# Patient Record
Sex: FEMALE | Race: WHITE | NOT HISPANIC OR LATINO | Employment: OTHER | URBAN - METROPOLITAN AREA
[De-identification: names, ages, dates, MRNs, and addresses within clinical notes are randomized per-mention and may not be internally consistent; named-entity substitution may affect disease eponyms.]

---

## 2017-01-03 ENCOUNTER — TELEPHONE (OUTPATIENT)
Dept: CARDIOLOGY | Facility: CLINIC | Age: 77
End: 2017-01-03

## 2017-01-03 NOTE — TELEPHONE ENCOUNTER
----- Message from Leonilagreg Arredondo sent at 1/3/2017  4:23 PM CST -----  Pt needs a refill on her blood pressure meds called into [facility] [phone] and states the pharmacy sent it over on last Thursday.Pt states  she will run out tomorrow//please call [phone]

## 2017-01-06 RX ORDER — LISINOPRIL 40 MG/1
40 TABLET ORAL DAILY
Qty: 30 TABLET | Refills: 3 | Status: SHIPPED | OUTPATIENT
Start: 2017-01-06 | End: 2017-02-07 | Stop reason: SDUPTHER

## 2017-02-07 RX ORDER — LISINOPRIL 40 MG/1
TABLET ORAL
Qty: 30 TABLET | Refills: 0 | Status: SHIPPED | OUTPATIENT
Start: 2017-02-07 | End: 2017-03-07 | Stop reason: SDUPTHER

## 2017-03-09 RX ORDER — LISINOPRIL 40 MG/1
TABLET ORAL
Qty: 30 TABLET | Refills: 0 | Status: SHIPPED | OUTPATIENT
Start: 2017-03-09 | End: 2017-04-10 | Stop reason: SDUPTHER

## 2017-03-20 ENCOUNTER — HOSPITAL ENCOUNTER (OUTPATIENT)
Dept: RADIOLOGY | Facility: HOSPITAL | Age: 77
Discharge: HOME | End: 2017-03-20
Attending: FAMILY MEDICINE
Payer: MEDICARE

## 2017-03-20 ENCOUNTER — OFFICE VISIT (OUTPATIENT)
Dept: URGENT CARE | Facility: CLINIC | Age: 77
End: 2017-03-20
Payer: MEDICARE

## 2017-03-20 VITALS
HEIGHT: 65 IN | SYSTOLIC BLOOD PRESSURE: 126 MMHG | OXYGEN SATURATION: 97 % | TEMPERATURE: 97 F | DIASTOLIC BLOOD PRESSURE: 72 MMHG | WEIGHT: 191.38 LBS | BODY MASS INDEX: 31.89 KG/M2 | RESPIRATION RATE: 20 BRPM | HEART RATE: 79 BPM

## 2017-03-20 DIAGNOSIS — R05.9 COUGH: Primary | ICD-10-CM

## 2017-03-20 DIAGNOSIS — R05.9 COUGH: ICD-10-CM

## 2017-03-20 DIAGNOSIS — J01.00 ACUTE MAXILLARY SINUSITIS, RECURRENCE NOT SPECIFIED: ICD-10-CM

## 2017-03-20 PROCEDURE — 99999 PR PBB SHADOW E&M-EST. PATIENT-LVL V: CPT | Mod: PBBFAC,,, | Performed by: PHYSICIAN ASSISTANT

## 2017-03-20 PROCEDURE — 71020 XR CHEST PA AND LATERAL: CPT | Mod: 26,,, | Performed by: RADIOLOGY

## 2017-03-20 PROCEDURE — 99214 OFFICE O/P EST MOD 30 MIN: CPT | Mod: S$PBB,,, | Performed by: PHYSICIAN ASSISTANT

## 2017-03-20 PROCEDURE — 71020 XR CHEST PA AND LATERAL: CPT | Mod: TC,PO

## 2017-03-20 RX ORDER — BENZONATATE 200 MG/1
200 CAPSULE ORAL 3 TIMES DAILY PRN
Qty: 30 CAPSULE | Refills: 0 | Status: SHIPPED | OUTPATIENT
Start: 2017-03-20 | End: 2017-03-30

## 2017-03-20 RX ORDER — AMOXICILLIN AND CLAVULANATE POTASSIUM 875; 125 MG/1; MG/1
1 TABLET, FILM COATED ORAL 2 TIMES DAILY
Qty: 14 TABLET | Refills: 0 | Status: SHIPPED | OUTPATIENT
Start: 2017-03-20 | End: 2017-03-27

## 2017-03-20 RX ORDER — MECLIZINE HCL 12.5 MG 12.5 MG/1
12.5 TABLET ORAL
COMMUNITY
Start: 2016-12-20 | End: 2017-08-17

## 2017-03-20 RX ORDER — FLUTICASONE PROPIONATE 50 MCG
2 SPRAY, SUSPENSION (ML) NASAL
COMMUNITY
Start: 2017-01-24 | End: 2017-09-26 | Stop reason: CLARIF

## 2017-03-20 RX ORDER — TIZANIDINE 4 MG/1
4 TABLET ORAL NIGHTLY
Status: ON HOLD | COMMUNITY
Start: 2016-10-05 | End: 2023-08-17 | Stop reason: HOSPADM

## 2017-03-20 NOTE — PATIENT INSTRUCTIONS
Rest  Drink plenty of clear fluids--at least 64 ounces of water/juice  Normal saline nasal wash to irrigate sinuses and for congestion/runny nose  Cool mist humidifier/vaporizer  Practice good handwashing  Zyrtec or Claritin to help dry mucus and post nasal drip  Flonase to help dry mucus and post nasal drip  Mucinex or Mucinex DM for cough and chest congestion  Tylenol or Ibuprofen for fever, headache and body aches  Warm salt water gargles for throat comfort  Chloraseptic spray or lozenges for throat comfort  See PCP or go to ER if symptoms worsen or fail to improve with treatment.            Sinusitis (Antibiotic Treatment)    The sinuses are air-filled spaces within the bones of the face. They connect to the inside of the nose. Sinusitis is an inflammation of the tissue lining the sinus cavity. Sinus inflammation can occur during a cold. It can also be due to allergies to pollens and other particles in the air. Sinusitis can cause symptoms of sinus congestion and fullness. A sinus infection causes fever, headache and facial pain. There is often green or yellow drainage from the nose or into the back of the throat (post-nasal drip). You have been given antibiotics to treat this condition.  Home care:  · Take the full course of antibiotics as instructed. Do not stop taking them, even if you feel better.  · Drink plenty of water, hot tea, and other liquids. This may help thin mucus. It also may promote sinus drainage.  · Heat may help soothe painful areas of the face. Use a towel soaked in hot water. Or,  the shower and direct the hot spray onto your face. Using a vaporizer along with a menthol rub at night may also help.   · An expectorant containing guaifenesin may help thin the mucus and promote drainage from the sinuses.  · Over-the-counter decongestants may be used unless a similar medicine was prescribed. Nasal sprays work the fastest. Use one that contains phenylephrine or oxymetazoline. First  blow the nose gently. Then use the spray. Do not use these medicines more often than directed on the label or symptoms may get worse. You may also use tablets containing pseudoephedrine. Avoid products that combine ingredients, because side effects may be increased. Read labels. You can also ask the pharmacist for help. (NOTE: Persons with high blood pressure should not use decongestants. They can raise blood pressure.)  · Over-the-counter antihistamines may help if allergies contributed to your sinusitis.    · Do not use nasal rinses or irrigation during an acute sinus infection, unless told to by your health care provider. Rinsing may spread the infection to other sinuses.  · Use acetaminophen or ibuprofen to control pain, unless another pain medicine was prescribed. (If you have chronic liver or kidney disease or ever had a stomach ulcer, talk with your doctor before using these medicines. Aspirin should never be used in anyone under 18 years of age who is ill with a fever. It may cause severe liver damage.)  · Don't smoke. This can worsen symptoms.  Follow-up care  Follow up with your healthcare provider or our staff if you are not improving within the next week.  When to seek medical advice  Call your healthcare provider if any of these occur:  · Facial pain or headache becoming more severe  · Stiff neck  · Unusual drowsiness or confusion  · Swelling of the forehead or eyelids  · Vision problems, including blurred or double vision  · Fever of 100.4ºF (38ºC) or higher, or as directed by your healthcare provider  · Seizure  · Breathing problems  · Symptoms not resolving within 10 days  Date Last Reviewed: 4/13/2015  © 7325-0426 [employer]. [address] [place] [address], [place] [address]. All rights reserved. This information is not intended as a substitute for professional medical care. Always follow your healthcare professional's instructions.

## 2017-03-20 NOTE — PROGRESS NOTES
"Subjective:    Patient ID: Rebecca Rios is a 76 y.o. female.    Chief Complaint: Cough and Sinus Problem    URI    This is a new problem. The current episode started 1 to 4 weeks ago (1.5 weeks ago). Maximum temperature: subjective. Associated symptoms include congestion, coughing, diarrhea (once last week), headaches, rhinorrhea, sinus pain and sneezing. Pertinent negatives include no ear pain, sore throat, vomiting or wheezing. Treatments tried: Mucinex, Tessalon Perles, Claritin. The treatment provided mild relief.     Review of Systems   Constitutional: Positive for chills. Fever: subjective.   HENT: Positive for congestion, postnasal drip, rhinorrhea and sneezing. Negative for ear pain and sore throat.    Respiratory: Positive for cough. Negative for shortness of breath and wheezing.    Gastrointestinal: Positive for diarrhea (once last week). Negative for vomiting.   Neurological: Positive for headaches.     Objective:   /72 (BP Location: Left arm, Patient Position: Sitting, BP Method: Manual)  Pulse 79  Temp 96.8 °F (36 °C) (Tympanic)   Resp 20  Ht 5' 5" (1.651 m)  Wt 86.8 kg (191 lb 5.8 oz)  SpO2 97%  BMI 31.84 kg/m2    Physical Exam   Constitutional: She is oriented to person, place, and time. She appears well-developed and well-nourished.   HENT:   Head: Normocephalic and atraumatic.   Right Ear: Hearing, tympanic membrane, external ear and ear canal normal.   Left Ear: Hearing, tympanic membrane, external ear and ear canal normal.   Nose: Rhinorrhea present. Right sinus exhibits maxillary sinus tenderness. Left sinus exhibits maxillary sinus tenderness.   Mouth/Throat: Uvula is midline and oropharynx is clear and moist. No oropharyngeal exudate or posterior oropharyngeal erythema.   Eyes: Conjunctivae and EOM are normal.   Neck: Normal range of motion. Neck supple.   Cardiovascular: Normal rate, regular rhythm and normal heart sounds.    Pulmonary/Chest: Effort normal and breath sounds " normal. No respiratory distress. She has no wheezes.   Musculoskeletal: Normal range of motion.   Neurological: She is alert and oriented to person, place, and time.   Skin: Skin is warm and dry.   Nursing note and vitals reviewed.    Assessment:     1. Cough    2. Acute maxillary sinusitis, recurrence not specified      Plan:   Cough  -     X-Ray Chest PA And Lateral; Future; Expected date: 3/20/17  Findings: Heart size is normal.  Aorta is mildly tortuous and calcified.  Lungs are clear bilaterally.  Degenerative changes in the spine and shoulder girdle.    Acute maxillary sinusitis, recurrence not specified  -     amoxicillin-clavulanate 875-125mg (AUGMENTIN) 875-125 mg per tablet; Take 1 tablet by mouth 2 (two) times daily.  Dispense: 14 tablet; Refill: 0  -     benzonatate (TESSALON) 200 MG capsule; Take 1 capsule (200 mg total) by mouth 3 (three) times daily as needed for Cough.  Dispense: 30 capsule; Refill: 0      Discussed xray results with patient.  Rest  Drink plenty of clear fluids--at least 64 ounces of water/juice  Normal saline nasal wash to irrigate sinuses and for congestion/runny nose  Cool mist humidifier/vaporizer  Practice good handwashing  Zyrtec or Claritin to help dry mucus and post nasal drip  Flonase to help dry mucus and post nasal drip  Mucinex or Mucinex DM for cough and chest congestion  Tylenol or Ibuprofen for fever, headache and body aches  Warm salt water gargles for throat comfort  Chloraseptic spray or lozenges for throat comfort    If symptoms worsen or fail to improve, follow-up with primary care doctor or nearest ER. After visit summary given and discussed.  Patient verbalized understanding and agrees with treatment plan.  Patient remained stable and was discharged in no acute distress.

## 2017-03-20 NOTE — MR AVS SNAPSHOT
[facility]  [address]  [facility] [address]  Phone: [phone]  Fax: [phone]                  Rebecca Rios   3/20/2017 2:40 PM   Office Visit    Description:  Female : 1940   Provider:  Yolette Gillespie PA-C   Department:  [facility]           Reason for Visit     Cough     Sinus Problem           Diagnoses this Visit        Comments    Cough    -  Primary     Acute maxillary sinusitis, recurrence not specified                To Do List           Goals (5 Years of Data)     None       These Medications        Disp Refills Start End    amoxicillin-clavulanate 875-125mg (AUGMENTIN) 875-125 mg per tablet 14 tablet 0 3/20/2017 3/27/2017    Take 1 tablet by mouth 2 (two) times daily. - Oral    Pharmacy: [address] - [address] Ph #: [phone]       benzonatate (TESSALON) 200 MG capsule 30 capsule 0 3/20/2017 3/30/2017    Take 1 capsule (200 mg total) by mouth 3 (three) times daily as needed for Cough. - Oral    Pharmacy: [address] - [address] Ph #: [phone]         [facility]     [facility] On Call Nurse Care Line -  Assistance  Registered nurses in the [facility] provide clinical advisement, health education, appointment booking, and other advisory services.  Call for this free service at [phone].             Medications           Message regarding Medications     Verify the changes and/or additions to your medication regime listed below are the same as discussed with your clinician today.  If any of these changes or additions are incorrect, please notify your healthcare provider.        START taking these NEW medications        Refills    amoxicillin-clavulanate 875-125mg (AUGMENTIN) 875-125 mg per tablet 0    Sig: Take 1 tablet by mouth 2 (two) times daily.    Class: Normal    Route: Oral    benzonatate  (TESSALON) 200 MG capsule 0    Sig: Take 1 capsule (200 mg total) by mouth 3 (three) times daily as needed for Cough.    Class: Normal    Route: Oral      STOP taking these medications     citalopram (CELEXA) 10 MG tablet Take 1 tablet (10 mg total) by mouth once daily.           Verify that the below list of medications is an accurate representation of the medications you are currently taking.  If none reported, the list may be blank. If incorrect, please contact your healthcare provider. Carry this list with you in case of emergency.           Current Medications     alprazolam (XANAX) 0.5 MG tablet Take 0.5 mg by mouth 2 (two) times daily as needed for Anxiety.    aspirin (ECOTRIN) 81 MG EC tablet Take 1 tablet (81 mg total) by mouth once daily.    duloxetine (CYMBALTA) 30 MG capsule Take 30 mg by mouth.    fluticasone (FLONASE) 50 mcg/actuation nasal spray 2 sprays.    gabapentin (NEURONTIN) 600 MG tablet Take 600 mg by mouth 2 (two) times daily.    hydrochlorothiazide (MICROZIDE) 12.5 mg capsule TAKE ONE CAPSULE BY MOUTH ONCE DAILY    hydrocodone-acetaminophen 10-325mg (NORCO)  mg Tab Take 1 tablet by mouth every 6 (six) hours as needed.    levothyroxine (SYNTHROID) 75 MCG tablet Take 1 tablet (75 mcg total) by mouth once daily.    lisinopril (PRINIVIL,ZESTRIL) 40 MG tablet TAKE ONE TABLET BY MOUTH ONCE DAILY    meclizine (ANTIVERT) 12.5 mg tablet Take 12.5 mg by mouth.    pantoprazole (PROTONIX) 40 MG tablet Take 40 mg by mouth once daily.    tizanidine (ZANAFLEX) 4 MG tablet Take 4 mg by mouth.    amoxicillin-clavulanate 875-125mg (AUGMENTIN) 875-125 mg per tablet Take 1 tablet by mouth 2 (two) times daily.    benzonatate (TESSALON) 200 MG capsule Take 1 capsule (200 mg total) by mouth 3 (three) times daily as needed for Cough.    diphenoxylate-atropine 2.5-0.025 mg (LOMOTIL) 2.5-0.025 mg per tablet Take 1 tablet by mouth 4 (four) times daily as needed for Diarrhea.    meloxicam (MOBIC) 7.5 MG tablet  "Take 7.5 mg by mouth 2 (two) times daily.     nitroGLYCERIN (NITROSTAT) 0.4 MG SL tablet Place 1 tablet (0.4 mg total) under the tongue every 5 (five) minutes as needed for Chest pain.           Clinical Reference Information           Your Vitals Were     BP Pulse Temp Resp Height Weight    126/72 (BP Location: Left arm, Patient Position: Sitting, BP Method: Manual) 79 96.8 °F (36 °C) (Tympanic) 20 5' 5" (1.651 m) 86.8 kg (191 lb 5.8 oz)    SpO2 BMI             97% 31.84 kg/m2         Blood Pressure          Most Recent Value    BP  126/72      Allergies as of 3/20/2017     Erythromycin    Macrolide Antibiotics      Immunizations Administered on Date of Encounter - 3/20/2017     None      Orders Placed During Today's Visit     Future Labs/Procedures Expected by Expires    X-Ray Chest PA And Lateral  3/20/2017 3/20/2018      Instructions        Rest  Drink plenty of clear fluids--at least 64 ounces of water/juice  Normal saline nasal wash to irrigate sinuses and for congestion/runny nose  Cool mist humidifier/vaporizer  Practice good handwashing  Zyrtec or Claritin to help dry mucus and post nasal drip  Flonase to help dry mucus and post nasal drip  Mucinex or Mucinex DM for cough and chest congestion  Tylenol or Ibuprofen for fever, headache and body aches  Warm salt water gargles for throat comfort  Chloraseptic spray or lozenges for throat comfort  See PCP or go to ER if symptoms worsen or fail to improve with treatment.            Sinusitis (Antibiotic Treatment)    The sinuses are air-filled spaces within the bones of the face. They connect to the inside of the nose. Sinusitis is an inflammation of the tissue lining the sinus cavity. Sinus inflammation can occur during a cold. It can also be due to allergies to pollens and other particles in the air. Sinusitis can cause symptoms of sinus congestion and fullness. A sinus infection causes fever, headache and facial pain. There is often green or yellow drainage " from the nose or into the back of the throat (post-nasal drip). You have been given antibiotics to treat this condition.  Home care:  · Take the full course of antibiotics as instructed. Do not stop taking them, even if you feel better.  · Drink plenty of water, hot tea, and other liquids. This may help thin mucus. It also may promote sinus drainage.  · Heat may help soothe painful areas of the face. Use a towel soaked in hot water. Or,  the shower and direct the hot spray onto your face. Using a vaporizer along with a menthol rub at night may also help.   · An expectorant containing guaifenesin may help thin the mucus and promote drainage from the sinuses.  · Over-the-counter decongestants may be used unless a similar medicine was prescribed. Nasal sprays work the fastest. Use one that contains phenylephrine or oxymetazoline. First blow the nose gently. Then use the spray. Do not use these medicines more often than directed on the label or symptoms may get worse. You may also use tablets containing pseudoephedrine. Avoid products that combine ingredients, because side effects may be increased. Read labels. You can also ask the pharmacist for help. (NOTE: Persons with high blood pressure should not use decongestants. They can raise blood pressure.)  · Over-the-counter antihistamines may help if allergies contributed to your sinusitis.    · Do not use nasal rinses or irrigation during an acute sinus infection, unless told to by your health care provider. Rinsing may spread the infection to other sinuses.  · Use acetaminophen or ibuprofen to control pain, unless another pain medicine was prescribed. (If you have chronic liver or kidney disease or ever had a stomach ulcer, talk with your doctor before using these medicines. Aspirin should never be used in anyone under 18 years of age who is ill with a fever. It may cause severe liver damage.)  · Don't smoke. This can worsen symptoms.  Follow-up care  Follow up  with your healthcare provider or our staff if you are not improving within the next week.  When to seek medical advice  Call your healthcare provider if any of these occur:  · Facial pain or headache becoming more severe  · Stiff neck  · Unusual drowsiness or confusion  · Swelling of the forehead or eyelids  · Vision problems, including blurred or double vision  · Fever of 100.4ºF (38ºC) or higher, or as directed by your healthcare provider  · Seizure  · Breathing problems  · Symptoms not resolving within 10 days  Date Last Reviewed: 4/13/2015 © 2000-2016 [employer]. [address] [place] [address]. All rights reserved. This information is not intended as a substitute for professional medical care. Always follow your healthcare professional's instructions.             Language Assistance Services     ATTENTION: Language assistance services are available, free of charge. Please call [phone].      ATENCIÓN: Si habla chelsea, tiene a mei disposición servicios gratuitos de asistencia lingüística. Llame al [phone].     CHÚ Ý: N?u b?n nói Ti?ng Vi?t, có các d?ch v? h? tr? ngôn ng? mi?n phí dành cho b?n. G?i s? [phone].         [facility] complies with applicable Federal civil rights laws and does not discriminate on the basis of race, color, national origin, age, disability, or sex.

## 2017-03-31 ENCOUNTER — PATIENT MESSAGE (OUTPATIENT)
Dept: CARDIOLOGY | Facility: CLINIC | Age: 77
End: 2017-03-31

## 2017-04-10 RX ORDER — LISINOPRIL 40 MG/1
TABLET ORAL
Qty: 30 TABLET | Refills: 0 | Status: SHIPPED | OUTPATIENT
Start: 2017-04-10 | End: 2017-05-11 | Stop reason: SDUPTHER

## 2017-04-11 DIAGNOSIS — E03.9 HYPOTHYROIDISM, UNSPECIFIED TYPE: ICD-10-CM

## 2017-04-12 RX ORDER — LEVOTHYROXINE SODIUM 75 UG/1
TABLET ORAL
Qty: 30 TABLET | Refills: 0 | Status: SHIPPED | OUTPATIENT
Start: 2017-04-12 | End: 2017-05-11 | Stop reason: SDUPTHER

## 2017-05-11 DIAGNOSIS — E03.9 HYPOTHYROIDISM, UNSPECIFIED TYPE: ICD-10-CM

## 2017-05-12 RX ORDER — LISINOPRIL 40 MG/1
TABLET ORAL
Qty: 30 TABLET | Refills: 0 | Status: SHIPPED | OUTPATIENT
Start: 2017-05-12 | End: 2017-05-16 | Stop reason: SDUPTHER

## 2017-05-12 RX ORDER — LEVOTHYROXINE SODIUM 75 UG/1
TABLET ORAL
Qty: 30 TABLET | Refills: 0 | Status: SHIPPED | OUTPATIENT
Start: 2017-05-12 | End: 2017-08-28 | Stop reason: SDUPTHER

## 2017-05-16 DIAGNOSIS — I10 ESSENTIAL HYPERTENSION: ICD-10-CM

## 2017-05-16 DIAGNOSIS — I25.119 CORONARY ARTERY DISEASE INVOLVING NATIVE CORONARY ARTERY OF NATIVE HEART WITH ANGINA PECTORIS: Primary | ICD-10-CM

## 2017-05-16 RX ORDER — LISINOPRIL 40 MG/1
40 TABLET ORAL DAILY
Qty: 90 TABLET | Refills: 3 | Status: SHIPPED | OUTPATIENT
Start: 2017-05-16 | End: 2018-03-30 | Stop reason: SDUPTHER

## 2017-05-17 ENCOUNTER — OFFICE VISIT (OUTPATIENT)
Dept: URGENT CARE | Facility: CLINIC | Age: 77
End: 2017-05-17
Payer: MEDICARE

## 2017-05-17 VITALS
TEMPERATURE: 97 F | HEIGHT: 65 IN | SYSTOLIC BLOOD PRESSURE: 138 MMHG | RESPIRATION RATE: 18 BRPM | OXYGEN SATURATION: 97 % | HEART RATE: 62 BPM | DIASTOLIC BLOOD PRESSURE: 66 MMHG | BODY MASS INDEX: 33.04 KG/M2 | WEIGHT: 198.31 LBS
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
## 2017-05-17 DIAGNOSIS — S51.811A SKIN TEAR OF RIGHT FOREARM WITHOUT COMPLICATION, INITIAL ENCOUNTER: ICD-10-CM

## 2017-05-17 PROBLEM — M54.50 CHRONIC LOW BACK PAIN: Status: ACTIVE | Noted: 2017-05-17

## 2017-05-17 PROBLEM — M79.7 FIBROMYALGIA: Status: ACTIVE | Noted: 2017-05-17

## 2017-05-17 PROBLEM — M19.90 OSTEOARTHRITIS: Status: ACTIVE | Noted: 2017-05-17

## 2017-05-17 PROBLEM — G89.29 CHRONIC LOW BACK PAIN: Status: ACTIVE | Noted: 2017-05-17

## 2017-05-17 PROCEDURE — 99214 OFFICE O/P EST MOD 30 MIN: CPT | Mod: S$PBB,,, | Performed by: NURSE PRACTITIONER

## 2017-05-17 PROCEDURE — 99214 OFFICE O/P EST MOD 30 MIN: CPT | Mod: PBBFAC,PO | Performed by: NURSE PRACTITIONER

## 2017-05-17 PROCEDURE — 99999 PR PBB SHADOW E&M-EST. PATIENT-LVL IV: CPT | Mod: PBBFAC,,, | Performed by: NURSE PRACTITIONER

## 2017-05-17 RX ORDER — ESOMEPRAZOLE MAGNESIUM 40 MG/1
40 CAPSULE, DELAYED RELEASE ORAL DAILY
COMMUNITY
Start: 2017-05-02

## 2017-05-17 RX ORDER — LORATADINE 10 MG/1
10 TABLET ORAL
COMMUNITY
Start: 2017-04-28 | End: 2017-09-26 | Stop reason: CLARIF

## 2017-05-17 RX ORDER — AMOXICILLIN AND CLAVULANATE POTASSIUM 875; 125 MG/1; MG/1
1 TABLET, FILM COATED ORAL 2 TIMES DAILY
Qty: 20 TABLET | Refills: 0 | Status: SHIPPED | OUTPATIENT
Start: 2017-05-17 | End: 2017-05-27

## 2017-05-17 RX ORDER — MUPIROCIN 20 MG/G
OINTMENT TOPICAL 3 TIMES DAILY
Qty: 30 G | Refills: 0 | Status: SHIPPED | OUTPATIENT
Start: 2017-05-17 | End: 2017-05-27

## 2017-05-17 RX ORDER — BUPROPION HYDROCHLORIDE 150 MG/1
150 TABLET, EXTENDED RELEASE ORAL 2 TIMES DAILY
COMMUNITY
End: 2020-09-22

## 2017-05-17 NOTE — MR AVS SNAPSHOT
[facility]  [address]  [facility] [address]  Phone: [phone]  Fax: [phone]                  Reebcca Rios   2017 4:10 PM   Office Visit    Description:  Female : 1940   Provider:  Zuleika Tinsley NP   Department:  [facility]           Reason for Visit     Abrasion           Diagnoses this Visit        Comments    Dog scratch    -  Primary            To Do List           Future Appointments        Provider Department Dept Phone    2017 1:30 PM Celestino Joyce MD [facility] - Ophthalmology [phone]      Goals (5 Years of Data)     None       These Medications        Disp Refills Start End    amoxicillin-clavulanate 875-125mg (AUGMENTIN) 875-125 mg per tablet 20 tablet 0 2017    Take 1 tablet by mouth 2 (two) times daily. - Oral    Pharmacy: [address] [address] [place] [address] Ph #: [phone]       mupirocin (BACTROBAN) 2 % ointment 30 g 0 2017    Apply topically 3 (three) times daily. - Topical (Top)    Pharmacy: [address] [id number] [place] [address] Ph #: [phone]         [facility]     [facility] - 24/7 Assistance  Unless otherwise directed by your provider, please contact [facility], our nurse care line that is available for 24/7 assistance.     Registered nurses in the [facility] provide: appointment scheduling, clinical advisement, health education, and other advisory services.  Call: [phone] (toll free)               Medications           Message regarding Medications     Verify the changes and/or additions to your medication regime listed below are the same as discussed with your clinician today.  If any of these changes or additions are incorrect, please notify your healthcare provider.        START taking these NEW medications        Refills     amoxicillin-clavulanate 875-125mg (AUGMENTIN) 875-125 mg per tablet 0    Sig: Take 1 tablet by mouth 2 (two) times daily.    Class: Normal    Route: Oral    mupirocin (BACTROBAN) 2 % ointment 0    Sig: Apply topically 3 (three) times daily.    Class: Normal    Route: Topical (Top)      STOP taking these medications     duloxetine (CYMBALTA) 30 MG capsule Take 30 mg by mouth.    pantoprazole (PROTONIX) 40 MG tablet Take 40 mg by mouth once daily.    meloxicam (MOBIC) 7.5 MG tablet Take 7.5 mg by mouth 2 (two) times daily.            Verify that the below list of medications is an accurate representation of the medications you are currently taking.  If none reported, the list may be blank. If incorrect, please contact your healthcare provider. Carry this list with you in case of emergency.           Current Medications     alprazolam (XANAX) 0.5 MG tablet Take 0.5 mg by mouth 2 (two) times daily as needed for Anxiety.    amoxicillin-clavulanate 875-125mg (AUGMENTIN) 875-125 mg per tablet Take 1 tablet by mouth 2 (two) times daily.    aspirin (ECOTRIN) 81 MG EC tablet Take 1 tablet (81 mg total) by mouth once daily.    buPROPion (WELLBUTRIN SR) 150 MG TBSR 12 hr tablet Take 150 mg by mouth.    diphenoxylate-atropine 2.5-0.025 mg (LOMOTIL) 2.5-0.025 mg per tablet Take 1 tablet by mouth 4 (four) times daily as needed for Diarrhea.    esomeprazole (NEXIUM) 40 MG capsule Take 40 mg by mouth.    fluticasone (FLONASE) 50 mcg/actuation nasal spray 2 sprays.    gabapentin (NEURONTIN) 600 MG tablet Take 600 mg by mouth 2 (two) times daily.    hydrochlorothiazide (MICROZIDE) 12.5 mg capsule TAKE ONE CAPSULE BY MOUTH ONCE DAILY    hydrocodone-acetaminophen 10-325mg (NORCO)  mg Tab Take 1 tablet by mouth every 6 (six) hours as needed.    levothyroxine (SYNTHROID) 75 MCG tablet TAKE ONE TABLET BY MOUTH ONCE DAILY    lisinopril (PRINIVIL,ZESTRIL) 40 MG tablet Take 1 tablet (40 mg total) by mouth once daily.    loratadine  "(CLARITIN) 10 mg tablet Take 10 mg by mouth.    meclizine (ANTIVERT) 12.5 mg tablet Take 12.5 mg by mouth.    mupirocin (BACTROBAN) 2 % ointment Apply topically 3 (three) times daily.    nitroGLYCERIN (NITROSTAT) 0.4 MG SL tablet Place 1 tablet (0.4 mg total) under the tongue every 5 (five) minutes as needed for Chest pain.    tizanidine (ZANAFLEX) 4 MG tablet Take 4 mg by mouth.           Clinical Reference Information           Your Vitals Were     BP Pulse Temp Resp Height Weight    138/66 (BP Location: Right arm, Patient Position: Sitting, BP Method: Manual) 62 97 °F (36.1 °C) (Tympanic) 18 5' 5" (1.651 m) 89.9 kg (198 lb 4.9 oz)    SpO2 BMI             97% 33 kg/m2         Blood Pressure          Most Recent Value    BP  138/66      Allergies as of 5/17/2017     Erythromycin    Macrolide Antibiotics      Immunizations Administered on Date of Encounter - 5/17/2017     None      Instructions        Animal Bites and Scratches     Healthcare provider giving injection in man's arm.     Most bites and scratches from household pets are nothing to worry about. But some bites or scratches can be serious. Others may become infected or pose the risk of rabies. For that reason, it's best to seek medical treatment for all but the most minor bites.  Report severe animal bites to animal control or your local health department.   When to go to the emergency room (ER)  A bite that barely breaks the skin usually isn't cause for concern. But seek emergency medical care if you:  · Have a deep puncture wound or badly torn skin  · Have redness, swelling, or warmth near the wound  · Think you may have a broken bone or other serious injury  · The attack was unprovoked and you don't know the animal (rabies must be ruled out)  · Are bitten by a domestic cat or a wild animal, such as a skunk, raccoon, or bat  · Do not have a spleen or have a suppressed immune system  What to expect in the ER  · The wound will be carefully cleaned and " inspected.  · X-rays may be taken if deep damage is suspected.  · Although not common, infection can occur, especially if you have a cat bite, deep wound, or weakened immune system. You may be given antibiotics to help prevent this.  · You may be given a tetanus shot if you haven't had one in the past 5 years. If rabies is a concern, you may be given shots to protect you.  · If serious tissue or joint damage has been done, you may be referred to a plastic or orthopedic surgeon.  Follow-up  You will likely need to see your doctor within a day or two of receiving emergency medical treatment. In the meantime, watch for signs of infection. These include:  · Fever of 100.4°F (38°C) or higher, or as directed by your healthcare provider  · Swelling  · Redness  · Pus draining from the wound  Date Last Reviewed: 11/30/2014  © 6951-8430 [employer]. [address] [place] [address]. All rights reserved. This information is not intended as a substitute for professional medical care. Always follow your healthcare professional's instructions.             Language Assistance Services     ATTENTION: Language assistance services are available, free of charge. Please call [phone].      ATENCIÓN: Si habla español, tiene a mei disposición servicios gratuitos de asistencia lingüística. Llame al [phone].     CHÚ Ý: N?u b?n nói Ti?ng Vi?t, có các d?ch v? h? tr? ngôn ng? mi?n phí dành cho b?n. G?i s? [phone].         [facility] complies with applicable Federal civil rights laws and does not discriminate on the basis of race, color, national origin, age, disability, or sex.

## 2017-05-17 NOTE — PATIENT INSTRUCTIONS
Animal Bites and Scratches     Healthcare provider giving injection in man's arm.     Most bites and scratches from household pets are nothing to worry about. But some bites or scratches can be serious. Others may become infected or pose the risk of rabies. For that reason, it's best to seek medical treatment for all but the most minor bites.  Report severe animal bites to animal control or your local health department.   When to go to the emergency room (ER)  A bite that barely breaks the skin usually isn't cause for concern. But seek emergency medical care if you:  · Have a deep puncture wound or badly torn skin  · Have redness, swelling, or warmth near the wound  · Think you may have a broken bone or other serious injury  · The attack was unprovoked and you don't know the animal (rabies must be ruled out)  · Are bitten by a domestic cat or a wild animal, such as a skunk, raccoon, or bat  · Do not have a spleen or have a suppressed immune system  What to expect in the ER  · The wound will be carefully cleaned and inspected.  · X-rays may be taken if deep damage is suspected.  · Although not common, infection can occur, especially if you have a cat bite, deep wound, or weakened immune system. You may be given antibiotics to help prevent this.  · You may be given a tetanus shot if you haven't had one in the past 5 years. If rabies is a concern, you may be given shots to protect you.  · If serious tissue or joint damage has been done, you may be referred to a plastic or orthopedic surgeon.  Follow-up  You will likely need to see your doctor within a day or two of receiving emergency medical treatment. In the meantime, watch for signs of infection. These include:  · Fever of 100.4°F (38°C) or higher, or as directed by your healthcare provider  · Swelling  · Redness  · Pus draining from the wound  Date Last Reviewed: 11/30/2014  © 5238-0017 [employer]. [address] [place] [address], [place] [address]. All  rights reserved. This information is not intended as a substitute for professional medical care. Always follow your healthcare professional's instructions.

## 2017-05-17 NOTE — PROGRESS NOTES
Subjective:       Patient ID: Rebecca Rios is a 76 y.o. female.    Chief Complaint: Abrasion (Right arm )    HPI Comments: Patient here due to dog scratch (family dog) to the right FA that happened yesterday, she cleaned the area with peroxide. There is some pain. Dog immunizations UTD per patient. Patient had last TD 2 years ago    Arm Injury    The pain is present in the right forearm. The pain is mild. Pertinent negatives include no numbness or tingling. The symptoms are aggravated by palpation.     Review of Systems   Constitutional: Negative for chills, diaphoresis, fatigue and fever.   Skin: Positive for wound.   Neurological: Negative.  Negative for tingling and numbness.       Objective:      Physical Exam   Constitutional: She is oriented to person, place, and time. She appears well-developed and well-nourished.  Non-toxic appearance. She does not have a sickly appearance. She does not appear ill. No distress.   Pulmonary/Chest: Effort normal.   Neurological: She is alert and oriented to person, place, and time.   Skin: Skin is warm. Abrasion noted. She is not diaphoretic.   RFA- superficial, skin flap, surrounding erythema, mild tenderness; no obvious FB, normal ROM of right arm Neurovascular intact       Assessment:       1. Dog scratch    2. Skin tear of right forearm without complication, initial encounter            Plan:   Rebecca was seen today for abrasion.    Diagnoses and all orders for this visit:    Dog scratch  -     amoxicillin-clavulanate 875-125mg (AUGMENTIN) 875-125 mg per tablet; Take 1 tablet by mouth 2 (two) times daily.  -     mupirocin (BACTROBAN) 2 % ointment; Apply topically 3 (three) times daily.    Skin tear of right forearm without complication, initial encounter         Wound cleaned thoroughly with saline/betadine and 20ml syringe, abx ointment and dressing applied by nurse. Patient counseled on keeping area clean and dry;covered. Advised to observe for worsening  symptoms to  include, increase in redness, increase in swelling, red streaking, fever, increase in pain, etc or if no improvement in 3 days. If any of these should occur, seek care immediately.  -     Diagnosis, treatment, AVS reviewed with patient  -     Patient understands and agrees with plan

## 2017-06-29 ENCOUNTER — HOSPITAL ENCOUNTER (OUTPATIENT)
Dept: RADIOLOGY | Facility: HOSPITAL | Age: 77
Discharge: HOME | End: 2017-06-29
Attending: FAMILY MEDICINE
Payer: MEDICARE

## 2017-06-29 ENCOUNTER — OFFICE VISIT (OUTPATIENT)
Dept: FAMILY MEDICINE | Facility: CLINIC | Age: 77
End: 2017-06-29
Payer: MEDICARE

## 2017-06-29 VITALS
WEIGHT: 190.13 LBS | OXYGEN SATURATION: 95 % | TEMPERATURE: 97 F | DIASTOLIC BLOOD PRESSURE: 76 MMHG | HEIGHT: 65 IN | BODY MASS INDEX: 31.68 KG/M2 | HEART RATE: 83 BPM | SYSTOLIC BLOOD PRESSURE: 142 MMHG

## 2017-06-29 DIAGNOSIS — M54.41 CHRONIC BILATERAL LOW BACK PAIN WITH RIGHT-SIDED SCIATICA: ICD-10-CM

## 2017-06-29 DIAGNOSIS — M17.11 OSTEOARTHRITIS OF RIGHT KNEE, UNSPECIFIED OSTEOARTHRITIS TYPE: ICD-10-CM

## 2017-06-29 DIAGNOSIS — I25.10 CORONARY ARTERY DISEASE INVOLVING NATIVE CORONARY ARTERY OF NATIVE HEART WITHOUT ANGINA PECTORIS: ICD-10-CM

## 2017-06-29 DIAGNOSIS — G89.29 CHRONIC BILATERAL LOW BACK PAIN WITH RIGHT-SIDED SCIATICA: ICD-10-CM

## 2017-06-29 DIAGNOSIS — M25.551 ACUTE RIGHT HIP PAIN: Primary | ICD-10-CM

## 2017-06-29 DIAGNOSIS — M25.551 ACUTE RIGHT HIP PAIN: ICD-10-CM

## 2017-06-29 DIAGNOSIS — G89.29 OTHER CHRONIC PAIN: ICD-10-CM

## 2017-06-29 DIAGNOSIS — N18.9 CHRONIC KIDNEY DISEASE, UNSPECIFIED STAGE: ICD-10-CM

## 2017-06-29 DIAGNOSIS — I10 ESSENTIAL (PRIMARY) HYPERTENSION: ICD-10-CM

## 2017-06-29 DIAGNOSIS — E66.9 OBESITY (BMI 30.0-34.9): ICD-10-CM

## 2017-06-29 DIAGNOSIS — M79.7 FIBROMYALGIA: ICD-10-CM

## 2017-06-29 PROCEDURE — 73560 X-RAY EXAM OF KNEE 1 OR 2: CPT | Mod: 26,59,LT, | Performed by: RADIOLOGY

## 2017-06-29 PROCEDURE — 73560 X-RAY EXAM OF KNEE 1 OR 2: CPT | Mod: TC,PO,LT

## 2017-06-29 PROCEDURE — 73521 X-RAY EXAM HIPS BI 2 VIEWS: CPT | Mod: TC,PO

## 2017-06-29 PROCEDURE — 1159F MED LIST DOCD IN RCRD: CPT | Mod: ,,, | Performed by: FAMILY MEDICINE

## 2017-06-29 PROCEDURE — 73521 X-RAY EXAM HIPS BI 2 VIEWS: CPT | Mod: 26,,, | Performed by: RADIOLOGY

## 2017-06-29 PROCEDURE — 73562 X-RAY EXAM OF KNEE 3: CPT | Mod: 26,RT,, | Performed by: RADIOLOGY

## 2017-06-29 PROCEDURE — 1125F AMNT PAIN NOTED PAIN PRSNT: CPT | Mod: ,,, | Performed by: FAMILY MEDICINE

## 2017-06-29 PROCEDURE — 99999 PR PBB SHADOW E&M-EST. PATIENT-LVL V: CPT | Mod: PBBFAC,,, | Performed by: FAMILY MEDICINE

## 2017-06-29 PROCEDURE — 99214 OFFICE O/P EST MOD 30 MIN: CPT | Mod: S$PBB,,, | Performed by: FAMILY MEDICINE

## 2017-06-29 RX ORDER — KETOROLAC TROMETHAMINE 30 MG/ML
60 INJECTION, SOLUTION INTRAMUSCULAR; INTRAVENOUS
Status: COMPLETED | OUTPATIENT
Start: 2017-06-29 | End: 2017-06-29

## 2017-06-29 RX ORDER — IBUPROFEN 600 MG/1
600 TABLET ORAL EVERY 6 HOURS PRN
Qty: 60 TABLET | Refills: 0 | Status: SHIPPED | OUTPATIENT
Start: 2017-06-29 | End: 2017-09-26 | Stop reason: CLARIF

## 2017-06-29 RX ADMIN — KETOROLAC TROMETHAMINE 60 MG: 60 INJECTION, SOLUTION INTRAMUSCULAR at 03:06

## 2017-06-29 NOTE — PROGRESS NOTES
Allergies reviewed with patient  Pt instructed to wait 15 min for side effect  Kenalog 60 mg given  Left ventrogluteal  Josefina Madrid LPN

## 2017-06-29 NOTE — PROGRESS NOTES
Subjective:       Patient ID: Rebecca Rios is a 76 y.o. female.    Chief Complaint: Hip Pain and Knee Pain      HPI Comments:     This is my first time seeing Ms. Rios:    She has a history of spinal stenosis, low back pain, fibromyalgia, bilateral knee ARTHRITIS, and diffuse chronic pain.  She is followed by rheumatologist at the clinic.  She presents now with one-week history of pain in her right hip radiating down to the right knee is causing her to limp.  She had no falls or injuries, and the pain is worse at night she hasn't been taking anything for it.  In the past she's gotten cortisone shots in her knees.  She has a history of chronic kidney disease but her last renal panel was normal.  She's had no troubles tolerating NSAIDs in the past.      Review of Systems   Constitutional: Positive for activity change. Negative for appetite change, chills and fever.   HENT: Negative for sore throat.    Respiratory: Negative for cough and shortness of breath.    Cardiovascular: Negative for chest pain and leg swelling.   Gastrointestinal: Negative for abdominal pain, constipation and diarrhea.   Genitourinary: Negative for difficulty urinating and dysuria.   Musculoskeletal: Positive for arthralgias, back pain, gait problem and joint swelling. Negative for neck pain and neck stiffness.   Skin: Negative for rash.   Neurological: Negative for dizziness and headaches.   Psychiatric/Behavioral: Positive for sleep disturbance.       Objective:      Physical Exam   Constitutional: She is oriented to person, place, and time. She appears well-developed and well-nourished. No distress.   HENT:   Head: Normocephalic.   Right Ear: External ear normal.   Left Ear: External ear normal.   Mouth/Throat: Oropharynx is clear and moist. No oropharyngeal exudate.   Eyes: Conjunctivae are normal. Right eye exhibits no discharge. Left eye exhibits no discharge. No scleral icterus.   Neck: Normal range of motion. Neck supple. No JVD  present. No thyromegaly present.   Cardiovascular: Normal rate, regular rhythm, normal heart sounds and intact distal pulses.  Exam reveals no gallop and no friction rub.    No murmur heard.  Pulmonary/Chest: Effort normal and breath sounds normal. No respiratory distress. She has no wheezes. She has no rales.   Abdominal: Soft. She exhibits no distension and no mass. There is no tenderness. There is no guarding.   Musculoskeletal: She exhibits no edema.        Right hip: She exhibits tenderness and bony tenderness. She exhibits normal range of motion, normal strength and no crepitus.        Right knee: She exhibits decreased range of motion and swelling. She exhibits no effusion.   Tender over lateral hip joint and over greater trochanter.   Pain with internal rotation of hip     R knee with mild joint line tend   Lymphadenopathy:     She has no cervical adenopathy.   Neurological: She is alert and oriented to person, place, and time.   Skin: Skin is warm and dry. No rash noted. She is not diaphoretic.   Psychiatric: She has a normal mood and affect. Her behavior is normal. Judgment and thought content normal.   Nursing note and vitals reviewed.      Assessment:       1. Acute right hip pain    2. Osteoarthritis of right knee, unspecified osteoarthritis type    3. Chronic kidney disease, unspecified stage    4. Obesity (BMI 30.0-34.9)    5. Coronary artery disease involving native coronary artery of native heart without angina pectoris    6. Fibromyalgia    7. Chronic bilateral low back pain with right-sided sciatica    8. Essential (primary) hypertension    9. Other chronic pain        Plan:   Acute right hip pain  Comments:  Pain with range of motion on exam.  Toradol today.  Ibuprofen at home.  Follow up 2 weeks  Orders:  -     Cancel: X-Ray Hip 2 View Left; Future; Expected date: 06/29/2017  -     X-Ray Hip 2 View Right; Future; Expected date: 06/29/2017  -     ketorolac injection 60 mg; Inject 2 mLs (60 mg  total) into the muscle one time.  -     X-Ray Hips Bilateral 2 View Incl AP Pelvis; Future; Expected date: 06/29/2017    Osteoarthritis of right knee, unspecified osteoarthritis type  Comments:  Chronic, recurrent  Orders:  -     Cancel: X-Ray Knee 3 View Right; Future; Expected date: 06/29/2017  -     ketorolac injection 60 mg; Inject 2 mLs (60 mg total) into the muscle one time.  -     X-ray Knee Ortho Right; Future; Expected date: 06/29/2017    Chronic kidney disease, unspecified stage  Comments:  Currently normal function    Obesity (BMI 30.0-34.9)  Comments:  No recent change    Coronary artery disease involving native coronary artery of native heart without angina pectoris  Comments:  No recent symptoms    Fibromyalgia    Chronic bilateral low back pain with right-sided sciatica  Comments:  History of spinal stenosis    Essential (primary) hypertension  Comments:  stable    Other chronic pain  Comments:  pain managed largely by rheumatologist    Other orders  -     ibuprofen (ADVIL,MOTRIN) 600 MG tablet; Take 1 tablet (600 mg total) by mouth every 6 (six) hours as needed for Pain.  Dispense: 60 tablet; Refill: 0

## 2017-07-14 ENCOUNTER — OFFICE VISIT (OUTPATIENT)
Dept: FAMILY MEDICINE | Facility: CLINIC | Age: 77
End: 2017-07-14
Payer: MEDICARE

## 2017-07-14 ENCOUNTER — LAB VISIT (OUTPATIENT)
Dept: LAB | Facility: HOSPITAL | Age: 77
End: 2017-07-14
Attending: FAMILY MEDICINE
Payer: MEDICARE

## 2017-07-14 VITALS
DIASTOLIC BLOOD PRESSURE: 82 MMHG | WEIGHT: 195.19 LBS | BODY MASS INDEX: 32.52 KG/M2 | SYSTOLIC BLOOD PRESSURE: 164 MMHG | OXYGEN SATURATION: 97 % | HEIGHT: 65 IN | HEART RATE: 70 BPM | TEMPERATURE: 97 F

## 2017-07-14 DIAGNOSIS — E03.9 HYPOTHYROIDISM, UNSPECIFIED TYPE: ICD-10-CM

## 2017-07-14 DIAGNOSIS — I10 ESSENTIAL HYPERTENSION: ICD-10-CM

## 2017-07-14 DIAGNOSIS — I25.10 CORONARY ARTERY DISEASE INVOLVING NATIVE CORONARY ARTERY OF NATIVE HEART WITHOUT ANGINA PECTORIS: ICD-10-CM

## 2017-07-14 DIAGNOSIS — M25.551 CHRONIC RIGHT HIP PAIN: ICD-10-CM

## 2017-07-14 DIAGNOSIS — N18.9 CHRONIC KIDNEY DISEASE, UNSPECIFIED CKD STAGE: ICD-10-CM

## 2017-07-14 DIAGNOSIS — M17.11 OSTEOARTHRITIS OF RIGHT KNEE, UNSPECIFIED OSTEOARTHRITIS TYPE: Primary | ICD-10-CM

## 2017-07-14 DIAGNOSIS — G89.29 CHRONIC RIGHT HIP PAIN: ICD-10-CM

## 2017-07-14 DIAGNOSIS — Z00.00 HEALTHCARE MAINTENANCE: ICD-10-CM

## 2017-07-14 LAB
ALBUMIN SERPL BCP-MCNC: 3.7 G/DL
ALP SERPL-CCNC: 96 U/L
ALT SERPL W/O P-5'-P-CCNC: 12 U/L
ANION GAP SERPL CALC-SCNC: 12 MMOL/L
AST SERPL-CCNC: 19 U/L
BASOPHILS # BLD AUTO: 0.03 K/UL
BASOPHILS NFR BLD: 0.4 %
BILIRUB SERPL-MCNC: 0.4 MG/DL
BUN SERPL-MCNC: 13 MG/DL
CALCIUM SERPL-MCNC: 9.3 MG/DL
CHLORIDE SERPL-SCNC: 103 MMOL/L
CO2 SERPL-SCNC: 28 MMOL/L
CREAT SERPL-MCNC: 1 MG/DL
DIFFERENTIAL METHOD: ABNORMAL
EOSINOPHIL # BLD AUTO: 0.1 K/UL
EOSINOPHIL NFR BLD: 1.9 %
ERYTHROCYTE [DISTWIDTH] IN BLOOD BY AUTOMATED COUNT: 13 %
EST. GFR  (AFRICAN AMERICAN): >60 ML/MIN/1.73 M^2
EST. GFR  (NON AFRICAN AMERICAN): 55.6 ML/MIN/1.73 M^2
GLUCOSE SERPL-MCNC: 94 MG/DL
HCT VFR BLD AUTO: 42.8 %
HGB BLD-MCNC: 13.5 G/DL
LYMPHOCYTES # BLD AUTO: 3.1 K/UL
LYMPHOCYTES NFR BLD: 42.9 %
MCH RBC QN AUTO: 29.5 PG
MCHC RBC AUTO-ENTMCNC: 31.5 %
MCV RBC AUTO: 93 FL
MONOCYTES # BLD AUTO: 0.6 K/UL
MONOCYTES NFR BLD: 8 %
NEUTROPHILS # BLD AUTO: 3.4 K/UL
NEUTROPHILS NFR BLD: 46.7 %
PLATELET # BLD AUTO: 303 K/UL
PMV BLD AUTO: 10.8 FL
POTASSIUM SERPL-SCNC: 3.8 MMOL/L
PROT SERPL-MCNC: 6.9 G/DL
RBC # BLD AUTO: 4.58 M/UL
SODIUM SERPL-SCNC: 143 MMOL/L
TSH SERPL DL<=0.005 MIU/L-ACNC: 2.36 UIU/ML
WBC # BLD AUTO: 7.22 K/UL

## 2017-07-14 PROCEDURE — 80053 COMPREHEN METABOLIC PANEL: CPT

## 2017-07-14 PROCEDURE — 36415 COLL VENOUS BLD VENIPUNCTURE: CPT | Mod: PO

## 2017-07-14 PROCEDURE — 1159F MED LIST DOCD IN RCRD: CPT | Mod: ,,, | Performed by: FAMILY MEDICINE

## 2017-07-14 PROCEDURE — 99214 OFFICE O/P EST MOD 30 MIN: CPT | Mod: S$PBB,,, | Performed by: FAMILY MEDICINE

## 2017-07-14 PROCEDURE — 85025 COMPLETE CBC W/AUTO DIFF WBC: CPT

## 2017-07-14 PROCEDURE — 84443 ASSAY THYROID STIM HORMONE: CPT

## 2017-07-14 PROCEDURE — 99999 PR PBB SHADOW E&M-EST. PATIENT-LVL IV: CPT | Mod: PBBFAC,,, | Performed by: FAMILY MEDICINE

## 2017-07-14 PROCEDURE — 1126F AMNT PAIN NOTED NONE PRSNT: CPT | Mod: ,,, | Performed by: FAMILY MEDICINE

## 2017-07-14 RX ORDER — LIDOCAINE 50 MG/G
1 PATCH TOPICAL DAILY
Qty: 30 PATCH | Refills: 1 | Status: SHIPPED | OUTPATIENT
Start: 2017-07-14 | End: 2017-08-17

## 2017-07-14 RX ORDER — DICLOFENAC SODIUM 10 MG/G
2 GEL TOPICAL DAILY
Qty: 100 G | Refills: 2 | Status: SHIPPED | OUTPATIENT
Start: 2017-07-14 | End: 2017-12-22 | Stop reason: SDUPTHER

## 2017-07-14 RX ORDER — AMLODIPINE BESYLATE 2.5 MG/1
2.5 TABLET ORAL DAILY
Qty: 30 TABLET | Refills: 1 | Status: SHIPPED | OUTPATIENT
Start: 2017-07-14 | End: 2017-08-17 | Stop reason: ALTCHOICE

## 2017-07-14 NOTE — PROGRESS NOTES
Subjective:       Patient ID: Rebecca Rios is a 76 y.o. female.    Chief Complaint: Follow-up      HPI Comments:     Ms. Rios is following up on her hip and knee pain on the right.  About the same, even with 600 mg of ibuprofen she's been taking off and on.  Today her knee is much worse than her hip.  She is asking about Lidoderm patches which have helped in the past.  She's never tried Voltaren gel.    Her blood pressures elevated today and she says she doesn't like to take her hydrochlorothiazide because it makes her urinate all day.  Therefore she hasn't been taking it for a while      Review of Systems   Constitutional: Negative for activity change, appetite change, fatigue and fever.   HENT: Negative for sore throat.    Respiratory: Negative for cough and shortness of breath.    Cardiovascular: Negative for chest pain.   Gastrointestinal: Negative for abdominal pain, diarrhea and nausea.   Genitourinary: Negative for difficulty urinating and dysuria.   Musculoskeletal: Positive for arthralgias, back pain, gait problem and joint swelling.   Neurological: Negative for dizziness and headaches.   Psychiatric/Behavioral: Negative for sleep disturbance.       Objective:      Physical Exam   Constitutional: She is oriented to person, place, and time. She appears well-developed and well-nourished. No distress.   HENT:   Head: Normocephalic.   Right Ear: External ear normal.   Left Ear: External ear normal.   Mouth/Throat: Oropharynx is clear and moist. No oropharyngeal exudate.   Eyes: Conjunctivae are normal. Right eye exhibits no discharge. Left eye exhibits no discharge. No scleral icterus.   Neck: Normal range of motion. Neck supple. No JVD present. No thyromegaly present.   Cardiovascular: Normal rate, regular rhythm, normal heart sounds and intact distal pulses.  Exam reveals no gallop and no friction rub.    No murmur heard.  Pulmonary/Chest: Effort normal and breath sounds normal. No respiratory distress.  She has no wheezes. She has no rales.   Abdominal: Soft. There is no tenderness.   Musculoskeletal: Normal range of motion. She exhibits no edema.        Legs:  Lymphadenopathy:     She has no cervical adenopathy.   Neurological: She is alert and oriented to person, place, and time.   Skin: Skin is warm and dry. No rash noted. She is not diaphoretic.   Psychiatric: She has a normal mood and affect. Her behavior is normal. Judgment and thought content normal.   Nursing note and vitals reviewed.      Assessment:       1. Osteoarthritis of right knee, unspecified osteoarthritis type    2. Chronic kidney disease, unspecified CKD stage    3. Coronary artery disease involving native coronary artery of native heart without angina pectoris    4. Essential hypertension    5. Hypothyroidism, unspecified type    6. Healthcare maintenance    7. Chronic right hip pain        Plan:   Osteoarthritis of right knee, unspecified osteoarthritis type  Comments:  Trial of Voltaren gel.  Steroid injection intra-articular if needed in future.  X-ray shows degenerative changes    Chronic kidney disease, unspecified CKD stage  Comments:  CMP today  Orders:  -     Comprehensive metabolic panel; Future; Expected date: 07/14/2017    Coronary artery disease involving native coronary artery of native heart without angina pectoris  Comments:  Patient was not aware that she had abnormal finding on her nuclear stress test previously.  She had a small defect.  She wants to see cardiology  Orders:  -     Ambulatory consult to Cardiology    Essential hypertension  Comments:  Uncontrolled.  She doesn't like the fluid pill because she urinates all day.  We'll start low-dose amlodipine and recheck in a month. D/C HCTZ  Orders:  -     CBC auto differential; Future; Expected date: 07/14/2017  -     TSH; Future; Expected date: 07/14/2017    Hypothyroidism, unspecified type  Comments:  TSH today    Healthcare maintenance  Comments:  She wants to wait until  the follow-up visit to schedule mammogram    Chronic right hip pain  Comments:  A bit better than last time.  We'll try Lidoderm patch.  X-ray shows mild degenerative changes    Other orders  -     lidocaine (LIDODERM) 5 %; Place 1 patch onto the skin once daily. Remove & Discard patch within 12 hours or as directed by MD  Dispense: 30 patch; Refill: 1  -     diclofenac sodium 1 % Gel; Apply 2 g topically once daily.  Dispense: 100 g; Refill: 2  -     amlodipine (NORVASC) 2.5 MG tablet; Take 1 tablet (2.5 mg total) by mouth once daily.  Dispense: 30 tablet; Refill: 1

## 2017-07-18 ENCOUNTER — TELEPHONE (OUTPATIENT)
Dept: FAMILY MEDICINE | Facility: CLINIC | Age: 77
End: 2017-07-18

## 2017-07-18 NOTE — TELEPHONE ENCOUNTER
----- Message from Barb Padilla sent at 7/18/2017  9:21 AM CDT -----  Contact: [employer]  [employer] [id number] ref#[id number] ,,, calling regarding the laticain patch,,, please call back

## 2017-08-17 ENCOUNTER — CLINICAL SUPPORT (OUTPATIENT)
Dept: CARDIOLOGY | Facility: CLINIC | Age: 77
End: 2017-08-17
Payer: MEDICARE

## 2017-08-17 ENCOUNTER — OFFICE VISIT (OUTPATIENT)
Dept: CARDIOLOGY | Facility: CLINIC | Age: 77
End: 2017-08-17
Payer: MEDICARE

## 2017-08-17 VITALS — SYSTOLIC BLOOD PRESSURE: 160 MMHG | DIASTOLIC BLOOD PRESSURE: 84 MMHG | HEART RATE: 72 BPM | RESPIRATION RATE: 16 BRPM

## 2017-08-17 DIAGNOSIS — I25.10 CAD (CORONARY ARTERY DISEASE): ICD-10-CM

## 2017-08-17 DIAGNOSIS — I27.20 PULMONARY HYPERTENSION: ICD-10-CM

## 2017-08-17 DIAGNOSIS — I25.10 CAD (CORONARY ARTERY DISEASE): Primary | ICD-10-CM

## 2017-08-17 DIAGNOSIS — I25.10 CORONARY ARTERY DISEASE INVOLVING NATIVE CORONARY ARTERY OF NATIVE HEART WITHOUT ANGINA PECTORIS: Primary | ICD-10-CM

## 2017-08-17 DIAGNOSIS — E78.2 MIXED HYPERLIPIDEMIA: ICD-10-CM

## 2017-08-17 DIAGNOSIS — R06.01 ORTHOPNEA: ICD-10-CM

## 2017-08-17 DIAGNOSIS — I10 ESSENTIAL HYPERTENSION: ICD-10-CM

## 2017-08-17 DIAGNOSIS — I50.32 CHRONIC DIASTOLIC HEART FAILURE: ICD-10-CM

## 2017-08-17 PROCEDURE — 3077F SYST BP >= 140 MM HG: CPT | Mod: ,,, | Performed by: INTERNAL MEDICINE

## 2017-08-17 PROCEDURE — 99999 PR PBB SHADOW E&M-EST. PATIENT-LVL II: CPT | Mod: PBBFAC,,, | Performed by: INTERNAL MEDICINE

## 2017-08-17 PROCEDURE — 99212 OFFICE O/P EST SF 10 MIN: CPT | Mod: PBBFAC,PO | Performed by: INTERNAL MEDICINE

## 2017-08-17 PROCEDURE — 1159F MED LIST DOCD IN RCRD: CPT | Mod: ,,, | Performed by: INTERNAL MEDICINE

## 2017-08-17 PROCEDURE — 99214 OFFICE O/P EST MOD 30 MIN: CPT | Mod: S$PBB,,, | Performed by: INTERNAL MEDICINE

## 2017-08-17 PROCEDURE — 3079F DIAST BP 80-89 MM HG: CPT | Mod: ,,, | Performed by: INTERNAL MEDICINE

## 2017-08-17 RX ORDER — AMLODIPINE BESYLATE 5 MG/1
5 TABLET ORAL DAILY
Qty: 30 TABLET | Refills: 11 | Status: SHIPPED | OUTPATIENT
Start: 2017-08-17 | End: 2019-01-11 | Stop reason: SDUPTHER

## 2017-08-17 NOTE — PROGRESS NOTES
Subjective:    Patient ID:  Rebecca Rios is a 76 y.o. female who presents for evaluation of Hyperlipidemia; Hypertension; Coronary Artery Disease; Congestive Heart Failure; and Shortness of Breath      HPI  Pt presents for f/u  She has seen Dr. Bryan in past.  Old chart reviewed.  Has h/o HTN, hyperlipidemia, carries dx of CAD in chart.  Stress mpi 2014 no significant ischemia, fixed anteroapical defect.  Echo 2014 normal EF, DD, mod TR, PHTN.  She denies h/o MI, PCI.  Has some occasional smothering feeling due to stress.  No chest pain sxs.    Has chronic arthritis sxs.  BP above goal      Patient Active Problem List   Diagnosis    Microscopic hematuria    HTN (hypertension)    Obesity (BMI 30.0-34.9)    Hypothyroidism    Hyperlipidemia    MDD (major depressive disorder)    Anxiety    Chronic pain    CAD (coronary artery disease)    Unspecified vitamin D deficiency    Sedative dependence    Restless leg    Osteopenia    Osteoarthritis    Mild episode of recurrent major depressive disorder    Acquired atrophy of thyroid    History of colonic polyps    Fibromyalgia    Essential (primary) hypertension    Diverticular disease of large intestine    Osteoarthritis of spine    Chronic low back pain    Chronic kidney disease    Back pain    Orthopnea    Pulmonary hypertension    Chronic diastolic heart failure     Past Medical History:   Diagnosis Date    Anxiety     Depression     Hot flash, menopausal     HTN (hypertension)     Hypothyroid        Current Outpatient Prescriptions:     alprazolam (XANAX) 0.5 MG tablet, Take 0.5 mg by mouth 2 (two) times daily as needed for Anxiety., Disp: , Rfl:     aspirin (ECOTRIN) 81 MG EC tablet, Take 1 tablet (81 mg total) by mouth once daily., Disp: 30 tablet, Rfl: 2    buPROPion (WELLBUTRIN SR) 150 MG TBSR 12 hr tablet, Take 150 mg by mouth., Disp: , Rfl:     diclofenac sodium 1 % Gel, Apply 2 g topically once daily., Disp: 100 g, Rfl: 2     diphenoxylate-atropine 2.5-0.025 mg (LOMOTIL) 2.5-0.025 mg per tablet, Take 1 tablet by mouth 4 (four) times daily as needed for Diarrhea., Disp: 30 tablet, Rfl: 1    esomeprazole (NEXIUM) 40 MG capsule, Take 40 mg by mouth., Disp: , Rfl:     fluticasone (FLONASE) 50 mcg/actuation nasal spray, 2 sprays., Disp: , Rfl:     gabapentin (NEURONTIN) 600 MG tablet, Take 600 mg by mouth 2 (two) times daily., Disp: , Rfl:     hydrocodone-acetaminophen 10-325mg (NORCO)  mg Tab, Take 1 tablet by mouth every 6 (six) hours as needed., Disp: , Rfl:     ibuprofen (ADVIL,MOTRIN) 600 MG tablet, Take 1 tablet (600 mg total) by mouth every 6 (six) hours as needed for Pain., Disp: 60 tablet, Rfl: 0    levothyroxine (SYNTHROID) 75 MCG tablet, TAKE ONE TABLET BY MOUTH ONCE DAILY, Disp: 30 tablet, Rfl: 0    lisinopril (PRINIVIL,ZESTRIL) 40 MG tablet, Take 1 tablet (40 mg total) by mouth once daily., Disp: 90 tablet, Rfl: 3    loratadine (CLARITIN) 10 mg tablet, Take 10 mg by mouth., Disp: , Rfl:     tizanidine (ZANAFLEX) 4 MG tablet, Take 4 mg by mouth every evening. , Disp: , Rfl:     amlodipine (NORVASC) 5 MG tablet, Take 1 tablet (5 mg total) by mouth once daily., Disp: 30 tablet, Rfl: 11      Review of Systems   Constitution: Negative.   HENT: Negative.    Eyes: Negative.    Cardiovascular: Positive for dyspnea on exertion and orthopnea.   Respiratory: Positive for shortness of breath.    Endocrine: Negative.    Hematologic/Lymphatic: Negative.    Skin: Negative.    Musculoskeletal: Positive for arthritis, back pain and joint pain.   Gastrointestinal: Negative.    Genitourinary: Negative.    Neurological: Negative.    Psychiatric/Behavioral: Negative.    Allergic/Immunologic: Negative.        BP (!) 160/84   Pulse 72   Resp 16     Wt Readings from Last 3 Encounters:   07/14/17 88.6 kg (195 lb 3.5 oz)   06/29/17 86.3 kg (190 lb 2.4 oz)   05/17/17 89.9 kg (198 lb 4.9 oz)     Temp Readings from Last 3 Encounters:    07/14/17 97.4 °F (36.3 °C) (Tympanic)   06/29/17 97.1 °F (36.2 °C) (Tympanic)   05/17/17 97 °F (36.1 °C) (Tympanic)     BP Readings from Last 3 Encounters:   08/17/17 (!) 160/84   07/14/17 (!) 164/82   06/29/17 (!) 142/76     Pulse Readings from Last 3 Encounters:   08/17/17 72   07/14/17 70   06/29/17 83          Objective:    Physical Exam   Constitutional: She is oriented to person, place, and time. Vital signs are normal. She appears well-developed and well-nourished. She is active and cooperative. She does not have a sickly appearance. She does not appear ill. No distress.   HENT:   Head: Normocephalic.   Neck: Neck supple. Normal carotid pulses, no hepatojugular reflux and no JVD present. Carotid bruit is not present. No thyromegaly present.   Cardiovascular: Normal rate, regular rhythm, S1 normal, S2 normal, normal heart sounds and normal pulses.  PMI is not displaced.  Exam reveals no gallop and no friction rub.    No murmur heard.  Pulses:       Radial pulses are 2+ on the right side, and 2+ on the left side.   Pulmonary/Chest: Effort normal and breath sounds normal. She has no wheezes. She has no rales.   Abdominal: Soft. Normal appearance, normal aorta and bowel sounds are normal. She exhibits no pulsatile liver, no abdominal bruit, no ascites and no mass. There is no splenomegaly or hepatomegaly. There is no tenderness.   Musculoskeletal: She exhibits no edema.   Lymphadenopathy:     She has no cervical adenopathy.   Neurological: She is alert and oriented to person, place, and time.   Skin: Skin is warm. She is not diaphoretic.   Psychiatric: She has a normal mood and affect. Her behavior is normal.   Nursing note and vitals reviewed.      I have reviewed all pertinent labs and cardiac studies.      Chemistry        Component Value Date/Time     07/14/2017 1452    K 3.8 07/14/2017 1452     07/14/2017 1452    CO2 28 07/14/2017 1452    BUN 13 07/14/2017 1452    CREATININE 1.0 07/14/2017 1452     GLU 94 07/14/2017 1452        Component Value Date/Time    CALCIUM 9.3 07/14/2017 1452    ALKPHOS 96 07/14/2017 1452    AST 19 07/14/2017 1452    ALT 12 07/14/2017 1452    BILITOT 0.4 07/14/2017 1452    ESTGFRAFRICA >60.0 07/14/2017 1452    EGFRNONAA 55.6 (A) 07/14/2017 1452        Lab Results   Component Value Date    WBC 7.22 07/14/2017    HGB 13.5 07/14/2017    HCT 42.8 07/14/2017    MCV 93 07/14/2017     07/14/2017     No results found for: LABA1C, HGBA1C  Lab Results   Component Value Date    CHOL 179 12/23/2015    CHOL 195 11/03/2014    CHOL 168 09/14/2014     Lab Results   Component Value Date    HDL 43 12/23/2015    HDL 47 11/03/2014    HDL 38 (L) 09/14/2014     Lab Results   Component Value Date    LDLCALC 102.6 12/23/2015    LDLCALC 122.2 11/03/2014    LDLCALC 107.8 09/14/2014     Lab Results   Component Value Date    TRIG 167 (H) 12/23/2015    TRIG 129 11/03/2014    TRIG 111 09/14/2014     Lab Results   Component Value Date    CHOLHDL 24.0 12/23/2015    CHOLHDL 24.1 11/03/2014    CHOLHDL 22.6 09/14/2014     PRE-TEST DATA   EKG: EKG demonstrates adequate. Resting electrocardiogram reveals normal sinus rhythm at a rate of 60 bpm. There was non-specific abnormality, ST segment, and/or T wave.     TEST DESCRIPTION   The patient received 0.4 mg of Regadenoson as an IV bolus. Peak heart rate was 87 bpm, which is 61% of the age predicted maximum heart rate. .     EKG Conclusions:    1. The EKG portion of this study is negative for ischemia at a peak heart rate of 87 bpm (61% of predicted).   2. Blood pressure remained stable throughout the protocol  (Presenting BP: 144/70 Peak BP: 166/69).   3. No significant arrhythmias were present.   4. There were no symptoms of chest discomfort or significant dyspnea throughout the protocol.     Nuclear Procedure:  Following a single isotope protocol, 10 mCi of Tc99 labeled Tetrofosmin was given at rest and tomographic imaging was performed. Regadenoson  pharmacologic stress testing was performed as described above. Immediately following the IV bolus of regadenoson,   30 mCi of Tc99 labeled Tetrofosmin was given and tomographic imaging was performed. The site of the IV injection was the right AC. Images were obtained on a [employer] camera.     Comments:  This is a technically adequate study. Inspection of the transaxial images demonstrated no significant cranial, caudal, or lateral patient motion in the camera between rest and stress acquisitions. On stress images, there is mild decreased uptake of   radiotracer in the anteroapical wall of the left ventricle which does not significantly reverse suggestive of injury. There is a trivial amount of reversibility in the distal aspect of this defect. The remainder of the myocardium demonstrates normal   radiotracer uptake. The extracardiac distribution of radioactivity is normal. The left ventricular cavity is normal in size and does not increase with stress. On gated SPECT, left ventricular motion is normal at rest.     Nuclear Quantitative Functional Analysis:   LVEF: 57 %    Impression: ABNORMAL MYOCARDIAL PERFUSION  1. There is mild intensity fixed defect in the anteroapical wall of the left ventricle, consistent with myocardial injury. There is trivial katelyn-injury ischemia.   2. Resting wall motion is physiologic.   3. Resting LV function is normal.   4. The ventricular volumes are normal at rest and stress.   5. The extracardiac distribution of radioactivity is normal.       This document has been electronically    SIGNED BY: Vlad Padilla MD On: 09/14/2014 16:04      CONCLUSIONS     1 - Normal left ventricular systolic function (EF 55-60%).     2 - Left ventricular diastolic dysfunction.     3 - Normal right ventricular systolic function .     4 - Mild mitral regurgitation.     5 - Moderate tricuspid regurgitation.     6 - Severe left atrial enlargement.     7 - Pulmonary hypertension.         This document has  been electronically    SIGNED BY: Vlad Padilla MD On: 09/14/2014 17:10        Assessment:       1. Coronary artery disease involving native coronary artery of native heart without angina pectoris    2. Essential hypertension    3. Mixed hyperlipidemia    4. Orthopnea    5. Pulmonary hypertension    6. Chronic diastolic heart failure         Plan:             Increase Amlodipine to 5 mg qd for more optimal HTN control.  Pt counseled on diastolic CHF mgt.  Check labs -- cbc, cmp, bnp, tsh.  Echocardiogram    F/u 4 weeks after tests.

## 2017-08-18 ENCOUNTER — OFFICE VISIT (OUTPATIENT)
Dept: OPHTHALMOLOGY | Facility: CLINIC | Age: 77
End: 2017-08-18
Payer: MEDICARE

## 2017-08-18 DIAGNOSIS — Z96.1 PSEUDOPHAKIA OF BOTH EYES: ICD-10-CM

## 2017-08-18 DIAGNOSIS — H26.491 PCO (POSTERIOR CAPSULAR OPACIFICATION), RIGHT: Primary | ICD-10-CM

## 2017-08-18 DIAGNOSIS — H52.7 REFRACTION DISORDER: ICD-10-CM

## 2017-08-18 PROCEDURE — 92014 COMPRE OPH EXAM EST PT 1/>: CPT | Mod: S$PBB,,, | Performed by: OPHTHALMOLOGY

## 2017-08-18 PROCEDURE — 92015 DETERMINE REFRACTIVE STATE: CPT | Mod: ,,, | Performed by: OPHTHALMOLOGY

## 2017-08-18 PROCEDURE — 99211 OFF/OP EST MAY X REQ PHY/QHP: CPT | Mod: PBBFAC,PO | Performed by: OPHTHALMOLOGY

## 2017-08-18 PROCEDURE — 99999 PR PBB SHADOW E&M-EST. PATIENT-LVL I: CPT | Mod: PBBFAC,,, | Performed by: OPHTHALMOLOGY

## 2017-08-18 NOTE — PROGRESS NOTES
HPI     Blurred Vision    Additional comments: OS           Comments   Pt states that her left eye is blurry. No complaints of pain or   discomfort. No gtts.     PCP: Dr: Amber Pagan    PCIOL OD 10-22-09 Distance   PCIOL OS 11-15-09 Near   YAG OS  PCO OD       Last edited by Guillermo Levin, Patient Care Assistant on 8/18/2017  1:27   PM. (History)            Assessment /Plan     For exam results, see Encounter Report.      ICD-10-CM ICD-9-CM    1. 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 Yag laser treatment as needed if visual loss occurs.      2. Pseudophakia of both eyes Z96.1 V43.1    3. 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

## 2017-08-24 ENCOUNTER — TELEPHONE (OUTPATIENT)
Dept: CARDIOLOGY | Facility: CLINIC | Age: 77
End: 2017-08-24

## 2017-08-24 NOTE — TELEPHONE ENCOUNTER
Returned phone call to patient requesting lab results and wanted let Dr. Padilla know her blood pressure 165/84 pulse 78.  Echo scheduled 8/29  Currently taking Lisinopril 40 mg daily and Amlodipine 5 mg daily- advised to start taking Lisinopril in the morning and Amlodipine in the evening.      Dr. Padilla- please review labs

## 2017-08-24 NOTE — TELEPHONE ENCOUNTER
----- Message from Torito Barreto sent at 8/23/2017  1:37 PM CDT -----  Contact: Pt   States she's calling rg her test results from 08/17 and can be reached at [phone]//[email or web address]

## 2017-08-24 NOTE — TELEPHONE ENCOUNTER
Patient was seen on 8/17 and her follow-up to discuss labs, b/p and Echo is 9/7/17.  Her  was scheduled for October ?

## 2017-08-24 NOTE — TELEPHONE ENCOUNTER
Please call pt  Labs are all good  She is supposed to have f/u appt with me in [place] in about 1 month, at same time as her husbands appt.    Dr Padilla

## 2017-08-28 DIAGNOSIS — E03.9 HYPOTHYROIDISM, UNSPECIFIED TYPE: ICD-10-CM

## 2017-08-29 RX ORDER — LEVOTHYROXINE SODIUM 75 UG/1
TABLET ORAL
Qty: 30 TABLET | Refills: 0 | Status: SHIPPED | OUTPATIENT
Start: 2017-08-29 | End: 2017-10-02 | Stop reason: SDUPTHER

## 2017-09-01 ENCOUNTER — CLINICAL SUPPORT (OUTPATIENT)
Dept: CARDIOLOGY | Facility: CLINIC | Age: 77
End: 2017-09-01
Payer: MEDICARE

## 2017-09-01 DIAGNOSIS — I27.20 PULMONARY HYPERTENSION: ICD-10-CM

## 2017-09-01 DIAGNOSIS — R06.01 ORTHOPNEA: ICD-10-CM

## 2017-09-01 DIAGNOSIS — I25.10 CORONARY ARTERY DISEASE INVOLVING NATIVE CORONARY ARTERY OF NATIVE HEART WITHOUT ANGINA PECTORIS: ICD-10-CM

## 2017-09-01 DIAGNOSIS — I10 ESSENTIAL HYPERTENSION: ICD-10-CM

## 2017-09-01 DIAGNOSIS — I50.32 CHRONIC DIASTOLIC HEART FAILURE: ICD-10-CM

## 2017-09-01 PROCEDURE — 93306 TTE W/DOPPLER COMPLETE: CPT | Mod: PBBFAC | Performed by: INTERNAL MEDICINE

## 2017-09-02 LAB
DIASTOLIC DYSFUNCTION: NO
ESTIMATED PA SYSTOLIC PRESSURE: 25
RETIRED EF AND QEF - SEE NOTES: 60 (ref 55–65)

## 2017-09-09 ENCOUNTER — HOSPITAL ENCOUNTER (EMERGENCY)
Facility: HOSPITAL | Age: 77
Discharge: HOME | End: 2017-09-09
Attending: EMERGENCY MEDICINE
Payer: MEDICARE

## 2017-09-09 VITALS
HEART RATE: 83 BPM | RESPIRATION RATE: 20 BRPM | WEIGHT: 220 LBS | DIASTOLIC BLOOD PRESSURE: 62 MMHG | BODY MASS INDEX: 36.65 KG/M2 | SYSTOLIC BLOOD PRESSURE: 130 MMHG | OXYGEN SATURATION: 98 % | HEIGHT: 65 IN | TEMPERATURE: 98 F

## 2017-09-09 DIAGNOSIS — R11.10 VOMITING, INTRACTABILITY OF VOMITING NOT SPECIFIED, PRESENCE OF NAUSEA NOT SPECIFIED, UNSPECIFIED VOMITING TYPE: Primary | ICD-10-CM

## 2017-09-09 DIAGNOSIS — R07.9 CHEST PAIN: ICD-10-CM

## 2017-09-09 DIAGNOSIS — I10 ESSENTIAL HYPERTENSION: ICD-10-CM

## 2017-09-09 DIAGNOSIS — R47.81 SLURRED SPEECH: ICD-10-CM

## 2017-09-09 LAB
ALBUMIN SERPL BCP-MCNC: 3.1 G/DL
ALP SERPL-CCNC: 85 U/L
ALT SERPL W/O P-5'-P-CCNC: 13 U/L
ANION GAP SERPL CALC-SCNC: 9 MMOL/L
AST SERPL-CCNC: 17 U/L
BACTERIA #/AREA URNS HPF: ABNORMAL /HPF
BASOPHILS # BLD AUTO: 0.02 K/UL
BASOPHILS NFR BLD: 0.2 %
BILIRUB SERPL-MCNC: 0.6 MG/DL
BILIRUB UR QL STRIP: NEGATIVE
BNP SERPL-MCNC: 138 PG/ML
BUN SERPL-MCNC: 12 MG/DL
CALCIUM SERPL-MCNC: 9.2 MG/DL
CHLORIDE SERPL-SCNC: 104 MMOL/L
CK SERPL-CCNC: 63 U/L
CLARITY UR: CLEAR
CO2 SERPL-SCNC: 28 MMOL/L
COLOR UR: YELLOW
CREAT SERPL-MCNC: 0.9 MG/DL
D DIMER PPP IA.FEU-MCNC: 0.76 MG/L FEU
DIFFERENTIAL METHOD: ABNORMAL
EOSINOPHIL # BLD AUTO: 0.1 K/UL
EOSINOPHIL NFR BLD: 0.6 %
ERYTHROCYTE [DISTWIDTH] IN BLOOD BY AUTOMATED COUNT: 14 %
EST. GFR  (AFRICAN AMERICAN): >60 ML/MIN/1.73 M^2
EST. GFR  (NON AFRICAN AMERICAN): >60 ML/MIN/1.73 M^2
GLUCOSE SERPL-MCNC: 107 MG/DL
GLUCOSE UR QL STRIP: NEGATIVE
HCT VFR BLD AUTO: 37.8 %
HGB BLD-MCNC: 11.9 G/DL
HGB UR QL STRIP: ABNORMAL
KETONES UR QL STRIP: NEGATIVE
LEUKOCYTE ESTERASE UR QL STRIP: ABNORMAL
LYMPHOCYTES # BLD AUTO: 2.4 K/UL
LYMPHOCYTES NFR BLD: 21.6 %
MAGNESIUM SERPL-MCNC: 1.9 MG/DL
MCH RBC QN AUTO: 29.4 PG
MCHC RBC AUTO-ENTMCNC: 31.5 G/DL
MCV RBC AUTO: 93 FL
MICROSCOPIC COMMENT: ABNORMAL
MONOCYTES # BLD AUTO: 0.9 K/UL
MONOCYTES NFR BLD: 7.9 %
NEUTROPHILS # BLD AUTO: 7.8 K/UL
NEUTROPHILS NFR BLD: 69.7 %
NITRITE UR QL STRIP: NEGATIVE
PH UR STRIP: 6 [PH] (ref 5–8)
PLATELET # BLD AUTO: 295 K/UL
PMV BLD AUTO: 10.2 FL
POTASSIUM SERPL-SCNC: 4.1 MMOL/L
PROT SERPL-MCNC: 6.2 G/DL
PROT UR QL STRIP: NEGATIVE
RBC # BLD AUTO: 4.05 M/UL
RBC #/AREA URNS HPF: 2 /HPF (ref 0–4)
SODIUM SERPL-SCNC: 141 MMOL/L
SP GR UR STRIP: <=1.005 (ref 1–1.03)
SQUAMOUS #/AREA URNS HPF: 2 /HPF
TROPONIN I SERPL DL<=0.01 NG/ML-MCNC: 0.01 NG/ML
URN SPEC COLLECT METH UR: ABNORMAL
UROBILINOGEN UR STRIP-ACNC: 1 EU/DL
WBC # BLD AUTO: 11.19 K/UL
WBC #/AREA URNS HPF: 8 /HPF (ref 0–5)

## 2017-09-09 PROCEDURE — 81000 URINALYSIS NONAUTO W/SCOPE: CPT

## 2017-09-09 PROCEDURE — 99284 EMERGENCY DEPT VISIT MOD MDM: CPT

## 2017-09-09 PROCEDURE — 82550 ASSAY OF CK (CPK): CPT

## 2017-09-09 PROCEDURE — 84484 ASSAY OF TROPONIN QUANT: CPT

## 2017-09-09 PROCEDURE — 80053 COMPREHEN METABOLIC PANEL: CPT

## 2017-09-09 PROCEDURE — 85025 COMPLETE CBC W/AUTO DIFF WBC: CPT

## 2017-09-09 PROCEDURE — 25500020 PHARM REV CODE 255: Performed by: EMERGENCY MEDICINE

## 2017-09-09 PROCEDURE — 85379 FIBRIN DEGRADATION QUANT: CPT

## 2017-09-09 PROCEDURE — 83880 ASSAY OF NATRIURETIC PEPTIDE: CPT

## 2017-09-09 PROCEDURE — 83735 ASSAY OF MAGNESIUM: CPT

## 2017-09-09 RX ADMIN — IOHEXOL 100 ML: 350 INJECTION, SOLUTION INTRAVENOUS at 09:09

## 2017-09-10 NOTE — ED PROVIDER NOTES
"SCRIBE #1 NOTE: I, Miranda Granados, am scribing for, and in the presence of, Carolynn Roberson DO. I have scribed the HPI, ROS, PEx.    SCRIBE #2 NOTE: I, Delphine Raman, am scribing for, and in the presence of,  Raza Burton MD. I have scribed the remaining portions of the note not scribed by Scribe #1.     History      Chief Complaint   Patient presents with    Chest Pain     L cp x1wk; described as sharp and smothering 8/10; worsening today       Review of patient's allergies indicates:   Allergen Reactions    Erythromycin     Macrolide antibiotics Nausea And Vomiting        HPI   HPI    9/9/2017, 7:31 PM   History obtained from the , family and patient      History of Present Illness: Rebecca Rios is a 76 y.o. female patient with HTN who presents to the Emergency Department for SOB and L sided sharp CP lasting 1 hour, which onset gradually 1 week ago. Symptoms are constant and moderate in severity. She denies feeling nauseated when the pain onsets. She had normal ECHO on 9/2. No mitigating or exacerbating factors reported. Pt c/o having a subjective fever, chills, cough, increased fatigue and sleepiness today.  reports that patient woke up with slurred speech this morning similar to when someone is drunk. He states that last night patient's pills were scattered everywhere and patient had N/V. Family were wondering if patient "took something twice" because the meds were not in the pill bottles. Pt states  was concerned because she was very sleepy today. Patient denies diaphoresis, leg swelling, dizziness, HA, extremity weakness/numbness, hospitalization or abx in last 90 days. and all other sxs at this time. No further complaints or concerns at this time.      Arrival mode: Personal vehicle    PCP: Shlomo Ware MD       Past Medical History:  Past Medical History:   Diagnosis Date    Anxiety     Depression     Hot flash, menopausal     HTN (hypertension)     Hyperlipidemia  "    Hypothyroid        Past Surgical History:  Past Surgical History:   Procedure Laterality Date    HYSTERECTOMY      open luis           Family History:  Family History   Problem Relation Age of Onset    Hypertension Mother        Social History:  Social History     Social History Main Topics    Smoking status: Never Smoker    Smokeless tobacco: Never Used    Alcohol use No    Drug use: No    Sexual activity: Unknown       ROS   Review of Systems   Constitutional: Positive for fatigue and fever.   HENT: Negative for sore throat.    Respiratory: Positive for cough and shortness of breath.    Cardiovascular: Positive for chest pain. Negative for palpitations and leg swelling.   Gastrointestinal: Negative for abdominal pain, diarrhea, nausea and vomiting.   Genitourinary: Negative for dysuria.   Musculoskeletal: Negative for back pain.   Skin: Negative for rash.   Neurological: Positive for speech difficulty. Negative for dizziness, seizures, facial asymmetry, weakness, light-headedness, numbness and headaches.   Hematological: Does not bruise/bleed easily.   All other systems reviewed and are negative.      Physical Exam      Initial Vitals [09/09/17 1927]   BP Pulse Resp Temp SpO2   (!) 139/59 88 18 98.3 °F (36.8 °C) (!) 93 %      MAP       85.67          Physical Exam  Nursing Notes and Vital Signs Reviewed.  Constitutional: Patient is in no acute distress. Awake and alert. Well-developed and well-nourished.  Head: Atraumatic. Normocephalic.  Eyes: PERRL. EOM intact. Conjunctivae are not pale. No scleral icterus.  ENT: Mucous membranes are dry. Oropharynx is clear and symmetric.    Neck: Supple. Full ROM. No lymphadenopathy.  Cardiovascular: Regular rate. Regular rhythm. No murmurs, rubs, or gallops. Distal pulses are 2+ and symmetric.  Pulmonary/Chest: No respiratory distress. Clear to auscultation bilaterally. No wheezing, rales, or rhonchi.  Abdominal: Soft and non-distended.  There is no tenderness.   "No rebound, guarding, or rigidity.  Good bowel sounds.    Musculoskeletal: Moves all extremities. No obvious deformities. No edema. No calf tenderness. No calf swelling.  Skin: Warm and dry.  Neurological: Patient is alert and oriented to person, place and time. Pupils ERRL and EOM normal. Cranial nerves II-XII are intact. Strength is full bilaterally; it is equal and 5/5 in bilateral upper and lower extremities. There is no pronator drift of outstretched arms. Finger to nose intact. Light touch sense is intact. Speech is clear and normal. Ambulates without difficulty. NIH stroke scale of 0. No acute focal neurological deficits noted.  Psychiatric: Normal affect. Good eye contact. Appropriate in content.    ED Course    Procedures  ED Vital Signs:  Vitals:    09/09/17 1927 09/09/17 1939 09/09/17 1948 09/09/17 2002   BP: (!) 139/59  (!) 106/49 (!) 122/42   Pulse: 88 80 85 82   Resp: 18  18 19   Temp: 98.3 °F (36.8 °C)      TempSrc: Oral      SpO2: (!) 93%  (!) 94% 96%   Weight: 99.8 kg (220 lb)      Height: 5' 5" (1.651 m)       09/09/17 2017 09/09/17 2217 09/09/17 2218   BP: (!) 115/53 130/62    Pulse: 79 83    Resp: 17 20    Temp:   98.3 °F (36.8 °C)   TempSrc:   Oral   SpO2: (!) 94% 98%    Weight:      Height:          Abnormal Lab Results:  Labs Reviewed   CBC W/ AUTO DIFFERENTIAL - Abnormal; Notable for the following:        Result Value    Hemoglobin 11.9 (*)     MCHC 31.5 (*)     Gran # 7.8 (*)     All other components within normal limits   COMPREHENSIVE METABOLIC PANEL - Abnormal; Notable for the following:     Albumin 3.1 (*)     All other components within normal limits   URINALYSIS - Abnormal; Notable for the following:     Specific Gravity, UA <=1.005 (*)     Occult Blood UA 1+ (*)     Leukocytes, UA 3+ (*)     All other components within normal limits   D DIMER, QUANTITATIVE - Abnormal; Notable for the following:     D-Dimer 0.76 (*)     All other components within normal limits   B-TYPE NATRIURETIC " PEPTIDE - Abnormal; Notable for the following:      (*)     All other components within normal limits   URINALYSIS MICROSCOPIC - Abnormal; Notable for the following:     WBC, UA 8 (*)     All other components within normal limits   MAGNESIUM   TROPONIN I   CK        All Lab Results:  Results for orders placed or performed during the hospital encounter of 09/09/17   CBC auto differential   Result Value Ref Range    WBC 11.19 3.90 - 12.70 K/uL    RBC 4.05 4.00 - 5.40 M/uL    Hemoglobin 11.9 (L) 12.0 - 16.0 g/dL    Hematocrit 37.8 37.0 - 48.5 %    MCV 93 82 - 98 fL    MCH 29.4 27.0 - 31.0 pg    MCHC 31.5 (L) 32.0 - 36.0 g/dL    RDW 14.0 11.5 - 14.5 %    Platelets 295 150 - 350 K/uL    MPV 10.2 9.2 - 12.9 fL    Gran # 7.8 (H) 1.8 - 7.7 K/uL    Lymph # 2.4 1.0 - 4.8 K/uL    Mono # 0.9 0.3 - 1.0 K/uL    Eos # 0.1 0.0 - 0.5 K/uL    Baso # 0.02 0.00 - 0.20 K/uL    Gran% 69.7 38.0 - 73.0 %    Lymph% 21.6 18.0 - 48.0 %    Mono% 7.9 4.0 - 15.0 %    Eosinophil% 0.6 0.0 - 8.0 %    Basophil% 0.2 0.0 - 1.9 %    Differential Method Automated    Comprehensive metabolic panel   Result Value Ref Range    Sodium 141 136 - 145 mmol/L    Potassium 4.1 3.5 - 5.1 mmol/L    Chloride 104 95 - 110 mmol/L    CO2 28 23 - 29 mmol/L    Glucose 107 70 - 110 mg/dL    BUN, Bld 12 8 - 23 mg/dL    Creatinine 0.9 0.5 - 1.4 mg/dL    Calcium 9.2 8.7 - 10.5 mg/dL    Total Protein 6.2 6.0 - 8.4 g/dL    Albumin 3.1 (L) 3.5 - 5.2 g/dL    Total Bilirubin 0.6 0.1 - 1.0 mg/dL    Alkaline Phosphatase 85 55 - 135 U/L    AST 17 10 - 40 U/L    ALT 13 10 - 44 U/L    Anion Gap 9 8 - 16 mmol/L    eGFR if African American >60 >60 mL/min/1.73 m^2    eGFR if non African American >60 >60 mL/min/1.73 m^2   Magnesium   Result Value Ref Range    Magnesium 1.9 1.6 - 2.6 mg/dL   Urinalysis   Result Value Ref Range    Specimen UA Urine, Clean Catch     Color, UA Yellow Yellow, Straw, Jeanette    Appearance, UA Clear Clear    pH, UA 6.0 5.0 - 8.0    Specific Gravity, UA  <=1.005 (A) 1.005 - 1.030    Protein, UA Negative Negative    Glucose, UA Negative Negative    Ketones, UA Negative Negative    Bilirubin (UA) Negative Negative    Occult Blood UA 1+ (A) Negative    Nitrite, UA Negative Negative    Urobilinogen, UA 1.0 <2.0 EU/dL    Leukocytes, UA 3+ (A) Negative   Troponin I   Result Value Ref Range    Troponin I 0.007 0.000 - 0.026 ng/mL   CPK   Result Value Ref Range    CPK 63 20 - 180 U/L   D dimer, quantitative   Result Value Ref Range    D-Dimer 0.76 (H) <0.50 mg/L FEU   Brain natriuretic peptide   Result Value Ref Range     (H) 0 - 99 pg/mL   Urinalysis Microscopic   Result Value Ref Range    RBC, UA 2 0 - 4 /hpf    WBC, UA 8 (H) 0 - 5 /hpf    Bacteria, UA Occasional None-Occ /hpf    Squam Epithel, UA 2 /hpf    Microscopic Comment SEE COMMENT          Imaging Results:  Imaging Results          CTA Chest Non-Coronary (Final result)  Result time 09/09/17 21:47:19    Final result by Dante Reynoso MD (09/09/17 21:47:19)                 Impression:        No pulmonary embolus.   Subtle right lower lobe infiltrate/pneumonia.  Multiple subpleural nodules measuring up to 3 mm in diameter likely representing remote granulomatous exposure.  Recommend followup.        All CT scans at this facility use dose modulation, iterative reconstruction, and/or weight based dosing when appropriate to reduce radiation dose to as low as reasonably achievable.      Electronically signed by: DANTE REYNOSO MD  Date:     09/09/17  Time:    21:47              Narrative:    A Exam: CTA chest with intravenous contrast.    Clinical History:   chest pain.  Rule out pulmonary embolus.    Technique: Axial CTA images performed through the chest after the administration of 100 cc intravenous contrast. Maximum intensity projections were performed and interpreted.    Findings:        No evidence of pulmonary embolism. Pulmonary artery caliber is normal. The heart, great vessels, and mediastinal structures are  within normal limits. No thoracic adenopathy.    Sagittal infiltrate involving the right lower lobe. No pleural effusions.    Hiatal hernia.    Degenerative thoracic spine with multilevel osteophytes.                             CT Head Without Contrast (Final result)  Result time 09/09/17 20:49:03    Final result by Antony Greene MD (09/09/17 20:49:03)                 Impression:     Normal head CT for age.          All CT scans at this facility use dose modulation, iterative reconstruction, and/or weight based dosing when appropriate to reduce radiation dose to as low as reasonably achievable.      Electronically signed by: ANTONY GREENE  Date:     09/09/17  Time:    20:49              Narrative:    Exam: CT HEAD WITHOUT CONTRAST     Indication: Slurred speech.    Technique: Routine noncontrast head CT.    Comparison Study: 9/13/2014.    Findings: No change.  There is no acute intracranial hemorrhage or abnormal extra-axial fluid collection.  Mild atrophy.  Stable ventricles.  Minimal periventricular microangiopathy.  There is no additional abnormal increased or decreased density within the brain parenchyma.  Gray-white differentiation preserved.  There is no intracranial mass or mass effect.  Calvarium is intact the visualized paranasal sinuses and mastoids are well-aerated.                             X-Ray Chest AP Portable (Final result)  Result time 09/09/17 20:19:34    Final result by Antony Greene MD (09/09/17 20:19:34)                 Impression:     Negative chest x-ray.      Electronically signed by: ANTONY GREENE  Date:     09/09/17  Time:    20:19              Narrative:    Chest x-ray single view    Indication: Chest pain, unspecified.    Findings: Comparison study 3/20/2017.  No change.  The lungs are clear.  Normal size heart.  Minimal aortic calcification.  Thoracic spondylosis.                             The EKG was ordered, reviewed, and independently interpreted by the ED  "provider.  Interpretation time: 19:25  Rate: 88 BPM  Rhythm: normal sinus rhythm  Interpretation: No acute ST changes. No STEMI.    The Emergency Provider reviewed the vital signs and test results, which are outlined above.    ED Discussion     8:00 PM: Dr. Roberson transfers care of pt to Dr. Burton, pending lab and imaging results.    8:34 PM: Pt is currently in CT. Dr. Burton introduced himself to the pt's family and will return to re-evaluate the pt when she returns.     9:20 PM: Dr. Burton re-evaluated pt. A complete exam was performed. Pt's exam was normal and pt is grossly neurologically intact. Pt states that she has been having N/V since yesterday and intermittent episodes of not CP, but "smothering". Pt states she has to sit in front of a fan to catch her breath and states that it lasts for a brief period of time and has been intermittent for the last 2 weeks. Pt states the last time she experienced this was around 5 pm today. Pt reports having a cardiac echo performed within the last month that showed a normal ejection fraction. Pt's labs and exams show no evidence of acute congestive heart failure. A CT will be ordered due to her elevated D dimer. D/w pt all pertinent results. D/w pt any concerns expressed at this time. Answered all questions. Pt expresses understanding at this time.    10:15 PM: Reassessed pt at this time. There is no evidence of respiratory distress or respiratory difficulty. Pt is awake, alert, and in NAD at this time. Discussed with pt all pertinent ED information and results. Discussed pt dx and plan of tx. Gave pt all f/u and return to the ED instructions. All questions and concerns were addressed at this time. Pt expresses understanding of information and instructions, and is comfortable with plan to discharge. Pt is stable for discharge.    I discussed with patient and/or family/caretaker that evaluation in the ED does not suggest any emergent or life threatening medical " conditions requiring immediate intervention beyond what was provided in the ED, and I believe patient is safe for discharge.  Regardless, an unremarkable evaluation in the ED does not preclude the development or presence of a serious of life threatening condition. As such, patient was instructed to return immediately for any worsening or change in current symptoms.      ED Medication(s):  Medications   omnipaque 350 iohexol 100 mL (100 mLs Intravenous Given 9/9/17 2132)       New Prescriptions    No medications on file       Follow-up Information     Shlomo Ware MD In 1 day.    Specialty:  Internal Medicine  Why:  Patient should return to the ED for any concerns or worsening of condition.  Contact information:  [address] [place] [id number] [phone]                    Medical Decision Making    Medical Decision Making:   Clinical Tests:   Lab Tests: Reviewed and Ordered  Radiological Study: Reviewed and Ordered  Medical Tests: Reviewed and Ordered           Scribe Attestation:   Scribe #1: I performed the above scribed service and the documentation accurately describes the services I performed. I attest to the accuracy of the note.    Attending:   Physician Attestation Statement for Scribe #1: I, Carolynn Roberson DO, personally performed the services described in this documentation, as scribed by Miranda Granados, in my presence, and it is both accurate and complete.       Scribe Attestation:   Scribe #2: I performed the above scribed service and the documentation accurately describes the services I performed. I attest to the accuracy of the note.    Attending Attestation:           Physician Attestation for Scribe:    Physician Attestation Statement for Scribe #2: I, Raza Burton MD, reviewed documentation, as scribed by Delphine Raman in my presence, and it is both accurate and complete. I also acknowledge and confirm the content of the note done by Scribe #1.          Clinical Impression        ICD-10-CM ICD-9-CM   1. Vomiting, intractability of vomiting not specified, presence of nausea not specified, unspecified vomiting type R11.10 787.03   2. Chest pain R07.9 786.50   3. Slurred speech R47.81 784.59   4. Essential hypertension I10 401.9       Disposition:   Disposition: Discharged  Condition: Stable         Carolynn Roberson DO  09/11/17 0558       Raza Burton MD  10/19/17 114

## 2017-09-12 ENCOUNTER — TELEPHONE (OUTPATIENT)
Dept: CARDIOLOGY | Facility: CLINIC | Age: 77
End: 2017-09-12

## 2017-09-12 NOTE — TELEPHONE ENCOUNTER
----- Message from Jimi Justice sent at 9/8/2017  1:04 PM CDT -----  Contact: [phone]   Pt would like to consult with the nurse, pt would like to be worked in for an appt sooner than October, Please call back at [phone].  Thx-[facility]

## 2017-09-26 ENCOUNTER — HOSPITAL ENCOUNTER (EMERGENCY)
Facility: HOSPITAL | Age: 77
Discharge: HOME | End: 2017-09-26
Payer: MEDICARE

## 2017-09-26 VITALS
WEIGHT: 220 LBS | RESPIRATION RATE: 18 BRPM | BODY MASS INDEX: 36.65 KG/M2 | SYSTOLIC BLOOD PRESSURE: 130 MMHG | HEART RATE: 67 BPM | OXYGEN SATURATION: 99 % | HEIGHT: 65 IN | TEMPERATURE: 99 F | DIASTOLIC BLOOD PRESSURE: 64 MMHG

## 2017-09-26 DIAGNOSIS — S82.001A CLOSED NONDISPLACED FRACTURE OF RIGHT PATELLA, UNSPECIFIED FRACTURE MORPHOLOGY, INITIAL ENCOUNTER: Primary | ICD-10-CM

## 2017-09-26 DIAGNOSIS — M25.561 ACUTE PAIN OF RIGHT KNEE: ICD-10-CM
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
## 2017-09-26 PROCEDURE — 96372 THER/PROPH/DIAG INJ SC/IM: CPT

## 2017-09-26 PROCEDURE — 99284 EMERGENCY DEPT VISIT MOD MDM: CPT | Mod: 25

## 2017-09-26 PROCEDURE — 29505 APPLICATION LONG LEG SPLINT: CPT

## 2017-09-26 PROCEDURE — 25000003 PHARM REV CODE 250: Performed by: PHYSICIAN ASSISTANT

## 2017-09-26 PROCEDURE — 63600175 PHARM REV CODE 636 W HCPCS: Performed by: PHYSICIAN ASSISTANT

## 2017-09-26 RX ORDER — MELOXICAM 15 MG/1
15 TABLET ORAL DAILY
Qty: 14 TABLET | Refills: 0 | Status: SHIPPED | OUTPATIENT
Start: 2017-09-26 | End: 2017-10-02 | Stop reason: ALTCHOICE

## 2017-09-26 RX ORDER — MORPHINE SULFATE 4 MG/ML
4 INJECTION, SOLUTION INTRAMUSCULAR; INTRAVENOUS
Status: COMPLETED | OUTPATIENT
Start: 2017-09-26 | End: 2017-09-26

## 2017-09-26 RX ORDER — ONDANSETRON 4 MG/1
4 TABLET, ORALLY DISINTEGRATING ORAL
Status: COMPLETED | OUTPATIENT
Start: 2017-09-26 | End: 2017-09-26

## 2017-09-26 RX ORDER — OXYCODONE AND ACETAMINOPHEN 10; 325 MG/1; MG/1
1 TABLET ORAL EVERY 6 HOURS PRN
Qty: 18 TABLET | Refills: 0 | Status: SHIPPED | OUTPATIENT
Start: 2017-09-26 | End: 2017-10-02 | Stop reason: ALTCHOICE

## 2017-09-26 RX ADMIN — ONDANSETRON 4 MG: 4 TABLET, ORALLY DISINTEGRATING ORAL at 02:09

## 2017-09-26 RX ADMIN — MORPHINE SULFATE 4 MG: 4 INJECTION, SOLUTION INTRAMUSCULAR; INTRAVENOUS at 02:09

## 2017-09-26 NOTE — DISCHARGE INSTRUCTIONS
Ice right knee 3-4 times per day  Call and schedule appointment with Dr. Kumari for next Monday or Wednesday: follow-up for patella fracture.

## 2017-09-26 NOTE — ED NOTES
Level of Consciousness: The patient is awake, alert, and oriented with appropriate affect and speech; oriented to person, place and time.  Appearance: Sitting up in wheelchair with no acute distress noted. Clothing and hygiene are clean and worn appropriately.  Skin: Skin is warm and dry with good skin turgor; intact; color consistent with ethnicity.  Mucous membranes are moist.   Musculoskeletal: Swelling, redness, warmth to right knee from fall.   Respiratory: Airway open and patent, respirations spontaneous, even and unlabored. No accessory muscles in use.   Cardiac: Regular rate and rhythm, good pulses palpated peripherally, capillary refill < 3 seconds.  Abdomen: Soft, non-tender to palpation. No distention noted.  Neurologic: PERRLA, face exhibits symmetrical expression, hand grasps equal and even bilaterally, normal sensation noted to all extremities and face when touched by a finger.

## 2017-09-26 NOTE — ED PROVIDER NOTES
History      Chief Complaint   Patient presents with    Fall     fell last night on R knee +swelling denies hitting head       Review of patient's allergies indicates:   Allergen Reactions    Erythromycin     Macrolide antibiotics Nausea And Vomiting        HPI   HPI    9/26/2017, 2:03 PM   History obtained from the patient      History of Present Illness: Rebecca Rios is a 76 y.o. female patient who presents to the Emergency Department for right knee pain x one day.  Patient states she fell on right knee yesterday when walking in kitchen.  Denies head injury, LOC, fever, vomiting, diarrhea.  Patient has taken Norco with minimal relief.  Patient states that she is unable to ambulate due to knee pain.        Arrival mode: Personal vehicle     PCP: Shlomo Ware MD       Past Medical History:  Past Medical History:   Diagnosis Date    Anxiety     Depression     Hot flash, menopausal     HTN (hypertension)     Hyperlipidemia     Hypothyroid        Past Surgical History:  Past Surgical History:   Procedure Laterality Date    CARPAL TUNNEL RELEASE Bilateral     CHOLECYSTECTOMY      HYSTERECTOMY      open luis           Family History:  Family History   Problem Relation Age of Onset    Hypertension Mother        Social History:  Social History     Social History Main Topics    Smoking status: Never Smoker    Smokeless tobacco: Never Used    Alcohol use No    Drug use: No    Sexual activity: Not given       ROS   Review of Systems   Constitutional: Negative for chills and fever.   HENT: Negative for congestion and sore throat.    Respiratory: Negative for cough and wheezing.    Cardiovascular: Negative for chest pain and palpitations.   Gastrointestinal: Negative for diarrhea and vomiting.   Musculoskeletal: Positive for arthralgias, joint swelling and myalgias.     Physical Exam      Initial Vitals [09/26/17 1321]   BP Pulse Resp Temp SpO2   118/74 73 18 98.5 °F (36.9 °C) 96 %      MAP        88.67          Physical Exam  Nursing Notes and Vital Signs Reviewed.  Constitutional: Patient is in no acute distress. Awake and alert. Well-developed and well-nourished.  Head: Atraumatic. Normocephalic.  Eyes: PERRL. EOM intact. Conjunctivae are not pale. No scleral icterus.  ENT: Mucous membranes are moist. Oropharynx is clear and symmetric.    Neck: Supple. Full ROM. No lymphadenopathy.  Cardiovascular: Regular rate. Regular rhythm. No murmurs, rubs, or gallops. Distal pulses are 2+ and symmetric.  Pulmonary/Chest: No respiratory distress. Clear to auscultation bilaterally. No wheezing, rales, or rhonchi.  Abdominal: Soft and non-distended.  There is no tenderness.  No rebound, guarding, or rigidity. Good bowel sounds.  Genitourinary: No CVA tenderness  Musculoskeletal: Moves all extremities. No obvious deformities. No edema. No calf tenderness.  Right Knee:  No obvious deformity. R lateral knee TTP. Swelling of knee. Pain with flexion and extension of the knee. No increased warmth, erythema, induration or fluctuance.  No ligament laxity. DP and PT pulses are 2+.  Normal capillary refill.  Distal sensation is intact.  Skin: Warm and dry.  Neurological:  Alert, awake, and appropriate.  Normal speech.  No acute focal neurological deficits are appreciated.  Psychiatric: Normal affect. Good eye contact. Appropriate in content.        ED Course    Orthopedic Injury  Date/Time: 9/26/2017 4:11 PM  Authorized by: DEYVI AGOSTO   Performed by: SABINO FORD  Injury location: knee  Location details: right knee  Injury type: fracture  Fracture type: patellar  Pre-procedure distal perfusion: normal  Pre-procedure neurological function: normal  Pre-procedure neurovascular assessment: neurovascularly intact  Pre-procedure range of motion: reduced  Local anesthesia used: no    Anesthesia:  Local anesthesia used: no    Sedation:  Patient sedated: no    Manipulation performed: no  Immobilization: brace (Knee  "immobilizer)  Supplies used: elastic bandage  Complications: No  Post-procedure neurovascular assessment: post-procedure neurovascularly intact  Post-procedure distal perfusion: normal  Post-procedure neurological function: normal  Post-procedure range of motion: unchanged  Patient tolerance: Patient tolerated the procedure well with no immediate complications        ED Vital Signs:  Vitals:    09/26/17 1321 09/26/17 1536 09/26/17 1635   BP: 118/74 (!) 124/58 130/64   Pulse: 73 79 67   Resp: 18 18 18   Temp: 98.5 °F (36.9 °C) 98.7 °F (37.1 °C)    TempSrc: Tympanic     SpO2: 96% 96% 99%   Weight: 99.8 kg (220 lb)     Height: 5' 5" (1.651 m)           Imaging Results:  Imaging Results          X-Ray Knee Complete 4 Or More Views Right (Final result)  Result time 09/26/17 15:12:48    Final result by JEREMIAS Dhillon Sr., MD (09/26/17 15:12:48)                 Impression:      1. There has been interval development of complex fractures of the patella.   2. There has been interval development of a moderate amount of fluid within the right knee. This is characteristic of hemorrhage.        Electronically signed by: JEREMIAS DHILLON MD  Date:     09/26/17  Time:    15:12              Narrative:    4 view x-ray of the right knee    Clinical History:     Unspecified fall, initial encounter    Findings: Comparison was made to a prior examination performed on 6/29/2017. There has been interval development of complex fractures of the patella. There is no dislocation. There has been interval development of a moderate amount of fluid within the right knee.                                      The Emergency Provider reviewed the vital signs and test results, which are outlined above.    ED Discussion     4:01 PM: FRED Garcia discussed the pt's case with Dr. Kumari (Orthopedics) who recommends finding out if pt would like surgery or conservative treatment. Dr. Kumari states conservative treatment would be knee immobilizer, ice, and " use of walker as well as being prescribed percocet for pain and mobic 15 mg daily for antibiotics. Also to f/u in his office either next Monday or Wednesday. Pt wants to go with conservative treatment.     4:06 PM: Reassessed pt at this time. Discussed with pt all pertinent ED information and results. Discussed pt dx and plan of tx. Gave pt all f/u and return to the ED instructions. All questions and concerns were addressed at this time. Pt expresses understanding of information and instructions, and is comfortable with plan to discharge. Pt is stable for discharge.        ED Medication(s):  Medications   morphine injection 4 mg (4 mg Intramuscular Given 9/26/17 1431)   ondansetron disintegrating tablet 4 mg (4 mg Oral Given 9/26/17 1431)       Discharge Medication List as of 9/26/2017  4:12 PM      START taking these medications    Details   meloxicam (MOBIC) 15 MG tablet Take 1 tablet (15 mg total) by mouth once daily., Starting Tue 9/26/2017, Print      oxycodone-acetaminophen (PERCOCET)  mg per tablet Take 1 tablet by mouth every 6 (six) hours as needed for Pain., Starting Tue 9/26/2017, Print             Follow-up Information     Schedule an appointment as soon as possible for a visit  with Vlad Kumari MD.    Specialty:  Orthopedic Surgery  Why:  Call and schedule an appointment for Oct 2 or Oct 4  Contact information:  [address] [place] [address] [address] Vipul DOMINGUEZ [id number]  [phone]                     Medical Decision Making    Medical Decision Making:   Clinical Tests:   Radiological Study: Ordered and Reviewed               Clinical Impression       ICD-10-CM ICD-9-CM   1. Closed nondisplaced fracture of right patella, unspecified fracture morphology, initial encounter S82.001A 822.0   2. Fall W19.XXXA E888.9   3. Acute pain of right knee M25.561 719.46       Disposition:   Disposition: Discharged  Condition: Stable           Yolette Gillespie PA-C  09/26/17 2029

## 2017-09-27 ENCOUNTER — TELEPHONE (OUTPATIENT)
Dept: ORTHOPEDICS | Facility: CLINIC | Age: 77
End: 2017-09-27

## 2017-09-27 NOTE — TELEPHONE ENCOUNTER
----- Message from Torito Barreto sent at 9/27/2017 10:16 AM CDT -----  Contact: Pt   States she was in the ER and was told to follow up with Ortho for fractured right knee cap. States an afternoon appt later this week or early next week and wants to be been at [facility] and can be reached at [phone] or cell [phone]//thanks/dbw

## 2017-09-28 ENCOUNTER — NURSE TRIAGE (OUTPATIENT)
Dept: ADMINISTRATIVE | Facility: CLINIC | Age: 77
End: 2017-09-28

## 2017-09-28 NOTE — TELEPHONE ENCOUNTER
Reason for Disposition   MODERATE pain (e.g., symptoms interfere with work or school, limping) and present > 3 days    Protocols used:  KNEE PAIN-A-OH    Patient has questions about home care after ER visit for  fracture knee on 9/26/17. Education completed per [facility] On Call Care Advice including  Keep your leg elevated to reduce pain and swelling. When sleeping, put a pillow under the injured leg. When sitting, support the injured leg so it is above your waist. This is very important during the first 2 days (48 hours).    Put an ice pack on the injured area. Do this for 20 minutes every 1 to 2 hours the first day for pain relief. You can make an ice pack by wrapping a plastic bag of ice cubes in a thin towel. As the ice melts, be careful that the cast, splint, or brace doesnt get wet. You can place the ice pack directly over the splint, cast, or brace. Continue using the ice pack 3 to 4 times a day for the next 2 days. Then use the ice pack as needed to ease pain and swelling.    Keep the cast, splint, or brace completely dry at all times. Bathe with your cast, splint, or brace out of the water. Protect it with a large plastic bag, rubber-banded at the top end. If a brace or fiberglass cast, splint, or brace gets wet, you can dry it with a hair dryer.  Patient verbalize understanding. Patient has f/u on Monday 10/2 with ortho.

## 2017-10-02 ENCOUNTER — OFFICE VISIT (OUTPATIENT)
Dept: ORTHOPEDICS | Facility: CLINIC | Age: 77
End: 2017-10-02
Payer: MEDICARE

## 2017-10-02 ENCOUNTER — HOSPITAL ENCOUNTER (OUTPATIENT)
Dept: RADIOLOGY | Facility: HOSPITAL | Age: 77
Discharge: HOME | End: 2017-10-02
Attending: PHYSICIAN ASSISTANT
Payer: MEDICARE

## 2017-10-02 VITALS
RESPIRATION RATE: 14 BRPM | HEART RATE: 75 BPM | BODY MASS INDEX: 36.65 KG/M2 | HEIGHT: 65 IN | SYSTOLIC BLOOD PRESSURE: 138 MMHG | DIASTOLIC BLOOD PRESSURE: 95 MMHG | WEIGHT: 220 LBS

## 2017-10-02 DIAGNOSIS — M25.561 ACUTE PAIN OF RIGHT KNEE: ICD-10-CM

## 2017-10-02 DIAGNOSIS — M25.561 ACUTE PAIN OF RIGHT KNEE: Primary | ICD-10-CM

## 2017-10-02 DIAGNOSIS — M25.561 RIGHT KNEE PAIN, UNSPECIFIED CHRONICITY: Primary | ICD-10-CM

## 2017-10-02 DIAGNOSIS — S82.001A CLOSED FRACTURE OF RIGHT PATELLA, UNSPECIFIED FRACTURE MORPHOLOGY, INITIAL ENCOUNTER: Primary | ICD-10-CM

## 2017-10-02 DIAGNOSIS — E03.9 HYPOTHYROIDISM, UNSPECIFIED TYPE: ICD-10-CM

## 2017-10-02 PROCEDURE — 73564 X-RAY EXAM KNEE 4 OR MORE: CPT | Mod: 26,RT,, | Performed by: RADIOLOGY

## 2017-10-02 PROCEDURE — 99213 OFFICE O/P EST LOW 20 MIN: CPT | Mod: PBBFAC,25 | Performed by: PHYSICIAN ASSISTANT

## 2017-10-02 PROCEDURE — 73564 X-RAY EXAM KNEE 4 OR MORE: CPT | Mod: TC,RT

## 2017-10-02 PROCEDURE — 27520 TREAT KNEECAP FRACTURE: CPT | Mod: PBBFAC | Performed by: PHYSICIAN ASSISTANT

## 2017-10-02 PROCEDURE — 99214 OFFICE O/P EST MOD 30 MIN: CPT | Mod: S$PBB,57,, | Performed by: PHYSICIAN ASSISTANT

## 2017-10-02 PROCEDURE — 27520 TREAT KNEECAP FRACTURE: CPT | Mod: S$PBB,RT,, | Performed by: PHYSICIAN ASSISTANT

## 2017-10-02 PROCEDURE — 99999 PR PBB SHADOW E&M-EST. PATIENT-LVL III: CPT | Mod: PBBFAC,,, | Performed by: PHYSICIAN ASSISTANT

## 2017-10-02 RX ORDER — OXYCODONE AND ACETAMINOPHEN 10; 325 MG/1; MG/1
TABLET ORAL
Qty: 40 TABLET | Refills: 0 | Status: SHIPPED | OUTPATIENT
Start: 2017-10-02 | End: 2017-11-09

## 2017-10-02 RX ORDER — MELOXICAM 15 MG/1
15 TABLET ORAL DAILY
Qty: 30 TABLET | Refills: 2 | Status: SHIPPED | OUTPATIENT
Start: 2017-10-02 | End: 2018-03-15

## 2017-10-02 NOTE — PROGRESS NOTES
Subjective:      Patient ID: Rbeecca Rios is a 76 y.o. female.    Chief Complaint: Pain of the Right Knee    HPI  The patient presents to clinic for evaluation of right knee pain secondary to a closed displaced patellar fracture.  She is an emergency room follow-up.  She is here today with her family members.  The patient describes tripping on the threshold at her home and landing on hard, ceramic tile.  This fall occurred on September 26, 2017.  She was seen in the emergency room the same day.  The patient presents today wearing a knee immobilizer.    X-rays were taken today.  The patient was previously prescribed meloxicam and Percocet which she is currently out of.  She request refills on both of these.    Review of Systems   Constitution: Negative for chills, fever and night sweats.   Respiratory: Negative for cough, shortness of breath and wheezing.    Musculoskeletal: Positive for joint pain and joint swelling.   Gastrointestinal: Negative for diarrhea, nausea and vomiting.   Neurological: Negative for brief paralysis.   Psychiatric/Behavioral: Negative for altered mental status.         Objective:            General    Vitals reviewed.  Constitutional: She is oriented to person, place, and time. She appears well-developed and well-nourished.   HENT:   Head: Normocephalic.   Nose: Nose normal.   Eyes: EOM are normal. Pupils are equal, round, and reactive to light.   Neck: Normal range of motion. Neck supple.   Cardiovascular: Normal rate and regular rhythm.    Pulmonary/Chest: Effort normal.   Abdominal: Soft. She exhibits no distension. There is no tenderness.   Neurological: She is alert and oriented to person, place, and time.   Psychiatric: She has a normal mood and affect. Her behavior is normal.     General Musculoskeletal Exam   Gait: abnormal   Pelvic Obliquity: none    Right Ankle/Foot Exam     Alignment   Forefoot Alignment: normal    Tests   Varus tilt: negative  Heel Walk: able to perform  Tiptoe  Walk: able to perform    Other   Sensation: normal    Left Ankle/Foot Exam     Alignment   Forefoot Alignment: normal    Tests   Varus tilt: negative  Heel Walk: able to perform  Tiptoe Walk: able to perform    Other   Sensation: normal    Right Knee Exam     Inspection   Swelling: present  Bruising: present    Tenderness   The patient is tender to palpation of the patella.    Range of Motion   Extension:  0 normal     Tests   Ligament Examination   Posterior Sag Test: negative  Posterolateral Corner: unstable (>15 degrees difference)  Patella   Patellar Tracking: normal    Other   Sensation: normal    Left Knee Exam     Inspection   Erythema: absent  Scars: absent  Effusion: absent    Tenderness   The patient is experiencing no tenderness.         Range of Motion   Extension: normal   Flexion: normal     Tests   Stability   Posterior Sag Test: negative  Posterolateral Corner: unstable (>15 degrees difference)  Patella   Patellar Tracking: normal    Other   Sensation: normal    Right Hip Exam     Inspection   Swelling: absent  Bruising: absent    Other   Sensation: normal  Left Hip Exam     Inspection   Swelling: absent  Bruising: absent    Other   Sensation: normal      Back (L-Spine & T-Spine) / Neck (C-Spine) Exam   Back exam is normal.    Neck (C-Spine) Range of Motion   Flexion:     Normal  Extension: Normal  Right Lateral Bend: normal  Left Lateral Bend: normal  Right Rotation: normal  Left Rotation: normal    Spinal Sensation   Right Side Sensation  C-Spine Level: normal   L-Spine Level: normal  S-Spine Level: normal  T-Spine Level: normal  Left Side Sensation  C-Spine Level: normal  L-Spine Level: normal  S-Spine Level: normal  T-Spine Level: normal    Other She has no scoliosis .      Right Hand/Wrist Exam     Inspection   Deformity: Wrist - deformity     Tests   Phalens Sign: negative  Tinels Sign (Medial Nerve): negative  Finkelstein: negative  Cubital Tunnel Compression Test: negative      Other      Neuorologic Exam    Median Distribution: normal  Ulnar Distribution: normal  Radial Distribution: normal      Left Hand/Wrist Exam     Inspection   Deformity: Wrist - absent     Tests   Phalens Sign: negative  Tinels Sign (Medial Nerve): negative  Finkelstein: negative  Cubital Tunnel Compression Test: negative      Other     Sensory Exam  Median Distribution: normal  Ulnar Distribution: normal  Radial Distribution: normal      Right Elbow Exam     Inspection   Effusion: absent  Bruising: absent  Deformity: absent    Tests Tinel's Sign (cubital tunnel): negative    Other   Sensation: normal      Left Elbow Exam     Inspection   Effusion: absent  Bruising: absent  Deformity: absent    Tests Tinel's Sign (cubital tunnel): negative    Other   Sensation: normal    Right Shoulder Exam     Range of Motion   Active Abduction: normal   Passive Abduction: normal   Extension: normal   Forward Flexion: normal   Forward Elevation: normal  Adduction: normal  External Rotation 0 degrees: normal   Internal Rotation 0 degrees: normal     Tests & Signs   Apprehension: negative  Cross Arm: negative  Impingement: negative    Other   Sensation: normal    Left Shoulder Exam     Range of Motion   Active Abduction: normal   Passive Abduction: normal   Extension: normal   Forward Flexion: normal   Forward Elevation: normal  Adduction: normal  External Rotation 0 degrees: normal   Internal Rotation 0 degrees: normal     Tests & Signs   Apprehension: negative  Cross Arm: negative  Impingement: negative    Other   Sensation: normal       Muscle Strength   Right Upper Extremity   Wrist Extension: 5/5/5   Wrist Flexion: 5/5/5   : 5/5/5   Elbow Pronation:  5/5   Elbow Supination:  5/5   Elbow Extension: 5/5  Elbow Flexion: 5/5  Intrinsics: 5/5  EPL (Extensor Pollicis Longus): 5/5  Left Upper Extremity  Wrist Extension: 5/5/5   Wrist Flexion: 5/5/5   :  5/5/5   Elbow Pronation:  5/5   Elbow Supination:  5/5   Elbow Extension:  5/5  Elbow Flexion: 5/5  Intrinsics: 5/5  EPL (Extensor Pollicis Longus): 5/5  Right Lower Extremity   Hip Abduction: 5/5   Quadriceps:  5/5   Hamstrin/5   Left Lower Extremity   Hip Abduction: 5/5   Quadriceps:  5/5   Hamstrin/5     Reflexes     Left Side  Biceps:  2+  Triceps:  2+  Quadriceps:  2+  Achilles:  2+    Right Side   Biceps:  2+  Triceps:  2+  Quadriceps:  2+  Achilles:  2+    Vascular Exam     Right Pulses      Radial:                    2+      Left Pulses      Radial:                    2+      Capillary Refill  Right Hand: normal capillary refill  Left Hand: normal capillary refill                Comparison: 2017    Bilateral standing ap knees  bilateral sunrise views  right lateral    Findings:    Comminuted tuft fracture of the RIGHT patella again noted with displaced fragments, anterior and superior soft tissue swelling and concomitant small effusion.  No subluxation or dislocation.  Fabella noted.  Diffuse soft tissue prominence the lower extremity.   Impression           Comminuted fracture RIGHT patella.  See above.          Assessment:       Encounter Diagnoses   Name Primary?    Acute pain of right knee     Closed fracture of right patella, unspecified fracture morphology, initial encounter Yes          Plan:       Rebecca was seen today for pain.    Diagnoses and all orders for this visit:    Closed fracture of right patella, unspecified fracture morphology, initial encounter    Acute pain of right knee  -     WALKER FOR HOME USE    Other orders  -     meloxicam (MOBIC) 15 MG tablet; Take 1 tablet (15 mg total) by mouth once daily.  -     oxycodone-acetaminophen (PERCOCET)  mg per tablet; 1-2 tablets by mouth every 6 to 8 hours when necessary for pain    Based on recent x-rays, the patient's fracture has distracted somewhat.  She understands that it is in multiple pieces but it considered stable condition.  She is at high risk for developing posttraumatic arthritis in  her knee and may need to consider a total knee replacement some time in the future.  Dr. Kumari has also seen the patient and her x-rays and informed her of this.      The patient was instructed to wear her knee immobilizer at all times keeping her knee in complete extension.  She is able to do some weightbearing but keeping it minimal such as going to the restroom and around her home.  Patient was given a DME form to obtain a roller walker.  She needs to use her walker whenever she is walking around.  She is not safe to ambulate without this device.    The patient was given prescriptions for her anti-inflammatory and Percocet 10.    The patient was instructed on straight leg raises and ankle pumps.  She can also alternate warm and cold compresses.  Patient will return to recheck her patella in approximately 4 weeks and have repeat x-rays.            Agustin Thacker PA-C

## 2017-10-03 RX ORDER — LEVOTHYROXINE SODIUM 75 UG/1
TABLET ORAL
Qty: 30 TABLET | Refills: 6 | Status: SHIPPED | OUTPATIENT
Start: 2017-10-03

## 2017-10-25 ENCOUNTER — TELEPHONE (OUTPATIENT)
Dept: CARDIOLOGY | Facility: CLINIC | Age: 77
End: 2017-10-25

## 2017-10-25 NOTE — TELEPHONE ENCOUNTER
----- Message from Catece Kennedy sent at 10/25/2017 12:53 PM CDT -----  Contact: PT  States she needs to speak to the nurse regarding her and her husbands appts. States she fell and broke a bone in her leg. Please call pt at [phone]. Thank you

## 2017-10-30 ENCOUNTER — OFFICE VISIT (OUTPATIENT)
Dept: ORTHOPEDICS | Facility: CLINIC | Age: 77
End: 2017-10-30
Payer: MEDICARE

## 2017-10-30 ENCOUNTER — HOSPITAL ENCOUNTER (OUTPATIENT)
Dept: RADIOLOGY | Facility: HOSPITAL | Age: 77
Discharge: HOME | End: 2017-10-30
Attending: PHYSICIAN ASSISTANT
Payer: MEDICARE

## 2017-10-30 VITALS — HEART RATE: 64 BPM | SYSTOLIC BLOOD PRESSURE: 148 MMHG | DIASTOLIC BLOOD PRESSURE: 80 MMHG | RESPIRATION RATE: 12 BRPM

## 2017-10-30 DIAGNOSIS — S82.001D CLOSED NONDISPLACED FRACTURE OF RIGHT PATELLA WITH ROUTINE HEALING, UNSPECIFIED FRACTURE MORPHOLOGY, SUBSEQUENT ENCOUNTER: ICD-10-CM

## 2017-10-30 DIAGNOSIS — M25.561 RIGHT KNEE PAIN, UNSPECIFIED CHRONICITY: Primary | ICD-10-CM

## 2017-10-30 DIAGNOSIS — M25.561 RIGHT KNEE PAIN, UNSPECIFIED CHRONICITY: ICD-10-CM

## 2017-10-30 DIAGNOSIS — M25.561 ACUTE PAIN OF RIGHT KNEE: Primary | ICD-10-CM

## 2017-10-30 PROCEDURE — 99024 POSTOP FOLLOW-UP VISIT: CPT | Mod: S$PBB,,, | Performed by: PHYSICIAN ASSISTANT

## 2017-10-30 PROCEDURE — 73560 X-RAY EXAM OF KNEE 1 OR 2: CPT | Mod: 26,RT,, | Performed by: RADIOLOGY

## 2017-10-30 PROCEDURE — 99214 OFFICE O/P EST MOD 30 MIN: CPT | Mod: PBBFAC,25 | Performed by: PHYSICIAN ASSISTANT

## 2017-10-30 PROCEDURE — 99999 PR PBB SHADOW E&M-EST. PATIENT-LVL IV: CPT | Mod: PBBFAC,,, | Performed by: PHYSICIAN ASSISTANT

## 2017-10-30 PROCEDURE — 73560 X-RAY EXAM OF KNEE 1 OR 2: CPT | Mod: TC,RT

## 2017-10-30 RX ORDER — HYDROCODONE BITARTRATE AND ACETAMINOPHEN 10; 325 MG/1; MG/1
1 TABLET ORAL 3 TIMES DAILY PRN
COMMUNITY
End: 2020-09-22

## 2017-10-30 NOTE — PROGRESS NOTES
Subjective:      Patient ID: Rebecca Rios is a 76 y.o. female.    Chief Complaint: Follow-up and Pain of the Right Knee    HPI     The patient is seen today in follow-up of her right knee due to a closed patella fracture. She has kept her knee immobilized in a knee immobilizer for almost 5 weeks now and remains careful.   She only sleeps in her recliner.    Her current discomfort is a 6/10. X-rays were taken today.     Review of Systems   Constitution: Negative for chills, fever and night sweats.   Respiratory: Negative for cough, shortness of breath and wheezing.    Musculoskeletal: Positive for joint pain and joint swelling.   Gastrointestinal: Negative for diarrhea, nausea and vomiting.   Neurological: Negative for brief paralysis.   Psychiatric/Behavioral: Negative for altered mental status.         Objective:            General    Constitutional: She is oriented to person, place, and time. She appears well-developed and well-nourished.   Neck: Normal range of motion.   Cardiovascular: Normal rate and regular rhythm.    Pulmonary/Chest: Effort normal.   Abdominal: Soft.   Neurological: She is alert and oriented to person, place, and time.   Psychiatric: She has a normal mood and affect. Her behavior is normal.     General Musculoskeletal Exam   Gait: abnormal       Right Knee Exam     Inspection   Erythema: absent  Swelling: present  Effusion: present    Tenderness   The patient is tender to palpation of the patella, patellar tendon and tibial tubercle.    Range of Motion   Extension: 5   Flexion: 50               Technique: 2 views were obtained of the right knee.    Comparison: 10/2/2017    Findings: The previously described patellar fracture is again demonstrated.  Fragment positioning appears unchanged.  No new fractures or dislocations visualized.  Soft tissue edema appears improved. Joint spaces are well preserved.    Assessment:       Encounter Diagnoses   Name Primary?    Acute pain of right knee Yes     Closed nondisplaced fracture of right patella with routine healing, unspecified fracture morphology, subsequent encounter           Plan:       Rebecca was seen today for follow-up and pain.    Diagnoses and all orders for this visit:    Acute pain of right knee  -     Ambulatory Referral to Physical/Occupational Therapy    Closed nondisplaced fracture of right patella with routine healing, unspecified fracture morphology, subsequent encounter  -     Ambulatory Referral to Physical/Occupational Therapy      The patient's fracture remains in good alignment. I would like for her to continue wearing knee immobilizer for 1 week and then transition to shorter hinged knee brace. She will be TDWB to PWB (30-50%) on RLE with the use of her walker.     I am sending her to [facility] in [place] 2x a week to work on gait training and ROM exercises. I will see her back in 4 weeks to reassess her knee and have repeat x-rays.               Agustin Thacker PA-C

## 2017-11-09 ENCOUNTER — OFFICE VISIT (OUTPATIENT)
Dept: CARDIOLOGY | Facility: CLINIC | Age: 77
End: 2017-11-09
Payer: MEDICARE

## 2017-11-09 VITALS
HEART RATE: 74 BPM | DIASTOLIC BLOOD PRESSURE: 64 MMHG | WEIGHT: 193.31 LBS | SYSTOLIC BLOOD PRESSURE: 150 MMHG | BODY MASS INDEX: 32.17 KG/M2

## 2017-11-09 DIAGNOSIS — I10 ESSENTIAL (PRIMARY) HYPERTENSION: ICD-10-CM

## 2017-11-09 DIAGNOSIS — M25.561 ACUTE PAIN OF BOTH KNEES: Primary | ICD-10-CM

## 2017-11-09 DIAGNOSIS — I50.32 CHRONIC DIASTOLIC HEART FAILURE: Primary | ICD-10-CM

## 2017-11-09 DIAGNOSIS — R07.89 ATYPICAL CHEST PAIN: ICD-10-CM

## 2017-11-09 DIAGNOSIS — M25.562 ACUTE PAIN OF BOTH KNEES: Primary | ICD-10-CM

## 2017-11-09 DIAGNOSIS — I27.20 PULMONARY HYPERTENSION: ICD-10-CM

## 2017-11-09 DIAGNOSIS — E78.2 MIXED HYPERLIPIDEMIA: ICD-10-CM

## 2017-11-09 DIAGNOSIS — I25.10 CORONARY ARTERY DISEASE INVOLVING NATIVE CORONARY ARTERY OF NATIVE HEART WITHOUT ANGINA PECTORIS: ICD-10-CM

## 2017-11-09 PROCEDURE — 99214 OFFICE O/P EST MOD 30 MIN: CPT | Mod: S$PBB,,, | Performed by: INTERNAL MEDICINE

## 2017-11-09 PROCEDURE — 99213 OFFICE O/P EST LOW 20 MIN: CPT | Mod: PBBFAC,PO | Performed by: INTERNAL MEDICINE

## 2017-11-09 PROCEDURE — 99999 PR PBB SHADOW E&M-EST. PATIENT-LVL III: CPT | Mod: PBBFAC,,, | Performed by: INTERNAL MEDICINE

## 2017-11-09 NOTE — PROGRESS NOTES
Subjective:    Patient ID:  Rebecca Rios is a 76 y.o. female who presents for evaluation of Congestive Heart Failure; Hypertension; and Coronary Artery Disease      HPI Pt presents for f/u  She has seen Dr. Bryan in past.  Has h/o HTN, DD, MR, PHTN, hyperlipidemia, carries dx of CAD in chart.  Stress mpi 2014 no significant ischemia, fixed anteroapical defect.  Echo 2014 normal EF, DD, mod TR, PHTN.  Pt here for f/u.  Amlodipine increased to 5 mg qd last appt August for HTN control.  BNP levels minimally elevated.   Echo ordered last appt, and shows normal LV function, no significant abnormality noted.  Since last appt in August was seen in ER twice in Sept for fall on knee (fracture) and another time for cp, slurred speech, n/v, subj fevers and some concerns over whether pt took too many of her meds.  Last ecg 9/9/17 is normal when in ER for cp.  CTA - for PE, - troponin level.  No recurrent cp sxs.  Denies dyspnea. No pnd/orthopnea.  + knee pain.  BP is better per pt.  Compliant with meds.    Mindful of low salt diet.   PCP checks lipids.    Past Medical History:   Diagnosis Date    Anxiety     Depression     Fractures     Hot flash, menopausal     HTN (hypertension)     Hyperlipidemia     Hypothyroid        Current Outpatient Prescriptions:     alprazolam (XANAX) 0.5 MG tablet, Take 0.5 mg by mouth 2 (two) times daily as needed for Anxiety., Disp: , Rfl:     amlodipine (NORVASC) 5 MG tablet, Take 1 tablet (5 mg total) by mouth once daily., Disp: 30 tablet, Rfl: 11    aspirin (ECOTRIN) 81 MG EC tablet, Take 1 tablet (81 mg total) by mouth once daily., Disp: 30 tablet, Rfl: 2    buPROPion (WELLBUTRIN SR) 150 MG TBSR 12 hr tablet, Take 150 mg by mouth., Disp: , Rfl:     diclofenac sodium 1 % Gel, Apply 2 g topically once daily., Disp: 100 g, Rfl: 2    diphenoxylate-atropine 2.5-0.025 mg (LOMOTIL) 2.5-0.025 mg per tablet, Take 1 tablet by mouth 4 (four) times daily as needed for Diarrhea., Disp: 30  tablet, Rfl: 1    esomeprazole (NEXIUM) 40 MG capsule, Take 40 mg by mouth., Disp: , Rfl:     gabapentin (NEURONTIN) 600 MG tablet, Take 600 mg by mouth 2 (two) times daily., Disp: , Rfl:     hydrocodone-acetaminophen 10-325mg (NORCO)  mg Tab, Take by mouth., Disp: , Rfl:     levothyroxine (SYNTHROID) 75 MCG tablet, TAKE ONE TABLET BY MOUTH ONCE DAILY, Disp: 30 tablet, Rfl: 6    lisinopril (PRINIVIL,ZESTRIL) 40 MG tablet, Take 1 tablet (40 mg total) by mouth once daily., Disp: 90 tablet, Rfl: 3    meloxicam (MOBIC) 15 MG tablet, Take 1 tablet (15 mg total) by mouth once daily., Disp: 30 tablet, Rfl: 2    tizanidine (ZANAFLEX) 4 MG tablet, Take 4 mg by mouth every evening. , Disp: , Rfl:       Review of Systems   Constitution: Negative.   HENT: Negative.    Eyes: Negative.    Cardiovascular: Negative.    Respiratory: Negative.    Endocrine: Negative.    Hematologic/Lymphatic: Negative.    Skin: Negative.    Musculoskeletal: Positive for arthritis and joint pain.   Gastrointestinal: Negative.    Genitourinary: Negative.    Neurological: Negative.    Psychiatric/Behavioral: Negative.    Allergic/Immunologic: Negative.        BP (!) 150/64 (BP Location: Left arm, Patient Position: Sitting, BP Method: Large (Manual))   Pulse 74   Wt 87.7 kg (193 lb 5.5 oz)   BMI 32.17 kg/m²   Wt Readings from Last 3 Encounters:   11/09/17 87.7 kg (193 lb 5.5 oz)   10/02/17 99.8 kg (220 lb 0.3 oz)   09/26/17 99.8 kg (220 lb)     Temp Readings from Last 3 Encounters:   09/26/17 98.7 °F (37.1 °C)   09/09/17 98.2 °F (36.8 °C)   07/14/17 97.4 °F (36.3 °C) (Tympanic)     BP Readings from Last 3 Encounters:   11/09/17 (!) 150/64   10/30/17 (!) 148/80   10/02/17 (!) 138/95     Pulse Readings from Last 3 Encounters:   11/09/17 74   10/30/17 64   10/02/17 75          Objective:    Physical Exam   Constitutional: She is oriented to person, place, and time. Vital signs are normal. She appears well-developed and well-nourished. She is  active and cooperative. She does not have a sickly appearance. She does not appear ill. No distress.   HENT:   Head: Normocephalic.   Neck: Neck supple. Normal carotid pulses, no hepatojugular reflux and no JVD present. Carotid bruit is not present. No thyromegaly present.   Cardiovascular: Normal rate, regular rhythm, S1 normal, S2 normal, normal heart sounds and normal pulses.  PMI is not displaced.  Exam reveals no gallop and no friction rub.    No murmur heard.  Pulses:       Radial pulses are 2+ on the right side, and 2+ on the left side.   Pulmonary/Chest: Effort normal and breath sounds normal. She has no wheezes. She has no rales.   Abdominal: Soft. Normal appearance, normal aorta and bowel sounds are normal. She exhibits no pulsatile liver, no abdominal bruit, no ascites and no mass. There is no splenomegaly or hepatomegaly. There is no tenderness.   Musculoskeletal: She exhibits no edema.   Lymphadenopathy:     She has no cervical adenopathy.   Neurological: She is alert and oriented to person, place, and time.   Skin: Skin is warm. She is not diaphoretic.   Psychiatric: She has a normal mood and affect. Her behavior is normal.   Nursing note and vitals reviewed.      I have reviewed all pertinent labs and cardiac studies.      Component      Latest Ref Rng & Units 9/9/2017   BNP      0 - 99 pg/mL 138 (H)         Chemistry        Component Value Date/Time     09/09/2017 2010    K 4.1 09/09/2017 2010     09/09/2017 2010    CO2 28 09/09/2017 2010    BUN 12 09/09/2017 2010    CREATININE 0.9 09/09/2017 2010     09/09/2017 2010        Component Value Date/Time    CALCIUM 9.2 09/09/2017 2010    ALKPHOS 85 09/09/2017 2010    AST 17 09/09/2017 2010    ALT 13 09/09/2017 2010    BILITOT 0.6 09/09/2017 2010    ESTGFRAFRICA >60 09/09/2017 2010    EGFRNONAA >60 09/09/2017 2010        Lab Results   Component Value Date    WBC 11.19 09/09/2017    HGB 11.9 (L) 09/09/2017    HCT 37.8 09/09/2017    MCV 93  09/09/2017     09/09/2017     No results found for: LABA1C, HGBA1C  Lab Results   Component Value Date    CHOL 179 12/23/2015    CHOL 195 11/03/2014    CHOL 168 09/14/2014     Lab Results   Component Value Date    HDL 43 12/23/2015    HDL 47 11/03/2014    HDL 38 (L) 09/14/2014     Lab Results   Component Value Date    LDLCALC 102.6 12/23/2015    LDLCALC 122.2 11/03/2014    LDLCALC 107.8 09/14/2014     Lab Results   Component Value Date    TRIG 167 (H) 12/23/2015    TRIG 129 11/03/2014    TRIG 111 09/14/2014     Lab Results   Component Value Date    CHOLHDL 24.0 12/23/2015    CHOLHDL 24.1 11/03/2014    CHOLHDL 22.6 09/14/2014     Est. PA Systolic Pressure  25       Resulting Agency  CV[facility] CVIS CVIS   Narrative     Date of Procedure: 09/01/2017        TEST DESCRIPTION   Technical Quality: This is a technically challenging study.     Aorta: The aortic root is normal in size, measuring 2.2 cm at sinotubular junction and 2.2 cm at Sinuses of Valsalva. The proximal ascending aorta is normal in size, measuring 2.6 cm across.     Left Atrium: The left atrial volume index is normal, measuring 33.70 cc/m2.     Left Ventricle: The left ventricle is normal in size, with an end-diastolic diameter of 4.6 cm, and an end-systolic diameter of 3.3 cm. LV wall thickness is normal, with the septum measuring 1.5 cm and the posterior wall measuring 1.2 cm across. Relative   wall thickness was increased at 0.52, and the LV mass index was increased at 147.8 g/m2 consistent with concentric left ventricular hypertrophy. There are no regional wall motion abnormalities. Left ventricular systolic function appears normal. Visually   estimated ejection fraction is 60-65%. The LV Doppler derived stroke volume equals 55.0 ccs.     Diastolic indices: E wave velocity 0.9 m/s, E/A ratio 1.7,  msec., E/e' ratio(avg) 7. Diastolic function is normal.     Right Atrium: The right atrium is normal in size, measuring 4.9 cm in length and 2.8 cm  in width in the apical view.     Right Ventricle: The right ventricle is normal in size measuring 1.9 cm at the base in the apical right ventricle-focused view. Global right ventricular systolic function appears normal. The estimated PA systolic pressure is greater than 25 mmHg.     Aortic Valve:  Aortic valve is normal in structure with normal leaflet mobility.     Mitral Valve:  Mitral valve is normal in structure with normal leaflet mobility.     Tricuspid Valve:  Tricuspid valve is normal in structure with normal leaflet mobility.     Pulmonary Valve:  Pulmonary valve is normal in structure with normal leaflet mobility.     Intracavitary: There is no evidence of pericardial effusion, intracavity mass, thrombi, or vegetation.         CONCLUSIONS     1 - Concentric hypertrophy.     2 - No wall motion abnormalities.     3 - Normal left ventricular systolic function (EF 60-65%).     4 - Normal left ventricular diastolic function.     5 - Normal right ventricular systolic function .     6 - The estimated PA systolic pressure is greater than 25 mmHg.             This document has been electronically    SIGNED BY: David Bryan MD On: 09/02/2017 06:46              Assessment:       1. Chronic diastolic heart failure    2. Pulmonary hypertension    3. Essential (primary) hypertension    4. Coronary artery disease involving native coronary artery of native heart without angina pectoris    5. Mixed hyperlipidemia    6. Atypical chest pain         Plan:             - Stable diastolic CHF/CV conditions on current medical tx.  - Atypical cp resolved, likely non-cardiac etiology, monitor/observe for now. Stress test if needed in future should it recur.  - Test results reviewed in detail with pt.  - Pt counseled on diastolic CHF mgt and need to keep HTN under control.  - Cardiac low salt diet  - Exercise when recovers from knee fx.  - f/u with PCP on non-cardiac issues and lipids.    F/u 4 months.

## 2017-11-18 ENCOUNTER — OFFICE VISIT (OUTPATIENT)
Dept: URGENT CARE | Facility: CLINIC | Age: 77
End: 2017-11-18
Payer: MEDICARE

## 2017-11-18 VITALS
HEIGHT: 65 IN | TEMPERATURE: 96 F | DIASTOLIC BLOOD PRESSURE: 77 MMHG | BODY MASS INDEX: 31.96 KG/M2 | SYSTOLIC BLOOD PRESSURE: 150 MMHG | OXYGEN SATURATION: 97 % | WEIGHT: 191.81 LBS | HEART RATE: 65 BPM

## 2017-11-18 DIAGNOSIS — L60.0 INGROWN LEFT GREATER TOENAIL: ICD-10-CM

## 2017-11-18 DIAGNOSIS — L03.032 PARONYCHIA OF GREAT TOE OF LEFT FOOT: Primary | ICD-10-CM

## 2017-11-18 PROCEDURE — 99999 PR PBB SHADOW E&M-EST. PATIENT-LVL V: CPT | Mod: PBBFAC,,, | Performed by: NURSE PRACTITIONER

## 2017-11-18 PROCEDURE — 99214 OFFICE O/P EST MOD 30 MIN: CPT | Mod: S$PBB,,, | Performed by: NURSE PRACTITIONER

## 2017-11-18 PROCEDURE — 99215 OFFICE O/P EST HI 40 MIN: CPT | Mod: PBBFAC,PO | Performed by: NURSE PRACTITIONER

## 2017-11-18 RX ORDER — CEPHALEXIN 500 MG/1
500 CAPSULE ORAL 4 TIMES DAILY
Qty: 28 CAPSULE | Refills: 0 | Status: SHIPPED | OUTPATIENT
Start: 2017-11-18 | End: 2017-11-25

## 2017-11-18 RX ORDER — MUPIROCIN 20 MG/G
OINTMENT TOPICAL 3 TIMES DAILY
Qty: 1 TUBE | Refills: 0 | Status: SHIPPED | OUTPATIENT
Start: 2017-11-18 | End: 2017-11-28

## 2017-11-18 NOTE — PATIENT INSTRUCTIONS
Paronychia of the Finger or Toe  Paronychia is an infection near a fingernail or toenail. It usually occurs when an opening in the cuticle or an ingrown toenail lets bacteria under the skin.  The infection will need to be drained if pus is present. If the infection has been caught early, you may need only antibiotic treatment. Healing will take about 1 to 2 weeks.  Home care  Follow these guidelines when caring for yourself at home:  · Clean and soak the toe or finger. Do this 2 times a day for the first 3 days. To do so:  ¨ Soak your foot or hand in a tub of warm water for 5 minutes. Or hold your toe or finger under a faucet of warm running water for 5 minutes.  ¨ Clean any crust away with soap and water using a cotton swab.  ¨ Put antibiotic ointment on the infected area.  · Change the dressing daily or any time it gets dirty.  · If you were given antibiotics, take them as directed until they are all gone.  · If your infection is on a toe, wear comfortable shoes with a lot of toe room. You can also wear open-toed sandals while your toe heals.  · You may use over-the-counter medicine (acetaminophen or ibuprofen to help with pain, unless another medicine was prescribed. If you have chronic liver or kidney disease, talk with your healthcare provider before using these medicines. Also talk with your provider if you've had a stomach ulcer or GI (gastrointestinal) bleeding.  Prevention  The following can prevent paronychia:  · Avoid cutting or playing with your cuticles at home.  · Don't bite your nails.  · Don't suck on your thumbs or fingers.  Follow-up care  Follow up with your healthcare provider, or as advised.  When to seek medical advice  Call your healthcare provider right away if any of these occur:  · Redness, pain, or swelling of the finger or toe gets worse  · Red streaks in the skin leading away from the wound  · Pus or fluid draining from the nail area  · Fever of 100.4ºF (38ºC) or higher, or as directed  by your provider  Date Last Reviewed: 8/1/2016 © 2000-2017 [employer]. [address] [place] [address], [place] [address]. All rights reserved. This information is not intended as a substitute for professional medical care. Always follow your healthcare professional's instructions.        Ingrown Toenail (Excised)  An ingrown toenail occurs when the nail grows sideways into the skin next to the nail. This can cause pain and may lead to an infection with redness, swelling, and sometimes drainage.  The most common cause of an ingrown toenail is trimming your toenails wrong. Most people trim the nails too close to the skin and try to round the nail too tightly around the shape of the toe. When you do this, the nail can grow into the skin of the toe. While it may look nice, your toenail can grow into the skin and cause infection. It is safer to trim the nail to end in a straight line rather than a curve.  Other causes include injury or wearing shoes that are too short or tight. This can cause the same problem that happens when trimming your toenails. Sometimes you are born with a toenail that grows too large for your toe.  The most common symptoms of an ingrown toenail include:  · Pain  · Redness  · Swelling  · Drainage  Treatment  It's important to treat an ingrown toenail as soon as you notice there is a problem. If the infection is mild, you may be able to take care of it at home. Home care includes:  · Frequent warm water soaks  · Keeping the nail clean  · Wearing loose, comfortable shoes or open toe sandals  Another method to help the toe heal is to use a small piece of cotton or waxed dental floss to gently lift the corner of the problem nail. Change the cotton or floss frequently, especially if it gets dirty.  If your infection is mild but home care isn't working, or the toenail is getting worse, see your healthcare provider. Signs of worsening infection include:  · Swelling  · Redness   · Pus drainage  In  some cases, part of the toenail needs to be removed by your healthcare provider so that the infection can be drained.  If there is a lot of redness and swelling, then an antibiotic may also be used. The redness and pain should go away within 48 hours. It will take about 2 weeks for the exposed nail bed to become dry and for the swelling to go down.  If only the side of the nail was removed, it will begin to grow back in a few months. To prevent recurrence, sometimes the side of the nail bed may be treated with a strong chemical to prevent the nail from growing back.  Home care  Wound care  · Twice a day for the first 3 days, clean and soak the toe as follows:  ¨ Soak your foot in a tub of warm water for 5 minutes. Or, hold your toe under a faucet of warm running water for 5 minutes.  ¨ Clean any remaining crust away with soap and water using a cotton swab.  ¨ Put a small amount of antibiotic ointment on the infected area.  ¨ Cover with a bandage until the exposed nail bed is dry and there is no more drainage.  · Change the dressing or bandage every time you soak or clean it, or whenever it becomes wet or dirty.  · If you were prescribed antibiotics, take them as directed until they are all gone.  · While your toe is healing wear comfortable shoes with a lot of toe room. Or wear open-toe sandals.  Medicines  · You can take over-the-counter medicine for pain, unless you were given a different pain medicine to use. Note: Talk with your healthcare provider before using these medicines if you have chronic liver or kidney disease, have ever had a stomach ulcer or GI (gastrointestinal) bleeding, or are taking blood thinner medicines.  · If you were given antibiotics, take them until they are all gone. It is important to finish the antibiotics even if the wound looks better. This ensures that the infection clears.  Prevention  To prevent ingrown toenails:  · Wear shoes that fit well. Avoid shoes that pinch the toes  together.  · When you trim your toenails, dont cut them too short. Cut straight across at the top and dont round the edges.  · Dont use a sharp object to clean under your nail since this might cause an infection.  · If the toenail starts to grow into the skin again, put a small piece of cotton under that side of the nail to help it grow out straight.  Follow-up care  Follow up as advised by your healthcare provider. If the ingrown toenail recurs, follow up with a foot specialist (podiatrist) for nail bed ablation.  When to seek medical care  Call your healthcare provider right away if any of these occur:  · Increasing redness, pain, or swelling of the toe  · Red streaks in the skin leading away from the wound  · Continued pus or fluid drainage for more than 24 hours  · Fever of 100.4°F (38°C) or higher, or as directed by your provider  Date Last Reviewed: 11/1/2016  © 5458-5792 [employer]. [address] [place] [address], [place] [address]. All rights reserved. This information is not intended as a substitute for professional medical care. Always follow your healthcare professional's instructions.

## 2017-11-21 NOTE — PROGRESS NOTES
"Subjective:      Patient ID: Rebecca Rios is a 76 y.o. female.    Chief Complaint: Nail Problem    Nail Problem   This is a new problem. The current episode started in the past 7 days. The problem occurs constantly. The problem has been gradually worsening. Associated symptoms include arthralgias (left great toe). Pertinent negatives include no chills, fever, numbness or weakness. Associated symptoms comments: Had some drainage from the site. Nothing aggravates the symptoms. Treatments tried: soaks. The treatment provided no relief.     Review of Systems   Constitutional: Negative for chills and fever.   HENT: Negative.    Respiratory: Negative.    Cardiovascular: Negative.    Gastrointestinal: Negative.    Musculoskeletal: Positive for arthralgias (left great toe).   Skin:        Redness to left great toe   Neurological: Negative for weakness and numbness.   Hematological: Negative.        Objective:   BP (!) 150/77 (BP Location: Left arm, Patient Position: Sitting, BP Method: Large (Manual))   Pulse 65   Temp 96 °F (35.6 °C) (Tympanic)   Ht 5' 5" (1.651 m)   Wt 87 kg (191 lb 12.8 oz)   SpO2 97%   BMI 31.92 kg/m²   Physical Exam   Constitutional: She is oriented to person, place, and time. She appears well-developed and well-nourished. No distress.   Neck: Normal range of motion. Neck supple.   Cardiovascular: Normal rate.    Pulmonary/Chest: Effort normal. No respiratory distress.   Musculoskeletal:        Left foot: There is tenderness. There is normal range of motion and no bony tenderness.        Feet:    Neurological: She is alert and oriented to person, place, and time.   Skin: Skin is warm and dry. She is not diaphoretic.   Nursing note and vitals reviewed.    Assessment:      1. Paronychia of great toe of left foot    2. Ingrown left greater toenail       Plan:   Paronychia of great toe of left foot  -     cephALEXin (KEFLEX) 500 MG capsule; Take 1 capsule (500 mg total) by mouth 4 (four) times " daily.  Dispense: 28 capsule; Refill: 0  -     mupirocin (BACTROBAN) 2 % ointment; Apply topically 3 (three) times daily.  Dispense: 1 Tube; Refill: 0    Ingrown left greater toenail  -     Ambulatory referral to Podiatry    Instructions, follow up, and supportive care as per AVS.  Epsom salt soaks.   Follow up with podiatry as scheduled.

## 2017-12-04 ENCOUNTER — HOSPITAL ENCOUNTER (OUTPATIENT)
Dept: RADIOLOGY | Facility: HOSPITAL | Age: 77
Discharge: HOME | End: 2017-12-04
Attending: PHYSICIAN ASSISTANT
Payer: MEDICARE

## 2017-12-04 ENCOUNTER — OFFICE VISIT (OUTPATIENT)
Dept: ORTHOPEDICS | Facility: CLINIC | Age: 77
End: 2017-12-04
Payer: MEDICARE

## 2017-12-04 VITALS
BODY MASS INDEX: 31.96 KG/M2 | HEIGHT: 65 IN | RESPIRATION RATE: 19 BRPM | SYSTOLIC BLOOD PRESSURE: 135 MMHG | DIASTOLIC BLOOD PRESSURE: 75 MMHG | HEART RATE: 68 BPM | WEIGHT: 191.81 LBS

## 2017-12-04 DIAGNOSIS — M25.561 RIGHT KNEE PAIN, UNSPECIFIED CHRONICITY: ICD-10-CM

## 2017-12-04 DIAGNOSIS — S82.001D CLOSED NONDISPLACED FRACTURE OF RIGHT PATELLA WITH ROUTINE HEALING, UNSPECIFIED FRACTURE MORPHOLOGY, SUBSEQUENT ENCOUNTER: Primary | ICD-10-CM

## 2017-12-04 PROCEDURE — 99214 OFFICE O/P EST MOD 30 MIN: CPT | Mod: PBBFAC,25 | Performed by: PHYSICIAN ASSISTANT

## 2017-12-04 PROCEDURE — 73560 X-RAY EXAM OF KNEE 1 OR 2: CPT | Mod: TC,LT

## 2017-12-04 PROCEDURE — 73562 X-RAY EXAM OF KNEE 3: CPT | Mod: 26,RT,, | Performed by: RADIOLOGY

## 2017-12-04 PROCEDURE — 73560 X-RAY EXAM OF KNEE 1 OR 2: CPT | Mod: 26,59,LT, | Performed by: RADIOLOGY

## 2017-12-04 PROCEDURE — 99213 OFFICE O/P EST LOW 20 MIN: CPT | Mod: 24,S$PBB,, | Performed by: PHYSICIAN ASSISTANT

## 2017-12-04 PROCEDURE — 99999 PR PBB SHADOW E&M-EST. PATIENT-LVL IV: CPT | Mod: PBBFAC,,, | Performed by: PHYSICIAN ASSISTANT

## 2017-12-04 NOTE — PROGRESS NOTES
Subjective:     Patient ID: Rebecca Rios is a 77 y.o. female.    Chief Complaint: Pain of the Right Knee    HPI   The patient is seen today in follow-up of her right knee due to a closed patella fracture. She is more comfortable on today's visit. Her current discomfort is a 4/10.    The patient describes tripping on the threshold at her home and landing on hard, ceramic tile.  This fall occurred on September 26, 2017.            Past Medical History:   Diagnosis Date    Anxiety     Depression     Fractures     Hot flash, menopausal     HTN (hypertension)     Hyperlipidemia     Hypothyroid      Past Surgical History:   Procedure Laterality Date    CARPAL TUNNEL RELEASE Bilateral     CHOLECYSTECTOMY      HYSTERECTOMY      open luis       Family History   Problem Relation Age of Onset    Hypertension Mother      Social History     Social History    Marital status:      Spouse name: N/A    Number of children: N/A    Years of education: N/A     Occupational History    Not on file.     Social History Main Topics    Smoking status: Never Smoker    Smokeless tobacco: Never Used    Alcohol use No    Drug use: No    Sexual activity: Not on file     Other Topics Concern    Not on file     Social History Narrative    No narrative on file     Medication List with Changes/Refills   Current Medications    ALPRAZOLAM (XANAX) 0.5 MG TABLET    Take 0.5 mg by mouth 2 (two) times daily as needed for Anxiety.    AMLODIPINE (NORVASC) 5 MG TABLET    Take 1 tablet (5 mg total) by mouth once daily.    ASPIRIN (ECOTRIN) 81 MG EC TABLET    Take 1 tablet (81 mg total) by mouth once daily.    BUPROPION (WELLBUTRIN SR) 150 MG TBSR 12 HR TABLET    Take 150 mg by mouth.    DICLOFENAC SODIUM 1 % GEL    Apply 2 g topically once daily.    DIPHENOXYLATE-ATROPINE 2.5-0.025 MG (LOMOTIL) 2.5-0.025 MG PER TABLET    Take 1 tablet by mouth 4 (four) times daily as needed for Diarrhea.    ESOMEPRAZOLE (NEXIUM) 40 MG CAPSULE     Take 40 mg by mouth.    GABAPENTIN (NEURONTIN) 600 MG TABLET    Take 600 mg by mouth 2 (two) times daily.    HYDROCODONE-ACETAMINOPHEN 10-325MG (NORCO)  MG TAB    Take by mouth.    LEVOTHYROXINE (SYNTHROID) 75 MCG TABLET    TAKE ONE TABLET BY MOUTH ONCE DAILY    LISINOPRIL (PRINIVIL,ZESTRIL) 40 MG TABLET    Take 1 tablet (40 mg total) by mouth once daily.    MELOXICAM (MOBIC) 15 MG TABLET    Take 1 tablet (15 mg total) by mouth once daily.    TIZANIDINE (ZANAFLEX) 4 MG TABLET    Take 4 mg by mouth every evening.      Review of patient's allergies indicates:   Allergen Reactions    Erythromycin     Macrolide antibiotics Nausea And Vomiting     Review of Systems   Constitution: Negative for fever, night sweats, weight gain and weight loss.   Cardiovascular: Negative for chest pain, near-syncope and syncope.   Respiratory: Negative for shortness of breath.    Musculoskeletal: Positive for joint pain and stiffness. Negative for joint swelling.   Gastrointestinal: Negative for bowel incontinence.   Genitourinary: Negative for bladder incontinence.       Objective:   Body mass index is 31.92 kg/m².  Vitals:    12/04/17 1325   BP: 135/75   Pulse: 68   Resp: 19       General: Rebecca is well-developed, well-nourished, appears stated age, in no acute distress, alert and oriented to time, place and person.       General    Constitutional: She is oriented to person, place, and time. She appears well-developed and well-nourished.   Neck: Normal range of motion.   Cardiovascular: Normal rate and regular rhythm.    Pulmonary/Chest: Effort normal.   Abdominal: Soft.   Neurological: She is alert and oriented to person, place, and time.   Psychiatric: She has a normal mood and affect. Her behavior is normal.     General Musculoskeletal Exam   Gait: normal       Right Knee Exam     Inspection   Erythema: absent  Swelling: present  Effusion: effusion    Tenderness   The patient is tender to palpation of the patella and patellar  tendon.    Range of Motion   Extension: 0   Flexion: 110        AP standing views of both knees as well as lateral and Merchant views of the right knee and a Merchant view of the left knee were obtained.    Comparison: 10/30/2017    Findings: There is a comminuted transversely oriented fracture involving the patella.  The alignment of the fracture fragments is stable when compared to the prior exam.  There may be some early callus formation although the fracture lines are still visualized..   Impression      As Above.         Assessment:     Encounter Diagnoses   Name Primary?    Right knee pain, unspecified chronicity     Closed nondisplaced fracture of right patella with routine healing, unspecified fracture morphology, subsequent encounter Yes        Plan:       The patient will complete current referral/order for PT and then continue with HEP. She is making good progress so far.   She will return in 4 weeks for what may be a final check and have repeat x-rays.

## 2017-12-18 ENCOUNTER — OFFICE VISIT (OUTPATIENT)
Dept: PODIATRY | Facility: CLINIC | Age: 77
End: 2017-12-18
Payer: MEDICARE

## 2017-12-18 VITALS
SYSTOLIC BLOOD PRESSURE: 169 MMHG | HEIGHT: 65 IN | DIASTOLIC BLOOD PRESSURE: 71 MMHG | WEIGHT: 192.88 LBS | HEART RATE: 65 BPM | BODY MASS INDEX: 32.14 KG/M2

## 2017-12-18 DIAGNOSIS — L60.0 INGROWN LEFT BIG TOENAIL: Primary | ICD-10-CM

## 2017-12-18 DIAGNOSIS — L03.032 ACUTE PARONYCHIA OF TOE OF LEFT FOOT: ICD-10-CM

## 2017-12-18 PROCEDURE — 99203 OFFICE O/P NEW LOW 30 MIN: CPT | Mod: 25,S$PBB,, | Performed by: PODIATRIST

## 2017-12-18 PROCEDURE — 11750 EXCISION NAIL&NAIL MATRIX: CPT | Mod: TA,PBBFAC | Performed by: PODIATRIST

## 2017-12-18 PROCEDURE — 99999 PR PBB SHADOW E&M-EST. PATIENT-LVL III: CPT | Mod: PBBFAC,,, | Performed by: PODIATRIST

## 2017-12-18 PROCEDURE — 11750 EXCISION NAIL&NAIL MATRIX: CPT | Mod: TA,S$PBB,, | Performed by: PODIATRIST

## 2017-12-18 PROCEDURE — 99213 OFFICE O/P EST LOW 20 MIN: CPT | Mod: PBBFAC,25 | Performed by: PODIATRIST

## 2017-12-18 NOTE — PROGRESS NOTES
Subjective:     Patient ID: Rebecca Rios is a 77 y.o. female.    Chief Complaint: Ingrown Toenail (left great toenail (medial side))    Rebecca is a 77 y.o. female who presents to the clinic complaining of painful ingrown toenail on the left foot. Patient states she completed the antibiotics prescribed by her PCP, and denies any pus from the toe.       Patient Active Problem List   Diagnosis    Microscopic hematuria    Obesity (BMI 30.0-34.9)    Hypothyroidism    Hyperlipidemia    MDD (major depressive disorder)    Anxiety    Chronic pain    CAD (coronary artery disease)    Unspecified vitamin D deficiency    Sedative dependence    Restless leg    Osteopenia    Osteoarthritis    Mild episode of recurrent major depressive disorder    Acquired atrophy of thyroid    History of colonic polyps    Fibromyalgia    Essential (primary) hypertension    Diverticular disease of large intestine    Osteoarthritis of spine    Chronic low back pain    Chronic kidney disease    Back pain    Orthopnea    Pulmonary hypertension    Chronic diastolic heart failure    Atypical chest pain       Medication List with Changes/Refills   Current Medications    ALPRAZOLAM (XANAX) 0.5 MG TABLET    Take 0.5 mg by mouth 2 (two) times daily as needed for Anxiety.    AMLODIPINE (NORVASC) 5 MG TABLET    Take 1 tablet (5 mg total) by mouth once daily.    ASPIRIN (ECOTRIN) 81 MG EC TABLET    Take 1 tablet (81 mg total) by mouth once daily.    BUPROPION (WELLBUTRIN SR) 150 MG TBSR 12 HR TABLET    Take 150 mg by mouth.    DIPHENOXYLATE-ATROPINE 2.5-0.025 MG (LOMOTIL) 2.5-0.025 MG PER TABLET    Take 1 tablet by mouth 4 (four) times daily as needed for Diarrhea.    ESOMEPRAZOLE (NEXIUM) 40 MG CAPSULE    Take 40 mg by mouth.    GABAPENTIN (NEURONTIN) 600 MG TABLET    Take 600 mg by mouth 2 (two) times daily.    HYDROCODONE-ACETAMINOPHEN 10-325MG (NORCO)  MG TAB    Take by mouth.    LEVOTHYROXINE (SYNTHROID) 75 MCG TABLET     "TAKE ONE TABLET BY MOUTH ONCE DAILY    LISINOPRIL (PRINIVIL,ZESTRIL) 40 MG TABLET    Take 1 tablet (40 mg total) by mouth once daily.    MELOXICAM (MOBIC) 15 MG TABLET    Take 1 tablet (15 mg total) by mouth once daily.    TIZANIDINE (ZANAFLEX) 4 MG TABLET    Take 4 mg by mouth every evening.    Changed and/or Refilled Medications    Modified Medication Previous Medication    VOLTAREN 1 % GEL diclofenac sodium 1 % Gel       APPLY 2 GRAMS TOPICALLY ONCE DAILY    Apply 2 g topically once daily.       Review of patient's allergies indicates:   Allergen Reactions    Erythromycin     Macrolide antibiotics Nausea And Vomiting       Past Surgical History:   Procedure Laterality Date    CARPAL TUNNEL RELEASE Bilateral     CHOLECYSTECTOMY      HYSTERECTOMY      open luis         Family History   Problem Relation Age of Onset    Hypertension Mother        Social History     Social History    Marital status:      Spouse name: N/A    Number of children: N/A    Years of education: N/A     Occupational History    Not on file.     Social History Main Topics    Smoking status: Never Smoker    Smokeless tobacco: Never Used    Alcohol use No    Drug use: No    Sexual activity: Not on file     Other Topics Concern    Not on file     Social History Narrative    No narrative on file       Vitals:    12/18/17 1327   BP: (!) 169/71   Pulse: 65   Weight: 87.5 kg (192 lb 14.4 oz)   Height: 5' 5" (1.651 m)   PainSc: 0-No pain       Review of Systems   Constitutional: Negative for chills and fever.   Respiratory: Negative for shortness of breath.    Cardiovascular: Negative for chest pain, palpitations, orthopnea, claudication and leg swelling.   Gastrointestinal: Negative for diarrhea, nausea and vomiting.   Musculoskeletal: Negative for joint pain.   Skin: Negative for rash.   Neurological: Negative for dizziness, tingling, sensory change, focal weakness and weakness.   Psychiatric/Behavioral: Negative.            "   Objective:      PHYSICAL EXAM: Apperance: Alert and orient in no distress,well developed, and with good attention to grooming and body habits  Lower Extremity Exam   VASCULAR: Dorsalis pedis pulses 2/4 left and Posterior Tibial pulses 2/4 left. Capillary fill time <3 seconds left. No edema observed left. Varicosities absent left. Skin temperature of the lower extremities is warm to warm, proximal to distal. Hair growth dim left.  DERMATOLOGICAL: No skin rash, subcutaneous nodules, lesions or ulcers observed. Left hallux nail observed to be mildly incurvated at medial borders and slight obstructed in the nail grooves with soft tissue. The left hallux medial nail fold is grossly edematous and firm from chronic inflammation and scarring. No purulent drainage, no odor, and no increased temperature observed to left hallux.   NEUROLOGICAL: Light touch, sharp-dull, proprioception all present and equal bilaterally.    MUSCULOSKELETAL: Muscle strength 5/5 for all foot inverters, everters, plantarflexors, and  dorsiflexors bilateral. Pain on palpation of left hallux medial nail border. No pain on palpation of dorsal nail plate left hallux.         Assessment:       Encounter Diagnoses   Name Primary?    Ingrown left big toenail Yes    Acute paronychia of toe of left foot          Plan:   Ingrown left big toenail    Acute paronychia of toe of left foot      I counseled the patient on her conditions, regarding findings of my examination, my impressions, and usual treatment plan.   Patient consented verbal and written to permanent partial nail avulsion of left great toe.  Procedure performed: A local digital lizandro was administered to the left hallux of  6cc of 1% Lidocaine plain. Attention was then directed to the left hallux to check for adequate anesthesia. A penrose drain tourniquet was applied to the base of the left hallux for hemostasis.  Attention was then directed the medial nail border to separate using a spatula the  most proximal nail border at the eponychium was released to the area of the matrix. Then the border was released at the medial aspect and at the plantar aspect distally to proximally. Using the English anvil, a cut was then made approximately 3mm lateral to the medial nail fold in a longitudinal manner at the distal aspect extending to the proximal end of the nail plate. Using a straight hemostat, the free nail plate segment was clamped and removed. Using a tissue nipper, residual tissue was then removed. The nail groove area was inspected and probed proximally with a curette for any spicules. Next a 60 second application of phenol was applied to the medial nail border. The area was flushed with copious amounts of sterile normal saline. Betadine was applied to the area and dry sterile dressing of gauze and Coflex. The penrose drain tourniquet was released. Neurovascular status was assessed and noted to be intact. Patient tolerated procedure well.   The patient was given oral and written instructions for post-op care including BID soaks of water and Epson salt followed by application of antibiotic ointment  and light dressing.  The patient was instructed to take Tylenol over-the-counter q4h prn pain and to call clinic immediately if there is increased pain, increased redness, pus, fever, chills, nausea, or vomiting present.  Patient to return 3 weeks or sooner if needed.                 Emmanuel Mcnair DPM  [facility]

## 2017-12-18 NOTE — LETTER
December 26, 2017      Jessa Pagan, NP  [address] [facility] [address]           [facility]  [address] [place] [id number]  Phone: [phone]  Fax: [phone]          Patient: Rebecca Rios   MR Number: [id number]   YOB: 1940   Date of Visit: 12/18/2017       Dear Jessa Pagan:    Thank you for referring Rebecca Rios to me for evaluation. Attached you will find relevant portions of my assessment and plan of care.    If you have questions, please do not hesitate to call me. I look forward to following Rebecca Rios along with you.    Sincerely,    Sangeeta Muniz  CC:  No Recipients    If you would like to receive this communication electronically, please contact [email or web address] or [phone] to request more information on [employer] access.    For providers and/or their staff who would like to refer a patient to [facility], please contact us through our one-stop-shop provider referral line, [facility], at [phone].    If you feel you have received this communication in error or would no longer like to receive these types of communications, please e-mail [email or web address]

## 2017-12-22 RX ORDER — DICLOFENAC SODIUM 10 MG/G
GEL TOPICAL
Qty: 100 G | Refills: 2 | Status: SHIPPED | OUTPATIENT
Start: 2017-12-22 | End: 2019-04-23 | Stop reason: SDUPTHER

## 2018-01-08 ENCOUNTER — OFFICE VISIT (OUTPATIENT)
Dept: ORTHOPEDICS | Facility: CLINIC | Age: 78
End: 2018-01-08
Payer: MEDICARE

## 2018-01-08 ENCOUNTER — HOSPITAL ENCOUNTER (OUTPATIENT)
Dept: RADIOLOGY | Facility: HOSPITAL | Age: 78
Discharge: HOME | End: 2018-01-08
Attending: PHYSICIAN ASSISTANT
Payer: MEDICARE

## 2018-01-08 VITALS
SYSTOLIC BLOOD PRESSURE: 164 MMHG | WEIGHT: 192.88 LBS | HEART RATE: 78 BPM | BODY MASS INDEX: 32.14 KG/M2 | HEIGHT: 65 IN | RESPIRATION RATE: 19 BRPM | DIASTOLIC BLOOD PRESSURE: 86 MMHG

## 2018-01-08 DIAGNOSIS — M17.11 ARTHRITIS OF RIGHT KNEE: ICD-10-CM

## 2018-01-08 DIAGNOSIS — S82.001D CLOSED NONDISPLACED FRACTURE OF RIGHT PATELLA WITH ROUTINE HEALING, UNSPECIFIED FRACTURE MORPHOLOGY, SUBSEQUENT ENCOUNTER: ICD-10-CM

## 2018-01-08 DIAGNOSIS — S82.001D CLOSED NONDISPLACED FRACTURE OF RIGHT PATELLA WITH ROUTINE HEALING, UNSPECIFIED FRACTURE MORPHOLOGY, SUBSEQUENT ENCOUNTER: Primary | ICD-10-CM

## 2018-01-08 DIAGNOSIS — M25.561 RIGHT KNEE PAIN, UNSPECIFIED CHRONICITY: ICD-10-CM

## 2018-01-08 PROCEDURE — 20610 DRAIN/INJ JOINT/BURSA W/O US: CPT | Mod: PBBFAC | Performed by: PHYSICIAN ASSISTANT

## 2018-01-08 PROCEDURE — 20610 DRAIN/INJ JOINT/BURSA W/O US: CPT | Mod: S$PBB,RT,, | Performed by: PHYSICIAN ASSISTANT

## 2018-01-08 PROCEDURE — 73562 X-RAY EXAM OF KNEE 3: CPT | Mod: TC,RT

## 2018-01-08 PROCEDURE — 73560 X-RAY EXAM OF KNEE 1 OR 2: CPT | Mod: 26,59,LT, | Performed by: RADIOLOGY

## 2018-01-08 PROCEDURE — 99213 OFFICE O/P EST LOW 20 MIN: CPT | Mod: 25,S$PBB,, | Performed by: PHYSICIAN ASSISTANT

## 2018-01-08 PROCEDURE — 99999 PR PBB SHADOW E&M-EST. PATIENT-LVL IV: CPT | Mod: PBBFAC,,, | Performed by: PHYSICIAN ASSISTANT

## 2018-01-08 PROCEDURE — 99214 OFFICE O/P EST MOD 30 MIN: CPT | Mod: PBBFAC,25 | Performed by: PHYSICIAN ASSISTANT

## 2018-01-08 PROCEDURE — 73560 X-RAY EXAM OF KNEE 1 OR 2: CPT | Mod: TC,LT

## 2018-01-08 PROCEDURE — 73562 X-RAY EXAM OF KNEE 3: CPT | Mod: 26,RT,, | Performed by: RADIOLOGY

## 2018-01-08 RX ORDER — LORATADINE 10 MG/1
TABLET ORAL
COMMUNITY
Start: 2017-12-23 | End: 2018-03-15 | Stop reason: SDUPTHER

## 2018-01-08 RX ORDER — GUAIFENESIN 600 MG/1
600 TABLET, EXTENDED RELEASE ORAL
COMMUNITY
Start: 2017-12-27 | End: 2018-03-15

## 2018-01-08 RX ORDER — METHYLPREDNISOLONE ACETATE 80 MG/ML
80 INJECTION, SUSPENSION INTRA-ARTICULAR; INTRALESIONAL; INTRAMUSCULAR; SOFT TISSUE
Status: COMPLETED | OUTPATIENT
Start: 2018-01-08 | End: 2018-01-08

## 2018-01-08 RX ORDER — FLUTICASONE PROPIONATE 50 MCG
2 SPRAY, SUSPENSION (ML) NASAL
COMMUNITY
Start: 2017-12-27 | End: 2018-12-27

## 2018-01-08 RX ADMIN — METHYLPREDNISOLONE ACETATE 80 MG: 80 INJECTION, SUSPENSION INTRALESIONAL; INTRAMUSCULAR; INTRASYNOVIAL; SOFT TISSUE at 01:01

## 2018-01-08 NOTE — PROGRESS NOTES
Subjective:     Patient ID: Rebecca Rios is a 77 y.o. female.    Chief Complaint: Follow-up and Pain of the Right Knee    HPI   The patient is seen today in follow-up of her right knee due to a closed patella fracture. X-rays were taken today.  The patient is also known to have evolving arthritis of her right knee.  This causes discomfort for her as well.  Her current pain is a 6 out of 10.    The patient describes tripping on the threshold at her home and landing on hard, ceramic tile.  This fall occurred on September 26, 2017.      X-rays were taken today.    Past Medical History:   Diagnosis Date    Anxiety     Depression     Fractures     Hot flash, menopausal     HTN (hypertension)     Hyperlipidemia     Hypothyroid      Past Surgical History:   Procedure Laterality Date    CARPAL TUNNEL RELEASE Bilateral     CHOLECYSTECTOMY      HYSTERECTOMY      open luis       Family History   Problem Relation Age of Onset    Hypertension Mother      Social History     Social History    Marital status:      Spouse name: N/A    Number of children: N/A    Years of education: N/A     Occupational History    Not on file.     Social History Main Topics    Smoking status: Never Smoker    Smokeless tobacco: Never Used    Alcohol use No    Drug use: No    Sexual activity: Not on file     Other Topics Concern    Not on file     Social History Narrative    No narrative on file     Medication List with Changes/Refills   Current Medications    ALPRAZOLAM (XANAX) 0.5 MG TABLET    Take 0.5 mg by mouth 2 (two) times daily as needed for Anxiety.    AMLODIPINE (NORVASC) 5 MG TABLET    Take 1 tablet (5 mg total) by mouth once daily.    ASPIRIN (ECOTRIN) 81 MG EC TABLET    Take 1 tablet (81 mg total) by mouth once daily.    BUPROPION (WELLBUTRIN SR) 150 MG TBSR 12 HR TABLET    Take 150 mg by mouth.    DIPHENOXYLATE-ATROPINE 2.5-0.025 MG (LOMOTIL) 2.5-0.025 MG PER TABLET    Take 1 tablet by mouth 4 (four) times  daily as needed for Diarrhea.    ESOMEPRAZOLE (NEXIUM) 40 MG CAPSULE    Take 40 mg by mouth.    FLUTICASONE (FLONASE) 50 MCG/ACTUATION NASAL SPRAY    2 sprays.    GABAPENTIN (NEURONTIN) 600 MG TABLET    Take 600 mg by mouth 2 (two) times daily.    GUAIFENESIN (MUCINEX) 600 MG 12 HR TABLET    Take 600 mg by mouth.    HYDROCODONE-ACETAMINOPHEN 10-325MG (NORCO)  MG TAB    Take by mouth.    LEVOTHYROXINE (SYNTHROID) 75 MCG TABLET    TAKE ONE TABLET BY MOUTH ONCE DAILY    LISINOPRIL (PRINIVIL,ZESTRIL) 40 MG TABLET    Take 1 tablet (40 mg total) by mouth once daily.    LORATADINE (CLARITIN) 10 MG TABLET    TAKE ONE TABLET BY MOUTH ONCE DAILY    MELOXICAM (MOBIC) 15 MG TABLET    Take 1 tablet (15 mg total) by mouth once daily.    TIZANIDINE (ZANAFLEX) 4 MG TABLET    Take 4 mg by mouth every evening.     VOLTAREN 1 % GEL    APPLY 2 GRAMS TOPICALLY ONCE DAILY     Review of patient's allergies indicates:   Allergen Reactions    Erythromycin     Macrolide antibiotics Nausea And Vomiting     Review of Systems   Constitution: Negative for chills and fever.   Musculoskeletal: Positive for arthritis and joint pain. Negative for joint swelling.   Gastrointestinal: Negative for abdominal pain, diarrhea, nausea and vomiting.       Objective:   Body mass index is 32.1 kg/m².  Vitals:    01/08/18 1308   BP: (!) 164/86   Pulse: 78   Resp: 19       General: Rebecca is well-developed, well-nourished, appears stated age, in no acute distress, alert and oriented to time, place and person.       General    Constitutional: She is oriented to person, place, and time. She appears well-developed and well-nourished.   Neck: Normal range of motion.   Cardiovascular: Normal rate and regular rhythm.    Pulmonary/Chest: Effort normal.   Abdominal: Soft.   Neurological: She is alert and oriented to person, place, and time.   Psychiatric: She has a normal mood and affect. Her behavior is normal.     General Musculoskeletal Exam   Gait: normal        Right Knee Exam     Inspection   Erythema: absent  Effusion: effusion  Bruising: absent    Tenderness   The patient is tender to palpation of the condyle, patella and lateral joint line.    Crepitus   The patient has crepitus of the patella.    Range of Motion   Extension: 0   Flexion: 120     Tests   Ligament Examination Lachman: normal (-1 to 2mm) PCL-Posterior Drawer: normal (0 to 2mm)     Patella   Patellar Grind: positive    AP standing views of both knees as well as lateral and Merchant views of the right knee and a Merchant view of the left knee were obtained.  Comparison 12/04/2017    Findings: There is mild degenerative change bilaterally.  Ununited fracture of the right patella again noted with stable fragment positioning and alignment.  No new fracture or dislocation.  Assessment:     Encounter Diagnoses   Name Primary?    Closed nondisplaced fracture of right patella with routine healing, unspecified fracture morphology, subsequent encounter Yes    Right knee pain, unspecified chronicity     Arthritis of right knee         Plan:       The patient was administered a steroid and Xylocaine injection today.  She will return in 6 weeks to recheck her knee.  If her pain is not well controlled, we will consider Visco supplementation next time.    Procedure note:    Preoperative diagnosis:OA and pain of right knee    Postoperative diagnosis: same    Procedure performed Injection  right knee(s)     Description:  Under sterile conditions the patient's right knee(s) was injected through   anterolateral approach with Depro-Medrol 80 and 1 cc of 2% Xylocaine.  The patient tolerated the procedure without incident.  A Band-Aid was applied to the needle site.

## 2018-03-15 ENCOUNTER — HOSPITAL ENCOUNTER (OUTPATIENT)
Dept: RADIOLOGY | Facility: HOSPITAL | Age: 78
Discharge: HOME | End: 2018-03-15
Attending: NURSE PRACTITIONER
Payer: MEDICARE

## 2018-03-15 ENCOUNTER — OFFICE VISIT (OUTPATIENT)
Dept: FAMILY MEDICINE | Facility: CLINIC | Age: 78
End: 2018-03-15
Payer: MEDICARE

## 2018-03-15 VITALS
BODY MASS INDEX: 32.14 KG/M2 | OXYGEN SATURATION: 95 % | WEIGHT: 193.13 LBS | TEMPERATURE: 98 F | SYSTOLIC BLOOD PRESSURE: 130 MMHG | HEART RATE: 76 BPM | DIASTOLIC BLOOD PRESSURE: 82 MMHG

## 2018-03-15 DIAGNOSIS — R07.89 ATYPICAL CHEST PAIN: ICD-10-CM

## 2018-03-15 DIAGNOSIS — J00 ACUTE NASOPHARYNGITIS: Primary | ICD-10-CM

## 2018-03-15 PROCEDURE — 99214 OFFICE O/P EST MOD 30 MIN: CPT | Mod: PBBFAC,25,PO | Performed by: NURSE PRACTITIONER

## 2018-03-15 PROCEDURE — 71046 X-RAY EXAM CHEST 2 VIEWS: CPT | Mod: TC,PO

## 2018-03-15 PROCEDURE — 96372 THER/PROPH/DIAG INJ SC/IM: CPT | Mod: PBBFAC,PO

## 2018-03-15 PROCEDURE — 71046 X-RAY EXAM CHEST 2 VIEWS: CPT | Mod: 26,,, | Performed by: RADIOLOGY

## 2018-03-15 PROCEDURE — 99214 OFFICE O/P EST MOD 30 MIN: CPT | Mod: S$PBB,,, | Performed by: NURSE PRACTITIONER

## 2018-03-15 PROCEDURE — 93005 ELECTROCARDIOGRAM TRACING: CPT | Mod: PBBFAC,PO | Performed by: NURSE PRACTITIONER

## 2018-03-15 PROCEDURE — 93010 ELECTROCARDIOGRAM REPORT: CPT | Mod: ,,, | Performed by: INTERNAL MEDICINE

## 2018-03-15 PROCEDURE — 99999 PR PBB SHADOW E&M-EST. PATIENT-LVL IV: CPT | Mod: PBBFAC,,, | Performed by: NURSE PRACTITIONER

## 2018-03-15 RX ORDER — METHYLPREDNISOLONE ACETATE 80 MG/ML
80 INJECTION, SUSPENSION INTRA-ARTICULAR; INTRALESIONAL; INTRAMUSCULAR; SOFT TISSUE
Status: COMPLETED | OUTPATIENT
Start: 2018-03-15 | End: 2018-03-15

## 2018-03-15 RX ORDER — LEVOCETIRIZINE DIHYDROCHLORIDE 5 MG/1
5 TABLET, FILM COATED ORAL NIGHTLY
Qty: 90 TABLET | Refills: 3 | Status: SHIPPED | OUTPATIENT
Start: 2018-03-15 | End: 2019-03-15

## 2018-03-15 RX ADMIN — METHYLPREDNISOLONE ACETATE 80 MG: 80 INJECTION, SUSPENSION INTRALESIONAL; INTRAMUSCULAR; INTRASYNOVIAL; SOFT TISSUE at 04:03

## 2018-03-16 NOTE — PROGRESS NOTES
"Subjective:       Patient ID: Rebecca Rios is a 77 y.o. female.    Chief Complaint: Cough; Headache; Nasal Congestion; Sore Throat; and Nausea  Pt reports to clinic with chief complaint of URI symptoms.   Onset 3 days ago. Has been taking OTC mucinex without relief.  Pt also reports she has been feeling a "tightness in chest".  Denies SOB or wheezing.  HX of CAD  URI    This is a new problem. The current episode started in the past 7 days. The problem has been unchanged. There has been no fever. Associated symptoms include chest pain, congestion, coughing and nausea. She has tried decongestant for the symptoms. The treatment provided no relief.     Review of Systems   Constitutional: Positive for fever.   HENT: Positive for congestion.    Respiratory: Positive for cough. Negative for shortness of breath.    Cardiovascular: Positive for chest pain. Negative for palpitations.   Gastrointestinal: Positive for nausea.   Genitourinary: Negative.        Objective:      Physical Exam   Constitutional: She appears well-developed and well-nourished.   HENT:   Head: Normocephalic.   Right Ear: Tympanic membrane normal.   Left Ear: Tympanic membrane normal.   Nose: Mucosal edema and rhinorrhea present. Right sinus exhibits maxillary sinus tenderness. Left sinus exhibits maxillary sinus tenderness.   Eyes: EOM are normal.   Neck: Neck supple.   Cardiovascular: Normal rate, regular rhythm and normal heart sounds.    Pulmonary/Chest: Effort normal and breath sounds normal.   Abdominal: Soft. Bowel sounds are normal. There is no tenderness.   Skin: Skin is warm and dry.   Psychiatric: She has a normal mood and affect.   Vitals reviewed.      Assessment:       1. Acute nasopharyngitis    2. Atypical chest pain        Plan:   Acute nasopharyngitis  -     levocetirizine (XYZAL) 5 MG tablet; Take 1 tablet (5 mg total) by mouth every evening.  Dispense: 90 tablet; Refill: 3  -     methylPREDNISolone acetate injection 80 mg; Inject 1 " mL (80 mg total) into the muscle one time.    Atypical chest pain  -     IN OFFICE EKG 12-LEAD (to Muse)  -     X-Ray Chest PA And Lateral; Future; Expected date: 03/15/2018      No Follow-up on file.

## 2018-03-27 ENCOUNTER — TELEPHONE (OUTPATIENT)
Dept: FAMILY MEDICINE | Facility: CLINIC | Age: 78
End: 2018-03-27

## 2018-03-27 NOTE — TELEPHONE ENCOUNTER
----- Message from Krista Judd sent at 3/27/2018 11:23 AM CDT -----  Contact: pt  The pt request a call concerning her xray results, the pt can be reached at [phone]///[id number]

## 2018-03-30 DIAGNOSIS — I25.119 CORONARY ARTERY DISEASE INVOLVING NATIVE CORONARY ARTERY OF NATIVE HEART WITH ANGINA PECTORIS: ICD-10-CM

## 2018-03-30 DIAGNOSIS — I10 ESSENTIAL HYPERTENSION: ICD-10-CM

## 2018-03-31 RX ORDER — LISINOPRIL 40 MG/1
TABLET ORAL
Qty: 90 TABLET | Refills: 3 | Status: SHIPPED | OUTPATIENT
Start: 2018-03-31 | End: 2019-05-09 | Stop reason: SDUPTHER

## 2018-04-26 ENCOUNTER — OFFICE VISIT (OUTPATIENT)
Dept: CARDIOLOGY | Facility: CLINIC | Age: 78
End: 2018-04-26
Payer: MEDICARE

## 2018-04-26 VITALS
HEART RATE: 72 BPM | SYSTOLIC BLOOD PRESSURE: 114 MMHG | HEIGHT: 65 IN | WEIGHT: 192.69 LBS | BODY MASS INDEX: 32.1 KG/M2 | DIASTOLIC BLOOD PRESSURE: 64 MMHG

## 2018-04-26 DIAGNOSIS — E78.2 MIXED HYPERLIPIDEMIA: ICD-10-CM

## 2018-04-26 DIAGNOSIS — I50.32 CHRONIC DIASTOLIC HEART FAILURE: ICD-10-CM

## 2018-04-26 DIAGNOSIS — I10 ESSENTIAL (PRIMARY) HYPERTENSION: ICD-10-CM

## 2018-04-26 DIAGNOSIS — I25.10 CORONARY ARTERY DISEASE INVOLVING NATIVE CORONARY ARTERY OF NATIVE HEART WITHOUT ANGINA PECTORIS: ICD-10-CM

## 2018-04-26 DIAGNOSIS — R07.89 ATYPICAL CHEST PAIN: Primary | ICD-10-CM

## 2018-04-26 DIAGNOSIS — I27.20 PULMONARY HYPERTENSION: ICD-10-CM

## 2018-04-26 PROCEDURE — 99214 OFFICE O/P EST MOD 30 MIN: CPT | Mod: S$PBB,,, | Performed by: INTERNAL MEDICINE

## 2018-04-26 PROCEDURE — 99213 OFFICE O/P EST LOW 20 MIN: CPT | Mod: PBBFAC | Performed by: INTERNAL MEDICINE

## 2018-04-26 PROCEDURE — 99999 PR PBB SHADOW E&M-EST. PATIENT-LVL III: CPT | Mod: PBBFAC,,, | Performed by: INTERNAL MEDICINE

## 2018-04-26 NOTE — PROGRESS NOTES
Subjective:    Patient ID:  Rebecca Rios is a 77 y.o. female who presents for evaluation of Congestive Heart Failure; Coronary Artery Disease; Hypertension; Hyperlipidemia; and Risk Factor Management For Atherosclerosis        HPI Pt presents for f/u  She has seen Dr. Bryan in past.  Has h/o HTN, DD, MR, PHTN, hyperlipidemia, carries dx of CAD in chart.  Stress mpi 2014 fixed anteroapical defect, trivial ischemia.   Echo 2014 normal EF, DD, mod TR, PHTN.  Pt here for f/u.  She has had some chronic chest pain sxs, some indigestion per pt.   Occurs after eating. Associated with belching.  Possible hiatal hernia.  Sometimes occurs at night.    nexium helps but not all the time.  No cp with exertion.  Stable occasional CURTIS, no pnd/orthopnea.  No leg edema.  BP controlled on current meds, no associated sxs.   Compliant with meds.       Current Outpatient Prescriptions:     alprazolam (XANAX) 0.5 MG tablet, Take 0.5 mg by mouth 2 (two) times daily as needed for Anxiety., Disp: , Rfl:     amlodipine (NORVASC) 5 MG tablet, Take 1 tablet (5 mg total) by mouth once daily., Disp: 30 tablet, Rfl: 11    aspirin (ECOTRIN) 81 MG EC tablet, Take 1 tablet (81 mg total) by mouth once daily., Disp: 30 tablet, Rfl: 2    buPROPion (WELLBUTRIN SR) 150 MG TBSR 12 hr tablet, Take 150 mg by mouth., Disp: , Rfl:     diphenoxylate-atropine 2.5-0.025 mg (LOMOTIL) 2.5-0.025 mg per tablet, Take 1 tablet by mouth 4 (four) times daily as needed for Diarrhea., Disp: 30 tablet, Rfl: 1    esomeprazole (NEXIUM) 40 MG capsule, Take 40 mg by mouth., Disp: , Rfl:     fluticasone (FLONASE) 50 mcg/actuation nasal spray, 2 sprays., Disp: , Rfl:     gabapentin (NEURONTIN) 600 MG tablet, Take 600 mg by mouth 2 (two) times daily., Disp: , Rfl:     hydrocodone-acetaminophen 10-325mg (NORCO)  mg Tab, Take by mouth., Disp: , Rfl:     levocetirizine (XYZAL) 5 MG tablet, Take 1 tablet (5 mg total) by mouth every evening., Disp: 90 tablet, Rfl:  "3    levothyroxine (SYNTHROID) 75 MCG tablet, TAKE ONE TABLET BY MOUTH ONCE DAILY, Disp: 30 tablet, Rfl: 6    lisinopril (PRINIVIL,ZESTRIL) 40 MG tablet, TAKE ONE TABLET BY MOUTH ONCE DAILY, Disp: 90 tablet, Rfl: 3    tizanidine (ZANAFLEX) 4 MG tablet, Take 4 mg by mouth every evening. , Disp: , Rfl:     VOLTAREN 1 % Gel, APPLY 2 GRAMS TOPICALLY ONCE DAILY, Disp: 100 g, Rfl: 2      Review of Systems   Constitution: Negative.   HENT: Negative.    Eyes: Negative.    Cardiovascular: Positive for chest pain and dyspnea on exertion.   Respiratory: Positive for shortness of breath.    Endocrine: Negative.    Hematologic/Lymphatic: Negative.    Skin: Negative.    Musculoskeletal: Negative.    Gastrointestinal: Positive for heartburn.   Genitourinary: Negative.    Neurological: Negative.    Psychiatric/Behavioral: Negative.    Allergic/Immunologic: Negative.        /64 (BP Location: Right arm)   Pulse 72   Ht 5' 5" (1.651 m)   Wt 87.4 kg (192 lb 10.9 oz)   BMI 32.06 kg/m²     Wt Readings from Last 3 Encounters:   04/26/18 87.4 kg (192 lb 10.9 oz)   03/15/18 87.6 kg (193 lb 2 oz)   01/08/18 87.5 kg (192 lb 14.4 oz)     Temp Readings from Last 3 Encounters:   03/15/18 97.9 °F (36.6 °C) (Tympanic)   11/18/17 96 °F (35.6 °C) (Tympanic)   09/26/17 98.7 °F (37.1 °C)     BP Readings from Last 3 Encounters:   04/26/18 114/64   03/15/18 130/82   01/08/18 (!) 164/86     Pulse Readings from Last 3 Encounters:   04/26/18 72   03/15/18 76   01/08/18 78          Objective:    Physical Exam   Constitutional: She is oriented to person, place, and time. Vital signs are normal. She appears well-developed and well-nourished. She is active and cooperative. She does not have a sickly appearance. She does not appear ill. No distress.   HENT:   Head: Normocephalic.   Neck: Neck supple. Normal carotid pulses, no hepatojugular reflux and no JVD present. Carotid bruit is not present. No thyromegaly present.   Cardiovascular: Normal " rate, regular rhythm, S1 normal, S2 normal, normal heart sounds and normal pulses.  PMI is not displaced.  Exam reveals no gallop and no friction rub.    No murmur heard.  Pulses:       Radial pulses are 2+ on the right side, and 2+ on the left side.   Pulmonary/Chest: Effort normal and breath sounds normal. She has no wheezes. She has no rales.   Abdominal: Soft. Normal appearance, normal aorta and bowel sounds are normal. She exhibits no pulsatile liver, no abdominal bruit, no ascites and no mass. There is no splenomegaly or hepatomegaly. There is no tenderness.   Musculoskeletal: She exhibits no edema.   Lymphadenopathy:     She has no cervical adenopathy.   Neurological: She is alert and oriented to person, place, and time.   Skin: Skin is warm. She is not diaphoretic.   Psychiatric: She has a normal mood and affect. Her behavior is normal.   Nursing note and vitals reviewed.      I have reviewed all pertinent labs and cardiac studies.      Chemistry        Component Value Date/Time     09/09/2017 2010    K 4.1 09/09/2017 2010     09/09/2017 2010    CO2 28 09/09/2017 2010    BUN 12 09/09/2017 2010    CREATININE 0.9 09/09/2017 2010     09/09/2017 2010        Component Value Date/Time    CALCIUM 9.2 09/09/2017 2010    ALKPHOS 85 09/09/2017 2010    AST 17 09/09/2017 2010    ALT 13 09/09/2017 2010    BILITOT 0.6 09/09/2017 2010    ESTGFRAFRICA >60 09/09/2017 2010    EGFRNONAA >60 09/09/2017 2010        Lab Results   Component Value Date    WBC 11.19 09/09/2017    HGB 11.9 (L) 09/09/2017    HCT 37.8 09/09/2017    MCV 93 09/09/2017     09/09/2017     No results found for: LABA1C, HGBA1C  Lab Results   Component Value Date    CHOL 179 12/23/2015    CHOL 195 11/03/2014    CHOL 168 09/14/2014     Lab Results   Component Value Date    HDL 43 12/23/2015    HDL 47 11/03/2014    HDL 38 (L) 09/14/2014     Lab Results   Component Value Date    LDLCALC 102.6 12/23/2015    LDLCALC 122.2 11/03/2014     LDLCALC 107.8 09/14/2014     Lab Results   Component Value Date    TRIG 167 (H) 12/23/2015    TRIG 129 11/03/2014    TRIG 111 09/14/2014     Lab Results   Component Value Date    CHOLHDL 24.0 12/23/2015    CHOLHDL 24.1 11/03/2014    CHOLHDL 22.6 09/14/2014           Assessment:       1. Atypical chest pain    2. Pulmonary hypertension    3. Mixed hyperlipidemia    4. Coronary artery disease involving native coronary artery of native heart without angina pectoris    5. Chronic diastolic heart failure    6. Essential (primary) hypertension         Plan:             Stable CV conditions on current medical tx.  Atypical cp, likely GERD sxs.  Continue Nexium.  She defers GI referral. F/u with PCP on this issue.  Carries dx of CAD.  Stress MPI + anteroapical scar, trivial katelyn-infarct ischemia.  Will reevaluate CAD with stress test.  Continue current meds  Cardiac low salt diet  Phone review for test results.    F/u 6 months.

## 2018-05-07 ENCOUNTER — HOSPITAL ENCOUNTER (OUTPATIENT)
Dept: RADIOLOGY | Facility: HOSPITAL | Age: 78
Discharge: HOME | End: 2018-05-07
Attending: INTERNAL MEDICINE
Payer: MEDICARE

## 2018-05-07 ENCOUNTER — CLINICAL SUPPORT (OUTPATIENT)
Dept: CARDIOLOGY | Facility: CLINIC | Age: 78
End: 2018-05-07
Attending: INTERNAL MEDICINE
Payer: MEDICARE

## 2018-05-07 DIAGNOSIS — I27.20 PULMONARY HYPERTENSION: ICD-10-CM

## 2018-05-07 DIAGNOSIS — I25.10 CORONARY ARTERY DISEASE INVOLVING NATIVE CORONARY ARTERY OF NATIVE HEART WITHOUT ANGINA PECTORIS: ICD-10-CM

## 2018-05-07 DIAGNOSIS — I10 ESSENTIAL (PRIMARY) HYPERTENSION: ICD-10-CM

## 2018-05-07 DIAGNOSIS — R07.89 ATYPICAL CHEST PAIN: ICD-10-CM

## 2018-05-07 DIAGNOSIS — I50.32 CHRONIC DIASTOLIC HEART FAILURE: ICD-10-CM

## 2018-05-07 LAB — DIASTOLIC DYSFUNCTION: NO

## 2018-05-07 PROCEDURE — A9502 TC99M TETROFOSMIN: HCPCS | Mod: PO

## 2018-05-07 PROCEDURE — 93018 CV STRESS TEST I&R ONLY: CPT | Mod: S$PBB,,, | Performed by: NUCLEAR MEDICINE

## 2018-05-07 PROCEDURE — 93016 CV STRESS TEST SUPVJ ONLY: CPT | Mod: S$PBB,,, | Performed by: NUCLEAR MEDICINE

## 2018-05-07 PROCEDURE — 78452 HT MUSCLE IMAGE SPECT MULT: CPT | Mod: 26,,, | Performed by: NUCLEAR MEDICINE

## 2018-05-09 ENCOUNTER — TELEPHONE (OUTPATIENT)
Dept: CARDIOLOGY | Facility: HOSPITAL | Age: 78
End: 2018-05-09

## 2018-05-09 NOTE — TELEPHONE ENCOUNTER
Please call pt  She passed her nuclear stress test.  No blockages were noted  F/u next appt    Dr Padilla

## 2018-07-05 ENCOUNTER — HOSPITAL ENCOUNTER (EMERGENCY)
Facility: HOSPITAL | Age: 78
Discharge: HOME | End: 2018-07-05
Attending: EMERGENCY MEDICINE
Payer: MEDICARE

## 2018-07-05 ENCOUNTER — OFFICE VISIT (OUTPATIENT)
Dept: URGENT CARE | Facility: CLINIC | Age: 78
End: 2018-07-05
Payer: MEDICARE

## 2018-07-05 VITALS
TEMPERATURE: 99 F | RESPIRATION RATE: 18 BRPM | HEART RATE: 65 BPM | OXYGEN SATURATION: 97 % | SYSTOLIC BLOOD PRESSURE: 120 MMHG | WEIGHT: 192.44 LBS | BODY MASS INDEX: 32.06 KG/M2 | DIASTOLIC BLOOD PRESSURE: 70 MMHG | HEIGHT: 65 IN

## 2018-07-05 VITALS
WEIGHT: 189 LBS | BODY MASS INDEX: 31.49 KG/M2 | HEIGHT: 65 IN | HEART RATE: 66 BPM | TEMPERATURE: 98 F | RESPIRATION RATE: 21 BRPM | OXYGEN SATURATION: 94 % | SYSTOLIC BLOOD PRESSURE: 169 MMHG | DIASTOLIC BLOOD PRESSURE: 78 MMHG

## 2018-07-05 DIAGNOSIS — R07.9 CHEST PAIN: ICD-10-CM

## 2018-07-05 DIAGNOSIS — R07.9 CHEST PAIN, UNSPECIFIED TYPE: ICD-10-CM

## 2018-07-05 DIAGNOSIS — E66.9 OBESITY (BMI 30-39.9): Primary | ICD-10-CM

## 2018-07-05 DIAGNOSIS — I10 ESSENTIAL HYPERTENSION: ICD-10-CM

## 2018-07-05 DIAGNOSIS — R60.9 SWELLING: ICD-10-CM

## 2018-07-05 DIAGNOSIS — M79.662 PAIN OF LEFT CALF: ICD-10-CM

## 2018-07-05 DIAGNOSIS — M25.562 ACUTE PAIN OF LEFT KNEE: ICD-10-CM

## 2018-07-05 DIAGNOSIS — L03.116 CELLULITIS OF KNEE, LEFT: Primary | ICD-10-CM

## 2018-07-05 LAB
ALBUMIN SERPL BCP-MCNC: 3.6 G/DL
ALP SERPL-CCNC: 105 U/L
ALT SERPL W/O P-5'-P-CCNC: 12 U/L
ANION GAP SERPL CALC-SCNC: 12 MMOL/L
AST SERPL-CCNC: 17 U/L
BASOPHILS # BLD AUTO: 0.03 K/UL
BASOPHILS NFR BLD: 0.4 %
BILIRUB SERPL-MCNC: 0.4 MG/DL
BUN SERPL-MCNC: 7 MG/DL
CALCIUM SERPL-MCNC: 9.2 MG/DL
CHLORIDE SERPL-SCNC: 103 MMOL/L
CO2 SERPL-SCNC: 26 MMOL/L
CREAT SERPL-MCNC: 0.8 MG/DL
DIFFERENTIAL METHOD: NORMAL
EOSINOPHIL # BLD AUTO: 0.1 K/UL
EOSINOPHIL NFR BLD: 1.5 %
ERYTHROCYTE [DISTWIDTH] IN BLOOD BY AUTOMATED COUNT: 13.1 %
EST. GFR  (AFRICAN AMERICAN): >60 ML/MIN/1.73 M^2
EST. GFR  (NON AFRICAN AMERICAN): >60 ML/MIN/1.73 M^2
GLUCOSE SERPL-MCNC: 114 MG/DL
HCT VFR BLD AUTO: 40.7 %
HGB BLD-MCNC: 13.1 G/DL
LYMPHOCYTES # BLD AUTO: 3.1 K/UL
LYMPHOCYTES NFR BLD: 40.6 %
MCH RBC QN AUTO: 30.3 PG
MCHC RBC AUTO-ENTMCNC: 32.2 G/DL
MCV RBC AUTO: 94 FL
MONOCYTES # BLD AUTO: 0.7 K/UL
MONOCYTES NFR BLD: 8.8 %
NEUTROPHILS # BLD AUTO: 3.7 K/UL
NEUTROPHILS NFR BLD: 48.7 %
PLATELET # BLD AUTO: 285 K/UL
PMV BLD AUTO: 10.1 FL
POTASSIUM SERPL-SCNC: 3.6 MMOL/L
PROT SERPL-MCNC: 6.6 G/DL
RBC # BLD AUTO: 4.32 M/UL
SODIUM SERPL-SCNC: 141 MMOL/L
TROPONIN I SERPL DL<=0.01 NG/ML-MCNC: <0.006 NG/ML
WBC # BLD AUTO: 7.52 K/UL

## 2018-07-05 PROCEDURE — 85025 COMPLETE CBC W/AUTO DIFF WBC: CPT

## 2018-07-05 PROCEDURE — 84484 ASSAY OF TROPONIN QUANT: CPT

## 2018-07-05 PROCEDURE — 96375 TX/PRO/DX INJ NEW DRUG ADDON: CPT

## 2018-07-05 PROCEDURE — 99214 OFFICE O/P EST MOD 30 MIN: CPT | Mod: PBBFAC,PO,25 | Performed by: NURSE PRACTITIONER

## 2018-07-05 PROCEDURE — 99285 EMERGENCY DEPT VISIT HI MDM: CPT | Mod: 25,27

## 2018-07-05 PROCEDURE — 80053 COMPREHEN METABOLIC PANEL: CPT

## 2018-07-05 PROCEDURE — 36415 COLL VENOUS BLD VENIPUNCTURE: CPT

## 2018-07-05 PROCEDURE — 96374 THER/PROPH/DIAG INJ IV PUSH: CPT

## 2018-07-05 PROCEDURE — 93005 ELECTROCARDIOGRAM TRACING: CPT

## 2018-07-05 PROCEDURE — 63600175 PHARM REV CODE 636 W HCPCS: Performed by: EMERGENCY MEDICINE

## 2018-07-05 PROCEDURE — 93010 ELECTROCARDIOGRAM REPORT: CPT | Mod: ,,, | Performed by: INTERNAL MEDICINE

## 2018-07-05 PROCEDURE — S0077 INJECTION, CLINDAMYCIN PHOSP: HCPCS | Performed by: EMERGENCY MEDICINE

## 2018-07-05 PROCEDURE — 25000003 PHARM REV CODE 250: Performed by: EMERGENCY MEDICINE

## 2018-07-05 PROCEDURE — 99999 PR PBB SHADOW E&M-EST. PATIENT-LVL IV: CPT | Mod: PBBFAC,,, | Performed by: NURSE PRACTITIONER

## 2018-07-05 PROCEDURE — 99214 OFFICE O/P EST MOD 30 MIN: CPT | Mod: S$PBB,,, | Performed by: NURSE PRACTITIONER

## 2018-07-05 RX ORDER — ONDANSETRON 2 MG/ML
4 INJECTION INTRAMUSCULAR; INTRAVENOUS
Status: COMPLETED | OUTPATIENT
Start: 2018-07-05 | End: 2018-07-05

## 2018-07-05 RX ORDER — CLINDAMYCIN HYDROCHLORIDE 150 MG/1
300 CAPSULE ORAL 4 TIMES DAILY
Qty: 56 CAPSULE | Refills: 0 | Status: SHIPPED | OUTPATIENT
Start: 2018-07-05 | End: 2018-07-12

## 2018-07-05 RX ORDER — ONDANSETRON 4 MG/1
4 TABLET, FILM COATED ORAL EVERY 6 HOURS
Qty: 30 TABLET | Refills: 0 | Status: SHIPPED | OUTPATIENT
Start: 2018-07-05 | End: 2018-07-17 | Stop reason: SDUPTHER

## 2018-07-05 RX ORDER — CLINDAMYCIN PHOSPHATE 150 MG/ML
600 INJECTION, SOLUTION INTRAVENOUS
Status: COMPLETED | OUTPATIENT
Start: 2018-07-05 | End: 2018-07-05

## 2018-07-05 RX ORDER — ONDANSETRON 4 MG/1
4 TABLET, FILM COATED ORAL EVERY 6 HOURS
Qty: 12 TABLET | Refills: 0 | Status: SHIPPED | OUTPATIENT
Start: 2018-07-05 | End: 2019-08-01

## 2018-07-05 RX ORDER — OXYCODONE AND ACETAMINOPHEN 5; 325 MG/1; MG/1
1 TABLET ORAL EVERY 4 HOURS PRN
Qty: 18 TABLET | Refills: 0 | Status: SHIPPED | OUTPATIENT
Start: 2018-07-05 | End: 2018-07-26

## 2018-07-05 RX ORDER — MORPHINE SULFATE 4 MG/ML
2 INJECTION, SOLUTION INTRAMUSCULAR; INTRAVENOUS
Status: COMPLETED | OUTPATIENT
Start: 2018-07-05 | End: 2018-07-05

## 2018-07-05 RX ADMIN — MORPHINE SULFATE 2 MG: 4 INJECTION INTRAVENOUS at 06:07

## 2018-07-05 RX ADMIN — ONDANSETRON 4 MG: 2 INJECTION INTRAMUSCULAR; INTRAVENOUS at 06:07

## 2018-07-05 RX ADMIN — CLINDAMYCIN PHOSPHATE 600 MG: 150 INJECTION, SOLUTION INTRAMUSCULAR; INTRAVENOUS at 06:07

## 2018-07-05 NOTE — ED PROVIDER NOTES
"SCRIBE #1 NOTE: I, Alan Watters, am scribing for, and in the presence of, Dalton Powell Do, MD. I have scribed the entire note.      History      Chief Complaint   Patient presents with    Leg Swelling     Pt states, "I hurt my leg about 2 or 3 weeks ago and it started swelling yesterday."       Review of patient's allergies indicates:   Allergen Reactions    Erythromycin     Macrolide antibiotics Nausea And Vomiting        HPI   HPI    7/5/2018, 4:32 PM   History obtained from the patient      History of Present Illness: Rebecca Rios is a 77 y.o. female patient who presents to the Emergency Department for L knee/upper calf swelling which onset today. Pt states that her dog ran into her knee three weeks ago, and that it has hurt since then. She states when she stood up today, she heard a pop in her knee. Symptoms are worsening and moderate in severity. No mitigating or exacerbating factors reported. Associated sxs include pain to the knee and one instance of CP this AM =. Pt describes the pain as only lasting for a "few seconds" and being localized to the L chest, and states that it did not feel like a heaviness or pressure. Patient denies any SOB, diaphoresis, n/v/d, numbness/weakness, fever, chills, palpitations, cough, back pain, neck pain, abd pain, HA, dizziness, and all other sxs at this time. No further complaints or concerns at this time.         Arrival mode: Personal vehicle     PCP: Shlomo Ware MD       Past Medical History:  Past Medical History:   Diagnosis Date    Anxiety     Depression     Fractures     Hot flash, menopausal     HTN (hypertension)     Hyperlipidemia     Hypothyroid        Past Surgical History:  Past Surgical History:   Procedure Laterality Date    CARPAL TUNNEL RELEASE Bilateral     CHOLECYSTECTOMY      HYSTERECTOMY      open luis           Family History:  Family History   Problem Relation Age of Onset    Hypertension Mother        Social History:  Social " History     Social History Main Topics    Smoking status: Never Smoker    Smokeless tobacco: Never Used    Alcohol use No    Drug use: No    Sexual activity: Unknown       ROS   Review of Systems   Constitutional: Negative for chills, diaphoresis and fever.   HENT: Negative for sore throat.    Respiratory: Negative for cough and shortness of breath.    Cardiovascular: Positive for chest pain and leg swelling (Upper left calf). Negative for palpitations.   Gastrointestinal: Negative for abdominal pain, diarrhea, nausea and vomiting.   Genitourinary: Negative for dysuria.   Musculoskeletal: Positive for arthralgias (L knee) and joint swelling (L knee). Negative for back pain and neck pain.   Skin: Negative for rash.   Neurological: Negative for dizziness, weakness, numbness and headaches.   Hematological: Does not bruise/bleed easily.   All other systems reviewed and are negative.      Physical Exam      Initial Vitals [07/05/18 1619]   BP Pulse Resp Temp SpO2   (!) 145/74 67 20 97.5 °F (36.4 °C) 96 %      MAP       --          Physical Exam  Nursing Notes and Vital Signs Reviewed.  Constitutional: Patient is in no acute distress. Well-developed and well-nourished.  Head: Atraumatic. Normocephalic.  Eyes: PERRL. EOM intact. Conjunctivae are not pale. No scleral icterus.  ENT: Mucous membranes are moist. Oropharynx is clear and symmetric.    Neck: Supple. Full ROM. No lymphadenopathy.  Cardiovascular: Regular rate. Regular rhythm. No murmurs, rubs, or gallops. Distal pulses are 2+ and symmetric.  Pulmonary/Chest: No respiratory distress. Clear to auscultation bilaterally. No wheezing or rales.  Abdominal: Soft and non-distended.  There is no tenderness.  No rebound, guarding, or rigidity.   Musculoskeletal: Moves all extremities. No obvious deformities.   LLE: no evident deformity. Mild upper calf swelling. FROM with some tenderness. Cap refill distally is <2 seconds. DP and PT pulses are equal and 2+ bilaterally.  "No motor deficit. No distal sensory deficit  Skin: Fine cellulitis to left knee.  Neurological:  Alert, awake, and appropriate.  Normal speech.  No acute focal neurological deficits are appreciated.  Psychiatric: Normal affect. Good eye contact. Appropriate in content.    ED Course    Procedures  ED Vital Signs:  Vitals:    07/05/18 1619 07/05/18 1642 07/05/18 1645 07/05/18 1647   BP: (!) 145/74   (!) 157/67   Pulse: 67 67 61 60   Resp: 20   18   Temp: 97.5 °F (36.4 °C)      TempSrc: Oral      SpO2: 96%   (!) 94%   Weight: 85.7 kg (189 lb)      Height: 5' 5" (1.651 m)       07/05/18 1700 07/05/18 1730   BP: (!) 178/79 (!) 198/85   Pulse: 70 62   Resp: 20 20   Temp:     TempSrc:     SpO2: 95% 95%   Weight:     Height:         Abnormal Lab Results:  Labs Reviewed   COMPREHENSIVE METABOLIC PANEL - Abnormal; Notable for the following:        Result Value    Glucose 114 (*)     BUN, Bld 7 (*)     All other components within normal limits   CBC W/ AUTO DIFFERENTIAL   TROPONIN I        All Lab Results:  Results for orders placed or performed during the hospital encounter of 07/05/18   CBC auto differential   Result Value Ref Range    WBC 7.52 3.90 - 12.70 K/uL    RBC 4.32 4.00 - 5.40 M/uL    Hemoglobin 13.1 12.0 - 16.0 g/dL    Hematocrit 40.7 37.0 - 48.5 %    MCV 94 82 - 98 fL    MCH 30.3 27.0 - 31.0 pg    MCHC 32.2 32.0 - 36.0 g/dL    RDW 13.1 11.5 - 14.5 %    Platelets 285 150 - 350 K/uL    MPV 10.1 9.2 - 12.9 fL    Gran # (ANC) 3.7 1.8 - 7.7 K/uL    Lymph # 3.1 1.0 - 4.8 K/uL    Mono # 0.7 0.3 - 1.0 K/uL    Eos # 0.1 0.0 - 0.5 K/uL    Baso # 0.03 0.00 - 0.20 K/uL    Gran% 48.7 38.0 - 73.0 %    Lymph% 40.6 18.0 - 48.0 %    Mono% 8.8 4.0 - 15.0 %    Eosinophil% 1.5 0.0 - 8.0 %    Basophil% 0.4 0.0 - 1.9 %    Differential Method Automated    Comprehensive metabolic panel   Result Value Ref Range    Sodium 141 136 - 145 mmol/L    Potassium 3.6 3.5 - 5.1 mmol/L    Chloride 103 95 - 110 mmol/L    CO2 26 23 - 29 mmol/L    " Glucose 114 (H) 70 - 110 mg/dL    BUN, Bld 7 (L) 8 - 23 mg/dL    Creatinine 0.8 0.5 - 1.4 mg/dL    Calcium 9.2 8.7 - 10.5 mg/dL    Total Protein 6.6 6.0 - 8.4 g/dL    Albumin 3.6 3.5 - 5.2 g/dL    Total Bilirubin 0.4 0.1 - 1.0 mg/dL    Alkaline Phosphatase 105 55 - 135 U/L    AST 17 10 - 40 U/L    ALT 12 10 - 44 U/L    Anion Gap 12 8 - 16 mmol/L    eGFR if African American >60 >60 mL/min/1.73 m^2    eGFR if non African American >60 >60 mL/min/1.73 m^2   Troponin I #1   Result Value Ref Range    Troponin I <0.006 0.000 - 0.026 ng/mL         Imaging Results:  Imaging Results          US Lower Extremity Veins Left (Final result)  Result time 07/05/18 17:44:49    Final result by Lico Sr MD (07/05/18 17:44:49)                 Impression:      Negative for left lower extremity DVT.      Electronically signed by: Lico Sr MD  Date:    07/05/2018  Time:    17:44             Narrative:    EXAMINATION:  US LOWER EXTREMITY VEINS LEFT    CLINICAL HISTORY:  Left lower extremity swelling.    COMPARISON:  None.    FINDINGS:  The deep veins demonstrate a normal appearance of the common femoral vein, deep profunda, superficial femoral and popliteal vein.  There is no evidence of deep venous thrombosis.  Additionally, the greater saphenous, tibial and peroneal veins are patent.                               X-Ray Chest PA And Lateral (Final result)  Result time 07/05/18 17:41:18    Final result by Lico Sr MD (07/05/18 17:41:18)                 Impression:      No acute findings.  No significant change since the previous exam.      Electronically signed by: Lico Sr MD  Date:    07/05/2018  Time:    17:41             Narrative:    EXAMINATION:  XR CHEST PA AND LATERAL    CLINICAL HISTORY:  Chest Pain;    COMPARISON:  03/15/2018    FINDINGS:  Lungs are clear.  Heart size within normal limits.Degenerative changes of the spine..                               X-Ray Knee Complete 4 or  more Views Left (Final result)  Result time 07/05/18 17:40:45   Procedure changed from X-Ray Knee 3 View Left     Final result by Lico Sr MD (07/05/18 17:40:45)                 Impression:      Negative .      Electronically signed by: Lico Sr MD  Date:    07/05/2018  Time:    17:40             Narrative:    EXAMINATION:  XR KNEE COMP 4 OR MORE VIEWS LEFT    CLINICAL HISTORY:  Left knee pain; Edema, unspecified    COMPARISON:  None    FINDINGS:  No fracture or dislocation of the left knee is identified.  Joint spaces are well preserved in all 3 compartments.  Soft tissues are unremarkable.                               The EKG was ordered, reviewed, and independently interpreted by the ED provider.  Interpretation time: 16:45  Rate: 60 BPM  Rhythm: normal sinus rhythm and sinus arrhythmia  Interpretation: No acute ST changes. No STEMI.             The Emergency Provider reviewed the vital signs and test results, which are outlined above.    ED Discussion     6:09 PM: Do not suspect septic knee at this time. Pt has good ROM with a fine cellulitis on her knee.    6:15 PM: Reassessed pt at this time.  Pt states her condition has improved at this time. Discussed with pt all pertinent ED information and results. Discussed pt dx and plan of tx. Gave pt all f/u and return to the ED instructions. All questions and concerns were addressed at this time. Pt expresses understanding of information and instructions, and is comfortable with plan to discharge. Pt is stable for discharge.    I discussed with patient and/or family/caretaker that evaluation in the ED does not suggest any emergent or life threatening medical conditions requiring immediate intervention beyond what was provided in the ED, and I believe patient is safe for discharge.  Regardless, an unremarkable evaluation in the ED does not preclude the development or presence of a serious of life threatening condition. As such, patient was  instructed to return immediately for any worsening or change in current symptoms.    I have discussed with patient and/or family/caretaker chest pain precautions, specifically to return for worsening chest pain, shortness of breath, fever, or any concern.  I have low suspicion for cardiopulmonary, vascular, infectious, respiratory, or other emergent medical condition based on my evaluation in the ED.      ED Medication(s):  Medications   ondansetron injection 4 mg (4 mg Intravenous Given 7/5/18 1813)   morphine injection 2 mg (2 mg Intravenous Given 7/5/18 1813)   clindamycin injection 600 mg (600 mg Intravenous Given 7/5/18 1814)       New Prescriptions    CLINDAMYCIN (CLEOCIN) 150 MG CAPSULE    Take 2 capsules (300 mg total) by mouth 4 (four) times daily. for 7 days    ONDANSETRON (ZOFRAN) 4 MG TABLET    Take 1 tablet (4 mg total) by mouth every 6 (six) hours.    OXYCODONE-ACETAMINOPHEN (PERCOCET) 5-325 MG PER TABLET    Take 1 tablet by mouth every 4 (four) hours as needed for Pain.       Follow-up Information     Shlomo Ware MD In 2 days.    Specialty:  Internal Medicine  Contact information:  [address] [facility] [address] [phone]                     Medical Decision Making    Medical Decision Making:   Clinical Tests:   Lab Tests: Ordered and Reviewed  Radiological Study: Ordered and Reviewed  Medical Tests: Ordered and Reviewed           Scribe Attestation:   Scribe #1: I performed the above scribed service and the documentation accurately describes the services I performed. I attest to the accuracy of the note.    Attending:   Physician Attestation Statement for Scribe #1: I, Dalton Powell Do, MD, personally performed the services described in this documentation, as scribed by Alan Watters, in my presence, and it is both accurate and complete.          Clinical Impression       ICD-10-CM ICD-9-CM   1. Cellulitis of knee, left L03.116 682.6   2. Chest pain R07.9 786.50   3. Swelling R60.9  782.3       Disposition:   Disposition: Discharged  Condition: Stable         Dalton Powell Do, MD  07/05/18 1854

## 2018-07-05 NOTE — ED NOTES
C/o left leg pain after 90 pound dog ran into her leg 2 weeks ago, then stated she had pain around her heart for just a few seconds this morning. Denies chest pain at present, denies N/V or dyspnea.

## 2018-07-05 NOTE — PATIENT INSTRUCTIONS
PLAN:   Advise go to E.R. Now for further evaluation  Advise follow up with PCP  Advise go to ER now for US & further evaluation   Patient admits daughter will drive her to emergency room.

## 2018-07-05 NOTE — PROGRESS NOTES
CHIEF COMPLAINT/REASON FOR VISIT:  Left knee and calf pain    HISTORY OF PRESENT ILLNESS:  77-year-old female in wheelchair complains of left knee pain onset yesterday. Complains of chest pain onset prior to arrival. Patient admits her dog ran & hit left knee onset 2 weeks ago.  Admits to getting up quickly, twisted left knee, lower leg &  felt a pop and pain onset yesterday. Patient concerned may have a blood clot. Complains pain increases on walking. Patient denies shortness of breath, congestion, nausea, vomiting, fever, cough, back pain and urinary discomfort.  Discussed further evaluation at Emergency room with ultrasound, lab work & transportation. Patient admits daughter will drive her to emergency room.  Patient denies any history of gout.    Past Medical History:   Diagnosis Date    Anxiety     Depression     Fractures     Hot flash, menopausal     HTN (hypertension)     Hyperlipidemia     Hypothyroid           Social History     Social History    Marital status:      Spouse name: N/A    Number of children: N/A    Years of education: N/A     Occupational History    Not on file.     Social History Main Topics    Smoking status: Never Smoker    Smokeless tobacco: Never Used    Alcohol use No    Drug use: No    Sexual activity: Not on file     Other Topics Concern    Not on file     Social History Narrative    No narrative on file          Family History   Problem Relation Age of Onset    Hypertension Mother        ROS:  GENERAL: No fever, chills, fatigability or weight loss.  SKIN: No rashes, itching or changes in color or texture of skin.  HEENT: No headaches or recent head trauma.  Denies ear pain, discharge or vertigo. No loss of smell, no epistaxis or postnasal drip. No hoarseness or change in voice.   CHEST: Denies cyanosis, wheezing, cough and sputum production.  CARDIOVASCULAR: reports chest pain, no shortness of breath  ABDOMEN: Appetite fine. No weight loss. Denies diarrhea,  abdominal pain  MUSCULOSKELETAL: left red knee pain & left calf pain with swelling. .  NEUROLOGIC: No history of seizures, paralysis, alteration of gait or coordination.  PSYCHIATRIC: Denies mood swings, depression or suicidal thoughts.    PE:   APPEARANCE: Well nourished, well developed, in mild distress.   V/S: Reviewed.  SKIN: left knee red, left calf hot to touch, limited range of motion due to pain with soft tissue swelling..  CHEST: Lungs clear to auscultation. No wheezing  CARDIOVASCULAR: Regular rate and rhythm   MUSCULOSKELETAL: left knee red, hot to touch, left calf, tib /fib with limited range of motion due to pain, with soft tissue swelling, left posterior knee & left calf with tenderness on palpation, pulses palpable..  NEUROLOGIC: No sensory deficits. Gait & Posture:in wheelchair. No cerebellar signs.  MENTAL STATUS: Patient alert, oriented x 3 & conversant.    PLAN:   Advise go to E.R. Now for further evaluation  Advise follow up with PCP  Advise go to ER now for US & further evaluation   Patient admits daughter will drive her to emergency room.    DIAGNOSIS:  Chest pain  Obesity  Hypertension  Left knee pain / redness  Left lower extremity pain

## 2018-07-16 ENCOUNTER — TELEPHONE (OUTPATIENT)
Dept: ORTHOPEDICS | Facility: CLINIC | Age: 78
End: 2018-07-16

## 2018-07-16 NOTE — TELEPHONE ENCOUNTER
----- Message from Jimi Justice sent at 7/16/2018  9:42 AM CDT -----  Contact: [phone] or [phone]  Pt would like to be worked in for a same day appt, Pt complaints of a swollen knee.  Please call back at [phone] or [phone].  Pending sale to [facility]-

## 2018-07-16 NOTE — TELEPHONE ENCOUNTER
Returned pt phone call. Pt states she needs a hosp follow up appointment. Advised pt I would send her information to [facility] due to it being their on call day. Pt vocalized understanding.

## 2018-07-17 ENCOUNTER — OFFICE VISIT (OUTPATIENT)
Dept: ORTHOPEDICS | Facility: CLINIC | Age: 78
End: 2018-07-17
Payer: MEDICARE

## 2018-07-17 ENCOUNTER — HOSPITAL ENCOUNTER (OUTPATIENT)
Dept: RADIOLOGY | Facility: HOSPITAL | Age: 78
Discharge: HOME | End: 2018-07-17
Attending: PHYSICIAN ASSISTANT
Payer: MEDICARE

## 2018-07-17 VITALS
HEIGHT: 65 IN | RESPIRATION RATE: 12 BRPM | DIASTOLIC BLOOD PRESSURE: 67 MMHG | HEART RATE: 72 BPM | BODY MASS INDEX: 31.48 KG/M2 | SYSTOLIC BLOOD PRESSURE: 112 MMHG | WEIGHT: 188.94 LBS

## 2018-07-17 DIAGNOSIS — R52 PAIN: Primary | ICD-10-CM

## 2018-07-17 DIAGNOSIS — M25.562 ACUTE PAIN OF LEFT KNEE: Primary | ICD-10-CM

## 2018-07-17 DIAGNOSIS — M25.562 ACUTE PAIN OF LEFT KNEE: ICD-10-CM

## 2018-07-17 PROCEDURE — 20610 DRAIN/INJ JOINT/BURSA W/O US: CPT | Mod: S$PBB,LT,, | Performed by: PHYSICIAN ASSISTANT

## 2018-07-17 PROCEDURE — 73564 X-RAY EXAM KNEE 4 OR MORE: CPT | Mod: 26,LT,, | Performed by: RADIOLOGY

## 2018-07-17 PROCEDURE — 99215 OFFICE O/P EST HI 40 MIN: CPT | Mod: PBBFAC,25,PO | Performed by: PHYSICIAN ASSISTANT

## 2018-07-17 PROCEDURE — 73562 X-RAY EXAM OF KNEE 3: CPT | Mod: TC,FY,PO,RT

## 2018-07-17 PROCEDURE — 99214 OFFICE O/P EST MOD 30 MIN: CPT | Mod: 25,S$PBB,, | Performed by: PHYSICIAN ASSISTANT

## 2018-07-17 PROCEDURE — 73562 X-RAY EXAM OF KNEE 3: CPT | Mod: 26,XS,RT, | Performed by: RADIOLOGY

## 2018-07-17 PROCEDURE — 99999 PR PBB SHADOW E&M-EST. PATIENT-LVL V: CPT | Mod: PBBFAC,,, | Performed by: PHYSICIAN ASSISTANT

## 2018-07-17 PROCEDURE — 20610 DRAIN/INJ JOINT/BURSA W/O US: CPT | Mod: PBBFAC,PO | Performed by: PHYSICIAN ASSISTANT

## 2018-07-17 RX ORDER — SULFAMETHOXAZOLE AND TRIMETHOPRIM 800; 160 MG/1; MG/1
TABLET ORAL
COMMUNITY
Start: 2018-07-12 | End: 2018-07-17

## 2018-07-17 RX ORDER — LIDOCAINE AND PRILOCAINE 25; 25 MG/G; MG/G
CREAM TOPICAL
COMMUNITY
Start: 2018-04-12 | End: 2019-09-19

## 2018-07-17 RX ORDER — METHYLPREDNISOLONE ACETATE 80 MG/ML
80 INJECTION, SUSPENSION INTRA-ARTICULAR; INTRALESIONAL; INTRAMUSCULAR; SOFT TISSUE ONCE
Status: COMPLETED | OUTPATIENT
Start: 2018-07-17 | End: 2018-07-17

## 2018-07-17 RX ORDER — PRAVASTATIN SODIUM 20 MG/1
20 TABLET ORAL
COMMUNITY
Start: 2018-06-26 | End: 2019-01-11

## 2018-07-17 RX ORDER — DULOXETIN HYDROCHLORIDE 30 MG/1
CAPSULE, DELAYED RELEASE ORAL
Status: ON HOLD | COMMUNITY
Start: 2018-06-04 | End: 2023-08-17 | Stop reason: HOSPADM

## 2018-07-17 RX ADMIN — METHYLPREDNISOLONE ACETATE 80 MG: 80 INJECTION, SUSPENSION INTRALESIONAL; INTRAMUSCULAR; INTRASYNOVIAL; SOFT TISSUE at 02:07

## 2018-07-17 NOTE — PROGRESS NOTES
Patient ID: Rebecca Rios is a 77 y.o. female.    Chief Complaint: Pain of the Left Knee      HPI: Rebecca Rios  is a 77 y.o. female who c/o Pain of the Left Knee   for duration of about 2 weeks.  She states that she got up quickly from a chair on 07/04/2018 and twisted the left knee.  At that time, she felt a pop and had pain in the knee since then.  It is 7/10 in severity.  Quality is throbbing and constant.  Alleviating factors include ice and rest.  She is currently taking some pain medication which also helps.  Aggravating factors include weight-bearing and nighttime.  She also states it feels stiff when she tries to bend it.  She complains of associated swelling. She goes on to tell me that she was seen in the emergency room and they diagnosed her with cellulitis.  She denies associated fevers.  She has been taking an antibiotic.    Past Medical History:   Diagnosis Date    Anxiety     Depression     Fractures     Hot flash, menopausal     HTN (hypertension)     Hyperlipidemia     Hypothyroid      Past Surgical History:   Procedure Laterality Date    CARPAL TUNNEL RELEASE Bilateral     CHOLECYSTECTOMY      HYSTERECTOMY      open luis       Family History   Problem Relation Age of Onset    Hypertension Mother      Social History     Social History    Marital status:      Spouse name: N/A    Number of children: N/A    Years of education: N/A     Occupational History    Not on file.     Social History Main Topics    Smoking status: Never Smoker    Smokeless tobacco: Never Used    Alcohol use No    Drug use: No    Sexual activity: Not on file     Other Topics Concern    Not on file     Social History Narrative    No narrative on file     Medication List with Changes/Refills   Current Medications    ALPRAZOLAM (XANAX) 0.5 MG TABLET    Take 0.5 mg by mouth 2 (two) times daily as needed for Anxiety.    AMLODIPINE (NORVASC) 5 MG TABLET    Take 1 tablet (5 mg total) by mouth once  daily.    ASPIRIN (ECOTRIN) 81 MG EC TABLET    Take 1 tablet (81 mg total) by mouth once daily.    BUPROPION (WELLBUTRIN SR) 150 MG TBSR 12 HR TABLET    Take 150 mg by mouth.    DIPHENOXYLATE-ATROPINE 2.5-0.025 MG (LOMOTIL) 2.5-0.025 MG PER TABLET    Take 1 tablet by mouth 4 (four) times daily as needed for Diarrhea.    DULOXETINE (CYMBALTA) 30 MG CAPSULE    TAKE 1 CAPSULE BY MOUTH ONCE DAILY    ESOMEPRAZOLE (NEXIUM) 40 MG CAPSULE    Take 40 mg by mouth.    FLUTICASONE (FLONASE) 50 MCG/ACTUATION NASAL SPRAY    2 sprays.    GABAPENTIN (NEURONTIN) 600 MG TABLET    Take 600 mg by mouth 2 (two) times daily.    HYDROCODONE-ACETAMINOPHEN 10-325MG (NORCO)  MG TAB    Take by mouth.    LEVOCETIRIZINE (XYZAL) 5 MG TABLET    Take 1 tablet (5 mg total) by mouth every evening.    LEVOTHYROXINE (SYNTHROID) 75 MCG TABLET    TAKE ONE TABLET BY MOUTH ONCE DAILY    LIDOCAINE-PRILOCAINE (EMLA) CREAM    Apply topically.    LISINOPRIL (PRINIVIL,ZESTRIL) 40 MG TABLET    TAKE ONE TABLET BY MOUTH ONCE DAILY    ONDANSETRON (ZOFRAN) 4 MG TABLET    Take 1 tablet (4 mg total) by mouth every 6 (six) hours.    OXYCODONE-ACETAMINOPHEN (PERCOCET) 5-325 MG PER TABLET    Take 1 tablet by mouth every 4 (four) hours as needed for Pain.    PRAVASTATIN (PRAVACHOL) 20 MG TABLET    Take 20 mg by mouth.    TIZANIDINE (ZANAFLEX) 4 MG TABLET    Take 4 mg by mouth every evening.     VOLTAREN 1 % GEL    APPLY 2 GRAMS TOPICALLY ONCE DAILY   Discontinued Medications    ONDANSETRON (ZOFRAN) 4 MG TABLET    Take 1 tablet (4 mg total) by mouth every 6 (six) hours.     Review of patient's allergies indicates:   Allergen Reactions    Erythromycin     Macrolide antibiotics Nausea And Vomiting           Objective:      Temp 97.3    General    Nursing note and vitals reviewed.  Constitutional: She is oriented to person, place, and time. She appears well-developed and well-nourished.   HENT:   Head: Normocephalic and atraumatic.   Eyes: EOM are normal.    Cardiovascular: Normal rate and regular rhythm.    Pulmonary/Chest: Effort normal.   Abdominal: Soft.   Neurological: She is alert and oriented to person, place, and time.   Psychiatric: She has a normal mood and affect. Her behavior is normal.             Left Knee Exam     Inspection   Erythema: absent  Swelling: absent  Effusion: present (1+)  Deformity: deformity  Bruising: absent    Tenderness   The patient tender to palpation of the no tenderness, medial joint line, condyle, lateral joint line and pes anserinus.    Crepitus   The patient has crepitus of the patella.    Range of Motion   Extension: normal   Flexion:  90 abnormal     Tests   Meniscus   Kellie:  Medial - negative Lateral - negative  Stability Lachman: normal (-1 to 2mm) PCL-Posterior Drawer: normal (0 to 2mm)  MCL - Valgus: normal (0 to 2mm)  LCL - Varus: normal (0 to 2mm)  Patella   Patellar Apprehension: negative  Patellar Tracking: normal  Patellar Grind: negative    Other   Meniscal Cyst: absent  Popliteal (Baker's) Cyst: absent  Sensation: normal    Comments:  Comp soft, cap refill < 2 sec.  No erythema today  No purulence  No s/sx infxn  Painfree PROM    Muscle Strength   Left Lower Extremity   Quadriceps:  4/5   Hamstrin/5     Vascular Exam       Edema  Left Lower Leg: absent            Xray:   Left knee from 18 images and report were reviewed today.  I agree with the radiologist's interpretation.  No fracture or dislocation of the left knee is identified.  Joint spaces are well preserved in all 3 compartments.  Soft tissues are unremarkable.    Assessment:       Encounter Diagnosis   Name Primary?    Acute pain of left knee Yes          Plan:       Rebecca was seen today for pain.    Diagnoses and all orders for this visit:    Acute pain of left knee  -     X-ray Knee Ortho Left with Flexion; Future  -     methylPREDNISolone acetate injection 80 mg; Inject 1 mL (80 mg total) into the articular space once.  -     Ambulatory  Referral to Physical/Occupational Therapy    Ms. Rios is an established patient with a new problem in the left knee.  I would like her to get standing x-rays of the left knee on her way out today. She did have x-rays done in the emergency room, but they were supine and do not give a good indication of any severity of arthritis. We will notify her of those results.  Additionally, we have discussed risks and benefits of aspirating and injecting the knee. She would like to move forward with that.  I would also recommend formal physical therapy for ambulation training, quad strengthening, edema management as well as modalities for symptomatic relief.  I will see her back in the office in approximately 10-14 days to re-evaluate the knee.  If she has any problems before then, she will notify the office.  She certainly does not appear to have cellulitis today.  There is no erythema on the knee on exam.  She does not appear to have a septic joint as she has pain-free passive range of motion and is afebrile.  Additionally a white count done in the ER on 07/05/2018 was completely normal. If she worsens in any way, she will notify the office or seek treatment in the emergency room. She verbalizes understanding and agrees.  Follow-up in about 10 days (around 7/27/2018).        Left knee aspiration and injection report  Treatment options were discussed.  After verbal consent was obtained and the sight was identified the left knee was prepped in sterile fashion. Under sterile conditions the left knee was injected with 20 mg of lidocaine plain utilizing the superolateral approach. The left knee was reprepped sterilely and aspirated using the same anterolateral portal with return of 10 cc's of serous fluid.  The patient was then given an injection of 80 mg depomedrol.  After injection a sterile Band-Aid was applied.  The patient tolerated the procedure well and was sent home in stable condition.  The patient was instructed to  apply an ice pack for approximately 10 minutes once arriving at home and not to do anything strenuous for the next 48 hours.  The patient was instructed to call if there were any problems at the aspiration sight.    The patient understands, chooses and consents to this plan and accepts all   the risks which include but are not limited to the risks mentioned above.     Disclaimer: This note was prepared using a voice recognition system and is likely to have sound alike errors within the text.

## 2018-07-26 ENCOUNTER — OFFICE VISIT (OUTPATIENT)
Dept: ORTHOPEDICS | Facility: CLINIC | Age: 78
End: 2018-07-26
Payer: MEDICARE

## 2018-07-26 VITALS
HEIGHT: 65 IN | WEIGHT: 188.94 LBS | DIASTOLIC BLOOD PRESSURE: 73 MMHG | BODY MASS INDEX: 31.48 KG/M2 | HEART RATE: 76 BPM | SYSTOLIC BLOOD PRESSURE: 134 MMHG

## 2018-07-26 DIAGNOSIS — M25.562 ACUTE PAIN OF LEFT KNEE: Primary | ICD-10-CM

## 2018-07-26 PROCEDURE — 99213 OFFICE O/P EST LOW 20 MIN: CPT | Mod: S$PBB,,, | Performed by: PHYSICIAN ASSISTANT

## 2018-07-26 PROCEDURE — 99999 PR PBB SHADOW E&M-EST. PATIENT-LVL V: CPT | Mod: PBBFAC,,, | Performed by: PHYSICIAN ASSISTANT

## 2018-07-26 PROCEDURE — 99215 OFFICE O/P EST HI 40 MIN: CPT | Mod: PBBFAC,PO | Performed by: PHYSICIAN ASSISTANT

## 2018-07-26 NOTE — PROGRESS NOTES
Patient ID: Rebecca Rios is a 77 y.o. female.    Chief Complaint: Pain of the Left Knee      HPI: Rebecca Rios  is a 77 y.o. female who c/o Pain of the Left Knee   for duration of nearly a month.  She had twisted the knee and felt a pop.  She came to see me after going to the emergency room.  The emergency room diagnosed her with cellulitis and put her on an antibiotic.  When I saw her, she was not so concerned about any redness in the knee but the pain caused from the injury when she twisted the knee and popped.  At her appointment 2 weeks ago, we did an intra-articular steroid injection. She comes in today to follow-up.  She is not having any fevers.  She has completed her course of antibiotics from the emergency department.  Pain is vastly improved today compared to 2 weeks ago.  It is 3/10 in severity.  Quality is intermittent throbbing pain.  Alleviating factors include pain medication, knee brace and a steroid injection. Aggravating factors include prolonged weight-bearing.  Her range of motion is significantly improved as is the swelling.    Past Medical History:   Diagnosis Date    Anxiety     Depression     Fractures     Hot flash, menopausal     HTN (hypertension)     Hyperlipidemia     Hypothyroid      Past Surgical History:   Procedure Laterality Date    CARPAL TUNNEL RELEASE Bilateral     CHOLECYSTECTOMY      HYSTERECTOMY      open luis       Family History   Problem Relation Age of Onset    Hypertension Mother      Social History     Social History    Marital status:      Spouse name: N/A    Number of children: N/A    Years of education: N/A     Occupational History    Not on file.     Social History Main Topics    Smoking status: Never Smoker    Smokeless tobacco: Never Used    Alcohol use No    Drug use: No    Sexual activity: Not on file     Other Topics Concern    Not on file     Social History Narrative    No narrative on file     Medication List with  Changes/Refills   Current Medications    ALPRAZOLAM (XANAX) 0.5 MG TABLET    Take 0.5 mg by mouth 2 (two) times daily as needed for Anxiety.    AMLODIPINE (NORVASC) 5 MG TABLET    Take 1 tablet (5 mg total) by mouth once daily.    ASPIRIN (ECOTRIN) 81 MG EC TABLET    Take 1 tablet (81 mg total) by mouth once daily.    BUPROPION (WELLBUTRIN SR) 150 MG TBSR 12 HR TABLET    Take 150 mg by mouth.    DIPHENOXYLATE-ATROPINE 2.5-0.025 MG (LOMOTIL) 2.5-0.025 MG PER TABLET    Take 1 tablet by mouth 4 (four) times daily as needed for Diarrhea.    DULOXETINE (CYMBALTA) 30 MG CAPSULE    TAKE 1 CAPSULE BY MOUTH ONCE DAILY    ESOMEPRAZOLE (NEXIUM) 40 MG CAPSULE    Take 40 mg by mouth.    FLUTICASONE (FLONASE) 50 MCG/ACTUATION NASAL SPRAY    2 sprays.    GABAPENTIN (NEURONTIN) 600 MG TABLET    Take 600 mg by mouth 2 (two) times daily.    HYDROCODONE-ACETAMINOPHEN 10-325MG (NORCO)  MG TAB    Take by mouth.    LEVOCETIRIZINE (XYZAL) 5 MG TABLET    Take 1 tablet (5 mg total) by mouth every evening.    LEVOTHYROXINE (SYNTHROID) 75 MCG TABLET    TAKE ONE TABLET BY MOUTH ONCE DAILY    LIDOCAINE-PRILOCAINE (EMLA) CREAM    Apply topically.    LISINOPRIL (PRINIVIL,ZESTRIL) 40 MG TABLET    TAKE ONE TABLET BY MOUTH ONCE DAILY    ONDANSETRON (ZOFRAN) 4 MG TABLET    Take 1 tablet (4 mg total) by mouth every 6 (six) hours.    PRAVASTATIN (PRAVACHOL) 20 MG TABLET    Take 20 mg by mouth.    TIZANIDINE (ZANAFLEX) 4 MG TABLET    Take 4 mg by mouth every evening.     VOLTAREN 1 % GEL    APPLY 2 GRAMS TOPICALLY ONCE DAILY   Discontinued Medications    OXYCODONE-ACETAMINOPHEN (PERCOCET) 5-325 MG PER TABLET    Take 1 tablet by mouth every 4 (four) hours as needed for Pain.     Review of patient's allergies indicates:   Allergen Reactions    Erythromycin     Macrolide antibiotics Nausea And Vomiting           Objective:        General    Nursing note and vitals reviewed.  Constitutional: She is oriented to person, place, and time. She appears  well-developed and well-nourished.   HENT:   Head: Normocephalic and atraumatic.   Eyes: EOM are normal.   Cardiovascular: Normal rate and regular rhythm.    Pulmonary/Chest: Effort normal.   Abdominal: Soft.   Neurological: She is alert and oriented to person, place, and time.   Psychiatric: She has a normal mood and affect. Her behavior is normal.           Right Knee Exam     Range of Motion   Extension: normal   Flexion: normal     Tests   Ligament Examination Lachman: normal (-1 to 2mm) PCL-Posterior Drawer: normal (0 to 2mm)     MCL - Valgus: normal (0 to 2mm)  LCL - Varus: normal    Other   Sensation: normal    Left Knee Exam     Inspection   Erythema: absent  Swelling: absent  Effusion: absent  Deformity: deformity  Bruising: absent    Range of Motion   Extension: normal   Flexion:  130 (much improved from 90 2 weeks ago) abnormal     Tests   Meniscus   Kellie:  Medial - negative Lateral - negative  Stability Lachman: normal (-1 to 2mm) PCL-Posterior Drawer: normal (0 to 2mm)  MCL - Valgus: abnormal - grade I  LCL - Varus: normal (0 to 2mm)  Patella   Patellar Apprehension: negative  Patellar Tracking: normal  Patellar Grind: negative    Other   Meniscal Cyst: absent  Popliteal (Baker's) Cyst: absent  Sensation: normal    Comments:  Comp soft, cap refill < 2 sec.  Minimal diffuse TTP  No erythema  No effusion  Pain free ROM    Muscle Strength   Right Lower Extremity   Quadriceps:  5/5   Hamstrin/5   Left Lower Extremity   Quadriceps:  4/5   Hamstrin/5     Vascular Exam       Edema  Right Lower Leg: absent  Left Lower Leg: absent            Xray:   Left knee from 2018 images and report were reviewed today.  I agree with the radiologist's interpretation.  No acute osseous or soft tissue abnormality.  Joint spaces are relatively maintained.  Bilateral patellar enthesophyte formation noted.    Assessment:       Encounter Diagnosis   Name Primary?    Acute pain of left knee Yes          Plan:        Rebecca was seen today for pain.    Diagnoses and all orders for this visit:    Acute pain of left knee  -     Ambulatory Referral to Physical/Occupational Therapy        Rebecca Rios comes in today with the above problems.  This is an established problem which is improved.  She has mild arthritis in the left knee. No effusion no erythema.  No concern for infection at this time.  I recommend physical therapy.  I ordered at her last office visit, but she did not go.  I do think that doing therapy to strengthen the quad muscles will be helpful for her.  She may continue to use the knee brace as it is providing some symptomatic relief.  I will re-evaluate her in the office in 3 weeks.  At that time, we will make further decisions as to whether or not we should consider pursuing other conservative treatment.  She verbalizes understanding and agrees.    Follow-up in about 1 month (around 8/26/2018).    The patient understands, chooses and consents to this plan and accepts all   the risks which include but are not limited to the risks mentioned above.     Disclaimer: This note was prepared using a voice recognition system and is likely to have sound alike errors within the text.

## 2018-08-29 ENCOUNTER — OFFICE VISIT (OUTPATIENT)
Dept: ORTHOPEDICS | Facility: CLINIC | Age: 78
End: 2018-08-29
Payer: MEDICARE

## 2018-08-29 VITALS
DIASTOLIC BLOOD PRESSURE: 79 MMHG | WEIGHT: 188 LBS | BODY MASS INDEX: 31.32 KG/M2 | RESPIRATION RATE: 18 BRPM | SYSTOLIC BLOOD PRESSURE: 141 MMHG | HEART RATE: 74 BPM | HEIGHT: 65 IN

## 2018-08-29 DIAGNOSIS — M25.562 ACUTE PAIN OF LEFT KNEE: Primary | ICD-10-CM

## 2018-08-29 DIAGNOSIS — M17.11 ARTHRITIS OF RIGHT KNEE: ICD-10-CM

## 2018-08-29 PROCEDURE — 99215 OFFICE O/P EST HI 40 MIN: CPT | Mod: PBBFAC,PO | Performed by: PHYSICIAN ASSISTANT

## 2018-08-29 PROCEDURE — 99999 PR PBB SHADOW E&M-EST. PATIENT-LVL V: CPT | Mod: PBBFAC,,, | Performed by: PHYSICIAN ASSISTANT

## 2018-08-29 PROCEDURE — 99214 OFFICE O/P EST MOD 30 MIN: CPT | Mod: S$PBB,,, | Performed by: PHYSICIAN ASSISTANT

## 2018-08-29 NOTE — PROGRESS NOTES
Patient ID: Rebecca Rios is a 77 y.o. female.    Chief Complaint: Pain of the Left Knee      HPI: Rebecca Rios  is a 77 y.o. female who c/o Pain of the Left Knee   for duration of more than 2 months.  I last saw her a month ago.  At that time, she was doing relatively well. Today, her pain is much worsened.  It is 9/10.  It is mainly aching pain with occasional intermittent sharp pain. She also feels pulling sensation posteriorly. Aggravating factors include prolonged weight-bearing, nighttime, as well as getting up from a seated position. Alleviating factors include intra-articular steroid injection that she had approximately 6 weeks ago.  She still complains of associated swelling. She denies fevers.  She denies erythema.  She has not done physical therapy, because I hurt too much..  When I asked her further about that, she states that she go cold therapy exercises and stated there was no way she could expect herself to do the things that she found on the Internet.    Past Medical History:   Diagnosis Date    Anxiety     Depression     Fractures     Hot flash, menopausal     HTN (hypertension)     Hyperlipidemia     Hypothyroid      Past Surgical History:   Procedure Laterality Date    CARPAL TUNNEL RELEASE Bilateral     CHOLECYSTECTOMY      HYSTERECTOMY      open luis       Family History   Problem Relation Age of Onset    Hypertension Mother      Social History     Socioeconomic History    Marital status:      Spouse name: Not on file    Number of children: Not on file    Years of education: Not on file    Highest education level: Not on file   Social Needs    Financial resource strain: Not on file    Food insecurity - worry: Not on file    Food insecurity - inability: Not on file    Transportation needs - medical: Not on file    Transportation needs - non-medical: Not on file   Occupational History    Not on file   Tobacco Use    Smoking status: Never Smoker    Smokeless  tobacco: Never Used   Substance and Sexual Activity    Alcohol use: No    Drug use: No    Sexual activity: Not on file   Other Topics Concern    Not on file   Social History Narrative    Not on file     Medication List with Changes/Refills   Current Medications    ALPRAZOLAM (XANAX) 0.5 MG TABLET    Take 0.5 mg by mouth 2 (two) times daily as needed for Anxiety.    AMLODIPINE (NORVASC) 5 MG TABLET    Take 1 tablet (5 mg total) by mouth once daily.    ASPIRIN (ECOTRIN) 81 MG EC TABLET    Take 1 tablet (81 mg total) by mouth once daily.    BUPROPION (WELLBUTRIN SR) 150 MG TBSR 12 HR TABLET    Take 150 mg by mouth.    DIPHENOXYLATE-ATROPINE 2.5-0.025 MG (LOMOTIL) 2.5-0.025 MG PER TABLET    Take 1 tablet by mouth 4 (four) times daily as needed for Diarrhea.    DULOXETINE (CYMBALTA) 30 MG CAPSULE    TAKE 1 CAPSULE BY MOUTH ONCE DAILY    ESOMEPRAZOLE (NEXIUM) 40 MG CAPSULE    Take 40 mg by mouth.    FLUTICASONE (FLONASE) 50 MCG/ACTUATION NASAL SPRAY    2 sprays.    GABAPENTIN (NEURONTIN) 600 MG TABLET    Take 600 mg by mouth 2 (two) times daily.    HYDROCODONE-ACETAMINOPHEN 10-325MG (NORCO)  MG TAB    Take by mouth.    LEVOCETIRIZINE (XYZAL) 5 MG TABLET    Take 1 tablet (5 mg total) by mouth every evening.    LEVOTHYROXINE (SYNTHROID) 75 MCG TABLET    TAKE ONE TABLET BY MOUTH ONCE DAILY    LIDOCAINE-PRILOCAINE (EMLA) CREAM    Apply topically.    LISINOPRIL (PRINIVIL,ZESTRIL) 40 MG TABLET    TAKE ONE TABLET BY MOUTH ONCE DAILY    ONDANSETRON (ZOFRAN) 4 MG TABLET    Take 1 tablet (4 mg total) by mouth every 6 (six) hours.    PRAVASTATIN (PRAVACHOL) 20 MG TABLET    Take 20 mg by mouth.    TIZANIDINE (ZANAFLEX) 4 MG TABLET    Take 4 mg by mouth every evening.     VOLTAREN 1 % GEL    APPLY 2 GRAMS TOPICALLY ONCE DAILY     Review of patient's allergies indicates:   Allergen Reactions    Erythromycin     Macrolide antibiotics Nausea And Vomiting           Objective:        General    Nursing note and vitals  reviewed.  Constitutional: She is oriented to person, place, and time. She appears well-developed and well-nourished.   HENT:   Head: Normocephalic and atraumatic.   Eyes: EOM are normal.   Cardiovascular: Normal rate and regular rhythm.    Pulmonary/Chest: Effort normal.   Abdominal: Soft.   Neurological: She is alert and oriented to person, place, and time.   Psychiatric: She has a normal mood and affect. Her behavior is normal.           Right Knee Exam     Range of Motion   Extension: normal   Flexion: normal     Tests   Ligament Examination Lachman: normal (-1 to 2mm) PCL-Posterior Drawer: normal (0 to 2mm)     MCL - Valgus: normal (0 to 2mm)  LCL - Varus: normal    Other   Sensation: normal    Left Knee Exam     Inspection   Erythema: absent  Swelling: absent  Effusion: absent  Deformity: deformity  Bruising: absent    Tenderness   The patient tender to palpation of the condyle, medial joint line and medial hamstring.    Crepitus   The patient has crepitus of the patella.    Range of Motion   Extension: normal   Flexion:  110 (much improved from 90 2 weeks ago) abnormal     Tests   Meniscus   Kellie:  Medial - negative Lateral - negative  Stability Lachman: normal (-1 to 2mm) PCL-Posterior Drawer: normal (0 to 2mm)  MCL - Valgus: abnormal - grade I  LCL - Varus: normal (0 to 2mm)  Patella   Patellar apprehension: negative  Patellar Tracking: normal  Patellar Grind: negative    Other   Meniscal Cyst: absent  Popliteal (Baker's) Cyst: absent  Sensation: normal    Comments:  Comp soft, cap refill < 2 sec.  No erythema  No effusion  Pain free PROM  Calf NT    Muscle Strength   Right Lower Extremity   Quadriceps:  5/5   Hamstrin/5   Left Lower Extremity   Quadriceps:  4/5   Hamstrin/5     Vascular Exam       Edema  Right Lower Leg: absent  Left Lower Leg: absent              Assessment:       Encounter Diagnoses   Name Primary?    Acute pain of left knee Yes    Arthritis of right knee           Plan:        Rebecca was seen today for pain.    Diagnoses and all orders for this visit:    Acute pain of left knee  -     sodium hyaluronate (EUFLEXXA) 10 mg/mL(mw 2.4 -3.6 million) Syrg 20 mg; Inject 2 mLs (20 mg total) into the articular space once a week.  -     Prior Authorization Order  -     Ambulatory Referral to Physical/Occupational Therapy    Arthritis of right knee  -     sodium hyaluronate (EUFLEXXA) 10 mg/mL(mw 2.4 -3.6 million) Syrg 20 mg; Inject 2 mLs (20 mg total) into the articular space once a week.  -     Prior Authorization Order  -     Ambulatory Referral to Physical/Occupational Therapy    Ms. Rios comes in today for exacerbation of an established problem.  I had a very long discussion with her in regards to her pain in the need for physical therapy.  I have explained that a good physical therapist will do a full evaluation on her and avoid those activities which exacerbate her pain.  Additionally, they can do therapeutic modalities such as electrical stimulation, manual massage, heat, stretching which may help some of the discomfort she feels in the back of her knee. She is extremely concerned that she may have something torn in her knee. I have advised that at the age of 77 I do absolutely expect there to be worn down cartilage in the knee. However, getting an MRI would not change my immediate treatment plan for her, so I would hold off on that for now.  We have also discussed the option of doing hyaluronic acid injections in the left knee. She is willing to think about that.  I will submit the authorization request to her insurance company.  We will plan to see her back in the office in 2 weeks.  If at that time, she has decided she does not want to do the injections, she is welcome to cancel that appointment. We have discussed risks and benefits of Euflexxa as well as Synvisc 1.  She verbalizes understanding and agrees with the above plan.  Follow-up in about 2 weeks (around 9/12/2018).           The patient understands, chooses and consents to this plan and accepts all   the risks which include but are not limited to the risks mentioned above.     Disclaimer: This note was prepared using a voice recognition system and is likely to have sound alike errors within the text.

## 2018-09-07 ENCOUNTER — TELEPHONE (OUTPATIENT)
Dept: ORTHOPEDICS | Facility: CLINIC | Age: 78
End: 2018-09-07

## 2018-09-07 NOTE — TELEPHONE ENCOUNTER
----- Message from Vivian Ventura sent at 9/7/2018  1:22 PM CDT -----  Contact: Pt.   Pt is calling regarding requesting that nurse call Pt. Pt states that she would like to receive the injections after physical therapy. Pt would like nurse to call to discuss treatment. .[phone] (home) [phone]

## 2018-09-07 NOTE — TELEPHONE ENCOUNTER
CX Euflexxa inj appts for now. RS f/u Ortho appt for Left Knee therapy for about 5 weeks from initial PT visit. Pt verbalized understanding.

## 2018-10-10 ENCOUNTER — TELEPHONE (OUTPATIENT)
Dept: ORTHOPEDICS | Facility: CLINIC | Age: 78
End: 2018-10-10

## 2018-10-10 NOTE — TELEPHONE ENCOUNTER
----- Message from Yahaira Abbott sent at 10/10/2018 10:18 AM CDT -----  Contact: self [phone]  Pt canceled appt scheduled today due to illness, would like to know if reschedule is needed.   Please call back at [phone]. Md Loly

## 2018-10-10 NOTE — TELEPHONE ENCOUNTER
Pt cx appt due to illness. She prefers not to RS appt. She will RS f/u if needed. She stated that she is doing better. She will call back to RS appt if needed.

## 2019-01-11 ENCOUNTER — OFFICE VISIT (OUTPATIENT)
Dept: CARDIOLOGY | Facility: CLINIC | Age: 79
End: 2019-01-11
Payer: MEDICARE

## 2019-01-11 VITALS
HEIGHT: 65 IN | WEIGHT: 188.06 LBS | HEART RATE: 64 BPM | SYSTOLIC BLOOD PRESSURE: 162 MMHG | BODY MASS INDEX: 31.33 KG/M2 | DIASTOLIC BLOOD PRESSURE: 74 MMHG

## 2019-01-11 DIAGNOSIS — I27.20 PULMONARY HYPERTENSION: ICD-10-CM

## 2019-01-11 DIAGNOSIS — E78.2 MIXED HYPERLIPIDEMIA: ICD-10-CM

## 2019-01-11 DIAGNOSIS — I10 ESSENTIAL HYPERTENSION: ICD-10-CM

## 2019-01-11 DIAGNOSIS — I25.10 CORONARY ARTERY DISEASE INVOLVING NATIVE CORONARY ARTERY OF NATIVE HEART WITHOUT ANGINA PECTORIS: ICD-10-CM

## 2019-01-11 DIAGNOSIS — R07.89 ATYPICAL CHEST PAIN: ICD-10-CM

## 2019-01-11 DIAGNOSIS — E66.9 OBESITY (BMI 30.0-34.9): ICD-10-CM

## 2019-01-11 DIAGNOSIS — I10 ESSENTIAL (PRIMARY) HYPERTENSION: ICD-10-CM

## 2019-01-11 DIAGNOSIS — I50.32 CHRONIC DIASTOLIC HEART FAILURE: ICD-10-CM

## 2019-01-11 DIAGNOSIS — R05.9 COUGH: Primary | ICD-10-CM

## 2019-01-11 PROCEDURE — 99214 OFFICE O/P EST MOD 30 MIN: CPT | Mod: S$PBB,,, | Performed by: INTERNAL MEDICINE

## 2019-01-11 PROCEDURE — 99999 PR PBB SHADOW E&M-EST. PATIENT-LVL III: ICD-10-PCS | Mod: PBBFAC,,, | Performed by: INTERNAL MEDICINE

## 2019-01-11 PROCEDURE — 99999 PR PBB SHADOW E&M-EST. PATIENT-LVL III: CPT | Mod: PBBFAC,,, | Performed by: INTERNAL MEDICINE

## 2019-01-11 PROCEDURE — 99213 OFFICE O/P EST LOW 20 MIN: CPT | Mod: PBBFAC | Performed by: INTERNAL MEDICINE

## 2019-01-11 PROCEDURE — 99214 PR OFFICE/OUTPT VISIT, EST, LEVL IV, 30-39 MIN: ICD-10-PCS | Mod: S$PBB,,, | Performed by: INTERNAL MEDICINE

## 2019-01-11 RX ORDER — ROSUVASTATIN CALCIUM 10 MG/1
10 TABLET, COATED ORAL NIGHTLY
Qty: 90 TABLET | Refills: 3 | Status: SHIPPED | OUTPATIENT
Start: 2019-01-11 | End: 2020-01-27

## 2019-01-11 RX ORDER — AMLODIPINE BESYLATE 5 MG/1
5 TABLET ORAL DAILY
Qty: 90 TABLET | Refills: 3 | Status: SHIPPED | OUTPATIENT
Start: 2019-01-11 | End: 2020-01-07 | Stop reason: SDUPTHER

## 2019-01-11 RX ORDER — ROSUVASTATIN CALCIUM 10 MG/1
10 TABLET, COATED ORAL NIGHTLY
Qty: 30 TABLET | Refills: 12 | Status: SHIPPED | OUTPATIENT
Start: 2019-01-11 | End: 2019-01-11 | Stop reason: SDUPTHER

## 2019-01-11 RX ORDER — BENZONATATE 100 MG/1
100 CAPSULE ORAL 3 TIMES DAILY PRN
Qty: 30 CAPSULE | Refills: 0 | Status: SHIPPED | OUTPATIENT
Start: 2019-01-11 | End: 2019-01-21

## 2019-01-11 NOTE — PROGRESS NOTES
Subjective:    Patient ID:  Rebecca Rios is a 78 y.o. female who presents for evaluation of Cough, Risk Factor Modification,  Hyperlipidemia; Coronary Artery Disease; and Hypertension        HPI Pt presents for f/u  Has h/o HTN, DD, MR, PHTN, LVH, hyperlipidemia, carries dx of CAD in chart. Nonsmoker.   Stress mpi 2014 fixed anteroapical defect, trivial ischemia.   Echo 2014 normal EF, DD, mod TR, PHTN.  Now here.   She c/o cough recently due to sinus drainage.  No fevers, chills and has sinus headaches.  sxs recent.  HTN is above goal.  Has been off Amlodipine for a month, ran out and didn't get refill.  Has chronic atypical cp sxs, stable.  sxs infrequent.  This has not changed in frequency/severity since last visit.  No active dyspnea.  No pnd/orthopnea.  Stress MPI May 2018 no ischemia, normal LVEF.  Echo Sept 2017 normal LVEF, LVH.  Lipids last June by PCP were high, cholesterol 250.  On med list there is pravastatin listed but she is not taking it.   Did not tolerate Atorvastatin.  Obesity is stable.      Current Outpatient Medications:     alprazolam (XANAX) 0.5 MG tablet, Take 0.5 mg by mouth 2 (two) times daily as needed for Anxiety., Disp: , Rfl:     aspirin (ECOTRIN) 81 MG EC tablet, Take 1 tablet (81 mg total) by mouth once daily., Disp: 30 tablet, Rfl: 2    buPROPion (WELLBUTRIN SR) 150 MG TBSR 12 hr tablet, Take 150 mg by mouth., Disp: , Rfl:     diphenoxylate-atropine 2.5-0.025 mg (LOMOTIL) 2.5-0.025 mg per tablet, Take 1 tablet by mouth 4 (four) times daily as needed for Diarrhea., Disp: 30 tablet, Rfl: 1    DULoxetine (CYMBALTA) 30 MG capsule, TAKE 1 CAPSULE BY MOUTH ONCE DAILY, Disp: , Rfl:     esomeprazole (NEXIUM) 40 MG capsule, Take 40 mg by mouth., Disp: , Rfl:     gabapentin (NEURONTIN) 600 MG tablet, Take 600 mg by mouth 2 (two) times daily., Disp: , Rfl:     hydrocodone-acetaminophen 10-325mg (NORCO)  mg Tab, Take by mouth., Disp: , Rfl:     levocetirizine (XYZAL) 5 MG  "tablet, Take 1 tablet (5 mg total) by mouth every evening., Disp: 90 tablet, Rfl: 3    levothyroxine (SYNTHROID) 75 MCG tablet, TAKE ONE TABLET BY MOUTH ONCE DAILY, Disp: 30 tablet, Rfl: 6    lidocaine-prilocaine (EMLA) cream, Apply topically., Disp: , Rfl:     lisinopril (PRINIVIL,ZESTRIL) 40 MG tablet, TAKE ONE TABLET BY MOUTH ONCE DAILY, Disp: 90 tablet, Rfl: 3    tizanidine (ZANAFLEX) 4 MG tablet, Take 4 mg by mouth every evening. , Disp: , Rfl:     amLODIPine (NORVASC) 5 MG tablet, Take 1 tablet (5 mg total) by mouth once daily., Disp: 90 tablet, Rfl: 3    benzonatate (TESSALON PERLES) 100 MG capsule, Take 1 capsule (100 mg total) by mouth 3 (three) times daily as needed for Cough., Disp: 30 capsule, Rfl: 0    ondansetron (ZOFRAN) 4 MG tablet, Take 1 tablet (4 mg total) by mouth every 6 (six) hours., Disp: 12 tablet, Rfl: 0    rosuvastatin (CRESTOR) 10 MG tablet, Take 1 tablet (10 mg total) by mouth every evening., Disp: 90 tablet, Rfl: 3    VOLTAREN 1 % Gel, APPLY 2 GRAMS TOPICALLY ONCE DAILY, Disp: 100 g, Rfl: 2      Review of Systems   Constitution: Negative.   HENT: Negative.    Eyes: Negative.    Cardiovascular: Negative.    Respiratory: Positive for cough.    Endocrine: Negative.    Hematologic/Lymphatic: Negative.    Skin: Negative.    Musculoskeletal: Positive for arthritis and joint pain.   Gastrointestinal: Negative.    Genitourinary: Negative.    Neurological: Positive for headaches.   Psychiatric/Behavioral: Negative.    Allergic/Immunologic: Negative.        BP (!) 162/74 (BP Location: Right arm, Patient Position: Sitting, BP Method: Medium (Manual))   Pulse 64   Ht 5' 5" (1.651 m)   Wt 85.3 kg (188 lb 0.8 oz)   BMI 31.29 kg/m²     Wt Readings from Last 3 Encounters:   01/11/19 85.3 kg (188 lb 0.8 oz)   08/29/18 85.3 kg (188 lb)   07/26/18 85.7 kg (188 lb 15 oz)     Temp Readings from Last 3 Encounters:   07/05/18 97.5 °F (36.4 °C) (Oral)   07/05/18 98.6 °F (37 °C)   03/15/18 97.9 °F " (36.6 °C) (Tympanic)     BP Readings from Last 3 Encounters:   01/11/19 (!) 162/74   08/29/18 (!) 141/79   07/26/18 134/73     Pulse Readings from Last 3 Encounters:   01/11/19 64   08/29/18 74   07/26/18 76          Objective:    Physical Exam   Constitutional: She is oriented to person, place, and time. Vital signs are normal. She appears well-developed and well-nourished. She is active and cooperative. She does not have a sickly appearance. She does not appear ill. No distress.   HENT:   Head: Normocephalic.   Neck: Neck supple. Normal carotid pulses, no hepatojugular reflux and no JVD present. Carotid bruit is not present. No thyromegaly present.   Cardiovascular: Normal rate, regular rhythm, S1 normal, S2 normal, normal heart sounds and normal pulses. PMI is not displaced. Exam reveals no gallop and no friction rub.   No murmur heard.  Pulses:       Radial pulses are 2+ on the right side, and 2+ on the left side.   Pulmonary/Chest: Effort normal and breath sounds normal. She has no wheezes. She has no rales.   Abdominal: Soft. Normal appearance, normal aorta and bowel sounds are normal. She exhibits no pulsatile liver, no abdominal bruit, no ascites and no mass. There is no splenomegaly or hepatomegaly. There is no tenderness.   Musculoskeletal: She exhibits no edema.   Lymphadenopathy:     She has no cervical adenopathy.   Neurological: She is alert and oriented to person, place, and time.   Skin: Skin is warm. She is not diaphoretic.   Psychiatric: She has a normal mood and affect. Her behavior is normal.   Nursing note and vitals reviewed.      I have reviewed all pertinent labs and cardiac studies.          Chemistry        Component Value Date/Time     07/05/2018 1637    K 3.6 07/05/2018 1637     07/05/2018 1637    CO2 26 07/05/2018 1637    BUN 7 (L) 07/05/2018 1637    CREATININE 0.8 07/05/2018 1637     (H) 07/05/2018 1637        Component Value Date/Time    CALCIUM 9.2 07/05/2018 1637     ALKPHOS 105 07/05/2018 1637    AST 17 07/05/2018 1637    ALT 12 07/05/2018 1637    BILITOT 0.4 07/05/2018 1637    ESTGFRAFRICA >60 07/05/2018 1637    EGFRNONAA >60 07/05/2018 1637        Lab Results   Component Value Date    WBC 7.52 07/05/2018    HGB 13.1 07/05/2018    HCT 40.7 07/05/2018    MCV 94 07/05/2018     07/05/2018     No results found for: LABA1C, HGBA1C  Lab Results   Component Value Date    CHOL 179 12/23/2015    CHOL 195 11/03/2014    CHOL 168 09/14/2014     Lab Results   Component Value Date    HDL 43 12/23/2015    HDL 47 11/03/2014    HDL 38 (L) 09/14/2014     Lab Results   Component Value Date    LDLCALC 102.6 12/23/2015    LDLCALC 122.2 11/03/2014    LDLCALC 107.8 09/14/2014     Lab Results   Component Value Date    TRIG 167 (H) 12/23/2015    TRIG 129 11/03/2014    TRIG 111 09/14/2014     Lab Results   Component Value Date    CHOLHDL 24.0 12/23/2015    CHOLHDL 24.1 11/03/2014    CHOLHDL 22.6 09/14/2014           Assessment:       1. Cough    2. Coronary artery disease involving native coronary artery of native heart without angina pectoris    3. Pulmonary hypertension    4. Obesity (BMI 30.0-34.9)    5. Mixed hyperlipidemia    6. Chronic diastolic heart failure    7. Atypical chest pain    8. Essential (primary) hypertension    9. Essential hypertension         Plan:             Tessalon perles for sinus related cough.  Refill Amlodipine for HTN control and should improve.  Discussed indications for statin for hyperlipidemia which is significantly elevated.  Start Rosuvastatin 10 mg qhs, risks/benefits and side effects discussed.  Recheck lipids 2 months.  Continue other meds.  Chronic atypical cp stable, and - stress MPI 5/18.  Monitor for now.   Compensated diastolic CHF, well controlled.   Cardiac diet  Exercise  Weight loss  F/u 3 months.

## 2019-02-22 ENCOUNTER — OFFICE VISIT (OUTPATIENT)
Dept: URGENT CARE | Facility: CLINIC | Age: 79
End: 2019-02-22
Payer: MEDICARE

## 2019-02-22 VITALS
SYSTOLIC BLOOD PRESSURE: 122 MMHG | BODY MASS INDEX: 30.82 KG/M2 | TEMPERATURE: 98 F | RESPIRATION RATE: 18 BRPM | HEART RATE: 74 BPM | HEIGHT: 65 IN | OXYGEN SATURATION: 96 % | WEIGHT: 185 LBS | DIASTOLIC BLOOD PRESSURE: 78 MMHG

## 2019-02-22 DIAGNOSIS — G44.89 OTHER HEADACHE SYNDROME: ICD-10-CM

## 2019-02-22 DIAGNOSIS — J02.9 SORE THROAT: Primary | ICD-10-CM

## 2019-02-22 DIAGNOSIS — J01.90 ACUTE BACTERIAL SINUSITIS: ICD-10-CM

## 2019-02-22 DIAGNOSIS — B96.89 ACUTE BACTERIAL SINUSITIS: ICD-10-CM

## 2019-02-22 LAB
CTP QC/QA: YES
S PYO RRNA THROAT QL PROBE: NEGATIVE

## 2019-02-22 PROCEDURE — 99215 OFFICE O/P EST HI 40 MIN: CPT | Mod: PBBFAC,PO | Performed by: NURSE PRACTITIONER

## 2019-02-22 PROCEDURE — 99999 PR PBB SHADOW E&M-EST. PATIENT-LVL V: ICD-10-PCS | Mod: PBBFAC,,, | Performed by: NURSE PRACTITIONER

## 2019-02-22 PROCEDURE — 99214 PR OFFICE/OUTPT VISIT, EST, LEVL IV, 30-39 MIN: ICD-10-PCS | Mod: S$PBB,,, | Performed by: NURSE PRACTITIONER

## 2019-02-22 PROCEDURE — 99999 PR PBB SHADOW E&M-EST. PATIENT-LVL V: CPT | Mod: PBBFAC,,, | Performed by: NURSE PRACTITIONER

## 2019-02-22 PROCEDURE — 87081 CULTURE SCREEN ONLY: CPT

## 2019-02-22 PROCEDURE — 99214 OFFICE O/P EST MOD 30 MIN: CPT | Mod: S$PBB,,, | Performed by: NURSE PRACTITIONER

## 2019-02-22 PROCEDURE — 87880 STREP A ASSAY W/OPTIC: CPT | Mod: PBBFAC,PO | Performed by: NURSE PRACTITIONER

## 2019-02-22 RX ORDER — PROMETHAZINE HYDROCHLORIDE AND DEXTROMETHORPHAN HYDROBROMIDE 6.25; 15 MG/5ML; MG/5ML
5 SYRUP ORAL
Qty: 120 ML | Refills: 0 | Status: SHIPPED | OUTPATIENT
Start: 2019-02-22 | End: 2019-08-01

## 2019-02-22 RX ORDER — AMOXICILLIN AND CLAVULANATE POTASSIUM 875; 125 MG/1; MG/1
1 TABLET, FILM COATED ORAL EVERY 12 HOURS
Qty: 14 TABLET | Refills: 0 | Status: SHIPPED | OUTPATIENT
Start: 2019-02-22 | End: 2019-03-01

## 2019-02-22 RX ORDER — FLUTICASONE PROPIONATE 50 MCG
2 SPRAY, SUSPENSION (ML) NASAL DAILY
Qty: 16 G | Refills: 0 | Status: SHIPPED | OUTPATIENT
Start: 2019-02-22 | End: 2019-12-12 | Stop reason: SDUPTHER

## 2019-02-22 NOTE — PROGRESS NOTES
CHIEF COMPLAINT/REASON FOR VISIT: nasal congestion, post nasal drip, sore throat, facial pressure    HISTORY OF PRESENT ILLNESS:  77 y/o female complains of sore throat, nasal congestion, post nasal drip, facial pressure, ears popping and productive cough onset 5-6 days ago.  Patient concerned regarding strep throat and requesting strep screen.  Patient admits tried OTC medications with little relief. Patient states could be strep throat,  also be a sinus infection.  Patient requesting medication to relief symptoms.  Patient denies chest pain, shortness of breath, nausea, vomiting, diarrhea, fever with body aches..       Past Medical History:   Diagnosis Date    Anxiety     Depression     Fractures     Hot flash, menopausal     HTN (hypertension)     Hyperlipidemia     Hypothyroid          .  Past Surgical History:   Procedure Laterality Date    CARPAL TUNNEL RELEASE Bilateral     CHOLECYSTECTOMY      HYSTERECTOMY      open luis           Social History     Socioeconomic History    Marital status:      Spouse name: Not on file    Number of children: Not on file    Years of education: Not on file    Highest education level: Not on file   Social Needs    Financial resource strain: Not on file    Food insecurity - worry: Not on file    Food insecurity - inability: Not on file    Transportation needs - medical: Not on file    Transportation needs - non-medical: Not on file   Occupational History    Not on file   Tobacco Use    Smoking status: Never Smoker    Smokeless tobacco: Never Used   Substance and Sexual Activity    Alcohol use: No    Drug use: No    Sexual activity: Not on file   Other Topics Concern    Not on file   Social History Narrative    Not on file       Family History   Problem Relation Age of Onset    Hypertension Mother          ROS:  GENERAL: reports no fever, chills.  SKIN: No rashes, itching or changes in color or texture of skin.  HEENT: reports headaches,  nasal congestion, postnasal drip of blood tinge mucus, sore throat, ears popping.   CHEST: report cough and sputum production.  CARDIOVASCULAR: Denies chest pain, PND, orthopnea or reduced exercise tolerance.  ABDOMEN: Appetite fine. No weight loss. .  MUSCULOSKELETAL: No joint stiffness or swelling. Denies back pain.  NEUROLOGIC: No history of seizures, paralysis, alteration of gait or coordination.  PSYCHIATRIC: Denies mood swings, depression or suicidal thoughts.    PE:   APPEARANCE: Well nourished, well developed, in moderate distress. Hoarseness  V/S: Reviewed.  SKIN: Normal skin turgor, no lesions.  HEENT: Turbinates red,  minimal red pharynx, TM's with minimal effusions & poor light reflex bilaterally, facial tenderness.  CHEST: Lungs clear to auscultation. no wheezing  CARDIOVASCULAR: Regular rate and rhythm.  MUSCULOSKELETAL: Motor: 5/5 strength major flexors/extensors.  NEUROLOGIC: No sensory deficits. Gait & Posture: Normal, No cerebellar signs.  MENTAL STATUS: Patient alert, oriented x 3 & conversant.    PLAN: Lab work POCT Strep screen  Advise increase p.o. fluids-- water/juice & rest  Meds:  Augmentin, Flonase and Phenergan DM    / no refills  Simply saline nasal wash  to irrigate sinuses and for congestion/runny nose.  Cool mist humidifier/vaporizer.  Practice good handwashing.  Mucinex for cough and chest congestion.  Tylenol or Ibuprofen for fever, headache and body aches.  Warm salt water gargles for throat comfort.  Chloraseptic spray or lozenges for throat comfort.  Advise follow up with PCP  Advise go to ER if symptoms worsen or fail to improve with treatment.      DIAGNOSIS:  Cough  Pharyngitis  Hypertension  Acute bacterial sinusitis

## 2019-02-22 NOTE — PATIENT INSTRUCTIONS
PLAN: Lab work POCT Strep screen  Advise increase p.o. fluids-- water/juice & rest  Meds:  Augmentin, Flonase and Phenergan DM    / no refills  Simply saline nasal wash  to irrigate sinuses and for congestion/runny nose.  Cool mist humidifier/vaporizer.  Practice good handwashing.  Mucinex for cough and chest congestion.  Tylenol or Ibuprofen for fever, headache and body aches.  Warm salt water gargles for throat comfort.  Chloraseptic spray or lozenges for throat comfort.  Advise follow up with PCP  Advise go to ER if symptoms worsen or fail to improve with treatment.

## 2019-02-25 LAB — BACTERIA THROAT CULT: NORMAL

## 2019-03-08 ENCOUNTER — OFFICE VISIT (OUTPATIENT)
Dept: URGENT CARE | Facility: CLINIC | Age: 79
End: 2019-03-08
Payer: MEDICARE

## 2019-03-08 VITALS
OXYGEN SATURATION: 97 % | RESPIRATION RATE: 20 BRPM | TEMPERATURE: 98 F | WEIGHT: 187.63 LBS | BODY MASS INDEX: 31.22 KG/M2 | DIASTOLIC BLOOD PRESSURE: 64 MMHG | SYSTOLIC BLOOD PRESSURE: 144 MMHG | HEART RATE: 91 BPM

## 2019-03-08 DIAGNOSIS — J01.90 ACUTE BACTERIAL SINUSITIS: Primary | ICD-10-CM

## 2019-03-08 DIAGNOSIS — B96.89 ACUTE BACTERIAL SINUSITIS: Primary | ICD-10-CM

## 2019-03-08 PROCEDURE — 99999 PR PBB SHADOW E&M-EST. PATIENT-LVL III: ICD-10-PCS | Mod: PBBFAC,,, | Performed by: NURSE PRACTITIONER

## 2019-03-08 PROCEDURE — 99214 OFFICE O/P EST MOD 30 MIN: CPT | Mod: S$PBB,,, | Performed by: NURSE PRACTITIONER

## 2019-03-08 PROCEDURE — 99999 PR PBB SHADOW E&M-EST. PATIENT-LVL III: CPT | Mod: PBBFAC,,, | Performed by: NURSE PRACTITIONER

## 2019-03-08 PROCEDURE — 99213 OFFICE O/P EST LOW 20 MIN: CPT | Mod: PBBFAC,PO | Performed by: NURSE PRACTITIONER

## 2019-03-08 PROCEDURE — 99214 PR OFFICE/OUTPT VISIT, EST, LEVL IV, 30-39 MIN: ICD-10-PCS | Mod: S$PBB,,, | Performed by: NURSE PRACTITIONER

## 2019-03-08 RX ORDER — GUAIFENESIN 600 MG/1
600 TABLET, EXTENDED RELEASE ORAL 2 TIMES DAILY PRN
Qty: 14 TABLET | Refills: 0 | COMMUNITY
Start: 2019-03-08 | End: 2019-03-15

## 2019-03-08 RX ORDER — LEVOCETIRIZINE DIHYDROCHLORIDE 5 MG/1
5 TABLET, FILM COATED ORAL NIGHTLY
Qty: 30 TABLET | Refills: 1 | Status: SHIPPED | OUTPATIENT
Start: 2019-03-08 | End: 2019-06-07 | Stop reason: SDUPTHER

## 2019-03-08 RX ORDER — BENZONATATE 200 MG/1
200 CAPSULE ORAL 3 TIMES DAILY PRN
Qty: 30 CAPSULE | Refills: 0 | Status: SHIPPED | OUTPATIENT
Start: 2019-03-08 | End: 2019-03-18

## 2019-03-08 RX ORDER — PREDNISONE 20 MG/1
20 TABLET ORAL DAILY
Qty: 3 TABLET | Refills: 0 | Status: SHIPPED | OUTPATIENT
Start: 2019-03-08 | End: 2019-03-11

## 2019-03-08 RX ORDER — DOXYCYCLINE 100 MG/1
100 CAPSULE ORAL EVERY 12 HOURS
Qty: 14 CAPSULE | Refills: 0 | Status: SHIPPED | OUTPATIENT
Start: 2019-03-08 | End: 2019-03-15

## 2019-03-08 NOTE — PROGRESS NOTES
Subjective:      Patient ID: Rebecca Rios is a 78 y.o. female.    Chief Complaint: Chest Congestion; Sinus Problem; and Hoarse (come and go)      Sinus Problem   This is a new problem. The current episode started 1 to 4 weeks ago. There has been no fever. Her pain is at a severity of 0/10. Associated symptoms include congestion, coughing and a hoarse voice. Pertinent negatives include no chills, diaphoresis, ear pain, headaches, neck pain, shortness of breath, sinus pressure, sneezing, sore throat or swollen glands.     Review of Systems   Constitutional: Negative for activity change, appetite change, chills, diaphoresis, fatigue, fever and unexpected weight change.   HENT: Positive for congestion and hoarse voice. Negative for ear discharge, ear pain, postnasal drip, rhinorrhea, sinus pressure, sinus pain, sneezing and sore throat.    Eyes: Negative.  Negative for pain, discharge, redness and itching.   Respiratory: Positive for cough. Negative for chest tightness, shortness of breath and wheezing.    Cardiovascular: Negative for chest pain and palpitations.   Gastrointestinal: Negative for abdominal pain, diarrhea, nausea and vomiting.   Endocrine: Negative.  Negative for cold intolerance and heat intolerance.   Genitourinary: Negative.  Negative for decreased urine volume, difficulty urinating and dysuria.   Musculoskeletal: Negative for arthralgias, myalgias and neck pain.   Skin: Negative for rash.   Allergic/Immunologic: Negative for environmental allergies and food allergies.   Neurological: Negative for dizziness, weakness, light-headedness and headaches.   Hematological: Negative for adenopathy.   Psychiatric/Behavioral: Negative for agitation.        Objective:     Vitals:    03/08/19 1612   BP: (!) 144/64   Pulse: 91   Resp: 20   Temp: 98.1 °F (36.7 °C)     Physical Exam   Constitutional: She is oriented to person, place, and time. She appears well-developed and well-nourished. She is cooperative. No  distress.   HENT:   Head: Normocephalic.   Right Ear: Hearing, tympanic membrane, external ear and ear canal normal.   Left Ear: Hearing, tympanic membrane, external ear and ear canal normal.   Nose: Mucosal edema present. Right sinus exhibits maxillary sinus tenderness and frontal sinus tenderness. Left sinus exhibits maxillary sinus tenderness and frontal sinus tenderness.   Mouth/Throat: Uvula is midline and mucous membranes are normal. No oral lesions. No uvula swelling. Posterior oropharyngeal erythema (mild) present. No oropharyngeal exudate, posterior oropharyngeal edema or tonsillar abscesses.   Eyes: Conjunctivae, EOM and lids are normal. Pupils are equal, round, and reactive to light. Right eye exhibits no discharge. Left eye exhibits no discharge.   Neck: Normal range of motion and full passive range of motion without pain. Neck supple.   Cardiovascular: Normal rate, regular rhythm and normal heart sounds.   Pulmonary/Chest: Effort normal and breath sounds normal. No accessory muscle usage. No tachypnea and no bradypnea. No respiratory distress.   Musculoskeletal: Normal range of motion.   Lymphadenopathy:        Head (right side): No submandibular and no tonsillar adenopathy present.        Head (left side): No submandibular and no tonsillar adenopathy present.     She has no cervical adenopathy.   Neurological: She is alert and oriented to person, place, and time.   Skin: Skin is warm, dry and intact. Capillary refill takes less than 2 seconds. No bruising, no ecchymosis, no lesion, no petechiae and no rash noted. She is not diaphoretic. No erythema. No pallor.   Nursing note and vitals reviewed.      Assessment:     1. Acute bacterial sinusitis        Plan:       Rebecca was seen today for chest congestion, sinus problem and hoarse.    Diagnoses and all orders for this visit:    Acute bacterial sinusitis    Other orders  -     benzonatate (TESSALON) 200 MG capsule; Take 1 capsule (200 mg total) by mouth  3 (three) times daily as needed.  -     doxycycline (VIBRAMYCIN) 100 MG Cap; Take 1 capsule (100 mg total) by mouth every 12 (twelve) hours. for 7 days  -     levocetirizine (XYZAL) 5 MG tablet; Take 1 tablet (5 mg total) by mouth every evening.  -     guaiFENesin (MUCINEX) 600 mg 12 hr tablet; Take 1 tablet (600 mg total) by mouth 2 (two) times daily as needed.  -     predniSONE (DELTASONE) 20 MG tablet; Take 1 tablet (20 mg total) by mouth once daily. for 3 days    Antibiotic Therapy  Take antibiotics for entire course.  Do not save medications for later, all medications must be taken for full regimen    Sinus/Allergy Symptoms    - Attempt to avoid allergens.  - Use Normal saline nasal spray to wash offending allergens from airways.  - Take antihistamine as prescribed such as Allegra, Zyrtec, Clartin.   - Take Plain Mucinex as directed.   - Drink plenty of fluids.  - Follow up with Primary Care Provider in 2-3 days or sooner if needed.              Go to ER immediately if severe allergic response experienced such as difficulty breathing, facial swelling, throat swelling.      NELSON Plunkett, FNP-C

## 2019-03-08 NOTE — PATIENT INSTRUCTIONS
Sinusitis (Antibiotic Treatment)    The sinuses are air-filled spaces within the bones of the face. They connect to the inside of the nose. Sinusitis is an inflammation of the tissue lining the sinus cavity. Sinus inflammation can occur during a cold. It can also be due to allergies to pollens and other particles in the air. Sinusitis can cause symptoms of sinus congestion and fullness. A sinus infection causes fever, headache and facial pain. There is often green or yellow drainage from the nose or into the back of the throat (post-nasal drip). You have been given antibiotics to treat this condition.  Home care:  · Take the full course of antibiotics as instructed. Do not stop taking them, even if you feel better.  · Drink plenty of water, hot tea, and other liquids. This may help thin mucus. It also may promote sinus drainage.  · Heat may help soothe painful areas of the face. Use a towel soaked in hot water. Or,  the shower and direct the hot spray onto your face. Using a vaporizer along with a menthol rub at night may also help.   · An expectorant containing guaifenesin may help thin the mucus and promote drainage from the sinuses.  · Over-the-counter decongestants may be used unless a similar medicine was prescribed. Nasal sprays work the fastest. Use one that contains phenylephrine or oxymetazoline. First blow the nose gently. Then use the spray. Do not use these medicines more often than directed on the label or symptoms may get worse. You may also use tablets containing pseudoephedrine. Avoid products that combine ingredients, because side effects may be increased. Read labels. You can also ask the pharmacist for help. (NOTE: Persons with high blood pressure should not use decongestants. They can raise blood pressure.)  · Over-the-counter antihistamines may help if allergies contributed to your sinusitis.    · Do not use nasal rinses or irrigation during an acute sinus infection, unless told to by  your health care provider. Rinsing may spread the infection to other sinuses.  · Use acetaminophen or ibuprofen to control pain, unless another pain medicine was prescribed. (If you have chronic liver or kidney disease or ever had a stomach ulcer, talk with your doctor before using these medicines. Aspirin should never be used in anyone under 18 years of age who is ill with a fever. It may cause severe liver damage.)  · Don't smoke. This can worsen symptoms.  Follow-up care  Follow up with your healthcare provider or our staff if you are not improving within the next week.  When to seek medical advice  Call your healthcare provider if any of these occur:  · Facial pain or headache becoming more severe  · Stiff neck  · Unusual drowsiness or confusion  · Swelling of the forehead or eyelids  · Vision problems, including blurred or double vision  · Fever of 100.4ºF (38ºC) or higher, or as directed by your healthcare provider  · Seizure  · Breathing problems  · Symptoms not resolving within 10 days  Date Last Reviewed: 4/13/2015  © 8051-0358 [employer]. [address] [place] [address], [place] [address]. All rights reserved. This information is not intended as a substitute for professional medical care. Always follow your healthcare professional's instructions.

## 2019-04-14 ENCOUNTER — HOSPITAL ENCOUNTER (EMERGENCY)
Facility: HOSPITAL | Age: 79
Discharge: HOME | End: 2019-04-14
Payer: MEDICARE

## 2019-04-14 PROCEDURE — 99283 EMERGENCY DEPT VISIT LOW MDM: CPT

## 2019-04-14 RX ORDER — TRAMADOL HYDROCHLORIDE 50 MG/1
50 TABLET ORAL
Status: DISCONTINUED | OUTPATIENT
Start: 2019-04-14 | End: 2019-04-15 | Stop reason: HOSPADM

## 2019-04-14 RX ORDER — TRAMADOL HYDROCHLORIDE 50 MG/1
TABLET ORAL
Status: DISPENSED
Start: 2019-04-14 | End: 2019-04-15

## 2019-04-15 ENCOUNTER — PES CALL (OUTPATIENT)
Dept: ADMINISTRATIVE | Facility: CLINIC | Age: 79
End: 2019-04-15

## 2019-04-17 ENCOUNTER — TELEPHONE (OUTPATIENT)
Dept: ORTHOPEDICS | Facility: CLINIC | Age: 79
End: 2019-04-17

## 2019-04-17 NOTE — TELEPHONE ENCOUNTER
Spoke with the patient about getting a appointment schedule for their left shoulder. Offered the patient to come to the [facility] location or to [facility]. Patient states that they would like to be schedule at [facility]. I got the patient schedule to see Dr. Steele on 4/20/19 at 2:20. Patient verbalized understanding. Patient was thankful for the call.FP        ----- Message from Kayla Patel sent at 4/17/2019 12:39 PM CDT -----  Contact: pt   Type:  Needs Medical Advice    Who Called: WESLY MRECEDES   Symptoms (please be specific): shoulder pains   How long has patient had these symptoms: 04/14/2019  Pharmacy name and phone #:   Would the patient rather a call back or a response via [facility]? Call   Best Call Back Number: [phone]   Additional Information: pt went to the ER after her fall is is still in a lot of pain

## 2019-04-23 ENCOUNTER — HOSPITAL ENCOUNTER (OUTPATIENT)
Dept: RADIOLOGY | Facility: HOSPITAL | Age: 79
Discharge: HOME | End: 2019-04-23
Attending: NURSE PRACTITIONER
Payer: MEDICARE

## 2019-04-23 ENCOUNTER — OFFICE VISIT (OUTPATIENT)
Dept: URGENT CARE | Facility: CLINIC | Age: 79
End: 2019-04-23
Payer: MEDICARE

## 2019-04-23 VITALS
BODY MASS INDEX: 31.29 KG/M2 | SYSTOLIC BLOOD PRESSURE: 154 MMHG | OXYGEN SATURATION: 95 % | TEMPERATURE: 97 F | DIASTOLIC BLOOD PRESSURE: 80 MMHG | HEART RATE: 74 BPM | WEIGHT: 188.06 LBS

## 2019-04-23 DIAGNOSIS — R07.81 RIB PAIN ON LEFT SIDE: Primary | ICD-10-CM

## 2019-04-23 DIAGNOSIS — M54.50 LOW BACK PAIN, NON-SPECIFIC: ICD-10-CM

## 2019-04-23 DIAGNOSIS — R07.81 RIB PAIN ON LEFT SIDE: ICD-10-CM

## 2019-04-23 PROCEDURE — 72100 X-RAY EXAM L-S SPINE 2/3 VWS: CPT | Mod: TC,PO

## 2019-04-23 PROCEDURE — 72100 X-RAY EXAM L-S SPINE 2/3 VWS: CPT | Mod: 26,,, | Performed by: RADIOLOGY

## 2019-04-23 PROCEDURE — 99214 OFFICE O/P EST MOD 30 MIN: CPT | Mod: S$PBB,,, | Performed by: NURSE PRACTITIONER

## 2019-04-23 PROCEDURE — 99214 PR OFFICE/OUTPT VISIT, EST, LEVL IV, 30-39 MIN: ICD-10-PCS | Mod: S$PBB,,, | Performed by: NURSE PRACTITIONER

## 2019-04-23 PROCEDURE — 99999 PR PBB SHADOW E&M-EST. PATIENT-LVL IV: ICD-10-PCS | Mod: PBBFAC,,, | Performed by: NURSE PRACTITIONER

## 2019-04-23 PROCEDURE — 99999 PR PBB SHADOW E&M-EST. PATIENT-LVL IV: CPT | Mod: PBBFAC,,, | Performed by: NURSE PRACTITIONER

## 2019-04-23 PROCEDURE — 96372 THER/PROPH/DIAG INJ SC/IM: CPT | Mod: PBBFAC,PO

## 2019-04-23 PROCEDURE — 72100 XR LUMBAR SPINE AP AND LATERAL: ICD-10-PCS | Mod: 26,,, | Performed by: RADIOLOGY

## 2019-04-23 PROCEDURE — 71100 XR RIBS 2 VIEW LEFT: ICD-10-PCS | Mod: 26,LT,, | Performed by: RADIOLOGY

## 2019-04-23 PROCEDURE — 99214 OFFICE O/P EST MOD 30 MIN: CPT | Mod: PBBFAC,25,PO | Performed by: NURSE PRACTITIONER

## 2019-04-23 PROCEDURE — 71100 X-RAY EXAM RIBS UNI 2 VIEWS: CPT | Mod: 26,LT,, | Performed by: RADIOLOGY

## 2019-04-23 PROCEDURE — 71100 X-RAY EXAM RIBS UNI 2 VIEWS: CPT | Mod: TC,PO,LT

## 2019-04-23 RX ORDER — KETOROLAC TROMETHAMINE 30 MG/ML
15 INJECTION, SOLUTION INTRAMUSCULAR; INTRAVENOUS ONCE
Status: DISCONTINUED | OUTPATIENT
Start: 2019-04-23 | End: 2019-04-23

## 2019-04-23 RX ORDER — DICLOFENAC SODIUM 10 MG/G
GEL TOPICAL
Qty: 100 G | Refills: 0 | Status: SHIPPED | OUTPATIENT
Start: 2019-04-23 | End: 2021-06-18

## 2019-04-23 RX ORDER — KETOROLAC TROMETHAMINE 30 MG/ML
15 INJECTION, SOLUTION INTRAMUSCULAR; INTRAVENOUS
Status: COMPLETED | OUTPATIENT
Start: 2019-04-23 | End: 2019-04-23

## 2019-04-23 RX ADMIN — KETOROLAC TROMETHAMINE 15 MG: 60 INJECTION, SOLUTION INTRAMUSCULAR at 03:04

## 2019-04-23 NOTE — PROGRESS NOTES
Subjective:      Patient ID: Rebecca Rios is a 78 y.o. female.    Chief Complaint: No chief complaint on file.    Mrs. Rios presents to urgent care today with complaints of left shoulder pain, left rib pain, and low back pain after a fall on 4/14/19. She slipped and fell in her kitchen. Fell into the cabinets then slid onto the floor on her left side. She was seen in the ER just after the fall and had an x-ray of the shoulder done at that time. X-ray was negative. She takes norco 10 for arthritis and fibromyalgia. Also takes 4 mg of zanaflex PRN. She has been taking OTC naproxen without improvement. Also tried rest and ice packs. She has bruising to the left ribs and left low back. Ambulates with cane per baseline. Has increased pain with any movement or if she stays in one position too long. No numbness, tingling, or weakness of the lower extremities. No saddle anesthesia. No fever. No SOB.     Review of Systems   Constitutional: Negative.    HENT: Negative.    Respiratory: Negative.    Cardiovascular: Negative.    Musculoskeletal:        See HPI   Skin: Positive for color change (bruising to left posterior chest wall and left low back).   Neurological: Negative.    Hematological: Negative.        Objective:   BP (!) 154/80   Pulse 74   Temp 97.3 °F (36.3 °C) (Tympanic)   Wt 85.3 kg (188 lb 0.8 oz)   SpO2 95%   BMI 31.29 kg/m²   Physical Exam   Constitutional: She is oriented to person, place, and time. She appears well-developed and well-nourished. No distress.   HENT:   Head: Normocephalic and atraumatic.   Neck: Normal range of motion. Neck supple.   Cardiovascular: Normal rate, regular rhythm and normal heart sounds.   Pulmonary/Chest: Effort normal and breath sounds normal. No respiratory distress. She exhibits tenderness.   Musculoskeletal:        Lumbar back: She exhibits tenderness. She exhibits normal range of motion, no deformity and normal pulse.        Back:    Neurological: She is alert and  oriented to person, place, and time.   Skin: Skin is warm and dry. She is not diaphoretic.   Nursing note and vitals reviewed.    Assessment:      1. Rib pain on left side    2. Low back pain, non-specific       Plan:   Rib pain on left side  -     X-Ray Ribs 2 View Left; Future; Expected date: 04/23/2019  -     Discontinue: ketorolac injection 15 mg  -     VOLTAREN 1 % Gel; APPLY 2 GRAMS TOPICALLY ONCE DAILY  Dispense: 100 g; Refill: 0  -     ketorolac injection 15 mg    Low back pain, non-specific  -     X-Ray Lumbar Spine Ap And Lateral; Future; Expected date: 04/23/2019  -     Discontinue: ketorolac injection 15 mg  -     VOLTAREN 1 % Gel; APPLY 2 GRAMS TOPICALLY ONCE DAILY  Dispense: 100 g; Refill: 0  -     ketorolac injection 15 mg    PCP if not improving.  Continue rest, ice, and current pain medications as previously prescribed.  Instructions, follow up, and supportive care as per AVS.

## 2019-04-26 ENCOUNTER — OFFICE VISIT (OUTPATIENT)
Dept: CARDIOLOGY | Facility: CLINIC | Age: 79
End: 2019-04-26
Payer: MEDICARE

## 2019-04-26 VITALS
SYSTOLIC BLOOD PRESSURE: 138 MMHG | HEART RATE: 76 BPM | BODY MASS INDEX: 31.89 KG/M2 | HEIGHT: 65 IN | DIASTOLIC BLOOD PRESSURE: 68 MMHG | WEIGHT: 191.38 LBS

## 2019-04-26 DIAGNOSIS — E66.9 OBESITY (BMI 30.0-34.9): ICD-10-CM

## 2019-04-26 DIAGNOSIS — R07.89 ATYPICAL CHEST PAIN: ICD-10-CM

## 2019-04-26 DIAGNOSIS — I50.32 CHRONIC DIASTOLIC HEART FAILURE: Primary | ICD-10-CM

## 2019-04-26 DIAGNOSIS — I27.20 PULMONARY HYPERTENSION: ICD-10-CM

## 2019-04-26 DIAGNOSIS — E78.2 MIXED HYPERLIPIDEMIA: ICD-10-CM

## 2019-04-26 DIAGNOSIS — I10 ESSENTIAL (PRIMARY) HYPERTENSION: ICD-10-CM

## 2019-04-26 DIAGNOSIS — I25.10 CORONARY ARTERY DISEASE INVOLVING NATIVE CORONARY ARTERY OF NATIVE HEART WITHOUT ANGINA PECTORIS: ICD-10-CM

## 2019-04-26 PROCEDURE — 99999 PR PBB SHADOW E&M-EST. PATIENT-LVL III: ICD-10-PCS | Mod: PBBFAC,,, | Performed by: INTERNAL MEDICINE

## 2019-04-26 PROCEDURE — 99214 PR OFFICE/OUTPT VISIT, EST, LEVL IV, 30-39 MIN: ICD-10-PCS | Mod: S$PBB,,, | Performed by: INTERNAL MEDICINE

## 2019-04-26 PROCEDURE — 99214 OFFICE O/P EST MOD 30 MIN: CPT | Mod: S$PBB,,, | Performed by: INTERNAL MEDICINE

## 2019-04-26 PROCEDURE — 99213 OFFICE O/P EST LOW 20 MIN: CPT | Mod: PBBFAC | Performed by: INTERNAL MEDICINE

## 2019-04-26 PROCEDURE — 99999 PR PBB SHADOW E&M-EST. PATIENT-LVL III: CPT | Mod: PBBFAC,,, | Performed by: INTERNAL MEDICINE

## 2019-04-26 NOTE — PROGRESS NOTES
Subjective:    Patient ID:  Rebecca Rios is a 78 y.o. female who presents for evaluation of Hyperlipidemia; Hypertension; Coronary Artery Disease; Congestive Heart Failure; and Chest Pain        HPI Pt presents for f/u  Her current med conditions include HTN, DD, MR, PHTN, LVH, hyperlipidemia, carries dx of CAD in chart. Nonsmoker.   Now here for f/u.  Stress mpi 2014 fixed anteroapical defect, trivial ischemia.   Echo 2014 normal EF, DD, mod TR, PHTN.  Stress MPI May 2018 no ischemia, normal LVEF.  Echo Sept 2017 normal LVEF, LVH.  She had an episode of chest tightness few weeks ago, at night, few minutes, resolved.  This was only episode of cp since I saw her and she has h/o atypical cp sxs.  Diastolic CHF seems stable. She denies dyspnea, pnd/orthopnea. No leg edema.  Labs shows her lipids to be improved, on statin.  HTN controlled on current meds, no associated sxs.  Compliant with meds.  Obesity unchanged, no significant weight gain/loss since last visit.  No exercise.  Not as mindful of diet as she should be.       Current Outpatient Medications:     alprazolam (XANAX) 0.5 MG tablet, Take 0.5 mg by mouth 2 (two) times daily as needed for Anxiety., Disp: , Rfl:     amLODIPine (NORVASC) 5 MG tablet, Take 1 tablet (5 mg total) by mouth once daily., Disp: 90 tablet, Rfl: 3    aspirin (ECOTRIN) 81 MG EC tablet, Take 1 tablet (81 mg total) by mouth once daily., Disp: 30 tablet, Rfl: 2    buPROPion (WELLBUTRIN SR) 150 MG TBSR 12 hr tablet, Take 150 mg by mouth., Disp: , Rfl:     diphenoxylate-atropine 2.5-0.025 mg (LOMOTIL) 2.5-0.025 mg per tablet, Take 1 tablet by mouth 4 (four) times daily as needed for Diarrhea., Disp: 30 tablet, Rfl: 1    DULoxetine (CYMBALTA) 30 MG capsule, TAKE 1 CAPSULE BY MOUTH ONCE DAILY, Disp: , Rfl:     esomeprazole (NEXIUM) 40 MG capsule, Take 40 mg by mouth., Disp: , Rfl:     fluticasone (FLONASE) 50 mcg/actuation nasal spray, 2 sprays (100 mcg total) by Each Nare route once  "daily., Disp: 16 g, Rfl: 0    gabapentin (NEURONTIN) 600 MG tablet, Take 600 mg by mouth 2 (two) times daily., Disp: , Rfl:     hydrocodone-acetaminophen 10-325mg (NORCO)  mg Tab, Take by mouth., Disp: , Rfl:     levocetirizine (XYZAL) 5 MG tablet, Take 1 tablet (5 mg total) by mouth every evening., Disp: 30 tablet, Rfl: 1    levothyroxine (SYNTHROID) 75 MCG tablet, TAKE ONE TABLET BY MOUTH ONCE DAILY, Disp: 30 tablet, Rfl: 6    lidocaine-prilocaine (EMLA) cream, Apply topically., Disp: , Rfl:     lisinopril (PRINIVIL,ZESTRIL) 40 MG tablet, TAKE ONE TABLET BY MOUTH ONCE DAILY, Disp: 90 tablet, Rfl: 3    ondansetron (ZOFRAN) 4 MG tablet, Take 1 tablet (4 mg total) by mouth every 6 (six) hours., Disp: 12 tablet, Rfl: 0    promethazine-dextromethorphan (PROMETHAZINE-DM) 6.25-15 mg/5 mL Syrp, Take 5 mLs by mouth every 6 to 8 hours as needed., Disp: 120 mL, Rfl: 0    rosuvastatin (CRESTOR) 10 MG tablet, Take 1 tablet (10 mg total) by mouth every evening., Disp: 90 tablet, Rfl: 3    tizanidine (ZANAFLEX) 4 MG tablet, Take 4 mg by mouth every evening. , Disp: , Rfl:     VOLTAREN 1 % Gel, APPLY 2 GRAMS TOPICALLY ONCE DAILY, Disp: 100 g, Rfl: 0      Review of Systems   Constitution: Negative.   HENT: Negative.    Eyes: Negative.    Cardiovascular: Positive for chest pain.   Respiratory: Negative.    Endocrine: Negative.    Hematologic/Lymphatic: Negative.    Skin: Negative.    Musculoskeletal: Negative.    Gastrointestinal: Negative.    Genitourinary: Negative.    Neurological: Negative.    Psychiatric/Behavioral: Negative.    Allergic/Immunologic: Negative.        /68 (BP Location: Right arm, Patient Position: Sitting, BP Method: Large (Manual))   Pulse 76   Ht 5' 5" (1.651 m)   Wt 86.8 kg (191 lb 5.8 oz)   BMI 31.84 kg/m²       Wt Readings from Last 3 Encounters:   04/26/19 86.8 kg (191 lb 5.8 oz)   04/23/19 85.3 kg (188 lb 0.8 oz)   03/08/19 85.1 kg (187 lb 9.8 oz)     Temp Readings from Last 3 " Encounters:   04/23/19 97.3 °F (36.3 °C) (Tympanic)   03/08/19 98.1 °F (36.7 °C) (Tympanic)   02/22/19 97.5 °F (36.4 °C) (Tympanic)     BP Readings from Last 3 Encounters:   04/26/19 138/68   04/23/19 (!) 154/80   03/08/19 (!) 144/64     Pulse Readings from Last 3 Encounters:   04/26/19 76   04/23/19 74   03/08/19 91          Objective:    Physical Exam   Constitutional: She is oriented to person, place, and time. Vital signs are normal. She appears well-developed and well-nourished. She is active and cooperative. She does not have a sickly appearance. She does not appear ill. No distress.   HENT:   Head: Normocephalic.   Neck: Neck supple. Normal carotid pulses, no hepatojugular reflux and no JVD present. Carotid bruit is not present. No thyromegaly present.   Cardiovascular: Normal rate, regular rhythm, S1 normal, S2 normal, normal heart sounds and normal pulses. PMI is not displaced. Exam reveals no gallop and no friction rub.   No murmur heard.  Pulses:       Radial pulses are 2+ on the right side, and 2+ on the left side.   Pulmonary/Chest: Effort normal and breath sounds normal. She has no wheezes. She has no rales.   Abdominal: Soft. Normal appearance, normal aorta and bowel sounds are normal. She exhibits no pulsatile liver, no abdominal bruit, no ascites and no mass. There is no splenomegaly or hepatomegaly. There is no tenderness.   Musculoskeletal: She exhibits no edema.   Lymphadenopathy:     She has no cervical adenopathy.   Neurological: She is alert and oriented to person, place, and time.   Skin: Skin is warm. She is not diaphoretic.   Psychiatric: She has a normal mood and affect. Her behavior is normal.   Nursing note and vitals reviewed.      I have reviewed all pertinent labs and cardiac studies.      Chemistry        Component Value Date/Time     04/23/2019 1333    K 4.5 04/23/2019 1333     04/23/2019 1333    CO2 31 (H) 04/23/2019 1333    BUN 12 04/23/2019 1333    CREATININE 0.8  04/23/2019 1333    GLU 93 04/23/2019 1333        Component Value Date/Time    CALCIUM 9.9 04/23/2019 1333    ALKPHOS 125 04/23/2019 1333    AST 16 04/23/2019 1333    ALT 9 (L) 04/23/2019 1333    BILITOT 0.5 04/23/2019 1333    ESTGFRAFRICA >60.0 04/23/2019 1333    EGFRNONAA >60.0 04/23/2019 1333        Lab Results   Component Value Date    WBC 7.52 07/05/2018    HGB 13.1 07/05/2018    HCT 40.7 07/05/2018    MCV 94 07/05/2018     07/05/2018     No results found for: LABA1C, HGBA1C  Lab Results   Component Value Date    CHOL 168 04/23/2019    CHOL 179 12/23/2015    CHOL 195 11/03/2014     Lab Results   Component Value Date    HDL 55 04/23/2019    HDL 43 12/23/2015    HDL 47 11/03/2014     Lab Results   Component Value Date    LDLCALC 86.6 04/23/2019    LDLCALC 102.6 12/23/2015    LDLCALC 122.2 11/03/2014     Lab Results   Component Value Date    TRIG 132 04/23/2019    TRIG 167 (H) 12/23/2015    TRIG 129 11/03/2014     Lab Results   Component Value Date    CHOLHDL 32.7 04/23/2019    CHOLHDL 24.0 12/23/2015    CHOLHDL 24.1 11/03/2014           Assessment:       1. Chronic diastolic heart failure    2. Atypical chest pain    3. Pulmonary hypertension    4. Essential (primary) hypertension    5. Obesity (BMI 30.0-34.9)    6. Mixed hyperlipidemia    7. Coronary artery disease involving native coronary artery of native heart without angina pectoris         Plan:             Stable CV conditions on current medical tx.  Reviewed recent tests with pt.  Continue current medical tx for CV conditions.  Cardiac diet  Weight loss.  Monitor atypical cp sxs, stable with no ischemia on stress test last year.  Optimal HTN control.  No changes in meds today.  F/u 6 months with echo + labs + carotid u/s.

## 2019-05-09 DIAGNOSIS — I10 ESSENTIAL HYPERTENSION: ICD-10-CM

## 2019-05-09 DIAGNOSIS — I25.119 CORONARY ARTERY DISEASE INVOLVING NATIVE CORONARY ARTERY OF NATIVE HEART WITH ANGINA PECTORIS: ICD-10-CM

## 2019-05-09 RX ORDER — LISINOPRIL 40 MG/1
TABLET ORAL
Qty: 90 TABLET | Refills: 3 | Status: SHIPPED | OUTPATIENT
Start: 2019-05-09 | End: 2020-03-06 | Stop reason: SDUPTHER

## 2019-05-09 RX ORDER — LISINOPRIL 40 MG/1
TABLET ORAL
Qty: 90 TABLET | Refills: 3 | Status: SHIPPED | OUTPATIENT
Start: 2019-05-09 | End: 2019-05-09 | Stop reason: SDUPTHER

## 2019-06-10 RX ORDER — LEVOCETIRIZINE DIHYDROCHLORIDE 5 MG/1
TABLET, FILM COATED ORAL
Qty: 30 TABLET | Refills: 1 | Status: SHIPPED | OUTPATIENT
Start: 2019-06-10 | End: 2019-12-12 | Stop reason: SDUPTHER

## 2019-07-25 DIAGNOSIS — M25.561 PAIN IN BOTH KNEES, UNSPECIFIED CHRONICITY: Primary | ICD-10-CM

## 2019-07-25 DIAGNOSIS — M25.562 PAIN IN BOTH KNEES, UNSPECIFIED CHRONICITY: Primary | ICD-10-CM

## 2019-08-01 ENCOUNTER — HOSPITAL ENCOUNTER (OUTPATIENT)
Dept: RADIOLOGY | Facility: HOSPITAL | Age: 79
Discharge: HOME | End: 2019-08-01
Attending: FAMILY MEDICINE
Payer: MEDICARE

## 2019-08-01 ENCOUNTER — OFFICE VISIT (OUTPATIENT)
Dept: ORTHOPEDICS | Facility: CLINIC | Age: 79
End: 2019-08-01
Payer: MEDICARE

## 2019-08-01 VITALS
HEART RATE: 74 BPM | DIASTOLIC BLOOD PRESSURE: 78 MMHG | BODY MASS INDEX: 31.89 KG/M2 | HEIGHT: 65 IN | WEIGHT: 191.38 LBS | SYSTOLIC BLOOD PRESSURE: 175 MMHG

## 2019-08-01 DIAGNOSIS — I10 ESSENTIAL (PRIMARY) HYPERTENSION: ICD-10-CM

## 2019-08-01 DIAGNOSIS — M17.31 POST-TRAUMATIC OSTEOARTHRITIS OF RIGHT KNEE: ICD-10-CM

## 2019-08-01 DIAGNOSIS — M25.562 PAIN IN BOTH KNEES, UNSPECIFIED CHRONICITY: ICD-10-CM

## 2019-08-01 DIAGNOSIS — M17.0 BILATERAL PRIMARY OSTEOARTHRITIS OF KNEE: Primary | ICD-10-CM

## 2019-08-01 DIAGNOSIS — M79.7 FIBROMYALGIA: ICD-10-CM

## 2019-08-01 DIAGNOSIS — M17.12 PRIMARY OSTEOARTHRITIS OF LEFT KNEE: ICD-10-CM

## 2019-08-01 DIAGNOSIS — E78.2 MIXED HYPERLIPIDEMIA: ICD-10-CM

## 2019-08-01 DIAGNOSIS — M25.561 PAIN IN BOTH KNEES, UNSPECIFIED CHRONICITY: ICD-10-CM

## 2019-08-01 DIAGNOSIS — I25.10 CORONARY ARTERY DISEASE INVOLVING NATIVE CORONARY ARTERY OF NATIVE HEART WITHOUT ANGINA PECTORIS: ICD-10-CM

## 2019-08-01 PROCEDURE — 73564 X-RAY EXAM KNEE 4 OR MORE: CPT | Mod: TC,50

## 2019-08-01 PROCEDURE — 73564 XR KNEE ORTHO BILAT WITH FLEXION: ICD-10-PCS | Mod: 26,50,, | Performed by: RADIOLOGY

## 2019-08-01 PROCEDURE — 99213 OFFICE O/P EST LOW 20 MIN: CPT | Mod: PBBFAC,25 | Performed by: FAMILY MEDICINE

## 2019-08-01 PROCEDURE — 73564 X-RAY EXAM KNEE 4 OR MORE: CPT | Mod: 26,50,, | Performed by: RADIOLOGY

## 2019-08-01 PROCEDURE — 99214 OFFICE O/P EST MOD 30 MIN: CPT | Mod: S$PBB,,, | Performed by: FAMILY MEDICINE

## 2019-08-01 PROCEDURE — 99999 PR PBB SHADOW E&M-EST. PATIENT-LVL III: CPT | Mod: PBBFAC,,, | Performed by: FAMILY MEDICINE

## 2019-08-01 PROCEDURE — 99214 PR OFFICE/OUTPT VISIT, EST, LEVL IV, 30-39 MIN: ICD-10-PCS | Mod: S$PBB,,, | Performed by: FAMILY MEDICINE

## 2019-08-01 PROCEDURE — 99999 PR PBB SHADOW E&M-EST. PATIENT-LVL III: ICD-10-PCS | Mod: PBBFAC,,, | Performed by: FAMILY MEDICINE

## 2019-08-01 NOTE — PROGRESS NOTES
Subjective:     Patient ID: Rebecca Rios is a 78 y.o. female.    Chief Complaint: Pain of the Left Knee    Patient is a 78-year-old female presents clinic today complaining chronic left knee pain.  Patient states that she has had this knee pain for many years.  Patient sources popping, catching, and chronic sensation her knees.  States that she has had cortisone injections and Synvisc-One injections in the past which helped minimally.  States that she is interested in having a total knee replacement done under epidural anesthesia instead of general anesthesia.  Denies any recent falls, fever, chills, redness, or swelling.      Past Medical History:   Diagnosis Date    Anxiety     Depression     Fractures     Hot flash, menopausal     HTN (hypertension)     Hyperlipidemia     Hypothyroid      Past Surgical History:   Procedure Laterality Date    CARPAL TUNNEL RELEASE Bilateral     CHOLECYSTECTOMY      HYSTERECTOMY      open luis       Family History   Problem Relation Age of Onset    Hypertension Mother      Social History     Socioeconomic History    Marital status:      Spouse name: Not on file    Number of children: Not on file    Years of education: Not on file    Highest education level: Not on file   Occupational History    Not on file   Social Needs    Financial resource strain: Not on file    Food insecurity:     Worry: Not on file     Inability: Not on file    Transportation needs:     Medical: Not on file     Non-medical: Not on file   Tobacco Use    Smoking status: Never Smoker    Smokeless tobacco: Never Used   Substance and Sexual Activity    Alcohol use: No    Drug use: No    Sexual activity: Not on file   Lifestyle    Physical activity:     Days per week: Not on file     Minutes per session: Not on file    Stress: Not on file   Relationships    Social connections:     Talks on phone: Not on file     Gets together: Not on file     Attends [employer] service: Not on  file     Active member of club or organization: Not on file     Attends meetings of clubs or organizations: Not on file     Relationship status: Not on file   Other Topics Concern    Not on file   Social History Narrative    Not on file     Medication List with Changes/Refills   Current Medications    ALPRAZOLAM (XANAX) 0.5 MG TABLET    Take 0.5 mg by mouth 2 (two) times daily as needed for Anxiety.    AMLODIPINE (NORVASC) 5 MG TABLET    Take 1 tablet (5 mg total) by mouth once daily.    ASPIRIN (ECOTRIN) 81 MG EC TABLET    Take 1 tablet (81 mg total) by mouth once daily.    BUPROPION (WELLBUTRIN SR) 150 MG TBSR 12 HR TABLET    Take 150 mg by mouth 2 (two) times daily.     DIPHENOXYLATE-ATROPINE 2.5-0.025 MG (LOMOTIL) 2.5-0.025 MG PER TABLET    Take 1 tablet by mouth 4 (four) times daily as needed for Diarrhea.    DULOXETINE (CYMBALTA) 30 MG CAPSULE    TAKE 1 CAPSULE BY MOUTH ONCE DAILY    ESOMEPRAZOLE (NEXIUM) 40 MG CAPSULE    Take 40 mg by mouth once daily.     FLUTICASONE (FLONASE) 50 MCG/ACTUATION NASAL SPRAY    2 sprays (100 mcg total) by Each Nare route once daily.    GABAPENTIN (NEURONTIN) 600 MG TABLET    Take 600 mg by mouth 2 (two) times daily.    HYDROCODONE-ACETAMINOPHEN 10-325MG (NORCO)  MG TAB    Take 1 tablet by mouth 3 (three) times daily as needed.     LEVOCETIRIZINE (XYZAL) 5 MG TABLET    TAKE 1 TABLET BY MOUTH EVERY EVENING    LEVOTHYROXINE (SYNTHROID) 75 MCG TABLET    TAKE ONE TABLET BY MOUTH ONCE DAILY    LIDOCAINE-PRILOCAINE (EMLA) CREAM    Apply topically.    LISINOPRIL (PRINIVIL,ZESTRIL) 40 MG TABLET    TAKE 1 TABLET BY MOUTH EVERY DAY    ROSUVASTATIN (CRESTOR) 10 MG TABLET    Take 1 tablet (10 mg total) by mouth every evening.    TIZANIDINE (ZANAFLEX) 4 MG TABLET    Take 4 mg by mouth every evening.     VOLTAREN 1 % GEL    APPLY 2 GRAMS TOPICALLY ONCE DAILY   Discontinued Medications    ONDANSETRON (ZOFRAN) 4 MG TABLET    Take 1 tablet (4 mg total) by mouth every 6 (six) hours.     "PROMETHAZINE-DEXTROMETHORPHAN (PROMETHAZINE-DM) 6.25-15 MG/5 ML SYRP    Take 5 mLs by mouth every 6 to 8 hours as needed.     Review of patient's allergies indicates:   Allergen Reactions    Erythromycin     Macrolide antibiotics Nausea And Vomiting     Review of Systems   Constitutional: Negative for chills, fever and malaise/fatigue.   HENT: Negative for hearing loss.    Eyes: Negative for redness.   Cardiovascular: Negative for leg swelling.   Gastrointestinal: Negative for nausea and vomiting.   Musculoskeletal: Positive for joint pain. Negative for back pain, falls and myalgias.   Skin: Negative for rash.   Neurological: Negative for tingling, sensory change, focal weakness and weakness.        Objective:   Body mass index is 31.84 kg/m².  Vitals:    08/01/19 1354   BP: (!) 175/78   Pulse: 74   Weight: 86.8 kg (191 lb 5.8 oz)   Height: 5' 5" (1.651 m)   PainSc:   9   PainLoc: Knee           General    Nursing note and vitals reviewed.  Constitutional: She is oriented to person, place, and time. She appears well-developed and well-nourished. No distress.   HENT:   Head: Normocephalic and atraumatic.   Eyes: Conjunctivae are normal. No scleral icterus.   Pulmonary/Chest: Effort normal.   Neurological: She is alert and oriented to person, place, and time.   Psychiatric: She has a normal mood and affect. Her behavior is normal. Judgment and thought content normal.     General Musculoskeletal Exam   Gait: abnormal         Left Knee Exam     Inspection   Erythema: absent  Swelling: present  Effusion: absent  Deformity: present (Valgus)    Tenderness   The patient tender to palpation of the medial joint line, lateral joint line and patella.    Crepitus   The patient has crepitus of the patella and medial joint line.    Range of Motion   The patient has normal left knee ROM.    Other   Sensation: normal      EXAMINATION:  XR KNEE ORTHO BILAT WITH FLEXION    CLINICAL HISTORY:  Pain in right knee    TECHNIQUE:  AP " standing of both knees, PA flexion standing views of both knees, and Merchant views of both knees were performed.  Lateral views of both knees were also performed.    COMPARISON:  Left knee radiographs from 07/17/2018. right knee radiographs from 01/08/2018.    FINDINGS:  Old right patellar fracture again noted.  There is joint space narrowing in the lateral compartment of the left knee.  No fracture or dislocation is identified.  No patellar tilt.  Left knee joint effusion is seen.  No right effusion.  Soft tissues are symmetric in appearance.      Impression       As above      Electronically signed by: Santo Lezama MD  Date: 08/01/2019  Time: 13:52           Rebecca was seen today for pain.    Diagnoses and all orders for this visit:    Bilateral primary osteoarthritis of knee    Post-traumatic osteoarthritis of right knee    Primary osteoarthritis of left knee    Essential (primary) hypertension    Coronary artery disease involving native coronary artery of native heart without angina pectoris    Mixed hyperlipidemia    Fibromyalgia    -discussed the clinical course and nature of osteoarthritis with patient.  Patient is tried numerous conservative measures and is ready to have her knee replaced.  Will have the patient follow with Orthopedic surgery for further evaluation.  Discussed with patient that the definitive treatment for knee osteoarthritis is total knee replacement.  -bilateral knee Xray images were independently viewed and read by me showing no acute fractures or dislocations.  Patient's right patella shows evidence of old healed fracture.  Moderate to severe tricompartmental degenerative changes noted in bilateral knees.  Moderate osteophyte formation.  -Formal read by radiologist is as described above  -Discussed findings with patient    -Chronic hypertension.  Managed by patient's PCP.  -Chronic coronary artery disease.  Managed by patient's PCP and cardiologist.  Avoid NSAIDs.  -Chronic mixed  hyperlipidemia.  Managed by patient's PCP.  -Chronic fibromyalgia.  Managed by patient's PCP.    -Treatment options and alternatives were discussed with the patient. Patient expressed understanding. Patient was given the opportunity to ask questions and be an active participant in their medical care. Patient had no further questions or concerns at this time.   -Patient is an overall high risk for health complications from their medical conditions.

## 2019-08-02 ENCOUNTER — OFFICE VISIT (OUTPATIENT)
Dept: INTERNAL MEDICINE | Facility: CLINIC | Age: 79
End: 2019-08-02
Payer: MEDICARE

## 2019-08-02 ENCOUNTER — HOSPITAL ENCOUNTER (OUTPATIENT)
Dept: RADIOLOGY | Facility: HOSPITAL | Age: 79
Discharge: HOME | End: 2019-08-02
Attending: PHYSICIAN ASSISTANT
Payer: MEDICARE

## 2019-08-02 VITALS
DIASTOLIC BLOOD PRESSURE: 64 MMHG | TEMPERATURE: 98 F | HEART RATE: 80 BPM | WEIGHT: 189.13 LBS | BODY MASS INDEX: 31.51 KG/M2 | OXYGEN SATURATION: 96 % | HEIGHT: 65 IN | SYSTOLIC BLOOD PRESSURE: 138 MMHG

## 2019-08-02 DIAGNOSIS — R07.89 ACUTE CHEST WALL PAIN: ICD-10-CM

## 2019-08-02 DIAGNOSIS — M54.2 NECK PAIN, ACUTE: ICD-10-CM
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
## 2019-08-02 PROCEDURE — 71100 X-RAY EXAM RIBS UNI 2 VIEWS: CPT | Mod: 26,RT,, | Performed by: RADIOLOGY

## 2019-08-02 PROCEDURE — 72050 X-RAY EXAM NECK SPINE 4/5VWS: CPT | Mod: 26,,, | Performed by: RADIOLOGY

## 2019-08-02 PROCEDURE — 71100 X-RAY EXAM RIBS UNI 2 VIEWS: CPT | Mod: TC,RT

## 2019-08-02 PROCEDURE — 72050 X-RAY EXAM NECK SPINE 4/5VWS: CPT | Mod: TC

## 2019-08-02 PROCEDURE — 99214 OFFICE O/P EST MOD 30 MIN: CPT | Mod: PBBFAC,25 | Performed by: PHYSICIAN ASSISTANT

## 2019-08-02 PROCEDURE — 72050 XR CERVICAL SPINE COMPLETE 5 VIEW: ICD-10-PCS | Mod: 26,,, | Performed by: RADIOLOGY

## 2019-08-02 PROCEDURE — 71100 XR RIBS 2 VIEW RIGHT: ICD-10-PCS | Mod: 26,RT,, | Performed by: RADIOLOGY

## 2019-08-02 PROCEDURE — 99214 PR OFFICE/OUTPT VISIT, EST, LEVL IV, 30-39 MIN: ICD-10-PCS | Mod: S$PBB,,, | Performed by: PHYSICIAN ASSISTANT

## 2019-08-02 PROCEDURE — 99999 PR PBB SHADOW E&M-EST. PATIENT-LVL IV: CPT | Mod: PBBFAC,,, | Performed by: PHYSICIAN ASSISTANT

## 2019-08-02 PROCEDURE — 99999 PR PBB SHADOW E&M-EST. PATIENT-LVL IV: ICD-10-PCS | Mod: PBBFAC,,, | Performed by: PHYSICIAN ASSISTANT

## 2019-08-02 PROCEDURE — 99214 OFFICE O/P EST MOD 30 MIN: CPT | Mod: S$PBB,,, | Performed by: PHYSICIAN ASSISTANT

## 2019-08-02 RX ORDER — PREDNISONE 10 MG/1
TABLET ORAL
Qty: 18 TABLET | Refills: 0 | Status: SHIPPED | OUTPATIENT
Start: 2019-08-02 | End: 2019-09-19 | Stop reason: ALTCHOICE

## 2019-08-02 NOTE — PROGRESS NOTES
Subjective:       Patient ID: Rebecca Rios is a 78 y.o. female.    Chief Complaint: Fall    Fall   The accident occurred 3 to 5 days ago. She landed on hard floor. Point of impact: left arm. The pain is present in the left elbow and neck (right chest wall). The pain is mild. Pertinent negatives include no abdominal pain or fever. She has tried nothing for the symptoms.     Review of Systems   Constitutional: Negative for chills, fatigue and fever.   Respiratory: Negative for chest tightness and shortness of breath.    Cardiovascular: Negative for chest pain.   Gastrointestinal: Negative for abdominal pain.   Musculoskeletal: Positive for neck pain and neck stiffness.       Objective:      Physical Exam   Constitutional: She appears well-developed and well-nourished. No distress.   Neck: Muscular tenderness present. No spinous process tenderness present. Decreased range of motion present.   Cardiovascular: Normal rate and regular rhythm. Exam reveals no gallop and no friction rub.   No murmur heard.  Pulmonary/Chest: Effort normal and breath sounds normal. No stridor. No respiratory distress. She has no wheezes. She has no rales. She exhibits no tenderness.       Abdominal: Soft. Bowel sounds are normal.   Skin: She is not diaphoretic.   Nursing note and vitals reviewed.      Assessment:       1. Fall, initial encounter    2. Neck pain, acute    3. Acute chest wall pain        Plan:       Fall, initial encounter  -     X-Ray Cervical Spine Complete 5 view; Future; Expected date: 08/02/2019  -     X-Ray Ribs 2 View Right; Future; Expected date: 08/02/2019    Neck pain, acute  -     X-Ray Cervical Spine Complete 5 view; Future; Expected date: 08/02/2019    Acute chest wall pain  -     X-Ray Ribs 2 View Right; Future; Expected date: 08/02/2019    Other orders  -     predniSONE (DELTASONE) 10 MG tablet; Take 3 daily for 3 days, then 2 daily for three days, then 1 daily for three days.  Dispense: 18 tablet; Refill:  0

## 2019-09-16 ENCOUNTER — OFFICE VISIT (OUTPATIENT)
Dept: URGENT CARE | Facility: CLINIC | Age: 79
End: 2019-09-16
Payer: MEDICARE

## 2019-09-16 VITALS
DIASTOLIC BLOOD PRESSURE: 71 MMHG | SYSTOLIC BLOOD PRESSURE: 115 MMHG | HEIGHT: 65 IN | HEART RATE: 76 BPM | WEIGHT: 186 LBS | BODY MASS INDEX: 30.99 KG/M2

## 2019-09-16 DIAGNOSIS — H60.90 OTITIS EXTERNA, UNSPECIFIED CHRONICITY, UNSPECIFIED LATERALITY, UNSPECIFIED TYPE: Primary | ICD-10-CM

## 2019-09-16 DIAGNOSIS — H66.92 LEFT OTITIS MEDIA, UNSPECIFIED OTITIS MEDIA TYPE: ICD-10-CM

## 2019-09-16 PROCEDURE — 99999 PR PBB SHADOW E&M-EST. PATIENT-LVL V: ICD-10-PCS | Mod: PBBFAC,,, | Performed by: PHYSICIAN ASSISTANT

## 2019-09-16 PROCEDURE — 99214 PR OFFICE/OUTPT VISIT, EST, LEVL IV, 30-39 MIN: ICD-10-PCS | Mod: S$PBB,,, | Performed by: PHYSICIAN ASSISTANT

## 2019-09-16 PROCEDURE — 99999 PR PBB SHADOW E&M-EST. PATIENT-LVL V: CPT | Mod: PBBFAC,,, | Performed by: PHYSICIAN ASSISTANT

## 2019-09-16 PROCEDURE — 99214 OFFICE O/P EST MOD 30 MIN: CPT | Mod: S$PBB,,, | Performed by: PHYSICIAN ASSISTANT

## 2019-09-16 PROCEDURE — 99215 OFFICE O/P EST HI 40 MIN: CPT | Mod: PBBFAC,PO | Performed by: PHYSICIAN ASSISTANT

## 2019-09-16 RX ORDER — CIPROFLOXACIN AND DEXAMETHASONE 3; 1 MG/ML; MG/ML
4 SUSPENSION/ DROPS AURICULAR (OTIC) 2 TIMES DAILY
Qty: 7.5 ML | Refills: 0 | Status: SHIPPED | OUTPATIENT
Start: 2019-09-16 | End: 2019-09-23

## 2019-09-16 RX ORDER — TRAMADOL HYDROCHLORIDE 50 MG/1
50 TABLET ORAL EVERY 6 HOURS PRN
Qty: 16 TABLET | Refills: 0 | Status: SHIPPED | OUTPATIENT
Start: 2019-09-16 | End: 2019-09-23

## 2019-09-16 RX ORDER — AMOXICILLIN AND CLAVULANATE POTASSIUM 875; 125 MG/1; MG/1
1 TABLET, FILM COATED ORAL 2 TIMES DAILY
Qty: 20 TABLET | Refills: 0 | Status: SHIPPED | OUTPATIENT
Start: 2019-09-16 | End: 2019-09-19

## 2019-09-16 NOTE — PROGRESS NOTES
"Subjective:      Patient ID: Rebecca Rios is a 78 y.o. female.    Chief Complaint: Otalgia    Otalgia    There is pain in both (left worse than right ) ears. This is a new problem. Episode onset: 5 days  The problem has been gradually worsening. Associated symptoms include ear discharge (a little bloody discharge today ) and a sore throat. Pertinent negatives include no abdominal pain, coughing, diarrhea, headaches, hearing loss, neck pain, rash, rhinorrhea or vomiting. Treatments tried: OTC ear drops, xyzal      Review of Systems   Constitutional: Negative for chills, diaphoresis, fatigue and fever.   HENT: Positive for congestion, ear discharge (a little bloody discharge today ), ear pain, sinus pressure and sore throat. Negative for hearing loss and rhinorrhea.    Respiratory: Negative for cough.    Gastrointestinal: Negative for abdominal pain, diarrhea and vomiting.   Musculoskeletal: Negative for neck pain.   Skin: Negative for rash.   Neurological: Negative for headaches.       Objective:   /71 (BP Location: Left arm, Patient Position: Sitting, BP Method: Medium (Automatic))   Pulse 76   Ht 5' 5" (1.651 m)   Wt 84.4 kg (186 lb)   BMI 30.95 kg/m²   Physical Exam   Constitutional: She is oriented to person, place, and time. She appears well-developed and well-nourished. No distress.   HENT:   Head: Normocephalic.   Right Ear: Tympanic membrane, external ear and ear canal normal.   Left Ear: There is drainage and swelling.   Nose: Nose normal. No mucosal edema or rhinorrhea. Right sinus exhibits no maxillary sinus tenderness and no frontal sinus tenderness. Left sinus exhibits no maxillary sinus tenderness and no frontal sinus tenderness.   Mouth/Throat: Uvula is midline, oropharynx is clear and moist and mucous membranes are normal. No oropharyngeal exudate, posterior oropharyngeal edema or posterior oropharyngeal erythema.   Copious drainage and moderate left ear canal swelling - could not " visualize left TM due to drainage and swelling    Eyes: Conjunctivae and EOM are normal.   Neck: Normal range of motion. Neck supple.   Cardiovascular: Normal rate, regular rhythm and normal heart sounds.   Pulmonary/Chest: Effort normal and breath sounds normal. No accessory muscle usage. No apnea, no tachypnea and no bradypnea. No respiratory distress. She has no decreased breath sounds. She has no wheezes. She has no rhonchi. She has no rales.   Lymphadenopathy:        Head (right side): No submental, no submandibular and no tonsillar adenopathy present.        Head (left side): No submental, no submandibular and no tonsillar adenopathy present.     She has no cervical adenopathy.   Neurological: She is alert and oriented to person, place, and time.   Skin: Skin is warm and dry. She is not diaphoretic.     Assessment:      1. Otitis externa, unspecified chronicity, unspecified laterality, unspecified type    2. Left otitis media, unspecified otitis media type       Plan:   Otitis externa, unspecified chronicity, unspecified laterality, unspecified type  -     ciprofloxacin-dexamethasone 0.3-0.1% (CIPRODEX) 0.3-0.1 % DrpS; Place 4 drops into both ears 2 (two) times daily. for 7 days  Dispense: 7.5 mL; Refill: 0  -     Ambulatory referral to ENT    Left otitis media, unspecified otitis media type  -     amoxicillin-clavulanate 875-125mg (AUGMENTIN) 875-125 mg per tablet; Take 1 tablet by mouth 2 (two) times daily. for 10 days  Dispense: 20 tablet; Refill: 0  -     Ambulatory referral to ENT    Other orders  -     traMADol (ULTRAM) 50 mg tablet; Take 1 tablet (50 mg total) by mouth every 6 (six) hours as needed for Pain.  Dispense: 16 tablet; Refill: 0    Left Otitis Externa vs. Otitis Media w/concern for TM rupture    -  ciprodex drops    -  Augmentin    -  Follow-up with ENT this week to ensure improving - Rebecca has seen outside ENT and would like to follow-up with him    AVS provided and instructions reviewed  with patient.     Park Palafox PA-C   Physician Assistant   [facility]

## 2019-09-16 NOTE — PATIENT INSTRUCTIONS
Apply ear drops as directed   Take antibiotic as prescribed   Tylenol 1000 mg as needed for pain   Tramadol for breakthrough pain only   Follow-up with Dr. Mueller (ENT) to ensure improving within the next week

## 2019-09-19 ENCOUNTER — OFFICE VISIT (OUTPATIENT)
Dept: OTOLARYNGOLOGY | Facility: CLINIC | Age: 79
End: 2019-09-19
Payer: MEDICARE

## 2019-09-19 VITALS
TEMPERATURE: 98 F | SYSTOLIC BLOOD PRESSURE: 142 MMHG | BODY MASS INDEX: 30.49 KG/M2 | HEART RATE: 75 BPM | DIASTOLIC BLOOD PRESSURE: 82 MMHG | WEIGHT: 183.19 LBS

## 2019-09-19 DIAGNOSIS — H60.392 OTHER INFECTIVE ACUTE OTITIS EXTERNA OF LEFT EAR: Primary | ICD-10-CM

## 2019-09-19 PROCEDURE — 99999 PR PBB SHADOW E&M-EST. PATIENT-LVL II: ICD-10-PCS | Mod: PBBFAC,,, | Performed by: OTOLARYNGOLOGY

## 2019-09-19 PROCEDURE — 99999 PR PBB SHADOW E&M-EST. PATIENT-LVL II: CPT | Mod: PBBFAC,,, | Performed by: OTOLARYNGOLOGY

## 2019-09-19 PROCEDURE — 99203 PR OFFICE/OUTPT VISIT, NEW, LEVL III, 30-44 MIN: ICD-10-PCS | Mod: S$PBB,,, | Performed by: OTOLARYNGOLOGY

## 2019-09-19 PROCEDURE — 99212 OFFICE O/P EST SF 10 MIN: CPT | Mod: PBBFAC | Performed by: OTOLARYNGOLOGY

## 2019-09-19 PROCEDURE — 99203 OFFICE O/P NEW LOW 30 MIN: CPT | Mod: S$PBB,,, | Performed by: OTOLARYNGOLOGY

## 2019-09-19 RX ORDER — DULOXETIN HYDROCHLORIDE 30 MG/1
CAPSULE, DELAYED RELEASE ORAL
COMMUNITY
Start: 2019-08-14 | End: 2019-09-19 | Stop reason: SDUPTHER

## 2019-09-19 RX ORDER — ALPRAZOLAM 0.5 MG/1
0.5 TABLET ORAL
COMMUNITY
Start: 2019-08-28 | End: 2020-03-06 | Stop reason: SDUPTHER

## 2019-09-19 RX ORDER — CIPROFLOXACIN 500 MG/1
500 TABLET ORAL 2 TIMES DAILY
Qty: 14 TABLET | Refills: 0 | Status: SHIPPED | OUTPATIENT
Start: 2019-09-19 | End: 2019-09-26

## 2019-09-19 NOTE — PROGRESS NOTES
Referring Provider:    Park Palafox Pa-c  [address] [place] [address]  Subjective:   Patient: Rebecca Rios [id number], :1940   Visit date:2019 1:46 PM    Chief Complaint:  Follow-up (bilateral ear infection)    HPI:  Rebecca is a 78 y.o. female who I was asked to see in consultation for evaluation of the following issue(s):     Elio ear pain.  L>R.  Left with progressing hearing loss.  Was seen in  and placed on ciprodex and augmentin.     Review of Systems:  -     Allergic/Immunologic: is allergic to erythromycin and macrolide antibiotics..  -     Constitutional: Current temp: 97.8 °F (36.6 °C)      Her meds, allergies, medical, surgical, social & family histories were reviewed & updated:  -     She has a current medication list which includes the following prescription(s): alprazolam, alprazolam, amlodipine, aspirin, bupropion, ciprofloxacin-dexamethasone 0.3-0.1%, diphenoxylate-atropine 2.5-0.025 mg, duloxetine, esomeprazole, fluticasone propionate, gabapentin, hydrocodone-acetaminophen, levocetirizine, levothyroxine, lisinopril, rosuvastatin, tizanidine, ciprofloxacin hcl, tramadol, and voltaren.  -     She  has a past medical history of Anxiety, Depression, Fractures, Hot flash, menopausal, HTN (hypertension), Hyperlipidemia, and Hypothyroid.   -     She does not have any pertinent problems on file.   -     She  has a past surgical history that includes Hysterectomy; open luis; Cholecystectomy; and Carpal tunnel release (Bilateral).  -     She  reports that she has never smoked. She has never used smokeless tobacco. She reports that she does not drink alcohol or use drugs.  -     Her family history includes Hypertension in her mother.  -     She is allergic to erythromycin and macrolide antibiotics.    Objective:     Physical Exam:  Vitals:  BP (!) 142/82   Pulse 75   Temp 97.8 °F (36.6 °C)   Wt 83.1 kg (183 lb 3.2 oz)   BMI 30.49 kg/m²   General appearance:  Well developed,  well nourished    Eyes:  Extraocular motions intact, PERRL    Communication:  no hoarseness, no dysphonia    Ears: left EAC swollen nearly shut.  Right wnl  Nose:  No masses/lesions of external nose, nasal mucosa, septum, and turbinates were within normal limits.  Mouth:  No mass/lesion of lips, teeth, gums, hard/soft palate, tongue, tonsils, or oropharynx.    Cardiovascular:  No pedal edema; Radial Pulses +2     Neck & Lymphatics:  No cervical lymphadenopathy, no neck mass/crepitus/ asymmetry, trachea is midline, no thyroid enlargement/tenderness/mass.    Psych: Oriented x3,  Alert with normal mood and affect.     Respiration/Chest:  Symmetric expansion during respiration, normal respiratory effort.    Skin:  Warm and intact. No ulcerations of face, scalp, neck.      Assessment & Plan:   Other infective acute otitis externa of left ear    Other orders  -     ciprofloxacin HCl (CIPRO) 500 MG tablet; Take 1 tablet (500 mg total) by mouth 2 (two) times daily. for 7 days  Dispense: 14 tablet; Refill: 0          Wick placed  Continue ciprodex  D/c Augmentin  Cipro PO  Monday wick removal    Thank you for allowing me to participate in the care of Rebecca.    Mike Marcos MD, FACS

## 2019-09-19 NOTE — LETTER
September 19, 2019      Park Palafox PA-C  [address] [facility] [address]           [facility]  [address] [facility] [address]  Phone: [phone]  Fax: [phone]          Patient: Rebecca Rios   MR Number: [id number]   YOB: 1940   Date of Visit: 9/19/2019       Dear Park Palafox:    Thank you for referring Rebecca Rios to me for evaluation. Attached you will find relevant portions of my assessment and plan of care.    If you have questions, please do not hesitate to call me. I look forward to following Rebecca Rios along with you.    Sincerely,    Mike Marcos MD    Enclosure  CC:  No Recipients    If you would like to receive this communication electronically, please contact [email or web address] or [phone] to request more information on [email or web address] Link access.    For providers and/or their staff who would like to refer a patient to [facility], please contact us through our one-stop-shop provider referral line, Kim Moon, at [phone].    If you feel you have received this communication in error or would no longer like to receive these types of communications, please e-mail [email or web address]

## 2019-09-23 ENCOUNTER — OFFICE VISIT (OUTPATIENT)
Dept: OTOLARYNGOLOGY | Facility: CLINIC | Age: 79
End: 2019-09-23
Payer: MEDICARE

## 2019-09-23 VITALS — HEIGHT: 65 IN | WEIGHT: 183 LBS | BODY MASS INDEX: 30.49 KG/M2

## 2019-09-23 DIAGNOSIS — J30.89 ENVIRONMENTAL AND SEASONAL ALLERGIES: ICD-10-CM

## 2019-09-23 DIAGNOSIS — H60.392 OTHER INFECTIVE ACUTE OTITIS EXTERNA OF LEFT EAR: Primary | ICD-10-CM

## 2019-09-23 PROCEDURE — 99213 OFFICE O/P EST LOW 20 MIN: CPT | Mod: S$PBB,,, | Performed by: PHYSICIAN ASSISTANT

## 2019-09-23 PROCEDURE — 99213 PR OFFICE/OUTPT VISIT, EST, LEVL III, 20-29 MIN: ICD-10-PCS | Mod: S$PBB,,, | Performed by: PHYSICIAN ASSISTANT

## 2019-09-23 PROCEDURE — 99999 PR PBB SHADOW E&M-EST. PATIENT-LVL III: ICD-10-PCS | Mod: PBBFAC,,, | Performed by: PHYSICIAN ASSISTANT

## 2019-09-23 PROCEDURE — 99999 PR PBB SHADOW E&M-EST. PATIENT-LVL III: CPT | Mod: PBBFAC,,, | Performed by: PHYSICIAN ASSISTANT

## 2019-09-23 PROCEDURE — 99213 OFFICE O/P EST LOW 20 MIN: CPT | Mod: PBBFAC | Performed by: PHYSICIAN ASSISTANT

## 2019-09-23 RX ORDER — OFLOXACIN 3 MG/ML
3 SOLUTION AURICULAR (OTIC) 2 TIMES DAILY
Qty: 5 ML | Refills: 0 | Status: SHIPPED | OUTPATIENT
Start: 2019-09-23 | End: 2019-10-03

## 2019-09-23 RX ORDER — FLUTICASONE PROPIONATE 50 MCG
2 SPRAY, SUSPENSION (ML) NASAL DAILY
Qty: 1 BOTTLE | Refills: 12 | Status: SHIPPED | OUTPATIENT
Start: 2019-09-23

## 2019-09-23 RX ORDER — LEVOCETIRIZINE DIHYDROCHLORIDE 5 MG/1
5 TABLET, FILM COATED ORAL NIGHTLY
Qty: 30 TABLET | Refills: 11 | Status: SHIPPED | OUTPATIENT
Start: 2019-09-23 | End: 2020-09-22

## 2019-10-25 DIAGNOSIS — I10 ESSENTIAL HYPERTENSION: Primary | ICD-10-CM

## 2019-11-08 ENCOUNTER — LAB VISIT (OUTPATIENT)
Dept: LAB | Facility: HOSPITAL | Age: 79
End: 2019-11-08
Attending: INTERNAL MEDICINE
Payer: MEDICARE

## 2019-11-08 DIAGNOSIS — E78.2 MIXED HYPERLIPIDEMIA: ICD-10-CM

## 2019-11-08 DIAGNOSIS — I10 ESSENTIAL (PRIMARY) HYPERTENSION: ICD-10-CM

## 2019-11-08 DIAGNOSIS — I27.20 PULMONARY HYPERTENSION: ICD-10-CM

## 2019-11-08 DIAGNOSIS — E66.9 OBESITY (BMI 30.0-34.9): ICD-10-CM

## 2019-11-08 DIAGNOSIS — I25.10 CORONARY ARTERY DISEASE INVOLVING NATIVE CORONARY ARTERY OF NATIVE HEART WITHOUT ANGINA PECTORIS: ICD-10-CM

## 2019-11-08 DIAGNOSIS — R07.89 ATYPICAL CHEST PAIN: ICD-10-CM

## 2019-11-08 DIAGNOSIS — I50.32 CHRONIC DIASTOLIC HEART FAILURE: ICD-10-CM

## 2019-11-08 LAB
ALBUMIN SERPL BCP-MCNC: 3.6 G/DL (ref 3.5–5.2)
ALP SERPL-CCNC: 96 U/L (ref 55–135)
ALT SERPL W/O P-5'-P-CCNC: 9 U/L (ref 10–44)
ANION GAP SERPL CALC-SCNC: 9 MMOL/L (ref 8–16)
AST SERPL-CCNC: 18 U/L (ref 10–40)
BILIRUB SERPL-MCNC: 0.5 MG/DL (ref 0.1–1)
BNP SERPL-MCNC: 122 PG/ML (ref 0–99)
BUN SERPL-MCNC: 13 MG/DL (ref 8–23)
CALCIUM SERPL-MCNC: 9.3 MG/DL (ref 8.7–10.5)
CHLORIDE SERPL-SCNC: 105 MMOL/L (ref 95–110)
CHOLEST SERPL-MCNC: 130 MG/DL (ref 120–199)
CHOLEST/HDLC SERPL: 3 {RATIO} (ref 2–5)
CO2 SERPL-SCNC: 31 MMOL/L (ref 23–29)
CREAT SERPL-MCNC: 0.8 MG/DL (ref 0.5–1.4)
EST. GFR  (AFRICAN AMERICAN): >60 ML/MIN/1.73 M^2
EST. GFR  (NON AFRICAN AMERICAN): >60 ML/MIN/1.73 M^2
GLUCOSE SERPL-MCNC: 89 MG/DL (ref 70–110)
HDLC SERPL-MCNC: 44 MG/DL (ref 40–75)
HDLC SERPL: 33.8 % (ref 20–50)
LDLC SERPL CALC-MCNC: 57 MG/DL (ref 63–159)
NONHDLC SERPL-MCNC: 86 MG/DL
POTASSIUM SERPL-SCNC: 4 MMOL/L (ref 3.5–5.1)
PROT SERPL-MCNC: 6.3 G/DL (ref 6–8.4)
SODIUM SERPL-SCNC: 145 MMOL/L (ref 136–145)
TRIGL SERPL-MCNC: 145 MG/DL (ref 30–150)

## 2019-11-08 PROCEDURE — 80053 COMPREHEN METABOLIC PANEL: CPT

## 2019-11-08 PROCEDURE — 80061 LIPID PANEL: CPT

## 2019-11-08 PROCEDURE — 36415 COLL VENOUS BLD VENIPUNCTURE: CPT | Mod: PO

## 2019-11-08 PROCEDURE — 83880 ASSAY OF NATRIURETIC PEPTIDE: CPT

## 2019-11-11 ENCOUNTER — TELEPHONE (OUTPATIENT)
Dept: CARDIOLOGY | Facility: CLINIC | Age: 79
End: 2019-11-11

## 2019-11-11 NOTE — TELEPHONE ENCOUNTER
----- Message from Luis Antonio Gonzalez sent at 11/11/2019 10:48 AM CST -----  Contact: pt   Pt would like cb in reference to appt on 11/15, pls return call.         [phone]

## 2019-12-12 ENCOUNTER — OFFICE VISIT (OUTPATIENT)
Dept: FAMILY MEDICINE | Facility: CLINIC | Age: 79
End: 2019-12-12
Payer: MEDICARE

## 2019-12-12 VITALS
HEART RATE: 76 BPM | TEMPERATURE: 97 F | HEIGHT: 65 IN | WEIGHT: 179 LBS | DIASTOLIC BLOOD PRESSURE: 83 MMHG | BODY MASS INDEX: 29.82 KG/M2 | SYSTOLIC BLOOD PRESSURE: 167 MMHG | OXYGEN SATURATION: 97 %

## 2019-12-12 DIAGNOSIS — J06.9 UPPER RESPIRATORY TRACT INFECTION, UNSPECIFIED TYPE: Primary | ICD-10-CM

## 2019-12-12 PROCEDURE — 96372 THER/PROPH/DIAG INJ SC/IM: CPT | Mod: PBBFAC,PO

## 2019-12-12 PROCEDURE — 99999 PR PBB SHADOW E&M-EST. PATIENT-LVL IV: CPT | Mod: PBBFAC,,, | Performed by: NURSE PRACTITIONER

## 2019-12-12 PROCEDURE — 1126F PR PAIN SEVERITY QUANTIFIED, NO PAIN PRESENT: ICD-10-PCS | Mod: ,,, | Performed by: NURSE PRACTITIONER

## 2019-12-12 PROCEDURE — 1126F AMNT PAIN NOTED NONE PRSNT: CPT | Mod: ,,, | Performed by: NURSE PRACTITIONER

## 2019-12-12 PROCEDURE — 99999 PR PBB SHADOW E&M-EST. PATIENT-LVL IV: ICD-10-PCS | Mod: PBBFAC,,, | Performed by: NURSE PRACTITIONER

## 2019-12-12 PROCEDURE — 99213 OFFICE O/P EST LOW 20 MIN: CPT | Mod: S$PBB,,, | Performed by: NURSE PRACTITIONER

## 2019-12-12 PROCEDURE — 99214 OFFICE O/P EST MOD 30 MIN: CPT | Mod: PBBFAC,PO | Performed by: NURSE PRACTITIONER

## 2019-12-12 PROCEDURE — 1159F MED LIST DOCD IN RCRD: CPT | Mod: ,,, | Performed by: NURSE PRACTITIONER

## 2019-12-12 PROCEDURE — 99213 PR OFFICE/OUTPT VISIT, EST, LEVL III, 20-29 MIN: ICD-10-PCS | Mod: S$PBB,,, | Performed by: NURSE PRACTITIONER

## 2019-12-12 PROCEDURE — 1159F PR MEDICATION LIST DOCUMENTED IN MEDICAL RECORD: ICD-10-PCS | Mod: ,,, | Performed by: NURSE PRACTITIONER

## 2019-12-12 RX ORDER — METHYLPREDNISOLONE ACETATE 40 MG/ML
40 INJECTION, SUSPENSION INTRA-ARTICULAR; INTRALESIONAL; INTRAMUSCULAR; SOFT TISSUE
Status: COMPLETED | OUTPATIENT
Start: 2019-12-12 | End: 2019-12-12

## 2019-12-12 RX ORDER — PROMETHAZINE HYDROCHLORIDE AND DEXTROMETHORPHAN HYDROBROMIDE 6.25; 15 MG/5ML; MG/5ML
5 SYRUP ORAL 3 TIMES DAILY PRN
Qty: 118 ML | Refills: 0 | Status: SHIPPED | OUTPATIENT
Start: 2019-12-12 | End: 2019-12-22

## 2019-12-12 RX ADMIN — METHYLPREDNISOLONE ACETATE 40 MG: 40 INJECTION, SUSPENSION INTRALESIONAL; INTRAMUSCULAR; INTRASYNOVIAL; SOFT TISSUE at 04:12

## 2019-12-23 NOTE — PROGRESS NOTES
CC:   Chief Complaint   Patient presents with    Nasal Congestion     HPI: This is a new problem.   Rbeecca Rios is a 79 y.o. female with a complaint of URI.  The current episode started in the past 4 days.   The problem has been gradually worsening.   Associated symptoms included nasal congestion, rhinorrhea, cough.    Pertinent negatives include fever, chills, dyspnea, wheezing   Treatments tried: otc decongestant has been used and this has provided no relief.     [unfilled]  Outpatient Medications Prior to Visit   Medication Sig Dispense Refill    alprazolam (XANAX) 0.5 MG tablet Take 0.5 mg by mouth 2 (two) times daily as needed for Anxiety.      ALPRAZolam (XANAX) 0.5 MG tablet Take 0.5 mg by mouth.      amLODIPine (NORVASC) 5 MG tablet Take 1 tablet (5 mg total) by mouth once daily. 90 tablet 3    aspirin (ECOTRIN) 81 MG EC tablet Take 1 tablet (81 mg total) by mouth once daily. 30 tablet 2    buPROPion (WELLBUTRIN SR) 150 MG TBSR 12 hr tablet Take 150 mg by mouth 2 (two) times daily.       diphenoxylate-atropine 2.5-0.025 mg (LOMOTIL) 2.5-0.025 mg per tablet Take 1 tablet by mouth 4 (four) times daily as needed for Diarrhea. 30 tablet 1    DULoxetine (CYMBALTA) 30 MG capsule TAKE 1 CAPSULE BY MOUTH ONCE DAILY      esomeprazole (NEXIUM) 40 MG capsule Take 40 mg by mouth once daily.       fluticasone propionate (FLONASE) 50 mcg/actuation nasal spray 2 sprays (100 mcg total) by Each Nostril route once daily. 1 Bottle 12    gabapentin (NEURONTIN) 600 MG tablet Take 600 mg by mouth 2 (two) times daily.      hydrocodone-acetaminophen 10-325mg (NORCO)  mg Tab Take 1 tablet by mouth 3 (three) times daily as needed.       levocetirizine (XYZAL) 5 MG tablet Take 1 tablet (5 mg total) by mouth every evening. 30 tablet 11    levothyroxine (SYNTHROID) 75 MCG tablet TAKE ONE TABLET BY MOUTH ONCE DAILY 30 tablet 6    lisinopril (PRINIVIL,ZESTRIL) 40 MG tablet TAKE 1 TABLET BY MOUTH EVERY DAY 90  "tablet 3    rosuvastatin (CRESTOR) 10 MG tablet Take 1 tablet (10 mg total) by mouth every evening. 90 tablet 3    tizanidine (ZANAFLEX) 4 MG tablet Take 4 mg by mouth every evening.       VOLTAREN 1 % Gel APPLY 2 GRAMS TOPICALLY ONCE DAILY 100 g 0    fluticasone (FLONASE) 50 mcg/actuation nasal spray 2 sprays (100 mcg total) by Each Nare route once daily. 16 g 0    levocetirizine (XYZAL) 5 MG tablet TAKE 1 TABLET BY MOUTH EVERY EVENING 30 tablet 1     No facility-administered medications prior to visit.         Physical Exam   BP (!) 167/83   Pulse 76   Temp 97 °F (36.1 °C)   Ht 5' 5" (1.651 m)   Wt 81.2 kg (179 lb 0.2 oz)   SpO2 97%   BMI 29.79 kg/m²   Constitutional: The patient appears well-developed and well-nourished.   Head: Normocephalic and atraumatic.   Right Ear: Tympanic membrane and ear canal normal. No drainage, swelling or tenderness. Tympanic membrane is not injected, not erythematous and not bulging.   Left Ear: Ear canal normal. No drainage, swelling or tenderness. Tympanic membrane is not injected, not erythematous and not bulging.   Nose: Mucosal edema and rhinorrhea present. No sinus tenderness on palpation   Mouth/Throat: Uvula is midline. Posterior oropharyngeal erythema present. No oropharyngeal exudate.        THE MUCOSA IS BOGGY AND ERYTHEMATOUS.     Eyes: Conjunctivae normal and lids are normal. Pupils are equal, round, and reactive to light. Right eye exhibits no discharge. Left eye exhibits no discharge. Right eye exhibits normal extraocular motion. Left eye exhibits normal extraocular motion.   Neck: Trachea normal and normal range of motion. Neck supple. No tracheal tenderness present. No mass and no thyromegaly present.   Cardiovascular: Normal rate, regular rhythm, S1 normal, S2 normal and normal heart sounds.  Exam reveals no gallop, no S3, no S4 and no friction rub.    No murmur heard.  Pulmonary/Chest: Effort normal and breath sounds normal. No stridor. Not tachypneic. " No respiratory distress. The patient has no wheezes. The patient has no rhonchi. The patient has no rales.   Skin: The patient is not diaphoretic.     Encounter Diagnosis   Name Primary?    Upper respiratory tract infection, unspecified type Yes       PLAN:    Rebecca was seen today for nasal congestion.    Diagnoses and all orders for this visit:    Upper respiratory tract infection, unspecified type  -     methylPREDNISolone acetate injection 40 mg  -     promethazine-dextromethorphan (PROMETHAZINE-DM) 6.25-15 mg/5 mL Syrp; Take 5 mLs by mouth 3 (three) times daily as needed (cough).  -symptomatic treatment discussed. Verbalized understanding    HTN  -stop OTC decongestant.  Follow upwith PCP    Medications Ordered This Encounter   Medications    methylPREDNISolone acetate injection 40 mg    promethazine-dextromethorphan (PROMETHAZINE-DM) 6.25-15 mg/5 mL Syrp     Sig: Take 5 mLs by mouth 3 (three) times daily as needed (cough).     Dispense:  118 mL     Refill:  0     No orders of the defined types were placed in this encounter.    RTC if symptoms are worsening or changing significantly or if not improved by the end of therapy.

## 2020-01-07 ENCOUNTER — CLINICAL SUPPORT (OUTPATIENT)
Dept: CARDIOLOGY | Facility: CLINIC | Age: 80
End: 2020-01-07
Attending: INTERNAL MEDICINE
Payer: MEDICARE

## 2020-01-07 ENCOUNTER — CLINICAL SUPPORT (OUTPATIENT)
Dept: CARDIOLOGY | Facility: CLINIC | Age: 80
End: 2020-01-07
Payer: MEDICARE

## 2020-01-07 ENCOUNTER — TELEPHONE (OUTPATIENT)
Dept: CARDIOLOGY | Facility: CLINIC | Age: 80
End: 2020-01-07

## 2020-01-07 ENCOUNTER — OFFICE VISIT (OUTPATIENT)
Dept: CARDIOLOGY | Facility: CLINIC | Age: 80
End: 2020-01-07
Payer: MEDICARE

## 2020-01-07 ENCOUNTER — HOSPITAL ENCOUNTER (OUTPATIENT)
Dept: RADIOLOGY | Facility: HOSPITAL | Age: 80
Discharge: HOME | End: 2020-01-07
Attending: PHYSICIAN ASSISTANT
Payer: MEDICARE

## 2020-01-07 VITALS
BODY MASS INDEX: 30.19 KG/M2 | WEIGHT: 181.19 LBS | HEIGHT: 65 IN | SYSTOLIC BLOOD PRESSURE: 188 MMHG | HEART RATE: 79 BPM | DIASTOLIC BLOOD PRESSURE: 86 MMHG

## 2020-01-07 DIAGNOSIS — R07.89 ATYPICAL CHEST PAIN: ICD-10-CM

## 2020-01-07 DIAGNOSIS — G44.52 NEW DAILY PERSISTENT HEADACHE: Primary | ICD-10-CM

## 2020-01-07 DIAGNOSIS — I10 ESSENTIAL (PRIMARY) HYPERTENSION: ICD-10-CM

## 2020-01-07 DIAGNOSIS — R42 DIZZINESS: ICD-10-CM

## 2020-01-07 DIAGNOSIS — I50.32 CHRONIC DIASTOLIC HEART FAILURE: ICD-10-CM

## 2020-01-07 DIAGNOSIS — E78.2 MIXED HYPERLIPIDEMIA: ICD-10-CM

## 2020-01-07 DIAGNOSIS — I25.10 CORONARY ARTERY DISEASE INVOLVING NATIVE CORONARY ARTERY OF NATIVE HEART WITHOUT ANGINA PECTORIS: ICD-10-CM

## 2020-01-07 DIAGNOSIS — I27.20 PULMONARY HYPERTENSION: ICD-10-CM

## 2020-01-07 DIAGNOSIS — I10 ESSENTIAL HYPERTENSION: ICD-10-CM

## 2020-01-07 DIAGNOSIS — E66.9 OBESITY (BMI 30.0-34.9): ICD-10-CM

## 2020-01-07 DIAGNOSIS — G44.52 NEW DAILY PERSISTENT HEADACHE: ICD-10-CM

## 2020-01-07 LAB
ESTIMATED PA SYSTOLIC PRESSURE: 32.16
INTERNAL CAROTID STENOSIS: NORMAL
MITRAL VALVE REGURGITATION: NORMAL
RETIRED EF AND QEF - SEE NOTES: 60 (ref 55–65)
TRICUSPID VALVE REGURGITATION: NORMAL

## 2020-01-07 PROCEDURE — 1159F MED LIST DOCD IN RCRD: CPT | Mod: ,,, | Performed by: PHYSICIAN ASSISTANT

## 2020-01-07 PROCEDURE — 1125F AMNT PAIN NOTED PAIN PRSNT: CPT | Mod: ,,, | Performed by: PHYSICIAN ASSISTANT

## 2020-01-07 PROCEDURE — 93880 EXTRACRANIAL BILAT STUDY: CPT | Mod: PBBFAC | Performed by: INTERNAL MEDICINE

## 2020-01-07 PROCEDURE — 99214 OFFICE O/P EST MOD 30 MIN: CPT | Mod: S$PBB,,, | Performed by: PHYSICIAN ASSISTANT

## 2020-01-07 PROCEDURE — 1125F PR PAIN SEVERITY QUANTIFIED, PAIN PRESENT: ICD-10-PCS | Mod: ,,, | Performed by: PHYSICIAN ASSISTANT

## 2020-01-07 PROCEDURE — 1159F PR MEDICATION LIST DOCUMENTED IN MEDICAL RECORD: ICD-10-PCS | Mod: ,,, | Performed by: PHYSICIAN ASSISTANT

## 2020-01-07 PROCEDURE — 93005 ELECTROCARDIOGRAM TRACING: CPT | Mod: PBBFAC | Performed by: INTERNAL MEDICINE

## 2020-01-07 PROCEDURE — 93010 ELECTROCARDIOGRAM REPORT: CPT | Mod: S$PBB,,, | Performed by: INTERNAL MEDICINE

## 2020-01-07 PROCEDURE — 99214 PR OFFICE/OUTPT VISIT, EST, LEVL IV, 30-39 MIN: ICD-10-PCS | Mod: S$PBB,,, | Performed by: PHYSICIAN ASSISTANT

## 2020-01-07 PROCEDURE — 93010 EKG 12-LEAD: ICD-10-PCS | Mod: S$PBB,,, | Performed by: INTERNAL MEDICINE

## 2020-01-07 PROCEDURE — 93306 2D ECHO WITH COLOR FLOW DOPPLER: ICD-10-PCS | Mod: 26,S$PBB,, | Performed by: INTERNAL MEDICINE

## 2020-01-07 PROCEDURE — 99999 PR PBB SHADOW E&M-EST. PATIENT-LVL III: ICD-10-PCS | Mod: PBBFAC,,, | Performed by: PHYSICIAN ASSISTANT

## 2020-01-07 PROCEDURE — 99213 OFFICE O/P EST LOW 20 MIN: CPT | Mod: PBBFAC,25 | Performed by: PHYSICIAN ASSISTANT

## 2020-01-07 PROCEDURE — 70450 CT HEAD/BRAIN W/O DYE: CPT | Mod: TC

## 2020-01-07 PROCEDURE — 93880 CAR US DOPPLER CAROTID BILATERAL: ICD-10-PCS | Mod: 26,S$PBB,, | Performed by: INTERNAL MEDICINE

## 2020-01-07 PROCEDURE — 99999 PR PBB SHADOW E&M-EST. PATIENT-LVL III: CPT | Mod: PBBFAC,,, | Performed by: PHYSICIAN ASSISTANT

## 2020-01-07 PROCEDURE — 93306 TTE W/DOPPLER COMPLETE: CPT | Mod: PBBFAC | Performed by: INTERNAL MEDICINE

## 2020-01-07 RX ORDER — AMLODIPINE BESYLATE 5 MG/1
5 TABLET ORAL DAILY
Qty: 90 TABLET | Refills: 3 | Status: SHIPPED | OUTPATIENT
Start: 2020-01-07 | End: 2021-01-21 | Stop reason: SDUPTHER

## 2020-01-07 NOTE — TELEPHONE ENCOUNTER
Please phone [facility] pharmacy, make sure she has not picked up any amlodipine recently.    Please also make sure she has nothing filled at [facility]'s pharmacy in [place] as well

## 2020-01-07 NOTE — PROGRESS NOTES
"Subjective:    Patient ID:  Rebecca Rios is a 79 y.o. female who presents for follow-up of CAD, HTN, hyperlipidemia      HPI   Ms. Rios is a 79 year old female patient whose current medical conditions include HTN, DD, MR, LVH, hyperlipidemia, LHV, and PHTN who presents today for follow-up. She returns today and states she is doing ok. Reports she has been having issues with BP, has been having headaches when her BP is elevated. Will occasionally get dizzy and had an episode a few days ago where she felt like she was "staggering" Feels ok today in office but still has a dull headache. Other than that issue, patient seems to be doing ok. No scott chest pain, tightness, or heaviness. No PND, orthopnea, or BLE edema. No lightheadedness, palpitations, near syncope, or syncope. No s/s slurred speech, blurred vision, or focal weakness. BP elevated today in office. Patient reports compliance with her medications but apparently has not been taking amlodipine. Tries to watch her salt intake. Scheduled for echo and carotid U/S today. Recent labs reviewed. CMP stable. Lipids improved. EKG today reviewed, showed SR, non-specific T wave abnormality.       Review of Systems   Constitution: Negative for chills, decreased appetite, fever and malaise/fatigue.   HENT: Negative for congestion, hoarse voice and sore throat.    Eyes: Negative for blurred vision and discharge.   Cardiovascular: Negative for chest pain, claudication, cyanosis, dyspnea on exertion, irregular heartbeat, leg swelling, near-syncope, orthopnea, palpitations and paroxysmal nocturnal dyspnea.   Respiratory: Negative for cough, hemoptysis, shortness of breath, snoring, sputum production and wheezing.    Endocrine: Negative for cold intolerance and heat intolerance.   Hematologic/Lymphatic: Negative for bleeding problem. Does not bruise/bleed easily.   Skin: Negative for rash.   Musculoskeletal: Negative for arthritis, back pain, joint pain, joint swelling, " "muscle cramps, muscle weakness and myalgias.   Gastrointestinal: Negative for abdominal pain, constipation, diarrhea, heartburn, melena and nausea.   Genitourinary: Negative for hematuria.   Neurological: Positive for dizziness and headaches. Negative for focal weakness, light-headedness, loss of balance, numbness, paresthesias, seizures and weakness.   Psychiatric/Behavioral: Negative for memory loss. The patient does not have insomnia.    Allergic/Immunologic: Negative for hives.     BP (!) 188/86   Pulse 79   Ht 5' 5" (1.651 m)   Wt 82.2 kg (181 lb 3.5 oz)   BMI 30.16 kg/m²     Objective:    Physical Exam   Constitutional: She is oriented to person, place, and time. She appears well-developed and well-nourished. No distress.   HENT:   Head: Normocephalic and atraumatic.   Eyes: Pupils are equal, round, and reactive to light. Right eye exhibits no discharge. Left eye exhibits no discharge.   Neck: Neck supple. No JVD present.   Cardiovascular: Normal rate, regular rhythm, S1 normal, S2 normal, normal heart sounds and intact distal pulses.   No murmur heard.  Pulmonary/Chest: Effort normal and breath sounds normal. No respiratory distress. She has no wheezes. She has no rales.   Abdominal: Soft. She exhibits no distension.   Musculoskeletal: She exhibits no edema.   Neurological: She is alert and oriented to person, place, and time.   Skin: Skin is warm and dry. She is not diaphoretic. No erythema.   Psychiatric: She has a normal mood and affect. Her behavior is normal. Thought content normal.   Nursing note and vitals reviewed.      Impression: NORMAL MYOCARDIAL PERFUSION  1. The perfusion scan is free of evidence for myocardial ischemia or injury.   2. Resting wall motion is physiologic.   3. Resting LV function is normal.   4. The ventricular volumes are normal at rest and stress.   5. The extracardiac distribution of radioactivity is normal.       Chemistry        Component Value Date/Time     " 11/08/2019 1321    K 4.0 11/08/2019 1321     11/08/2019 1321    CO2 31 (H) 11/08/2019 1321    BUN 13 11/08/2019 1321    CREATININE 0.8 11/08/2019 1321    GLU 89 11/08/2019 1321        Component Value Date/Time    CALCIUM 9.3 11/08/2019 1321    ALKPHOS 96 11/08/2019 1321    AST 18 11/08/2019 1321    ALT 9 (L) 11/08/2019 1321    BILITOT 0.5 11/08/2019 1321    ESTGFRAFRICA >60.0 11/08/2019 1321    EGFRNONAA >60.0 11/08/2019 1321        Lab Results   Component Value Date    CHOL 130 11/08/2019    CHOL 168 04/23/2019    CHOL 179 12/23/2015     Lab Results   Component Value Date    HDL 44 11/08/2019    HDL 55 04/23/2019    HDL 43 12/23/2015     Lab Results   Component Value Date    LDLCALC 57.0 (L) 11/08/2019    LDLCALC 86.6 04/23/2019    LDLCALC 102.6 12/23/2015     Lab Results   Component Value Date    TRIG 145 11/08/2019    TRIG 132 04/23/2019    TRIG 167 (H) 12/23/2015     Lab Results   Component Value Date    CHOLHDL 33.8 11/08/2019    CHOLHDL 32.7 04/23/2019    CHOLHDL 24.0 12/23/2015     No results found for: LABA1C, HGBA1C    Assessment:       1. Coronary artery disease involving native coronary artery of native heart without angina pectoris    2. Chronic diastolic heart failure    3. Essential (primary) hypertension    4. Mixed hyperlipidemia    5. Pulmonary hypertension    6. Essential hypertension      Patient presents for f/u. Doing ok but having continued headaches and episode of gait disturbance. In light of continued headache, recommended CT of head to rule out any CVA. Patient declined ED, but opted for OP imaging. Again I counseled her on when she should present to ED. She needs to restart amlodipine which she has not been taking amlodipine. Will verify with her pharmacy, prescription sent.   Plan:   -Start amlodipine 5 mg daily, will verify with pharmacy  -Echo and carotid U/S pending today  -CT of head without contrast, phone review  -Continue other cardiac meds  -Follow-up TBA pending CT of head  results

## 2020-01-07 NOTE — TELEPHONE ENCOUNTER
The prescription is at [facility] pharmacy but she has not yet picked it up

## 2020-01-08 ENCOUNTER — TELEPHONE (OUTPATIENT)
Dept: CARDIOLOGY | Facility: HOSPITAL | Age: 80
End: 2020-01-08

## 2020-01-08 NOTE — TELEPHONE ENCOUNTER
The patient has been notified of this information and all questions answered.    A BP check follow up has been made with Rupinder on 1/30/2020 at 1:30 pm at [facility]

## 2020-01-08 NOTE — TELEPHONE ENCOUNTER
Please phone patient. CT of head showed no acute/concerning findings.    Make sure she picked up her amlodipine.     Needs to f/u with me in 3 weeks for BP re-check. Please arrange appt    Thanks

## 2020-01-27 DIAGNOSIS — E78.2 MIXED HYPERLIPIDEMIA: ICD-10-CM

## 2020-01-27 RX ORDER — ROSUVASTATIN CALCIUM 10 MG/1
TABLET, COATED ORAL
Qty: 30 TABLET | Refills: 10 | Status: SHIPPED | OUTPATIENT
Start: 2020-01-27 | End: 2020-03-06 | Stop reason: SDUPTHER

## 2020-01-30 ENCOUNTER — OFFICE VISIT (OUTPATIENT)
Dept: CARDIOLOGY | Facility: CLINIC | Age: 80
End: 2020-01-30
Payer: MEDICARE

## 2020-01-30 VITALS
BODY MASS INDEX: 30.45 KG/M2 | WEIGHT: 182.75 LBS | DIASTOLIC BLOOD PRESSURE: 72 MMHG | SYSTOLIC BLOOD PRESSURE: 150 MMHG | HEIGHT: 65 IN | HEART RATE: 74 BPM

## 2020-01-30 DIAGNOSIS — I50.32 CHRONIC DIASTOLIC HEART FAILURE: Primary | ICD-10-CM

## 2020-01-30 DIAGNOSIS — I27.20 PULMONARY HYPERTENSION: ICD-10-CM

## 2020-01-30 DIAGNOSIS — E78.2 MIXED HYPERLIPIDEMIA: ICD-10-CM

## 2020-01-30 DIAGNOSIS — I10 ESSENTIAL (PRIMARY) HYPERTENSION: ICD-10-CM

## 2020-01-30 DIAGNOSIS — N18.9 CHRONIC KIDNEY DISEASE, UNSPECIFIED CKD STAGE: ICD-10-CM

## 2020-01-30 DIAGNOSIS — I25.10 CORONARY ARTERY DISEASE INVOLVING NATIVE CORONARY ARTERY OF NATIVE HEART WITHOUT ANGINA PECTORIS: ICD-10-CM

## 2020-01-30 PROCEDURE — 99214 PR OFFICE/OUTPT VISIT, EST, LEVL IV, 30-39 MIN: ICD-10-PCS | Mod: S$PBB,,, | Performed by: PHYSICIAN ASSISTANT

## 2020-01-30 PROCEDURE — 99999 PR PBB SHADOW E&M-EST. PATIENT-LVL IV: CPT | Mod: PBBFAC,,, | Performed by: PHYSICIAN ASSISTANT

## 2020-01-30 PROCEDURE — 99999 PR PBB SHADOW E&M-EST. PATIENT-LVL IV: ICD-10-PCS | Mod: PBBFAC,,, | Performed by: PHYSICIAN ASSISTANT

## 2020-01-30 PROCEDURE — 99214 OFFICE O/P EST MOD 30 MIN: CPT | Mod: PBBFAC | Performed by: PHYSICIAN ASSISTANT

## 2020-01-30 PROCEDURE — 99214 OFFICE O/P EST MOD 30 MIN: CPT | Mod: S$PBB,,, | Performed by: PHYSICIAN ASSISTANT

## 2020-01-30 NOTE — PROGRESS NOTES
Subjective:    Patient ID:  Rebecca Rios is a 79 y.o. female who presents for follow-up of HTN/BP check      HPI  Ms. Rios is a 79 year old female patient whose current medical conditions include HTN, DD, MR, LVH, hyperlipidemia, LHV, and PHTN who presents today for follow-up. Patient previously seen by me and started on amlodipine for uncontrolled BP. She returns today and states she is doing well. Feels much better now that she is amlodipine. No recent episodes of chest pain or tightness. No SOB/CURTIS. No lightheadedness, dizziness, palpitations, near syncope, or syncope. No s/s of CHF. BP improved since last visit. Patient reports compliance with her medications. Mindful of her salt intake. States BP runs lower most of the time at home and is lower by my recheck today in office.. 2D echo and carotid U/S results reviewed. 2D echo showed normal EF. Carotid U/S showed non-obstructive disease bilaterally 20-39%.     Review of Systems   Constitution: Negative for chills, decreased appetite, fever and malaise/fatigue.   HENT: Negative for congestion, hoarse voice and sore throat.    Eyes: Negative for blurred vision and discharge.   Cardiovascular: Negative for chest pain, claudication, cyanosis, dyspnea on exertion, irregular heartbeat, leg swelling, near-syncope, orthopnea, palpitations and paroxysmal nocturnal dyspnea.   Respiratory: Negative for cough, hemoptysis, shortness of breath, snoring, sputum production and wheezing.    Endocrine: Negative for cold intolerance and heat intolerance.   Hematologic/Lymphatic: Negative for bleeding problem. Does not bruise/bleed easily.   Skin: Negative for rash.   Musculoskeletal: Positive for arthritis, back pain and joint pain. Negative for joint swelling, muscle cramps, muscle weakness and myalgias.   Gastrointestinal: Negative for abdominal pain, constipation, diarrhea, heartburn, melena and nausea.   Genitourinary: Negative for hematuria.   Neurological: Negative for  "dizziness, focal weakness, headaches, light-headedness, loss of balance, numbness, paresthesias, seizures and weakness.   Psychiatric/Behavioral: Negative for memory loss. The patient does not have insomnia.    Allergic/Immunologic: Negative for hives.     BP (!) 150/72 (BP Location: Right arm)   Pulse 74   Ht 5' 5" (1.651 m)   Wt 82.9 kg (182 lb 12.2 oz)   BMI 30.41 kg/m²   Repeat BP by me in office: 138/70  Objective:    Physical Exam   Constitutional: She is oriented to person, place, and time. She appears well-developed and well-nourished. No distress.   HENT:   Head: Normocephalic and atraumatic.   Eyes: Pupils are equal, round, and reactive to light. Right eye exhibits no discharge. Left eye exhibits no discharge.   Neck: Neck supple. No JVD present.   Cardiovascular: Normal rate, regular rhythm, S1 normal, S2 normal and normal heart sounds.   No murmur heard.  Pulmonary/Chest: Effort normal and breath sounds normal. No respiratory distress. She has no wheezes. She has no rales.   Abdominal: Soft. She exhibits no distension.   Musculoskeletal: She exhibits no edema.   Neurological: She is alert and oriented to person, place, and time.   Skin: Skin is warm and dry. She is not diaphoretic. No erythema.   Psychiatric: She has a normal mood and affect. Her behavior is normal. Thought content normal.   Nursing note and vitals reviewed.    2D Echo CONCLUSIONS     1 - Normal left ventricular systolic function (EF 60-65%).     2 - Indeterminate LV diastolic function.     3 - Normal right ventricular systolic function .     4 - Mild mitral regurgitation.     5 - Mild to moderate tricuspid regurgitation.     6 - Mild left atrial enlargement.     7 - Concentric hypertrophy.     8 - The estimated PA systolic pressure is 32 mmHg.     Carotid U/S  TEST DESCRIPTION     RIGHT  The right Mid Common Carotid Artery is visualized.   The right carotid bulb artery is visualized, associated with heterogeneous plaque.   The right " Distal Internal Carotid Artery has 20 - 39% stenosis.   The right external carotid artery is visualized, associated with heterogeneous plaque.   The right vertebral artery is visualized, associated with anterograde flow.   The right ICA/CCA ratio is: 1.22    LEFT  The left Proximal Common Carotid Artery is visualized.   The left carotid bulb artery is visualized, associated with heterogeneous plaque.   The left Distal Internal Carotid Artery has 20 - 39% stenosis.   The left external carotid artery is visualized.   The left vertebral artery is visualized, associated with anterograde flow.   The left ICA/CCA ratio is: 1.39      Carotid U/S CONCLUSIONS   There is 20 - 39% right Internal Carotid stenosis.  There is 20 - 39% left Internal Carotid stenosis.    Impression: NORMAL MYOCARDIAL PERFUSION  1. The perfusion scan is free of evidence for myocardial ischemia or injury.   2. Resting wall motion is physiologic.   3. Resting LV function is normal.   4. The ventricular volumes are normal at rest and stress.   5. The extracardiac distribution of radioactivity is normal.   Assessment:       1. Chronic diastolic heart failure    2. Coronary artery disease involving native coronary artery of native heart without angina pectoris    3. Pulmonary hypertension    4. Mixed hyperlipidemia    5. Chronic kidney disease, unspecified CKD stage    6. Essential (primary) hypertension      Patient presents for f/u. Doing well. BP improved with reintroduction of amlodipine. No other CV complaints. Results reviewed/discussed.   Plan:   -Continue current medical management and risk factor modification  -Cardiac, low salt diet  -Increase activity level as tolerated  -Follow-up with Dr. Padilla in 3 months

## 2020-02-28 ENCOUNTER — OFFICE VISIT (OUTPATIENT)
Dept: URGENT CARE | Facility: CLINIC | Age: 80
End: 2020-02-28
Payer: MEDICARE

## 2020-02-28 VITALS
HEIGHT: 65 IN | WEIGHT: 181.56 LBS | SYSTOLIC BLOOD PRESSURE: 154 MMHG | OXYGEN SATURATION: 96 % | HEART RATE: 62 BPM | BODY MASS INDEX: 30.25 KG/M2 | DIASTOLIC BLOOD PRESSURE: 72 MMHG | TEMPERATURE: 98 F

## 2020-02-28 DIAGNOSIS — J30.9 ALLERGIC RHINITIS, UNSPECIFIED SEASONALITY, UNSPECIFIED TRIGGER: ICD-10-CM

## 2020-02-28 DIAGNOSIS — J02.9 SORE THROAT: Primary | ICD-10-CM

## 2020-02-28 LAB
CTP QC/QA: YES
S PYO RRNA THROAT QL PROBE: NEGATIVE

## 2020-02-28 PROCEDURE — 99213 OFFICE O/P EST LOW 20 MIN: CPT | Mod: S$PBB,,, | Performed by: PHYSICIAN ASSISTANT

## 2020-02-28 PROCEDURE — 99213 PR OFFICE/OUTPT VISIT, EST, LEVL III, 20-29 MIN: ICD-10-PCS | Mod: S$PBB,,, | Performed by: PHYSICIAN ASSISTANT

## 2020-02-28 PROCEDURE — 99999 PR PBB SHADOW E&M-EST. PATIENT-LVL III: ICD-10-PCS | Mod: PBBFAC,,, | Performed by: PHYSICIAN ASSISTANT

## 2020-02-28 PROCEDURE — 87081 CULTURE SCREEN ONLY: CPT

## 2020-02-28 PROCEDURE — 87880 STREP A ASSAY W/OPTIC: CPT | Mod: PBBFAC,PO | Performed by: PHYSICIAN ASSISTANT

## 2020-02-28 PROCEDURE — 99213 OFFICE O/P EST LOW 20 MIN: CPT | Mod: PBBFAC,PO | Performed by: PHYSICIAN ASSISTANT

## 2020-02-28 PROCEDURE — 99999 PR PBB SHADOW E&M-EST. PATIENT-LVL III: CPT | Mod: PBBFAC,,, | Performed by: PHYSICIAN ASSISTANT

## 2020-02-28 RX ORDER — LEVOCETIRIZINE DIHYDROCHLORIDE 5 MG/1
5 TABLET, FILM COATED ORAL NIGHTLY
Qty: 30 TABLET | Refills: 0 | Status: SHIPPED | OUTPATIENT
Start: 2020-02-28 | End: 2020-03-06 | Stop reason: SDUPTHER

## 2020-02-28 NOTE — PATIENT INSTRUCTIONS
Flonase for post nasal drip.  Xyzal for allergy symptoms.  Honey, salt water gargles, and lozenges for sore throat.  Follow up with primary care physician in 3-5 days.  Report to ER with new or worsening symptoms.

## 2020-02-28 NOTE — PROGRESS NOTES
"Subjective:       History was provided by the patient.  Rebecca Rios is a 79 y.o. who presents for evaluation of a sore throat. Associated symptoms include myalgias, post nasal drip and sore throat. Onset of symptoms was 2 days ago, unchanged since that time.  He has not had recent close exposure to someone with proven streptococcal pharyngitis.  The following portions of the patient's history were reviewed and updated as appropriate: allergies, current medications, past family history, past medical history, past social history, past surgical history and problem list.    Review of Systems  Review of Systems   Constitutional: Negative for fever.   HENT: Positive for congestion and sore throat.    Respiratory: Negative for shortness of breath.    Cardiovascular: Negative for chest pain.   Gastrointestinal: Negative for abdominal pain and nausea.   Genitourinary: Negative for dysuria and frequency.   Musculoskeletal: Positive for myalgias. Negative for back pain.   Neurological: Negative for headaches.   All other systems reviewed and are negative.       Objective:   BP (!) 154/72 (BP Location: Right arm, Patient Position: Sitting, BP Method: Small (Automatic))   Pulse 62   Temp 98.1 °F (36.7 °C)   Ht 5' 5" (1.651 m)   Wt 82.3 kg (181 lb 8.8 oz)   SpO2 96%   BMI 30.21 kg/m²   Physical Exam   Constitutional: She is oriented to person, place, and time and well-developed, well-nourished, and in no distress.   HENT:   Head: Normocephalic.   Right Ear: Tympanic membrane and external ear normal.   Left Ear: Tympanic membrane and external ear normal.   Nose: Nose normal.   Mouth/Throat: Uvula is midline and mucous membranes are normal. No oropharyngeal exudate or posterior oropharyngeal erythema.   Neck: Normal range of motion.   Cardiovascular: Normal rate and normal heart sounds.   Pulmonary/Chest: Effort normal and breath sounds normal. No respiratory distress.   Neurological: She is alert and oriented to person, " place, and time.   Skin: Skin is warm.   Psychiatric: Affect normal.          Assessment:     Encounter Diagnoses   Name Primary?    Sore throat Yes    Allergic rhinitis, unspecified seasonality, unspecified trigger         Plan:   Strep negative.    Xyzal sent to pharmacy. Flonase recommended for post nasal drip.  Tylenol and Ibuprofen for fever and discomfort.  Salt water gargles for sore throat relief.  Follow up with primary care physician in 1 week if symptoms do not resolve.  Report to ER with new or worsening symptoms.  Red flags include muffled voices, swelling in back of the throat, fever uncontrolled, etc.

## 2020-03-01 LAB — BACTERIA THROAT CULT: NORMAL

## 2020-03-06 DIAGNOSIS — I10 ESSENTIAL HYPERTENSION: ICD-10-CM

## 2020-03-06 DIAGNOSIS — I25.119 CORONARY ARTERY DISEASE INVOLVING NATIVE CORONARY ARTERY OF NATIVE HEART WITH ANGINA PECTORIS: ICD-10-CM

## 2020-03-06 DIAGNOSIS — E78.2 MIXED HYPERLIPIDEMIA: ICD-10-CM

## 2020-03-06 RX ORDER — LISINOPRIL 40 MG/1
40 TABLET ORAL DAILY
Qty: 90 TABLET | Refills: 3 | Status: SHIPPED | OUTPATIENT
Start: 2020-03-06 | End: 2020-09-04

## 2020-03-06 RX ORDER — ROSUVASTATIN CALCIUM 10 MG/1
TABLET, COATED ORAL
Qty: 30 TABLET | Refills: 10 | Status: SHIPPED | OUTPATIENT
Start: 2020-03-06 | End: 2021-03-18

## 2020-03-17 ENCOUNTER — TELEPHONE (OUTPATIENT)
Dept: CARDIOLOGY | Facility: CLINIC | Age: 80
End: 2020-03-17

## 2020-07-07 ENCOUNTER — TELEPHONE (OUTPATIENT)
Dept: URGENT CARE | Facility: CLINIC | Age: 80
End: 2020-07-07

## 2020-07-07 ENCOUNTER — OFFICE VISIT (OUTPATIENT)
Dept: URGENT CARE | Facility: CLINIC | Age: 80
End: 2020-07-07
Payer: MEDICARE

## 2020-07-07 ENCOUNTER — TELEPHONE (OUTPATIENT)
Dept: ORTHOPEDICS | Facility: CLINIC | Age: 80
End: 2020-07-07

## 2020-07-07 ENCOUNTER — HOSPITAL ENCOUNTER (OUTPATIENT)
Dept: RADIOLOGY | Facility: HOSPITAL | Age: 80
Discharge: HOME | End: 2020-07-07
Attending: NURSE PRACTITIONER
Payer: MEDICARE

## 2020-07-07 VITALS
WEIGHT: 177.81 LBS | DIASTOLIC BLOOD PRESSURE: 58 MMHG | BODY MASS INDEX: 29.59 KG/M2 | SYSTOLIC BLOOD PRESSURE: 127 MMHG | HEART RATE: 76 BPM | TEMPERATURE: 98 F | OXYGEN SATURATION: 96 %

## 2020-07-07 DIAGNOSIS — M79.641 RIGHT HAND PAIN: ICD-10-CM

## 2020-07-07 DIAGNOSIS — M25.531 RIGHT WRIST PAIN: ICD-10-CM

## 2020-07-07 DIAGNOSIS — G89.4 CHRONIC PAIN SYNDROME: ICD-10-CM

## 2020-07-07 DIAGNOSIS — M54.2 NECK ACHE: ICD-10-CM
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
## 2020-07-07 PROCEDURE — 99214 OFFICE O/P EST MOD 30 MIN: CPT | Mod: 25,S$PBB,, | Performed by: NURSE PRACTITIONER

## 2020-07-07 PROCEDURE — 72040 XR CERVICAL SPINE AP LATERAL: ICD-10-PCS | Mod: 26,,, | Performed by: RADIOLOGY

## 2020-07-07 PROCEDURE — 73130 XR HAND COMPLETE 3 VIEW RIGHT: ICD-10-PCS | Mod: 26,RT,, | Performed by: RADIOLOGY

## 2020-07-07 PROCEDURE — 73130 X-RAY EXAM OF HAND: CPT | Mod: 26,RT,, | Performed by: RADIOLOGY

## 2020-07-07 PROCEDURE — 73130 X-RAY EXAM OF HAND: CPT | Mod: TC,PO,RT

## 2020-07-07 PROCEDURE — 99214 PR OFFICE/OUTPT VISIT, EST, LEVL IV, 30-39 MIN: ICD-10-PCS | Mod: 25,S$PBB,, | Performed by: NURSE PRACTITIONER

## 2020-07-07 PROCEDURE — 99999 PR PBB SHADOW E&M-EST. PATIENT-LVL V: ICD-10-PCS | Mod: PBBFAC,,, | Performed by: NURSE PRACTITIONER

## 2020-07-07 PROCEDURE — 99999 PR PBB SHADOW E&M-EST. PATIENT-LVL V: CPT | Mod: PBBFAC,,, | Performed by: NURSE PRACTITIONER

## 2020-07-07 PROCEDURE — 99215 OFFICE O/P EST HI 40 MIN: CPT | Mod: PBBFAC,25,PO | Performed by: NURSE PRACTITIONER

## 2020-07-07 PROCEDURE — 72040 X-RAY EXAM NECK SPINE 2-3 VW: CPT | Mod: 26,,, | Performed by: RADIOLOGY

## 2020-07-07 PROCEDURE — 73090 X-RAY EXAM OF FOREARM: CPT | Mod: 26,RT,, | Performed by: RADIOLOGY

## 2020-07-07 PROCEDURE — 73090 XR FOREARM RIGHT: ICD-10-PCS | Mod: 26,RT,, | Performed by: RADIOLOGY

## 2020-07-07 PROCEDURE — 73090 X-RAY EXAM OF FOREARM: CPT | Mod: TC,PO,RT

## 2020-07-07 PROCEDURE — 72040 X-RAY EXAM NECK SPINE 2-3 VW: CPT | Mod: TC,PO

## 2020-07-07 NOTE — PATIENT INSTRUCTIONS
PLAN: X-ray cervical spine, right hand & forearm  Consult orthopedics  Advise use of splint  Advise elevate apply ice  Advise use Norco medication  Tylenol  for fever, headache and body aches.  Advise follow up with PCP in 2- 3 days for recheck  Advise go to ER if nausea, vomiting, fever, increased pain, or fail to improve with treatment.  AVS provided and reviewed with patient including supportive care, follow up, and red flag symptoms.   Patient verbalizes understanding and agrees with treatment plan. Discharged from Urgent Care in stable condition

## 2020-07-07 NOTE — PROGRESS NOTES
"CHIEF COMPLAINT/REASON FOR VISIT:  Fall, right hand, right wrist and neck pain    HISTORY OF PRESENT ILLNESS:  79-year-old female in wheelchair complains of neck, right hand, wrist pain onset Sunday, 3 days ago.  Complains of tripped & fell with hand & arm stretched out to catch self onset Sunday.  Denies seeking emergency room treatment.  Admits tried cool compresses with little relief.  Complains of taking Norco pain medication with some relief.  Discussed need for x-rays and Orthopedic surgery evaluation. Patient denies chest pain, shortness of breath, LOC, congestion, cough, dizziness, blurred vision, nausea, vomiting, diarrhea, " aching all over", fatigue, loss of appetite & fever      Past Medical History:   Diagnosis Date    Anxiety     Coronary artery disease     Depression     Fractures     Hot flash, menopausal     HTN (hypertension)     Hyperlipidemia     Hypothyroid           Social History     Socioeconomic History    Marital status:      Spouse name: Not on file    Number of children: Not on file    Years of education: Not on file    Highest education level: Not on file   Occupational History    Not on file   Social Needs    Financial resource strain: Not on file    Food insecurity     Worry: Not on file     Inability: Not on file    Transportation needs     Medical: Not on file     Non-medical: Not on file   Tobacco Use    Smoking status: Never Smoker    Smokeless tobacco: Never Used   Substance and Sexual Activity    Alcohol use: No    Drug use: No    Sexual activity: Not on file   Lifestyle    Physical activity     Days per week: Not on file     Minutes per session: Not on file    Stress: Not on file   Relationships    Social connections     Talks on phone: Not on file     Gets together: Not on file     Attends Synagogue service: Not on file     Active member of club or organization: Not on file     Attends meetings of clubs or organizations: Not on file     " Relationship status: Not on file   Other Topics Concern    Not on file   Social History Narrative    Not on file          Family History   Problem Relation Age of Onset    Hypertension Mother        ROS:  GENERAL:  Fall.  SKIN: No rashes, itching or changes in color or texture of skin.  HEENT: No LOC, dizziness, headaches or recent head trauma. . Denies ear pain, discharge or vertigo. No loss of smell, no epistaxis or postnasal drip. No hoarseness or change in voice.   NODES: No masses or lesions. Denies swollen glands.  CHEST: Denies cyanosis, wheezing, cough and sputum production.  CARDIOVASCULAR: Denies chest pain, PND, orthopnea or reduced exercise tolerance.  ABDOMEN: Appetite fine. No weight loss. Denies diarrhea, abdominal pain,   MUSCULOSKELETAL:  Right hand, wrist and forearm pain, neck pain  NEUROLOGIC: No history of seizures, paralysis, alteration of gait or coordination.  PSYCHIATRIC: Denies mood swings, depression or suicidal thoughts.    PE:   APPEARANCE: Well nourished, well developed, in moderate distress.  Temp 98.  Two, pulse ox 96%  V/S: Reviewed.  SKIN:  Right dorsal hand, wrist and forearm with soft tissue swelling and ecchymosis, right hand, wrist, forearm with limited range of motion due to pain, pulses palpable  CHEST:  No respiratory symptoms.  CARDIOVASCULAR: Regular rate and rhythm   MUSCULOSKELETAL:  Right dorsal hand, wrist and forearm with soft tissue swelling, ecchymosis, limited range of motion due to pain.  NEUROLOGIC: No sensory deficits. Gait & Posture:  In wheelchair. No cerebellar signs.  MENTAL STATUS: Patient alert, oriented x 3 & conversant.    PLAN: X-ray cervical spine, right hand & forearm  Consult orthopedics  Advise use of velcro splint vs ortho glass splint/ patient requesting Velcro splint  Advise elevate apply ice  Advise use Norco medication  Tylenol  for fever, headache and body aches.  Advise follow up with PCP in 2- 3 days for recheck  Advise go to ER if nausea,  vomiting, fever, increased pain, or fail to improve with treatment.  AVS provided and reviewed with patient including supportive care, follow up, and red flag symptoms.   Patient verbalizes understanding and agrees with treatment plan. Discharged from Urgent Care in stable condition.    Advise [facility] radiologist will officially read x-rays.  Mildly comminuted fracture right radial shaft     DIAGNOSIS:  Fall  Neck pain  Right hand pain  Hypertension  Chronic pain syndrome  Right wrist pain/ fracture right radial shaft

## 2020-07-07 NOTE — TELEPHONE ENCOUNTER
Called pt to schedule her an apt, I informed her to arrive early incase xr are needed. Pt understood,.

## 2020-07-07 NOTE — TELEPHONE ENCOUNTER
----- Message from Natty Howell NP sent at 7/7/2020  1:26 PM CDT -----  Advise patient neck  x-ray shows no acute fracture, thanks

## 2020-07-09 ENCOUNTER — PATIENT MESSAGE (OUTPATIENT)
Dept: ORTHOPEDICS | Facility: CLINIC | Age: 80
End: 2020-07-09

## 2020-07-09 ENCOUNTER — TELEPHONE (OUTPATIENT)
Dept: ORTHOPEDICS | Facility: CLINIC | Age: 80
End: 2020-07-09

## 2020-07-09 ENCOUNTER — HOSPITAL ENCOUNTER (OUTPATIENT)
Dept: RADIOLOGY | Facility: HOSPITAL | Age: 80
Discharge: HOME | End: 2020-07-09
Attending: PHYSICIAN ASSISTANT
Payer: MEDICARE

## 2020-07-09 ENCOUNTER — OFFICE VISIT (OUTPATIENT)
Dept: ORTHOPEDICS | Facility: CLINIC | Age: 80
End: 2020-07-09
Payer: MEDICARE

## 2020-07-09 VITALS
DIASTOLIC BLOOD PRESSURE: 77 MMHG | HEIGHT: 65 IN | SYSTOLIC BLOOD PRESSURE: 135 MMHG | HEART RATE: 81 BPM | BODY MASS INDEX: 29.65 KG/M2 | WEIGHT: 177.94 LBS

## 2020-07-09 DIAGNOSIS — M25.531 RIGHT WRIST PAIN: Primary | ICD-10-CM

## 2020-07-09 DIAGNOSIS — S52.501A CLOSED FRACTURE OF DISTAL END OF RIGHT RADIUS, UNSPECIFIED FRACTURE MORPHOLOGY, INITIAL ENCOUNTER: Primary | ICD-10-CM

## 2020-07-09 DIAGNOSIS — M79.641 RIGHT HAND PAIN: ICD-10-CM
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
## 2020-07-09 DIAGNOSIS — M25.531 RIGHT WRIST PAIN: ICD-10-CM

## 2020-07-09 PROCEDURE — 99999 PR PBB SHADOW E&M-EST. PATIENT-LVL IV: CPT | Mod: PBBFAC,,, | Performed by: PHYSICIAN ASSISTANT

## 2020-07-09 PROCEDURE — 99214 OFFICE O/P EST MOD 30 MIN: CPT | Mod: PBBFAC,25 | Performed by: PHYSICIAN ASSISTANT

## 2020-07-09 PROCEDURE — 73110 X-RAY EXAM OF WRIST: CPT | Mod: 26,RT,, | Performed by: RADIOLOGY

## 2020-07-09 PROCEDURE — 29085 APPL CAST HAND&LWR FOREARM: CPT | Mod: PBBFAC | Performed by: PHYSICIAN ASSISTANT

## 2020-07-09 PROCEDURE — 73110 X-RAY EXAM OF WRIST: CPT | Mod: TC,RT

## 2020-07-09 PROCEDURE — 29085 APPL CAST HAND&LWR FOREARM: CPT | Mod: S$PBB,RT,, | Performed by: PHYSICIAN ASSISTANT

## 2020-07-09 PROCEDURE — 99999 PR PBB SHADOW E&M-EST. PATIENT-LVL IV: ICD-10-PCS | Mod: PBBFAC,,, | Performed by: PHYSICIAN ASSISTANT

## 2020-07-09 PROCEDURE — 99214 OFFICE O/P EST MOD 30 MIN: CPT | Mod: 25,S$PBB,, | Performed by: PHYSICIAN ASSISTANT

## 2020-07-09 PROCEDURE — 73110 XR WRIST COMPLETE 3 VIEWS RIGHT: ICD-10-PCS | Mod: 26,RT,, | Performed by: RADIOLOGY

## 2020-07-09 PROCEDURE — 99214 PR OFFICE/OUTPT VISIT, EST, LEVL IV, 30-39 MIN: ICD-10-PCS | Mod: 25,S$PBB,, | Performed by: PHYSICIAN ASSISTANT

## 2020-07-09 PROCEDURE — 29085 PR APPLY HAND/WRIST CAST: ICD-10-PCS | Mod: S$PBB,RT,, | Performed by: PHYSICIAN ASSISTANT

## 2020-07-09 RX ORDER — LORATADINE 10 MG/1
10 TABLET ORAL DAILY
COMMUNITY
Start: 2020-03-31 | End: 2021-03-15

## 2020-07-09 RX ORDER — HYDROCODONE BITARTRATE AND ACETAMINOPHEN 10; 325 MG/1; MG/1
1 TABLET ORAL EVERY 4 HOURS PRN
Qty: 30 TABLET | Refills: 0 | Status: SHIPPED | OUTPATIENT
Start: 2020-07-09 | End: 2020-09-22

## 2020-07-09 NOTE — LETTER
July 9, 2020      Natty Howell, BRENDEN  [address]  [place] [address]           [facility]  [address] [place] [id number]  Phone: [phone]  Fax: [phone]          Patient: Rebecca Rios   MR Number: [id number]   YOB: 1940   Date of Visit: 7/9/2020       Dear Natty Howell:    Thank you for referring Rebecca Rios to me for evaluation. Attached you will find relevant portions of my assessment and plan of care.    If you have questions, please do not hesitate to call me. I look forward to following Rebecca Rios along with you.    Sincerely,    Zeinab Hernandes PA-C    Enclosure  CC:  No Recipients    If you would like to receive this communication electronically, please contact [email or web address] or [phone] to request more information on [employer] Link access.    For providers and/or their staff who would like to refer a patient to [facility], please contact us through our one-stop-shop provider referral line, [facility], at [phone].    If you feel you have received this communication in error or would no longer like to receive these types of communications, please e-mail [email or web address]

## 2020-07-09 NOTE — PROGRESS NOTES
Patient ID: Rebecca Rios is a 79 y.o. female.    Chief Complaint: Pain of the Right Wrist      HPI: Rebecca Rios  is a 79 y.o. female who c/o Pain of the Right Wrist   for duration of 4 days.  She had a fall on 07/05/2020.  She was backing up when she tripped and fell.  Pain level today is 10/10.  She was seen in urgent care done [place] [address] and diagnosed with a distal radius fracture.  She was advised to follow up with Orthopedics.  Pain quality is aching and sharp.  Worsened with movement.  Improved with immobilization.  Alleviating factors also include Tylenol and Norco.  She tells me that when they put her into a splint, they put her into a left-sided cock-up wrist splint instead of a right-sided cock-up wrist splint.    Past Medical History:   Diagnosis Date    Anxiety     Coronary artery disease     Depression     Fractures     Hot flash, menopausal     HTN (hypertension)     Hyperlipidemia     Hypothyroid      Past Surgical History:   Procedure Laterality Date    CARPAL TUNNEL RELEASE Bilateral     CHOLECYSTECTOMY      HYSTERECTOMY      open luis       Family History   Problem Relation Age of Onset    Hypertension Mother      Social History     Socioeconomic History    Marital status:      Spouse name: Not on file    Number of children: Not on file    Years of education: Not on file    Highest education level: Not on file   Occupational History    Not on file   Social Needs    Financial resource strain: Not on file    Food insecurity     Worry: Not on file     Inability: Not on file    Transportation needs     Medical: Not on file     Non-medical: Not on file   Tobacco Use    Smoking status: Never Smoker    Smokeless tobacco: Never Used   Substance and Sexual Activity    Alcohol use: No    Drug use: No    Sexual activity: Not on file   Lifestyle    Physical activity     Days per week: Not on file     Minutes per session: Not on file    Stress: Not on file    Relationships    Social connections     Talks on phone: Not on file     Gets together: Not on file     Attends Mu-ism service: Not on file     Active member of club or organization: Not on file     Attends meetings of clubs or organizations: Not on file     Relationship status: Not on file   Other Topics Concern    Not on file   Social History Narrative    Not on file     Medication List with Changes/Refills   New Medications    HYDROCODONE-ACETAMINOPHEN (NORCO)  MG PER TABLET    Take 1 tablet by mouth every 4 (four) hours as needed for Pain (pain).   Current Medications    ALPRAZOLAM (XANAX) 0.5 MG TABLET    Take 0.5 mg by mouth 2 (two) times daily as needed for Anxiety.    AMLODIPINE (NORVASC) 5 MG TABLET    Take 1 tablet (5 mg total) by mouth once daily.    ASPIRIN (ECOTRIN) 81 MG EC TABLET    Take 1 tablet (81 mg total) by mouth once daily.    BUPROPION (WELLBUTRIN SR) 150 MG TBSR 12 HR TABLET    Take 150 mg by mouth 2 (two) times daily.     DIPHENOXYLATE-ATROPINE 2.5-0.025 MG (LOMOTIL) 2.5-0.025 MG PER TABLET    Take 1 tablet by mouth 4 (four) times daily as needed for Diarrhea.    DULOXETINE (CYMBALTA) 30 MG CAPSULE    TAKE 1 CAPSULE BY MOUTH ONCE DAILY    ESOMEPRAZOLE (NEXIUM) 40 MG CAPSULE    Take 40 mg by mouth once daily.     FLUTICASONE PROPIONATE (FLONASE) 50 MCG/ACTUATION NASAL SPRAY    2 sprays (100 mcg total) by Each Nostril route once daily.    GABAPENTIN (NEURONTIN) 600 MG TABLET    Take 600 mg by mouth 2 (two) times daily.    HYDROCODONE-ACETAMINOPHEN 10-325MG (NORCO)  MG TAB    Take 1 tablet by mouth 3 (three) times daily as needed.     LEVOCETIRIZINE (XYZAL) 5 MG TABLET    Take 1 tablet (5 mg total) by mouth every evening.    LEVOTHYROXINE (SYNTHROID) 75 MCG TABLET    TAKE ONE TABLET BY MOUTH ONCE DAILY    LISINOPRIL (PRINIVIL,ZESTRIL) 40 MG TABLET    Take 1 tablet (40 mg total) by mouth once daily.    LORATADINE (CLARITIN) 10 MG TABLET    Take 10 mg by mouth once daily.     ROSUVASTATIN (CRESTOR) 10 MG TABLET    T1T PO QPM    TIZANIDINE (ZANAFLEX) 4 MG TABLET    Take 4 mg by mouth every evening.     VOLTAREN 1 % GEL    APPLY 2 GRAMS TOPICALLY ONCE DAILY     Review of patient's allergies indicates:   Allergen Reactions    Erythromycin     Macrolide antibiotics Nausea And Vomiting       Objective:        General    Nursing note and vitals reviewed.  Constitutional: She is oriented to person, place, and time. She appears well-developed and well-nourished.   HENT:   Head: Normocephalic and atraumatic.   Eyes: EOM are normal.   Cardiovascular: Normal rate and regular rhythm.    Pulmonary/Chest: Effort normal.   Abdominal: Soft.   Neurological: She is alert and oriented to person, place, and time.   Psychiatric: She has a normal mood and affect. Her behavior is normal.             Right Hand/Wrist Exam     Other     Neuorologic Exam    Median Distribution: normal  Ulnar Distribution: normal  Radial Distribution: normal    Comments:  2+ rad pulse  Compartments soft  Tender palpation distal radius  Extensive ecchymosis right wrist and hand  No pain-free range of motion right wrist  Difficulty with flexion of her fingers          Vascular Exam       Capillary Refill  Right Hand: normal capillary refill      Xray Images and report were reviewed today.  I agree with the radiologist's interpretation.    X-Ray Wrist Complete Right  Narrative: EXAMINATION:  XR WRIST COMPLETE 3 VIEWS RIGHT    CLINICAL HISTORY:  Pain in right wrist    TECHNIQUE:  PA, lateral, and oblique views of the right wrist were performed.    COMPARISON:  Plain films from 07/07/2020    FINDINGS:  There is a comminuted intra-articular fracture of the distal radius that extends into the metadiaphyseal region of the radius.  Mild impaction noted at the fracture site.  A nondisplaced ulnar styloid process fracture is also suspected.  Impression: As above    Electronically signed by: Saul Page  DO  Date:    07/09/2020  Time:    14:54        Assessment:       Encounter Diagnoses   Name Primary?    Fall, initial encounter     Right wrist pain     Right hand pain     Closed fracture of distal end of right radius, unspecified fracture morphology, initial encounter Yes          Plan:       Rebecca was seen today for pain.    Diagnoses and all orders for this visit:    Closed fracture of distal end of right radius, unspecified fracture morphology, initial encounter  -     HYDROcodone-acetaminophen (NORCO)  mg per tablet; Take 1 tablet by mouth every 4 (four) hours as needed for Pain (pain).    Fall, initial encounter  -     Ambulatory referral/consult to Orthopedics    Right wrist pain  -     Ambulatory referral/consult to Orthopedics  -     HYDROcodone-acetaminophen (NORCO)  mg per tablet; Take 1 tablet by mouth every 4 (four) hours as needed for Pain (pain).    Right hand pain  -     Ambulatory referral/consult to Orthopedics        Rebecca Rios is an established pt who comes in today for evaluation of a new problem.  She has a right distal radius fracture.  It is relatively well aligned.  I have reviewed her x-rays with Dr. Quijano who recommends non operative management in exo splint.  We did not have the correct size for her.  Instead, I will put her into a short-arm cast.  I would like to see her back in the office in 2 weeks for new x-rays in the cast.  Assuming x-rays are stable and cast is fitting well, she will continue with the cast an additional 2 weeks for an appointment at 4 weeks from today with new x-rays out of the cast.  She is on Norco 10 3 times daily.  She says the Norco helps but wears off before the 8 hr butch.  I gave her new prescription for Norco 10 Q 4-6 hours p.r.n. pain.  She verbalizes understanding and agrees.    Application short arm cast - right:  I placed the patient into well padded and well molded short arm fiberglass cast for the right wrist fracture. She  tolerated the procedure(s) well. She was sent home in stable condition after going over cast care instructions; keeping it dry, not using it as a weapon, and not sticking anything down the cast(s) to scratch an itch. Patient experienced improved pain control after the casts were placed and was sent home in stable condition.  She will follow up as instructed and notify the office of any problems prior to follow up.    Follow up in about 2 weeks (around 7/23/2020) for xray in cast; and 4 weeks with xray out of cast.    The patient understands, chooses and consents to this plan and accepts all   the risks which include but are not limited to the risks mentioned above.     Disclaimer: This note was prepared using a voice recognition system and is likely to have sound alike errors within the text.

## 2020-07-09 NOTE — TELEPHONE ENCOUNTER
I see you have an appt to see me later this afternoon for your wrist.  I would like to get an actual x-ray of the wrist prior to your appointment.  Can you be here for 230 for those x-rays?  Also, are you in any sort of splint?  If so, is it removable?  Thank you!  Zeinab Hernandes PA-C  [facility]  [facility]

## 2020-07-09 NOTE — TELEPHONE ENCOUNTER
Spoke with [facility] and informed her that I would notify Zeinab Hernandes PA-C on tomorrow and give her a call back regarding patient's prescription. Understanding verbalized.

## 2020-07-09 NOTE — TELEPHONE ENCOUNTER
----- Message from Neyda Cristina sent at 7/9/2020  4:47 PM CDT -----  [facility] called stating that pt already gets HYDROcodone-acetaminophen (NORCO)  mg per tablet prescribed to her by a different Doctor. Please reach out to [facility] for clarity on this matter at  [phone]

## 2020-07-10 ENCOUNTER — TELEPHONE (OUTPATIENT)
Dept: ORTHOPEDICS | Facility: CLINIC | Age: 80
End: 2020-07-10

## 2020-07-10 NOTE — TELEPHONE ENCOUNTER
Spoke with Pharmacist / [facility] and informed them that it is okay to fill her Hydrocodone prescription. Understanding verbalized

## 2020-07-23 ENCOUNTER — HOSPITAL ENCOUNTER (OUTPATIENT)
Dept: RADIOLOGY | Facility: HOSPITAL | Age: 80
Discharge: HOME | End: 2020-07-23
Attending: PHYSICIAN ASSISTANT
Payer: MEDICARE

## 2020-07-23 ENCOUNTER — OFFICE VISIT (OUTPATIENT)
Dept: ORTHOPEDICS | Facility: CLINIC | Age: 80
End: 2020-07-23
Payer: MEDICARE

## 2020-07-23 VITALS
DIASTOLIC BLOOD PRESSURE: 66 MMHG | BODY MASS INDEX: 29.49 KG/M2 | HEIGHT: 65 IN | WEIGHT: 177 LBS | HEART RATE: 61 BPM | SYSTOLIC BLOOD PRESSURE: 130 MMHG

## 2020-07-23 DIAGNOSIS — S52.501A CLOSED FRACTURE OF DISTAL END OF RIGHT RADIUS, UNSPECIFIED FRACTURE MORPHOLOGY, INITIAL ENCOUNTER: ICD-10-CM

## 2020-07-23 DIAGNOSIS — M25.531 RIGHT WRIST PAIN: ICD-10-CM
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
## 2020-07-23 PROCEDURE — 73110 XR WRIST COMPLETE 3 VIEWS RIGHT: ICD-10-PCS | Mod: 26,RT,, | Performed by: RADIOLOGY

## 2020-07-23 PROCEDURE — 99214 OFFICE O/P EST MOD 30 MIN: CPT | Mod: PBBFAC,25 | Performed by: PHYSICIAN ASSISTANT

## 2020-07-23 PROCEDURE — 99213 PR OFFICE/OUTPT VISIT, EST, LEVL III, 20-29 MIN: ICD-10-PCS | Mod: S$PBB,,, | Performed by: PHYSICIAN ASSISTANT

## 2020-07-23 PROCEDURE — 73110 X-RAY EXAM OF WRIST: CPT | Mod: 26,RT,, | Performed by: RADIOLOGY

## 2020-07-23 PROCEDURE — 73110 X-RAY EXAM OF WRIST: CPT | Mod: TC,RT

## 2020-07-23 PROCEDURE — 99999 PR PBB SHADOW E&M-EST. PATIENT-LVL IV: CPT | Mod: PBBFAC,,, | Performed by: PHYSICIAN ASSISTANT

## 2020-07-23 PROCEDURE — 99213 OFFICE O/P EST LOW 20 MIN: CPT | Mod: S$PBB,,, | Performed by: PHYSICIAN ASSISTANT

## 2020-07-23 PROCEDURE — 99999 PR PBB SHADOW E&M-EST. PATIENT-LVL IV: ICD-10-PCS | Mod: PBBFAC,,, | Performed by: PHYSICIAN ASSISTANT

## 2020-07-23 NOTE — PROGRESS NOTES
Patient ID: Rebecca Rios is a 79 y.o. female.    Chief Complaint: Pain of the Right Wrist      HPI: Rebecca Rios  is a 79 y.o. female who c/o Pain of the Right Wrist   for duration of 2 weeks.  She fell and sustained a distal radius fracture.  I have been treating her conservatively in a short-arm cast.  She comes today for repeat x-rays in routine follow-up.  Pain level is improving slowly.  8/10 in severity.  Quality aching, sharp, shooting.  She is having new onset of electrical type sensations shooting through her fingers.  Alleviating factors include cast immobilization.  Aggravating factors include trying to move it or weightbear through the cast.    Past Medical History:   Diagnosis Date    Anxiety     Coronary artery disease     Depression     Fractures     Hot flash, menopausal     HTN (hypertension)     Hyperlipidemia     Hypothyroid      Past Surgical History:   Procedure Laterality Date    CARPAL TUNNEL RELEASE Bilateral     CHOLECYSTECTOMY      HYSTERECTOMY      open luis       Family History   Problem Relation Age of Onset    Hypertension Mother      Social History     Socioeconomic History    Marital status:      Spouse name: Not on file    Number of children: Not on file    Years of education: Not on file    Highest education level: Not on file   Occupational History    Not on file   Social Needs    Financial resource strain: Not on file    Food insecurity     Worry: Not on file     Inability: Not on file    Transportation needs     Medical: Not on file     Non-medical: Not on file   Tobacco Use    Smoking status: Never Smoker    Smokeless tobacco: Never Used   Substance and Sexual Activity    Alcohol use: No    Drug use: No    Sexual activity: Not on file   Lifestyle    Physical activity     Days per week: Not on file     Minutes per session: Not on file    Stress: Not on file   Relationships    Social connections     Talks on phone: Not on file     Gets  together: Not on file     Attends Denominational service: Not on file     Active member of club or organization: Not on file     Attends meetings of clubs or organizations: Not on file     Relationship status: Not on file   Other Topics Concern    Not on file   Social History Narrative    Not on file     Medication List with Changes/Refills   Current Medications    ALPRAZOLAM (XANAX) 0.5 MG TABLET    Take 0.5 mg by mouth 2 (two) times daily as needed for Anxiety.    AMLODIPINE (NORVASC) 5 MG TABLET    Take 1 tablet (5 mg total) by mouth once daily.    ASPIRIN (ECOTRIN) 81 MG EC TABLET    Take 1 tablet (81 mg total) by mouth once daily.    BUPROPION (WELLBUTRIN SR) 150 MG TBSR 12 HR TABLET    Take 150 mg by mouth 2 (two) times daily.     DIPHENOXYLATE-ATROPINE 2.5-0.025 MG (LOMOTIL) 2.5-0.025 MG PER TABLET    Take 1 tablet by mouth 4 (four) times daily as needed for Diarrhea.    DULOXETINE (CYMBALTA) 30 MG CAPSULE    TAKE 1 CAPSULE BY MOUTH ONCE DAILY    ESOMEPRAZOLE (NEXIUM) 40 MG CAPSULE    Take 40 mg by mouth once daily.     FLUTICASONE PROPIONATE (FLONASE) 50 MCG/ACTUATION NASAL SPRAY    2 sprays (100 mcg total) by Each Nostril route once daily.    GABAPENTIN (NEURONTIN) 600 MG TABLET    Take 600 mg by mouth 2 (two) times daily.    HYDROCODONE-ACETAMINOPHEN (NORCO)  MG PER TABLET    Take 1 tablet by mouth every 4 (four) hours as needed for Pain (pain).    HYDROCODONE-ACETAMINOPHEN 10-325MG (NORCO)  MG TAB    Take 1 tablet by mouth 3 (three) times daily as needed.     LEVOCETIRIZINE (XYZAL) 5 MG TABLET    Take 1 tablet (5 mg total) by mouth every evening.    LEVOTHYROXINE (SYNTHROID) 75 MCG TABLET    TAKE ONE TABLET BY MOUTH ONCE DAILY    LISINOPRIL (PRINIVIL,ZESTRIL) 40 MG TABLET    Take 1 tablet (40 mg total) by mouth once daily.    LORATADINE (CLARITIN) 10 MG TABLET    Take 10 mg by mouth once daily.    ROSUVASTATIN (CRESTOR) 10 MG TABLET    T1T PO QPM    TIZANIDINE (ZANAFLEX) 4 MG TABLET    Take 4 mg  by mouth every evening.     VOLTAREN 1 % GEL    APPLY 2 GRAMS TOPICALLY ONCE DAILY     Review of patient's allergies indicates:   Allergen Reactions    Erythromycin     Macrolide antibiotics Nausea And Vomiting       Objective:        General    Nursing note and vitals reviewed.  Constitutional: She is oriented to person, place, and time. She appears well-developed and well-nourished.   HENT:   Head: Normocephalic and atraumatic.   Eyes: EOM are normal.   Cardiovascular: Normal rate and regular rhythm.    Pulmonary/Chest: Effort normal.   Abdominal: Soft.   Neurological: She is alert and oriented to person, place, and time.   Psychiatric: She has a normal mood and affect. Her behavior is normal.             Right Hand/Wrist Exam     Comments:  Cast intact and fitting well  Sensation intact distally  Wiggles fingers  No tenderness proximal forearm            Xray Images and report were reviewed today.  I agree with the radiologist's interpretation.    X-Ray Wrist Complete Right  Narrative: EXAMINATION:  XR WRIST COMPLETE 3 VIEWS RIGHT    CLINICAL HISTORY:  Pain in right wrist    TECHNIQUE:  PA, lateral, and oblique views of the right wrist were performed.    COMPARISON:  July 9, 2020    FINDINGS:  Images obtained through well applied cast.  There is mild soft tissue swelling.  Faintly visualized healing fracture distal radial metaphysis with slight posterior angulation at the radiocarpal joint on the lateral view.  Limited visualization of the distal ulna is patient with known styloid fracture.  Mild degenerative change throughout the wrist and included hand.  Impression: As above.    Electronically signed by: Robert Rico MD  Date:    07/23/2020  Time:    15:29        Assessment:       Encounter Diagnoses   Name Primary?    Fall, initial encounter Yes    Closed fracture of distal end of right radius, unspecified fracture morphology, initial encounter           Plan:       Rebecca was seen today for  pain.    Diagnoses and all orders for this visit:    Fall, initial encounter  -     Ambulatory referral[facility]consult to [facility]; Future    Closed fracture of distal end of right radius, unspecified fracture morphology, initial encounter  -     Ambulatory referral/consult to [facility]; Future        Rebecca Rios is an established pt who comes in today for follow-up on the above.  She will continue with past for 2 weeks as planned.  At her follow-up I will see her for x-rays out of the cast.  Additionally have submitted a referral for the [facility] to be evaluated for osteoporosis.  If she has any problems prior to follow-up, she will notify the office.  She verbalized understanding and agrees.    Follow up in about 2 weeks (around 8/6/2020) for X-ray out of cast right wrist.    The patient understands, chooses and consents to this plan and accepts all   the risks which include but are not limited to the risks mentioned above.     Disclaimer: This note was prepared using a voice recognition system and is likely to have sound alike errors within the text.

## 2020-07-24 DIAGNOSIS — M81.0 OSTEOPOROSIS, UNSPECIFIED OSTEOPOROSIS TYPE, UNSPECIFIED PATHOLOGICAL FRACTURE PRESENCE: Primary | ICD-10-CM

## 2020-08-06 ENCOUNTER — OFFICE VISIT (OUTPATIENT)
Dept: ORTHOPEDICS | Facility: CLINIC | Age: 80
End: 2020-08-06
Payer: MEDICARE

## 2020-08-06 ENCOUNTER — HOSPITAL ENCOUNTER (OUTPATIENT)
Dept: RADIOLOGY | Facility: HOSPITAL | Age: 80
Discharge: HOME | End: 2020-08-06
Attending: PHYSICIAN ASSISTANT
Payer: MEDICARE

## 2020-08-06 VITALS
HEIGHT: 65 IN | RESPIRATION RATE: 16 BRPM | HEART RATE: 80 BPM | BODY MASS INDEX: 29.49 KG/M2 | DIASTOLIC BLOOD PRESSURE: 74 MMHG | WEIGHT: 177 LBS | SYSTOLIC BLOOD PRESSURE: 134 MMHG

## 2020-08-06 DIAGNOSIS — M25.531 RIGHT WRIST PAIN: Primary | ICD-10-CM

## 2020-08-06 DIAGNOSIS — R20.0 NUMBNESS AND TINGLING IN RIGHT HAND: ICD-10-CM

## 2020-08-06 DIAGNOSIS — R20.2 NUMBNESS AND TINGLING IN RIGHT HAND: ICD-10-CM

## 2020-08-06 DIAGNOSIS — S52.501A CLOSED FRACTURE OF DISTAL END OF RIGHT RADIUS, UNSPECIFIED FRACTURE MORPHOLOGY, INITIAL ENCOUNTER: Primary | ICD-10-CM

## 2020-08-06 DIAGNOSIS — M25.531 RIGHT WRIST PAIN: ICD-10-CM

## 2020-08-06 PROCEDURE — 73110 XR WRIST COMPLETE 3 VIEWS RIGHT: ICD-10-PCS | Mod: 26,RT,, | Performed by: RADIOLOGY

## 2020-08-06 PROCEDURE — 99214 OFFICE O/P EST MOD 30 MIN: CPT | Mod: S$PBB,,, | Performed by: PHYSICIAN ASSISTANT

## 2020-08-06 PROCEDURE — 73110 X-RAY EXAM OF WRIST: CPT | Mod: 26,RT,, | Performed by: RADIOLOGY

## 2020-08-06 PROCEDURE — 73110 X-RAY EXAM OF WRIST: CPT | Mod: TC,RT

## 2020-08-06 PROCEDURE — 99214 OFFICE O/P EST MOD 30 MIN: CPT | Mod: PBBFAC,25 | Performed by: PHYSICIAN ASSISTANT

## 2020-08-06 PROCEDURE — 99214 PR OFFICE/OUTPT VISIT, EST, LEVL IV, 30-39 MIN: ICD-10-PCS | Mod: S$PBB,,, | Performed by: PHYSICIAN ASSISTANT

## 2020-08-06 PROCEDURE — 99999 PR PBB SHADOW E&M-EST. PATIENT-LVL IV: ICD-10-PCS | Mod: PBBFAC,,, | Performed by: PHYSICIAN ASSISTANT

## 2020-08-06 PROCEDURE — 99999 PR PBB SHADOW E&M-EST. PATIENT-LVL IV: CPT | Mod: PBBFAC,,, | Performed by: PHYSICIAN ASSISTANT

## 2020-08-06 NOTE — Clinical Note
This is a 79-year-old with a distal radius fracture.  Minimal dorsal angulation on today's x-rays, but overall has interval callus formation and similar alignment to prior films.  Pain is worsening substantially.  She has go little bit of numbness and tingling despite carpal tunnel release many years ago.  I ordered a new EMG for her and I am going to have her follow up with you once that has been done.  I will go ahead and get new x-rays at that time as well.

## 2020-08-06 NOTE — PROGRESS NOTES
Patient ID: Rebecca Rios is a 79 y.o. female.    Chief Complaint: Pain of the Right Wrist      HPI: Rebecca Rios  is a 79 y.o. female who c/o Pain of the Right Wrist   for duration of 1 month.  She says her pain is worse now than it was when she initially got injured.  I have been treating her for minimally displaced distal radius fracture.  She has been in a short-arm cast for the last month.  She complains of numbness and tingling in the right hand as well as burning pain in the wrist and hand.  It goes into the digits.  Quality is aching and intermittent.  9/10 in severity.  Alleviating factors include ice, over-the-counter medications, pain medicine.  Aggravating factors include weight-bearing and nighttime.    Past Medical History:   Diagnosis Date    Anxiety     Coronary artery disease     Depression     Fractures     Hot flash, menopausal     HTN (hypertension)     Hyperlipidemia     Hypothyroid      Past Surgical History:   Procedure Laterality Date    CARPAL TUNNEL RELEASE Bilateral     CHOLECYSTECTOMY      HYSTERECTOMY      open luis       Family History   Problem Relation Age of Onset    Hypertension Mother      Social History     Socioeconomic History    Marital status:      Spouse name: Not on file    Number of children: Not on file    Years of education: Not on file    Highest education level: Not on file   Occupational History    Not on file   Social Needs    Financial resource strain: Not on file    Food insecurity     Worry: Not on file     Inability: Not on file    Transportation needs     Medical: Not on file     Non-medical: Not on file   Tobacco Use    Smoking status: Never Smoker    Smokeless tobacco: Never Used   Substance and Sexual Activity    Alcohol use: No    Drug use: No    Sexual activity: Not Currently     Partners: Male   Lifestyle    Physical activity     Days per week: Not on file     Minutes per session: Not on file    Stress: Not on file    Relationships    Social connections     Talks on phone: Not on file     Gets together: Not on file     Attends Jainism service: Not on file     Active member of club or organization: Not on file     Attends meetings of clubs or organizations: Not on file     Relationship status: Not on file   Other Topics Concern    Not on file   Social History Narrative    Not on file     Medication List with Changes/Refills   Current Medications    ALPRAZOLAM (XANAX) 0.5 MG TABLET    Take 0.5 mg by mouth 2 (two) times daily as needed for Anxiety.    AMLODIPINE (NORVASC) 5 MG TABLET    Take 1 tablet (5 mg total) by mouth once daily.    ASPIRIN (ECOTRIN) 81 MG EC TABLET    Take 1 tablet (81 mg total) by mouth once daily.    BUPROPION (WELLBUTRIN SR) 150 MG TBSR 12 HR TABLET    Take 150 mg by mouth 2 (two) times daily.     DIPHENOXYLATE-ATROPINE 2.5-0.025 MG (LOMOTIL) 2.5-0.025 MG PER TABLET    Take 1 tablet by mouth 4 (four) times daily as needed for Diarrhea.    DULOXETINE (CYMBALTA) 30 MG CAPSULE    TAKE 1 CAPSULE BY MOUTH ONCE DAILY    ESOMEPRAZOLE (NEXIUM) 40 MG CAPSULE    Take 40 mg by mouth once daily.     FLUTICASONE PROPIONATE (FLONASE) 50 MCG/ACTUATION NASAL SPRAY    2 sprays (100 mcg total) by Each Nostril route once daily.    GABAPENTIN (NEURONTIN) 600 MG TABLET    Take 600 mg by mouth 2 (two) times daily.    HYDROCODONE-ACETAMINOPHEN (NORCO)  MG PER TABLET    Take 1 tablet by mouth every 4 (four) hours as needed for Pain (pain).    HYDROCODONE-ACETAMINOPHEN 10-325MG (NORCO)  MG TAB    Take 1 tablet by mouth 3 (three) times daily as needed.     LEVOCETIRIZINE (XYZAL) 5 MG TABLET    Take 1 tablet (5 mg total) by mouth every evening.    LEVOTHYROXINE (SYNTHROID) 75 MCG TABLET    TAKE ONE TABLET BY MOUTH ONCE DAILY    LISINOPRIL (PRINIVIL,ZESTRIL) 40 MG TABLET    Take 1 tablet (40 mg total) by mouth once daily.    LORATADINE (CLARITIN) 10 MG TABLET    Take 10 mg by mouth once daily.    ROSUVASTATIN  (CRESTOR) 10 MG TABLET    T1T PO QPM    TIZANIDINE (ZANAFLEX) 4 MG TABLET    Take 4 mg by mouth every evening.     VOLTAREN 1 % GEL    APPLY 2 GRAMS TOPICALLY ONCE DAILY     Review of patient's allergies indicates:   Allergen Reactions    Erythromycin     Macrolide antibiotics Nausea And Vomiting       Objective:        General    Nursing note and vitals reviewed.  Constitutional: She is oriented to person, place, and time. She appears well-developed and well-nourished.   HENT:   Head: Normocephalic and atraumatic.   Eyes: EOM are normal.   Cardiovascular: Normal rate and regular rhythm.    Pulmonary/Chest: Effort normal.   Abdominal: Soft.   Neurological: She is alert and oriented to person, place, and time.   Psychiatric: She has a normal mood and affect. Her behavior is normal.             Right Hand/Wrist Exam     Other     Neuorologic Exam    Median Distribution: normal  Ulnar Distribution: normal  Radial Distribution: normal    Comments:  2+ rad pulse  Comp soft  Mild tenderness to palpation distal radius  Some pain-free passive and active range of motion  Decreased supination due to pain  Positive Tinel's          Vascular Exam       Capillary Refill  Right Hand: normal capillary refill      Xray Images and report were reviewed today.  I agree with the radiologist's interpretation.    X-Ray Wrist Complete Right  Narrative: EXAMINATION:  XR WRIST COMPLETE 3 VIEWS RIGHT    CLINICAL HISTORY:  Pain in right wrist    TECHNIQUE:  PA, lateral, and oblique views of the right wrist were performed.    COMPARISON:  07/23/2020.  07/09/2020    FINDINGS:  Interval removal of overlying splint material.  Distal right radius and ulnar styloid process fractures are again seen with evidence of interval healing since the comparison studies.  There is slight dorsal angulation of the major distal radius fracture fragment.  Slight positive ulnar variance appearance.  Bones are demineralized.  Soft tissue swelling  persists.  Impression: As above    Electronically signed by: Santo Lezama MD  Date:    08/06/2020  Time:    16:14        Assessment:       Encounter Diagnoses   Name Primary?    Closed fracture of distal end of right radius, unspecified fracture morphology, initial encounter Yes    Numbness and tingling in right hand           Plan:       Rebecca was seen today for pain.    Diagnoses and all orders for this visit:    Closed fracture of distal end of right radius, unspecified fracture morphology, initial encounter  -     Nerve conduction test; Future    Numbness and tingling in right hand  -     Nerve conduction test; Future        Rebecca Rios is an established pt who comes in today for follow-up on the above.  I would like to get an EMG nerve conduction study to evaluate for carpal tunnel syndrome as a cause for worsening symptoms.  She has interval callus formation noted on x-rays today.  I would like her to follow up with Dr. Quijano once the EMG has been completed.  We will go ahead and repeat x-rays at that time as well.  In the meantime, she should use a long cock-up wrist splint for immobilization.  Starting in 2 weeks,  She may take it off a couple of times a day to work on gentle motion.  She may take it off for showers.  She should avoid heavy lifting and should continue being nonweightbearing.  She verbalizes understanding and agrees.    Follow up for Emg results with Dr. Graf.    The patient understands, chooses and consents to this plan and accepts all   the risks which include but are not limited to the risks mentioned above.     Disclaimer: This note was prepared using a voice recognition system and is likely to have sound alike errors within the text.

## 2020-08-14 ENCOUNTER — TELEPHONE (OUTPATIENT)
Dept: RHEUMATOLOGY | Facility: CLINIC | Age: 80
End: 2020-08-14

## 2020-08-17 ENCOUNTER — OFFICE VISIT (OUTPATIENT)
Dept: RHEUMATOLOGY | Facility: CLINIC | Age: 80
End: 2020-08-17
Payer: MEDICARE

## 2020-08-17 ENCOUNTER — APPOINTMENT (OUTPATIENT)
Dept: RADIOLOGY | Facility: HOSPITAL | Age: 80
End: 2020-08-17
Attending: PHYSICIAN ASSISTANT
Payer: MEDICARE

## 2020-08-17 VITALS
WEIGHT: 178.44 LBS | BODY MASS INDEX: 29.73 KG/M2 | SYSTOLIC BLOOD PRESSURE: 134 MMHG | HEIGHT: 65 IN | DIASTOLIC BLOOD PRESSURE: 69 MMHG | HEART RATE: 76 BPM

## 2020-08-17 DIAGNOSIS — S52.501A CLOSED FRACTURE OF DISTAL END OF RIGHT RADIUS, UNSPECIFIED FRACTURE MORPHOLOGY, INITIAL ENCOUNTER: ICD-10-CM

## 2020-08-17 DIAGNOSIS — K21.9 GASTROESOPHAGEAL REFLUX DISEASE, ESOPHAGITIS PRESENCE NOT SPECIFIED: ICD-10-CM
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
## 2020-08-17 DIAGNOSIS — M81.0 OSTEOPOROSIS, UNSPECIFIED OSTEOPOROSIS TYPE, UNSPECIFIED PATHOLOGICAL FRACTURE PRESENCE: ICD-10-CM

## 2020-08-17 DIAGNOSIS — M81.0 AGE-RELATED OSTEOPOROSIS WITHOUT CURRENT PATHOLOGICAL FRACTURE: Primary | ICD-10-CM

## 2020-08-17 PROCEDURE — 99215 OFFICE O/P EST HI 40 MIN: CPT | Mod: PBBFAC,25 | Performed by: PHYSICIAN ASSISTANT

## 2020-08-17 PROCEDURE — 77080 DXA BONE DENSITY AXIAL: CPT | Mod: TC

## 2020-08-17 PROCEDURE — 77080 DEXA BONE DENSITY SPINE HIP: ICD-10-PCS | Mod: 26,,, | Performed by: RADIOLOGY

## 2020-08-17 PROCEDURE — 99214 PR OFFICE/OUTPT VISIT, EST, LEVL IV, 30-39 MIN: ICD-10-PCS | Mod: S$PBB,ICN,, | Performed by: PHYSICIAN ASSISTANT

## 2020-08-17 PROCEDURE — 99214 OFFICE O/P EST MOD 30 MIN: CPT | Mod: S$PBB,ICN,, | Performed by: PHYSICIAN ASSISTANT

## 2020-08-17 PROCEDURE — 99999 PR PBB SHADOW E&M-EST. PATIENT-LVL V: ICD-10-PCS | Mod: PBBFAC,,, | Performed by: PHYSICIAN ASSISTANT

## 2020-08-17 PROCEDURE — 77080 DXA BONE DENSITY AXIAL: CPT | Mod: 26,,, | Performed by: RADIOLOGY

## 2020-08-17 PROCEDURE — 99999 PR PBB SHADOW E&M-EST. PATIENT-LVL V: CPT | Mod: PBBFAC,,, | Performed by: PHYSICIAN ASSISTANT

## 2020-08-17 RX ORDER — ALENDRONATE SODIUM 70 MG/1
70 TABLET ORAL
Qty: 12 TABLET | Refills: 11 | Status: SHIPPED | OUTPATIENT
Start: 2020-08-17 | End: 2021-03-26

## 2020-08-17 NOTE — LETTER
August 17, 2020      Zeinab Hernandes PA-C  [address] [facility]  [place] [id number]           [facility]  [address] [place] [address] [address] [place] [address]  Phone: [phone]  Fax: [phone]          Patient: Rebecca Rios   MR Number: [id number]   YOB: 1940   Date of Visit: 8/17/2020       Dear Zeinab Hernandes:    Thank you for referring Rebecca Rios to me for evaluation. Attached you will find relevant portions of my assessment and plan of care.    If you have questions, please do not hesitate to call me. I look forward to following Rebecca Rios along with you.    Sincerely,    Ana Cunha PA-C    Enclosure  CC:  No Recipients    If you would like to receive this communication electronically, please contact [email or web address] or [phone] to request more information on "[employer] [employer]" Link access.    For providers and/or their staff who would like to refer a patient to [facility], please contact us through our one-stop-shop provider referral line, [facility], at [phone].    If you feel you have received this communication in error or would no longer like to receive these types of communications, please e-mail [email or web address]

## 2020-08-17 NOTE — PATIENT INSTRUCTIONS
Continue vit D 5,000units daily   Start fosamax 70mg once a week full glass of water, empty stomach, stay upright and only water for 30 mins   Calcium in diet , increase 1000mg/day       Alendronate tablets  What is this medicine?  ALENDRONATE (a BERENICE droe melissa) slows calcium loss from bones. It helps to make normal healthy bone and to slow bone loss in people with Paget's disease and osteoporosis. It may be used in others at risk for bone loss.  How should I use this medicine?  You must take this medicine exactly as directed or you will lower the amount of the medicine you absorb into your body or you may cause yourself harm. Take this medicine by mouth first thing in the morning, after you are up for the day. Do not eat or drink anything before you take your medicine. Swallow the tablet with a full glass (6 to 8 fluid ounces) of plain water. Do not take this medicine with any other drink. Do not chew or crush the tablet. After taking this medicine, do not eat breakfast, drink, or take any medicines or vitamins for at least 30 minutes. Sit or stand up for at least 30 minutes after you take this medicine; do not lie down. Do not take your medicine more often than directed.  Talk to your pediatrician regarding the use of this medicine in children. Special care may be needed.  What side effects may I notice from receiving this medicine?  Side effects that you should report to your doctor or health care professional as soon as possible:  · allergic reactions like skin rash, itching or hives, swelling of the face, lips, or tongue  · black or tarry stools  · bone, muscle or joint pain  · changes in vision  · chest pain  · heartburn or stomach pain  · jaw pain, especially after dental work  · pain or trouble when swallowing  · redness, blistering, peeling or loosening of the skin, including inside the mouth  Side effects that usually do not require medical attention (report to your doctor or health care professional if  they continue or are bothersome):  · changes in taste  · diarrhea or constipation  · eye pain or itching  · headache  · nausea or vomiting  · stomach gas or fullness  What may interact with this medicine?  · aluminum hydroxide  · antacids  · aspirin  · calcium supplements  · drugs for inflammation like ibuprofen, naproxen, and others  · iron supplements  · magnesium supplements  · vitamins with minerals  What if I miss a dose?  If you miss a dose, do not take it later in the day. Continue your normal schedule starting the next morning. Do not take double or extra doses.  Where should I keep my medicine?  Keep out of the reach of children.  Store at room temperature of 15 and 30 degrees C (59 and 86 degrees F). Throw away any unused medicine after the expiration date.  What should I tell my health care provider before I take this medicine?  They need to know if you have any of these conditions:  · dental disease  · esophagus, stomach, or intestine problems, like acid reflux or GERD  · kidney disease  · low blood calcium  · low vitamin D  · problems sitting or standing 30 minutes  · trouble swallowing  · an unusual or allergic reaction to alendronate, other medicines, foods, dyes, or preservatives  · pregnant or trying to get pregnant  · breast-feeding  What should I watch for while using this medicine?  Visit your doctor or health care professional for regular checks ups. It may be some time before you see benefit from this medicine. Do not stop taking your medicine except on your doctor's advice. Your doctor or health care professional may order blood tests and other tests to see how you are doing.  You should make sure you get enough calcium and vitamin D while you are taking this medicine, unless your doctor tells you not to. Discuss the foods you eat and the vitamins you take with your health care professional.  Some people who take this medicine have severe bone, joint, and/or muscle pain. This medicine may also  increase your risk for a broken thigh bone. Tell your doctor right away if you have pain in your upper leg or groin. Tell your doctor if you have any pain that does not go away or that gets worse.  This medicine can make you more sensitive to the sun. If you get a rash while taking this medicine, sunlight may cause the rash to get worse. Keep out of the sun. If you cannot avoid being in the sun, wear protective clothing and use sunscreen. Do not use sun lamps or tanning beds/booths.  NOTE:This sheet is a summary. It may not cover all possible information. If you have questions about this medicine, talk to your doctor, pharmacist, or health care provider. Copyright© 2017 [employer]

## 2020-08-17 NOTE — PROGRESS NOTES
Subjective:       Patient ID: Rebecca Rios is a 79 y.o. female.    Chief Complaint: Fracture (closed fx of distal end of R radius )    Ms Rios is a 78 y/o f with a recent R wrist fx.   Due to concerns for compromised bone quality and risk of future fractures, patient was appropriately identified and referred for further investigation and treatment recommendations for improved bone strength and reduction of future fractures.  Referred by Zeinab JACKSON       Current meds for osteopenia/ osteoporosis: none  Prior meds for osteopenia/ osteoporosis: none  Prev dexa scan: 3 years ago knows she has osteoporosis   Vit D supplement: daily otc   Ca: no supplement, makes her constipated , not much ca in diet   Menopause: 52   Hystrectomy: at age 38 but had one ovaries   HRT: was tx due to ovaries not producing hormones   Smoker/tobacco: no   Etoh: no   Falls:  One fall in the last 6 months, tripped over a wagon  Activity level: no formal exercise   Kidney problems : no   Prev fracture : R patella s/p fall   Steroids : no   Family history : mother with hip fx  PPI/gerd: daily ppi nexium   Other risk factors :  no   Dental issues:  has false teeth   Anticipated dental work :  none   H/o cancer/tx: no    Past Medical History:   Diagnosis Date    Anxiety     Coronary artery disease     Depression     Fractures     Hot flash, menopausal     HTN (hypertension)     Hyperlipidemia     Hypothyroid      Past Surgical History:   Procedure Laterality Date    CARPAL TUNNEL RELEASE Bilateral     CHOLECYSTECTOMY      HYSTERECTOMY      open luis       Family History   Problem Relation Age of Onset    Hypertension Mother      Social History     Socioeconomic History    Marital status:      Spouse name: Not on file    Number of children: Not on file    Years of education: Not on file    Highest education level: Not on file   Occupational History    Not on file   Social Needs    Financial resource  "strain: Not on file    Food insecurity     Worry: Not on file     Inability: Not on file    Transportation needs     Medical: Not on file     Non-medical: Not on file   Tobacco Use    Smoking status: Never Smoker    Smokeless tobacco: Never Used   Substance and Sexual Activity    Alcohol use: No    Drug use: No    Sexual activity: Not Currently     Partners: Male   Lifestyle    Physical activity     Days per week: Not on file     Minutes per session: Not on file    Stress: Not on file   Relationships    Social connections     Talks on phone: Not on file     Gets together: Not on file     Attends Rastafari service: Not on file     Active member of club or organization: Not on file     Attends meetings of clubs or organizations: Not on file     Relationship status: Not on file   Other Topics Concern    Not on file   Social History Narrative    Not on file     Review of patient's allergies indicates:   Allergen Reactions    Erythromycin     Macrolide antibiotics Nausea And Vomiting     Review of Systems   Constitutional: Negative for chills, fatigue and fever.   HENT: Negative for mouth sores, rhinorrhea and sore throat.    Eyes: Negative for pain and redness.   Respiratory: Negative for cough and shortness of breath.    Cardiovascular: Negative for chest pain.   Gastrointestinal: Negative for abdominal pain, constipation, diarrhea, nausea and vomiting.   Genitourinary: Negative for dysuria and hematuria.   Musculoskeletal: Negative for arthralgias, joint swelling and myalgias.   Skin: Negative for rash.   Neurological: Negative for weakness, numbness and headaches.   Psychiatric/Behavioral: The patient is not nervous/anxious.          Objective:   /69   Pulse 76   Ht 5' 5" (1.651 m)   Wt 81 kg (178 lb 7.4 oz)   BMI 29.70 kg/m²   Immunization History   Administered Date(s) Administered    Influenza - Quadrivalent 10/26/2015    Influenza - Quadrivalent - PF (6 months and older) 11/11/2013, " 10/01/2014, 10/26/2015, 12/20/2016    Influenza - Trivalent (ADULT) 11/07/2012, 11/11/2013    Pneumococcal Conjugate - 13 Valent 01/19/2016    Pneumococcal Polysaccharide - 23 Valent 01/13/2011              Physical Exam   Constitutional: She is oriented to person, place, and time and well-developed, well-nourished, and in no distress.   HENT:   Head: Normocephalic and atraumatic.   Eyes: Pupils are equal, round, and reactive to light. Right eye exhibits no discharge.   Neck: Normal range of motion.   Cardiovascular: Normal rate, regular rhythm and normal heart sounds.  Exam reveals no friction rub.    Pulmonary/Chest: Effort normal and breath sounds normal. No respiratory distress.   Abdominal: Soft. She exhibits no distension. There is no abdominal tenderness.   Lymphadenopathy:     She has no cervical adenopathy.   Neurological: She is alert and oriented to person, place, and time.   Skin: No rash noted. No erythema.     Psychiatric: Mood normal.   Musculoskeletal: Normal range of motion. No deformity or edema.      Comments: Wrist splint to her r wrist     Walking w cane, steady gait.         Able to get out of chair independently             No results found for this or any previous visit (from the past 336 hour(s)).     No results found for: TBGOLDPLUS   No results found for: HAV, HEPAIGM, HEPBIGM, HEPBCAB, HBEAG, HEPCAB     Assessment:       1. Age-related osteoporosis without current pathological fracture    2. Fall, initial encounter    3. Closed fracture of distal end of right radius, unspecified fracture morphology, initial encounter          Impression:   Osteoporosis: with R wrist fx not on tx, vit D daily 5Ku, not much ca in diet, ca supplements cause GI upset    Low vit d: taking 5k weekly, prev on 50K weekly     Gerd: taking daily Rx,    Plan:       Discussed osteoporosis/low bone density disease state in detail with patient.  Reviewed treatment options along with potential side effects of these  treatments.  Dexa scan was reviewed with patient.  Treatment plan agreed upon together with patient     Start fosamax 70mg weekly, full glass water, emtpy stomach, 30 mins upright and only water     We discussed her reflux and risk of fosamax worsening gerd, we will move to reclast if that is the case     Labs today: vit D, pth, cmp, mag, phos  cont vit D daily 5000u   rec ca from all sources 1000mg--handout given on ca content in foods   Encouraged WB activity      See back in 10 weeks to check in     Thank you for the consult!

## 2020-08-20 ENCOUNTER — TELEPHONE (OUTPATIENT)
Dept: RHEUMATOLOGY | Facility: CLINIC | Age: 80
End: 2020-08-20

## 2020-08-20 RX ORDER — ERGOCALCIFEROL 1.25 MG/1
50000 CAPSULE ORAL
Qty: 12 CAPSULE | Refills: 3 | Status: SHIPPED | OUTPATIENT
Start: 2020-08-20 | End: 2023-08-22

## 2020-08-20 NOTE — TELEPHONE ENCOUNTER
Let her know her vitamin D level was in the normal range but on the lower side.  I want her to take a once a week higher dose vitamin D instead of her daily vit D.  50,000units once weekly, rx sent in

## 2020-09-04 DIAGNOSIS — I25.119 CORONARY ARTERY DISEASE INVOLVING NATIVE CORONARY ARTERY OF NATIVE HEART WITH ANGINA PECTORIS: ICD-10-CM

## 2020-09-04 DIAGNOSIS — I10 ESSENTIAL HYPERTENSION: ICD-10-CM

## 2020-09-04 RX ORDER — LISINOPRIL 40 MG/1
40 TABLET ORAL DAILY
Qty: 90 TABLET | Refills: 3 | Status: SHIPPED | OUTPATIENT
Start: 2020-09-04 | End: 2021-12-13

## 2020-09-08 ENCOUNTER — HOSPITAL ENCOUNTER (OUTPATIENT)
Dept: RADIOLOGY | Facility: HOSPITAL | Age: 80
Discharge: HOME | End: 2020-09-08
Attending: PHYSICIAN ASSISTANT
Payer: MEDICARE

## 2020-09-08 ENCOUNTER — HOSPITAL ENCOUNTER (OUTPATIENT)
Dept: RADIOLOGY | Facility: HOSPITAL | Age: 80
Discharge: HOME | End: 2020-09-08
Attending: ORTHOPAEDIC SURGERY
Payer: MEDICARE

## 2020-09-08 ENCOUNTER — OFFICE VISIT (OUTPATIENT)
Dept: ORTHOPEDICS | Facility: CLINIC | Age: 80
End: 2020-09-08
Payer: MEDICARE

## 2020-09-08 VITALS
WEIGHT: 178 LBS | DIASTOLIC BLOOD PRESSURE: 69 MMHG | HEIGHT: 65 IN | BODY MASS INDEX: 29.66 KG/M2 | HEART RATE: 73 BPM | SYSTOLIC BLOOD PRESSURE: 153 MMHG

## 2020-09-08 DIAGNOSIS — M25.512 LEFT SHOULDER PAIN, UNSPECIFIED CHRONICITY: ICD-10-CM

## 2020-09-08 DIAGNOSIS — S52.501A CLOSED FRACTURE OF DISTAL END OF RIGHT RADIUS, UNSPECIFIED FRACTURE MORPHOLOGY, INITIAL ENCOUNTER: ICD-10-CM

## 2020-09-08 DIAGNOSIS — M25.562 LEFT KNEE PAIN, UNSPECIFIED CHRONICITY: Primary | ICD-10-CM

## 2020-09-08 DIAGNOSIS — M25.512 LEFT SHOULDER PAIN, UNSPECIFIED CHRONICITY: Primary | ICD-10-CM

## 2020-09-08 DIAGNOSIS — M25.531 RIGHT WRIST PAIN: ICD-10-CM

## 2020-09-08 DIAGNOSIS — M25.562 LEFT KNEE PAIN, UNSPECIFIED CHRONICITY: ICD-10-CM

## 2020-09-08 PROCEDURE — 73110 X-RAY EXAM OF WRIST: CPT | Mod: TC,RT

## 2020-09-08 PROCEDURE — 99215 OFFICE O/P EST HI 40 MIN: CPT | Mod: PBBFAC,25 | Performed by: ORTHOPAEDIC SURGERY

## 2020-09-08 PROCEDURE — 20610 DRAIN/INJ JOINT/BURSA W/O US: CPT | Mod: PBBFAC | Performed by: ORTHOPAEDIC SURGERY

## 2020-09-08 PROCEDURE — 73562 X-RAY EXAM OF KNEE 3: CPT | Mod: TC,RT

## 2020-09-08 PROCEDURE — 73564 X-RAY EXAM KNEE 4 OR MORE: CPT | Mod: 26,LT,, | Performed by: RADIOLOGY

## 2020-09-08 PROCEDURE — 99999 PR PBB SHADOW E&M-EST. PATIENT-LVL V: CPT | Mod: PBBFAC,,, | Performed by: ORTHOPAEDIC SURGERY

## 2020-09-08 PROCEDURE — 99999 PR PBB SHADOW E&M-EST. PATIENT-LVL V: ICD-10-PCS | Mod: PBBFAC,,, | Performed by: ORTHOPAEDIC SURGERY

## 2020-09-08 PROCEDURE — 73562 X-RAY EXAM OF KNEE 3: CPT | Mod: 26,RT,, | Performed by: RADIOLOGY

## 2020-09-08 PROCEDURE — 73110 X-RAY EXAM OF WRIST: CPT | Mod: 26,RT,, | Performed by: RADIOLOGY

## 2020-09-08 PROCEDURE — 73562 XR KNEE ORTHO LEFT WITH FLEXION: ICD-10-PCS | Mod: 26,RT,, | Performed by: RADIOLOGY

## 2020-09-08 PROCEDURE — 99213 PR OFFICE/OUTPT VISIT, EST, LEVL III, 20-29 MIN: ICD-10-PCS | Mod: S$PBB,25,, | Performed by: ORTHOPAEDIC SURGERY

## 2020-09-08 PROCEDURE — 73030 X-RAY EXAM OF SHOULDER: CPT | Mod: 26,LT,, | Performed by: RADIOLOGY

## 2020-09-08 PROCEDURE — 99213 OFFICE O/P EST LOW 20 MIN: CPT | Mod: S$PBB,25,, | Performed by: ORTHOPAEDIC SURGERY

## 2020-09-08 PROCEDURE — 73030 X-RAY EXAM OF SHOULDER: CPT | Mod: TC,LT

## 2020-09-08 PROCEDURE — 73030 XR SHOULDER COMPLETE 2 OR MORE VIEWS LEFT: ICD-10-PCS | Mod: 26,LT,, | Performed by: RADIOLOGY

## 2020-09-08 PROCEDURE — 73110 XR WRIST COMPLETE 3 VIEWS RIGHT: ICD-10-PCS | Mod: 26,RT,, | Performed by: RADIOLOGY

## 2020-09-08 PROCEDURE — 20610 LARGE JOINT ASPIRATION/INJECTION: L ANSERINE BURSA: ICD-10-PCS | Mod: S$PBB,LT,, | Performed by: ORTHOPAEDIC SURGERY

## 2020-09-08 PROCEDURE — 73564 XR KNEE ORTHO LEFT WITH FLEXION: ICD-10-PCS | Mod: 26,LT,, | Performed by: RADIOLOGY

## 2020-09-08 RX ORDER — TRIAMCINOLONE ACETONIDE 40 MG/ML
40 INJECTION, SUSPENSION INTRA-ARTICULAR; INTRAMUSCULAR
Status: DISCONTINUED | OUTPATIENT
Start: 2020-09-08 | End: 2020-09-08 | Stop reason: HOSPADM

## 2020-09-08 RX ADMIN — TRIAMCINOLONE ACETONIDE 40 MG: 200 INJECTION, SUSPENSION INTRA-ARTICULAR; INTRAMUSCULAR at 01:09

## 2020-09-08 NOTE — PROGRESS NOTES
Subjective:     Patient ID: Rebecca Rios is a 79 y.o. female.    Chief Complaint: Pain of the Right Wrist    The patient is a 79-year-old female with a fall 07/07/2020.  She had a left distal radius fracture that is currently healed.  She has left shoulder pain and weakness since the fall.  She also complains of left knee pain about the pace anserine bursa.  She had numbness in the median nerve distribution that is improving.  She never had a nerve study done.      Past Medical History:   Diagnosis Date    Anxiety     Coronary artery disease     Depression     Fractures     Hot flash, menopausal     HTN (hypertension)     Hyperlipidemia     Hypothyroid      Past Surgical History:   Procedure Laterality Date    CARPAL TUNNEL RELEASE Bilateral     CHOLECYSTECTOMY      HYSTERECTOMY      open luis       Family History   Problem Relation Age of Onset    Hypertension Mother      Social History     Socioeconomic History    Marital status:      Spouse name: Not on file    Number of children: Not on file    Years of education: Not on file    Highest education level: Not on file   Occupational History    Not on file   Social Needs    Financial resource strain: Not on file    Food insecurity     Worry: Not on file     Inability: Not on file    Transportation needs     Medical: Not on file     Non-medical: Not on file   Tobacco Use    Smoking status: Never Smoker    Smokeless tobacco: Never Used   Substance and Sexual Activity    Alcohol use: No    Drug use: No    Sexual activity: Not Currently     Partners: Male   Lifestyle    Physical activity     Days per week: Not on file     Minutes per session: Not on file    Stress: Not on file   Relationships    Social connections     Talks on phone: Not on file     Gets together: Not on file     Attends Moravian service: Not on file     Active member of club or organization: Not on file     Attends meetings of clubs or organizations: Not on file      Relationship status: Not on file   Other Topics Concern    Not on file   Social History Narrative    Not on file     Medication List with Changes/Refills   Current Medications    ALENDRONATE (FOSAMAX) 70 MG TABLET    Take 1 tablet (70 mg total) by mouth every 7 days. 1st thing in am, 8oz H20, upright and only water for 30 mins    ALPRAZOLAM (XANAX) 0.5 MG TABLET    Take 0.5 mg by mouth 2 (two) times daily as needed for Anxiety.    AMLODIPINE (NORVASC) 5 MG TABLET    Take 1 tablet (5 mg total) by mouth once daily.    ASPIRIN (ECOTRIN) 81 MG EC TABLET    Take 1 tablet (81 mg total) by mouth once daily.    BUPROPION (WELLBUTRIN SR) 150 MG TBSR 12 HR TABLET    Take 150 mg by mouth 2 (two) times daily.     DIPHENOXYLATE-ATROPINE 2.5-0.025 MG (LOMOTIL) 2.5-0.025 MG PER TABLET    Take 1 tablet by mouth 4 (four) times daily as needed for Diarrhea.    DULOXETINE (CYMBALTA) 30 MG CAPSULE    TAKE 1 CAPSULE BY MOUTH ONCE DAILY    ERGOCALCIFEROL (ERGOCALCIFEROL) 50,000 UNIT CAP    Take 1 capsule (50,000 Units total) by mouth every 7 days.    ESOMEPRAZOLE (NEXIUM) 40 MG CAPSULE    Take 40 mg by mouth once daily.     FLUTICASONE PROPIONATE (FLONASE) 50 MCG/ACTUATION NASAL SPRAY    2 sprays (100 mcg total) by Each Nostril route once daily.    GABAPENTIN (NEURONTIN) 600 MG TABLET    Take 600 mg by mouth 2 (two) times daily.    HYDROCODONE-ACETAMINOPHEN (NORCO)  MG PER TABLET    Take 1 tablet by mouth every 4 (four) hours as needed for Pain (pain).    HYDROCODONE-ACETAMINOPHEN 10-325MG (NORCO)  MG TAB    Take 1 tablet by mouth 3 (three) times daily as needed.     LEVOCETIRIZINE (XYZAL) 5 MG TABLET    Take 1 tablet (5 mg total) by mouth every evening.    LEVOTHYROXINE (SYNTHROID) 75 MCG TABLET    TAKE ONE TABLET BY MOUTH ONCE DAILY    LISINOPRIL (PRINIVIL,ZESTRIL) 40 MG TABLET    Take 1 tablet (40 mg total) by mouth once daily.    LORATADINE (CLARITIN) 10 MG TABLET    Take 10 mg by mouth once daily.    ROSUVASTATIN  (CRESTOR) 10 MG TABLET    T1T PO QPM    TIZANIDINE (ZANAFLEX) 4 MG TABLET    Take 4 mg by mouth every evening.     VOLTAREN 1 % GEL    APPLY 2 GRAMS TOPICALLY ONCE DAILY     Review of patient's allergies indicates:   Allergen Reactions    Erythromycin     Macrolide antibiotics Nausea And Vomiting     Review of Systems   Constitution: Negative for malaise/fatigue.   HENT: Negative for hearing loss.    Eyes: Negative for double vision and visual disturbance.   Cardiovascular: Positive for chest pain.   Respiratory: Negative for shortness of breath.    Endocrine: Negative for cold intolerance.   Hematologic/Lymphatic: Does not bruise/bleed easily.   Skin: Negative for poor wound healing and suspicious lesions.   Musculoskeletal: Positive for arthritis, back pain, joint pain, joint swelling and myalgias. Negative for gout.   Gastrointestinal: Positive for abdominal pain. Negative for nausea and vomiting.   Genitourinary: Positive for dysuria and hematuria.   Neurological: Negative for numbness, paresthesias and sensory change.   Psychiatric/Behavioral: Positive for depression. Negative for memory loss and substance abuse. The patient is nervous/anxious.    Allergic/Immunologic: Negative for persistent infections.       Objective:   Body mass index is 29.62 kg/m².  Vitals:    09/08/20 1350   BP: (!) 153/69   Pulse: 73                General    Constitutional: She is oriented to person, place, and time. She appears well-developed and well-nourished. No distress.   HENT:   Head: Normocephalic.   Eyes: EOM are normal.   Neck: Normal range of motion.   Pulmonary/Chest: Effort normal.   Neurological: She is oriented to person, place, and time.   Psychiatric: She has a normal mood and affect.             Left Knee Exam     Inspection   Swelling: absent  Effusion: absent    Tenderness   The patient tender to palpation of the pes anserinus.    Comments:  The patient is well localized tenderness to the pace anserine bursa.   There is no joint line tenderness.  There is no effusion there is no ligament laxity or instability noted.    Right Hand/Wrist Exam     Inspection   Scars: Wrist - absent Hand -  absent  Effusion: Hand -  absent  Deformity: Wrist - deformity     Other     Neuorologic Exam    Median Distribution: normal  Ulnar Distribution: normal          Left Shoulder Exam     Inspection/Observation   Swelling: absent  Deformity: absent    Tenderness   The patient is tender to palpation of the acromioclavicular joint.    Comments:  The patient has weakness of abduction of the left shoulder.  There is active abduction limited to 140°.  There is AC joint tenderness.  There is anterolateral subacromial tenderness.  There are no motor or sensory deficits.      Vascular Exam       Capillary Refill  Right Hand: normal capillary refill      Relevant imaging results reviewed and interpreted by me, discussed with the patient and / or family today radiographs left knee showed tricompartmental degenerative change with lateral joint line narrowing bone on bone.  Radiographs right wrist showed healed fracture distal radius with about 20° dorsal angulation.  Radiographs of the left shoulder showed AC joint degenerative change in is otherwise unrevealing  Assessment:     Encounter Diagnoses   Name Primary?    Left knee pain, unspecified chronicity Yes    Left shoulder pain, unspecified chronicity     Closed fracture of distal end of right radius, unspecified fracture morphology, initial encounter         Plan:   The patient was sent for MRI scan left shoulder rule out rotator cuff tendon tear she can follow up with Dr. elizabeth quiñones for that issue.  She was injected in the left knee Pace anserine bursa with 1 cc of Kenalog and 1 cc of 2% plain lidocaine under sterile technique.  She was told she can wean herself away from the brace of the right wrist.  She will return on a as needed basis.                  Disclaimer: This note was prepared using  a voice recognition system and is likely to have sound alike errors within the text.

## 2020-09-08 NOTE — PROCEDURES
Large Joint Aspiration/Injection: L anserine bursa    Date/Time: 9/8/2020 1:40 PM  Performed by: Ryland Quijano MD  Authorized by: Ryland Quijano MD     Consent Done?:  Yes (Verbal)  Indications:  Pain  Timeout: prior to procedure the correct patient, procedure, and site was verified    Prep: patient was prepped and draped in usual sterile fashion    Local anesthetic:  Lidocaine 2% without epinephrine  Anesthetic total (ml):  1      Details:  Needle Size:  25 G  Ultrasonic Guidance for needle placement?: No    Approach:  Medial  Location:  Knee  Site:  L anserine bursa  Medications:  40 mg triamcinolone acetonide 40 mg/mL

## 2020-09-08 NOTE — LETTER
September 8, 2020      Zeinab Hernandes PA-C  [address]  [place] [address]           [facility]  [address] [place] [id number]  Phone: [phone]  Fax: [phone]          Patient: Rebecca Rios   MR Number: [id number]   YOB: 1940   Date of Visit: 9/8/2020       Dear Zeinab Hernandes:    Thank you for referring Rebecca Rios to me for evaluation. Attached you will find relevant portions of my assessment and plan of care.    If you have questions, please do not hesitate to call me. I look forward to following Rebecca Rios along with you.    Sincerely,    Ryland Quijano MD    Enclosure  CC:  No Recipients    If you would like to receive this communication electronically, please contact [email or web address] or [phone] to request more information on [employer] access.    For providers and/or their staff who would like to refer a patient to [facility], please contact us through our one-stop-shop provider referral line, [facility] , at [phone].    If you feel you have received this communication in error or would no longer like to receive these types of communications, please e-mail [email or web address]

## 2020-09-18 ENCOUNTER — HOSPITAL ENCOUNTER (OUTPATIENT)
Dept: RADIOLOGY | Facility: HOSPITAL | Age: 80
Discharge: HOME | End: 2020-09-18
Attending: ORTHOPAEDIC SURGERY
Payer: MEDICARE

## 2020-09-18 DIAGNOSIS — M25.512 LEFT SHOULDER PAIN, UNSPECIFIED CHRONICITY: ICD-10-CM

## 2020-09-18 PROCEDURE — 73221 MRI JOINT UPR EXTREM W/O DYE: CPT | Mod: TC,LT

## 2020-09-22 ENCOUNTER — OFFICE VISIT (OUTPATIENT)
Dept: ORTHOPEDICS | Facility: CLINIC | Age: 80
End: 2020-09-22
Payer: MEDICARE

## 2020-09-22 ENCOUNTER — PATIENT MESSAGE (OUTPATIENT)
Dept: ORTHOPEDICS | Facility: CLINIC | Age: 80
End: 2020-09-22

## 2020-09-22 VITALS — WEIGHT: 178 LBS | BODY MASS INDEX: 29.66 KG/M2 | HEIGHT: 65 IN

## 2020-09-22 DIAGNOSIS — M75.122 NONTRAUMATIC COMPLETE TEAR OF LEFT ROTATOR CUFF: Primary | ICD-10-CM

## 2020-09-22 PROCEDURE — 99213 PR OFFICE/OUTPT VISIT, EST, LEVL III, 20-29 MIN: ICD-10-PCS | Mod: 95,,, | Performed by: STUDENT IN AN ORGANIZED HEALTH CARE EDUCATION/TRAINING PROGRAM

## 2020-09-22 PROCEDURE — 99213 OFFICE O/P EST LOW 20 MIN: CPT | Mod: 95,,, | Performed by: STUDENT IN AN ORGANIZED HEALTH CARE EDUCATION/TRAINING PROGRAM

## 2020-09-22 RX ORDER — HYDROCODONE BITARTRATE AND ACETAMINOPHEN 10; 325 MG/1; MG/1
TABLET ORAL
COMMUNITY
Start: 2020-08-17

## 2020-09-22 NOTE — PROGRESS NOTES
Orthopaedics Sports Medicine     Shoulder Initial Visit         This was a virtual visit.    9/22/2020  4:15 PM    Referring MD: Self, Aaareferral    Chief Complaint   Patient presents with    Left Shoulder - Pain         History of Present Illness:   Rebecca Rios is a 79 y.o. Right-hand dominant female who presents with LEFT shoulder pain and dysfunction that developed 2-3 months ago after a fall.  She endorses aching pain in that is intermittently sharp and burning. Pain is exacerbated by activity such as reaching and lifting. She has tried rest, activity modification, ice, and NSAIDs. Despite these interventions she continues to have pain that limits her activities.     Treatments to date: rest, NSAIDs, activity modification, ice    Past Medical History:   Past Medical History:   Diagnosis Date    Anxiety     Coronary artery disease     Depression     Fractures     Hot flash, menopausal     HTN (hypertension)     Hyperlipidemia     Hypothyroid        Past Surgical History:   Past Surgical History:   Procedure Laterality Date    CARPAL TUNNEL RELEASE Bilateral     CHOLECYSTECTOMY      HYSTERECTOMY      open luis         Medications:  Patient's Medications   New Prescriptions    No medications on file   Previous Medications    ALENDRONATE (FOSAMAX) 70 MG TABLET    Take 1 tablet (70 mg total) by mouth every 7 days. 1st thing in am, 8oz H20, upright and only water for 30 mins    ALPRAZOLAM (XANAX) 0.5 MG TABLET    Take 0.5 mg by mouth 2 (two) times daily as needed for Anxiety.    AMLODIPINE (NORVASC) 5 MG TABLET    Take 1 tablet (5 mg total) by mouth once daily.    ASPIRIN (ECOTRIN) 81 MG EC TABLET    Take 1 tablet (81 mg total) by mouth once daily.    DIPHENOXYLATE-ATROPINE 2.5-0.025 MG (LOMOTIL) 2.5-0.025 MG PER TABLET    Take 1 tablet by mouth 4 (four) times daily as needed for Diarrhea.    DULOXETINE (CYMBALTA) 30 MG CAPSULE    TAKE 1 CAPSULE BY MOUTH ONCE DAILY    ERGOCALCIFEROL (ERGOCALCIFEROL)  50,000 UNIT CAP    Take 1 capsule (50,000 Units total) by mouth every 7 days.    ESOMEPRAZOLE (NEXIUM) 40 MG CAPSULE    Take 40 mg by mouth once daily.     FLUTICASONE PROPIONATE (FLONASE) 50 MCG/ACTUATION NASAL SPRAY    2 sprays (100 mcg total) by Each Nostril route once daily.    HYDROCODONE-ACETAMINOPHEN (NORCO)  MG PER TABLET    TAKE 1 TABLET BY MOUTH EVERY 8 HOURS AS NEEDED FOR PAIN    LEVOTHYROXINE (SYNTHROID) 75 MCG TABLET    TAKE ONE TABLET BY MOUTH ONCE DAILY    LISINOPRIL (PRINIVIL,ZESTRIL) 40 MG TABLET    Take 1 tablet (40 mg total) by mouth once daily.    LORATADINE (CLARITIN) 10 MG TABLET    Take 10 mg by mouth once daily.    ROSUVASTATIN (CRESTOR) 10 MG TABLET    T1T PO QPM    TIZANIDINE (ZANAFLEX) 4 MG TABLET    Take 4 mg by mouth every evening.     VOLTAREN 1 % GEL    APPLY 2 GRAMS TOPICALLY ONCE DAILY   Modified Medications    No medications on file   Discontinued Medications    BUPROPION (WELLBUTRIN SR) 150 MG TBSR 12 HR TABLET    Take 150 mg by mouth 2 (two) times daily.     GABAPENTIN (NEURONTIN) 600 MG TABLET    Take 600 mg by mouth 2 (two) times daily.    HYDROCODONE-ACETAMINOPHEN (NORCO)  MG PER TABLET    Take 1 tablet by mouth every 4 (four) hours as needed for Pain (pain).    HYDROCODONE-ACETAMINOPHEN 10-325MG (NORCO)  MG TAB    Take 1 tablet by mouth 3 (three) times daily as needed.     LEVOCETIRIZINE (XYZAL) 5 MG TABLET    Take 1 tablet (5 mg total) by mouth every evening.       Allergies:   Review of patient's allergies indicates:   Allergen Reactions    Erythromycin     Macrolide antibiotics Nausea And Vomiting       Social History:   Home town: [place]  Occupation: retired, but caretaker for   Alcohol use: She reports no history of alcohol use.  Tobacco use: She reports that she has never smoked. She has never used smokeless tobacco.    Review of systems:  History of recent illness, fevers, shakes, or chills: no  History of cardiac problems or chest  "pain: no  History of pulmonary problems or asthma: no  History of diabetes: no  History of prior dvt or clotting problems: no  History of sleep apnea: no      Physical Examination:  Estimated body mass index is 29.62 kg/m² as calculated from the following:    Height as of this encounter: 5' 5" (1.651 m).    Weight as of this encounter: 80.7 kg (178 lb).    General  Healthy appearing female in no acute distress  Alert and oriented, normal mood, appropriate affect    Shoulder Examination:  Patient is alert and oriented, no distress. Skin is intact. Neuro is normal with no focal motor or sensory findings.      Physical Exam:  RIGHT    LEFT    ROM:  Forward Elevation 160    160  Abduction  120    120  ER (at side)  60    60      Strength: unable to test virtually    Special Tests: unable to perform virtually      Neurovascular examination  - Motor grossly intact bilaterally to shoulder abduction, elbow flexion and extension, wrist flexion and extension, and intrinsic hand musculature      Imaging:  XR Left shoulder (9/8/20):  Small calcific focus noted adjacent to the margins of the greater tuberosity which can be associated with calcific tendinosis.  No acute fractures or dislocations visualized.  There is mild AC joint arthrosis.  Glenohumeral joint space appears preserved.    Physician Read: I agree with the above impression.    MRI Left shoulder (9/18/20):  1. Moderate to advanced supraspinatus tendinosis with superimposed focal full-thickness tear with a small fluid-filled defect and minimal, 4 mm, tendon retraction with adjacent low-grade partial-thickness tearing/bursal sided fraying.  2. Mild infraspinatus tendinosis without tear.  3. Small glenohumeral joint effusion  4. Mild to moderate AC joint arthropathy.  Moderate subacromial subdeltoid bursal fluid collection.    Physician Read: I agree with the above impression.      Impression:  79 y.o. female with Left shoulder rotator cuff tear      Plan:  1. Outlined " a non-operative treatment plan  2. She has generalized rotator cuff tendinosis with a full thickness tear component  3. Recommend subacromial CSI for symptom management  4. She requests to go to [facility] for injection as its closer to her home and she is unable to leave for long  5. Follow up at [facility] for injection and then as needed      Beck King MD

## 2020-09-24 ENCOUNTER — OFFICE VISIT (OUTPATIENT)
Dept: ORTHOPEDICS | Facility: CLINIC | Age: 80
End: 2020-09-24
Payer: MEDICARE

## 2020-09-24 VITALS
HEART RATE: 61 BPM | BODY MASS INDEX: 29.65 KG/M2 | SYSTOLIC BLOOD PRESSURE: 164 MMHG | HEIGHT: 65 IN | DIASTOLIC BLOOD PRESSURE: 78 MMHG | WEIGHT: 177.94 LBS

## 2020-09-24 DIAGNOSIS — M25.512 LEFT SHOULDER PAIN, UNSPECIFIED CHRONICITY: ICD-10-CM

## 2020-09-24 DIAGNOSIS — M75.122 NONTRAUMATIC COMPLETE TEAR OF LEFT ROTATOR CUFF: Primary | ICD-10-CM

## 2020-09-24 PROCEDURE — 20610 LARGE JOINT ASPIRATION/INJECTION: L SUBACROMIAL BURSA: ICD-10-PCS | Mod: S$PBB,LT,, | Performed by: PHYSICIAN ASSISTANT

## 2020-09-24 PROCEDURE — 20610 DRAIN/INJ JOINT/BURSA W/O US: CPT | Mod: PBBFAC | Performed by: PHYSICIAN ASSISTANT

## 2020-09-24 PROCEDURE — 99499 NO LOS: ICD-10-PCS | Mod: S$PBB,,, | Performed by: PHYSICIAN ASSISTANT

## 2020-09-24 PROCEDURE — 99214 OFFICE O/P EST MOD 30 MIN: CPT | Mod: PBBFAC,25 | Performed by: PHYSICIAN ASSISTANT

## 2020-09-24 PROCEDURE — 99999 PR PBB SHADOW E&M-EST. PATIENT-LVL IV: ICD-10-PCS | Mod: PBBFAC,,, | Performed by: PHYSICIAN ASSISTANT

## 2020-09-24 PROCEDURE — 20610 DRAIN/INJ JOINT/BURSA W/O US: CPT | Mod: S$PBB,LT,, | Performed by: PHYSICIAN ASSISTANT

## 2020-09-24 PROCEDURE — 99499 UNLISTED E&M SERVICE: CPT | Mod: S$PBB,,, | Performed by: PHYSICIAN ASSISTANT

## 2020-09-24 PROCEDURE — 99999 PR PBB SHADOW E&M-EST. PATIENT-LVL IV: CPT | Mod: PBBFAC,,, | Performed by: PHYSICIAN ASSISTANT

## 2020-09-24 RX ORDER — METHYLPREDNISOLONE ACETATE 80 MG/ML
80 INJECTION, SUSPENSION INTRA-ARTICULAR; INTRALESIONAL; INTRAMUSCULAR; SOFT TISSUE
Status: DISCONTINUED | OUTPATIENT
Start: 2020-09-24 | End: 2020-09-24 | Stop reason: HOSPADM

## 2020-09-24 RX ADMIN — METHYLPREDNISOLONE ACETATE 80 MG: 80 INJECTION, SUSPENSION INTRALESIONAL; INTRAMUSCULAR; INTRASYNOVIAL; SOFT TISSUE at 11:09

## 2020-09-24 NOTE — PROGRESS NOTES
Dr. King saw her for a vv to discuss MRI results of her left shoulder.  He reached out to me stating she had a small rotator cuff tear.  However due to social circumstances, she is unable to consider any sort of surgical then shin at this point.  She is the primary caregiver for her .  He asked me to do a steroid injection in the subacromial space on the left shoulder.  She comes in today for that.  She can follow up p.r.n..  We can certainly do periodic injections every 3-4 months if needed.  If she has worsening symptoms, she will contact the office.  She verbalizes understanding and agrees.

## 2020-09-24 NOTE — PROCEDURES
Large Joint Aspiration/Injection: L subacromial bursa    Date/Time: 9/24/2020 11:20 AM  Performed by: Zeinab Hernandes PA-C  Authorized by: Zeinab Hernandes PA-C     Consent Done?:  Yes (Verbal)  Indications:  Pain  Site marked: the procedure site was marked    Timeout: prior to procedure the correct patient, procedure, and site was verified    Prep: patient was prepped and draped in usual sterile fashion      Local anesthesia used?: Yes    Local anesthetic:  Lidocaine 1% without epinephrine  Anesthetic total (ml):  2      Details:  Needle Size:  22 G  Ultrasonic Guidance for needle placement?: No    Approach:  Posterior  Location:  Shoulder  Site:  L subacromial bursa  Medications:  80 mg methylPREDNISolone acetate 80 mg/mL  Patient tolerance:  Patient tolerated the procedure well with no immediate complications     After verbal consent was obtained for left shoulder injection, patient ID, site, and side were verified.  The left shoulder was sterilly prepped in the standard fashion.  A 22-gauge needle was introduced into left subacromial space from the posterior portal approach without complication. The left shoulder was then injected with 20 mg lidocaine plain and 80 mg depomedrol.  A sterile bandaid was applied.  The patient was informed to apply an ice pack approximately 10min once arriving home and not to do anything strenuous for 24hours. She was instructed to call if there were any problems.   Elevation of glucose in diabetic patients was discussed.  The patient was discharged in stable condition.

## 2020-10-21 ENCOUNTER — PATIENT MESSAGE (OUTPATIENT)
Dept: GASTROENTEROLOGY | Facility: CLINIC | Age: 80
End: 2020-10-21

## 2020-10-21 ENCOUNTER — TELEPHONE (OUTPATIENT)
Dept: GASTROENTEROLOGY | Facility: CLINIC | Age: 80
End: 2020-10-21

## 2020-10-29 ENCOUNTER — TELEPHONE (OUTPATIENT)
Dept: RHEUMATOLOGY | Facility: CLINIC | Age: 80
End: 2020-10-29

## 2020-11-02 ENCOUNTER — TELEPHONE (OUTPATIENT)
Dept: RHEUMATOLOGY | Facility: CLINIC | Age: 80
End: 2020-11-02

## 2020-11-02 NOTE — TELEPHONE ENCOUNTER
Spoke with pt to r/s appt with kalin maria, stated she will call us back, Verbalized understanding.

## 2020-11-02 NOTE — TELEPHONE ENCOUNTER
Lm called to see if pt would like to r/s appt with kalin maria that was cxl for today at [facility].

## 2020-11-02 NOTE — TELEPHONE ENCOUNTER
----- Message from Torito Barreto sent at 11/2/2020  1:30 PM CST -----  Contact: Pt  Type:  Patient Returning Call    Who Called: pt   Who Left Message for Patient:nurse   Does the patient know what this is regarding? Unknown from pt   Would the patient rather a call back or a response via [facility]? Phone   Best Call Back Number: [phone]  Additional Information: pt states she cancelled her appt

## 2020-12-01 ENCOUNTER — OFFICE VISIT (OUTPATIENT)
Dept: UROLOGY | Facility: CLINIC | Age: 80
End: 2020-12-01
Payer: MEDICARE

## 2020-12-01 VITALS — WEIGHT: 179.38 LBS | HEIGHT: 65 IN | BODY MASS INDEX: 29.89 KG/M2

## 2020-12-01 DIAGNOSIS — R31.21 ASYMPTOMATIC MICROSCOPIC HEMATURIA: Primary | ICD-10-CM

## 2020-12-01 LAB
BILIRUB SERPL-MCNC: NORMAL MG/DL
BLOOD URINE, POC: NORMAL
CLARITY, POC UA: CLEAR
COLOR, POC UA: YELLOW
GLUCOSE UR QL STRIP: NORMAL
KETONES UR QL STRIP: NORMAL
LEUKOCYTE ESTERASE URINE, POC: 6
NITRITE, POC UA: NORMAL
PH, POC UA: 6
PROTEIN, POC: NORMAL
SPECIFIC GRAVITY, POC UA: 1.01
UROBILINOGEN, POC UA: NORMAL

## 2020-12-01 PROCEDURE — 99213 OFFICE O/P EST LOW 20 MIN: CPT | Mod: PBBFAC | Performed by: UROLOGY

## 2020-12-01 PROCEDURE — 99203 OFFICE O/P NEW LOW 30 MIN: CPT | Mod: S$PBB,,, | Performed by: UROLOGY

## 2020-12-01 PROCEDURE — 99203 PR OFFICE/OUTPT VISIT, NEW, LEVL III, 30-44 MIN: ICD-10-PCS | Mod: S$PBB,,, | Performed by: UROLOGY

## 2020-12-01 PROCEDURE — 99999 PR PBB SHADOW E&M-EST. PATIENT-LVL III: ICD-10-PCS | Mod: PBBFAC,,, | Performed by: UROLOGY

## 2020-12-01 PROCEDURE — 99999 PR PBB SHADOW E&M-EST. PATIENT-LVL III: CPT | Mod: PBBFAC,,, | Performed by: UROLOGY

## 2020-12-01 PROCEDURE — 87086 URINE CULTURE/COLONY COUNT: CPT

## 2020-12-01 PROCEDURE — 81001 URINALYSIS AUTO W/SCOPE: CPT

## 2020-12-01 PROCEDURE — 81002 URINALYSIS NONAUTO W/O SCOPE: CPT | Mod: PBBFAC | Performed by: UROLOGY

## 2020-12-01 NOTE — PROGRESS NOTES
Chief Complaint:   Encounter Diagnosis   Name Primary?    Asymptomatic microscopic hematuria Yes       HPI:  79-year-old female who comes in with hematuria.  This has been reported in the past, she had seen a previous urologist, although no reproducible findings at that appointment.  Therefore workup was not completed.  She has had no gross hematuria, she has never been a smoker.  She has no issues with UTIs or dysuria.  She states that she wears a safety pad, but currently does not want any treatment for urgency as it is not too severe.  No other urological or gynecological history.  She has had a total abdominal hysterectomy with bilateral salpingo oophorectomy.  She has had no pelvic slings.  She has had 5 natural deliveries without issue.  She has no evidence constipation.  She has both a father and son with history of bladder cancer, and stones within her family history.    Allergies:  Erythromycin and Macrolide antibiotics    Medications:  has a current medication list which includes the following prescription(s): alendronate, alprazolam, amlodipine, aspirin, diphenoxylate-atropine 2.5-0.025 mg, duloxetine, ergocalciferol, esomeprazole, fluticasone propionate, hydrocodone-acetaminophen, levocetirizine, levothyroxine, lisinopril, loratadine, rosuvastatin, tizanidine, and voltaren.    Review of Systems:  General: No fever, chills, fatigability, or weight loss.  Skin: No rashes, itching, or changes in color or texture of skin.  Chest: Denies CURTIS, cyanosis, wheezing, cough, and sputum production.  Abdomen: Appetite fine. No weight loss. Denies diarrhea, abdominal pain, hematemesis, or blood in stool.  Musculoskeletal: No joint stiffness or swelling. Denies back pain.  : As above.  All other review of systems negative.    PMH:   has a past medical history of Anxiety, Coronary artery disease, Depression, Fractures, Hot flash, menopausal, HTN (hypertension), Hyperlipidemia, and Hypothyroid.    PSH:   has a past  surgical history that includes Hysterectomy; open luis; Cholecystectomy; and Carpal tunnel release (Bilateral).    FamHx: family history includes Hypertension in her mother.    SocHx:  reports that she has never smoked. She has never used smokeless tobacco. She reports that she does not drink alcohol or use drugs.      Physical Exam:  There were no vitals filed for this visit.  General: A&Ox3, no apparent distress, no deformities  Neck: No masses, normal ROM  Lungs: normal inspiration, no use of accessory muscles  Heart: normal pulse, no arrhythmias  Abdomen: Soft, NT, ND, no masses, no hernias, no hepatosplenomegaly  Skin: The skin is warm and dry. No jaundice.  Ext: No c/c/e.    Labs/Studies:   Urinalysis trace leukocyte esterase, trace blood 12/20    Impression/Plan:     Microscopic hematuria- at this point due to the fact that she is relatively low risk, but does have a family history will follow closely.  She has trace blood today and will send it for microscopic analysis.  I will see her back in 1 month and repeat the urinalysis.  Obviously if this returns as true microscopic hematuria then further workup will be warranted.

## 2020-12-02 LAB
BACTERIA #/AREA URNS AUTO: ABNORMAL /HPF
HYALINE CASTS UR QL AUTO: 3 /LPF
MICROSCOPIC COMMENT: ABNORMAL
NON-SQ EPI CELLS #/AREA URNS AUTO: <1 /HPF
RBC #/AREA URNS AUTO: 11 /HPF (ref 0–4)
SQUAMOUS #/AREA URNS AUTO: 0 /HPF
WBC #/AREA URNS AUTO: 2 /HPF (ref 0–5)
WBC CLUMPS UR QL AUTO: ABNORMAL

## 2020-12-03 LAB
BACTERIA UR CULT: NORMAL
BACTERIA UR CULT: NORMAL

## 2020-12-04 ENCOUNTER — TELEPHONE (OUTPATIENT)
Dept: ORTHOPEDICS | Facility: CLINIC | Age: 80
End: 2020-12-04

## 2020-12-04 NOTE — TELEPHONE ENCOUNTER
----- Message from Tina Soria sent at 12/4/2020  3:45 PM CST -----  Regarding: knee injections  Good evening,      Pt would like a call to set up knee injections and can be reached at [phone]    Thanks,  Tina Soria

## 2020-12-10 ENCOUNTER — PATIENT MESSAGE (OUTPATIENT)
Dept: FAMILY MEDICINE | Facility: CLINIC | Age: 80
End: 2020-12-10

## 2020-12-15 ENCOUNTER — PATIENT MESSAGE (OUTPATIENT)
Dept: GASTROENTEROLOGY | Facility: CLINIC | Age: 80
End: 2020-12-15

## 2020-12-15 ENCOUNTER — HOSPITAL ENCOUNTER (EMERGENCY)
Facility: HOSPITAL | Age: 80
Discharge: HOME | End: 2020-12-15
Attending: EMERGENCY MEDICINE
Payer: MEDICARE

## 2020-12-15 VITALS
SYSTOLIC BLOOD PRESSURE: 215 MMHG | DIASTOLIC BLOOD PRESSURE: 96 MMHG | OXYGEN SATURATION: 95 % | HEART RATE: 72 BPM | RESPIRATION RATE: 18 BRPM | TEMPERATURE: 98 F

## 2020-12-15 DIAGNOSIS — S61.412A LACERATION OF LEFT HAND WITHOUT FOREIGN BODY, INITIAL ENCOUNTER: ICD-10-CM
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
## 2020-12-15 DIAGNOSIS — M25.512 SHOULDER PAIN, LEFT: ICD-10-CM

## 2020-12-15 PROCEDURE — 25000003 PHARM REV CODE 250: Performed by: EMERGENCY MEDICINE

## 2020-12-15 PROCEDURE — 12001 RPR S/N/AX/GEN/TRNK 2.5CM/<: CPT

## 2020-12-15 PROCEDURE — 99284 EMERGENCY DEPT VISIT MOD MDM: CPT | Mod: 25

## 2020-12-15 RX ORDER — LIDOCAINE HYDROCHLORIDE 10 MG/ML
1 INJECTION, SOLUTION EPIDURAL; INFILTRATION; INTRACAUDAL; PERINEURAL
Status: COMPLETED | OUTPATIENT
Start: 2020-12-15 | End: 2020-12-15

## 2020-12-15 RX ORDER — CLONIDINE HYDROCHLORIDE 0.2 MG/1
0.2 TABLET ORAL
Status: COMPLETED | OUTPATIENT
Start: 2020-12-15 | End: 2020-12-15

## 2020-12-15 RX ADMIN — LIDOCAINE HYDROCHLORIDE 10 MG: 10 INJECTION, SOLUTION EPIDURAL; INFILTRATION; INTRACAUDAL; PERINEURAL at 02:12

## 2020-12-15 RX ADMIN — CLONIDINE HYDROCHLORIDE 0.2 MG: 0.2 TABLET ORAL at 04:12

## 2020-12-15 NOTE — ED PROVIDER NOTES
SCRIBE #1 NOTE: I, Saima Ross, am scribing for, and in the presence of, Yordy Ramsey MD. I have scribed the entire note.       History     Chief Complaint   Patient presents with    Fall     tripped over sidewalk today; hit L side of head but denies LOC; reports L shoulder and neck pain with laceration noted to L hand, dressing in place PTA     Review of patient's allergies indicates:   Allergen Reactions    Erythromycin     Macrolide antibiotics Nausea And Vomiting         History of Present Illness     HPI    12/15/2020, 2:10 PM  History obtained from the patient      History of Present Illness: Rebecca Rios is a 80 y.o. female patient with a h/o anxiety, CAD, depression, HTN, HLD, hypothyroid, who presents to the Emergency Department for evaluation of a fall which onset this afternoon. Pt states that she tripped over the sidewalk today while ambulating with her cane and fell onto the concrete hitting the L side of her head, but denies LOC. The pt is up to date on her Tetanus shot. Symptoms are constant and moderate in severity. No mitigating or exacerbating factors reported. Associated sxs include L shoulder pain, L hand pain, laceration to L hand, and L hip pain. Patient denies any LOC, SOB, CP, n/v, back pain, neck pain, HA, light-headedness, dizziness, numbness, weakness, and all other sxs at this time. No prior Tx reported. Pt reports being compliant taking her Lisinopril daily. No further complaints or concerns at this time.       Arrival mode: Personal transportation     PCP: Shlomo Ware MD      Past Medical History:  Past Medical History:   Diagnosis Date    Anxiety     Coronary artery disease     Depression     Fractures     Hot flash, menopausal     HTN (hypertension)     Hyperlipidemia     Hypothyroid        Past Surgical History:  Past Surgical History:   Procedure Laterality Date    CARPAL TUNNEL RELEASE Bilateral     CHOLECYSTECTOMY      HYSTERECTOMY      open luis            Family History:  Family History   Problem Relation Age of Onset    Hypertension Mother        Social History:   Social History     Tobacco Use    Smoking status: Never Smoker    Smokeless tobacco: Never Used   Substance and Sexual Activity    Alcohol use: No    Drug use: No    Sexual activity: Not Currently     Partners: Male        Review of Systems     Review of Systems   Constitutional: Negative for chills and fever.   HENT: Negative for congestion and sore throat.    Respiratory: Negative for cough and shortness of breath.    Cardiovascular: Negative for chest pain.   Gastrointestinal: Negative for abdominal pain, diarrhea, nausea and vomiting.   Genitourinary: Negative for dysuria.   Musculoskeletal: Negative for back pain, neck pain and neck stiffness.        (+) L shoulder pain  (+) L hip pain  (+) L hand pain   Skin: Negative for rash.        (+) laceration to L hand   Neurological: Negative for dizziness, weakness, light-headedness, numbness and headaches.        (-) LOC   Hematological: Does not bruise/bleed easily.   All other systems reviewed and are negative.       Physical Exam     Initial Vitals [12/15/20 1408]   BP Pulse Resp Temp SpO2   (!) 229/100 72 18 98.2 °F (36.8 °C) 95 %      MAP       --          Physical Exam  Nursing Notes and Vital Signs Reviewed.  Constitutional: Patient is in no acute distress. Awake and alert. Elderly and frail female.  Head: Atraumatic. Midface is stable. No Raccoon's eyes. No Avila's sign.   Eyes: PERRL. EOM normal. Conjunctivae normal.   Ears: No hemotympanum.   Nose: No nasal deformity. No septal hematoma.   Mouth/Throat: Airway intact. No malocclusion. No dental trauma.   Neck: Trachea midline. No cervical bony tenderness, deformities, or step-offs.   Cardiovascular: Regular rate and rhythm. Heart sounds normal. Peripheral pulses are 2+ bilaterally in all extremities.   Pulmonary/Chest: Breath sounds are normal bilaterally. No decreased breath  sounds. No respiratory distress. Chest wall is non-tender. No crepitus. No asymmetric rise. No flail segment.   Abdominal: Soft and non-distended. No tenderness.   Back: No midline bony tenderness, deformities, or step-offs of the T-spine or L-spine. No abrasions or ecchymosis.   Musculoskeletal: Pelvis is non-tender and stable to compression. No gross deformities. FROM of all extremities.   Left Hand: No obvious deformity. There is mild tenderness. Small 1 cm laceration to medial aspect of L hand. Full flexion and extension of the wrist. Radial, median, and ulnar nerves are intact. Radial and ulnar pulses are 2+. Normal capillary refill.  Distal sensation is intact. NVI distally.   Skin: Normal color. No abrasions.   Neurological: Alert and oriented x3. GCS 15. Strength is normal, equal, 5/5 in bilateral upper and lower extremities. No sensory deficits to light touch. Non-focal neurological examination.   Psychiatric: Normal affect.     ED Course   Lac Repair    Date/Time: 12/15/2020 3:25 PM  Performed by: Yordy Ramsey MD  Authorized by: Yordy Ramsey MD     Consent:     Consent obtained:  Verbal    Consent given by:  Patient    Risks discussed:  Infection, pain, poor cosmetic result and poor wound healing  Anesthesia (see MAR for exact dosages):     Anesthesia method:  None  Laceration details:     Location:  Hand    Hand location: medial aspect of L hand.    Length (cm):  1  Repair type:     Repair type:  Simple  Pre-procedure details:     Preparation:  Patient was prepped and draped in usual sterile fashion and imaging obtained to evaluate for foreign bodies  Exploration:     Contaminated: no    Treatment:     Area cleansed with:  Soap and water    Amount of cleaning:  Standard    Irrigation solution:  Sterile saline    Irrigation method:  Syringe    Visualized foreign bodies/material removed: no    Skin repair:     Repair method:  Tissue adhesive  Approximation:     Approximation:   Close  Post-procedure details:     Dressing:  Sterile dressing    Patient tolerance of procedure:  Tolerated well, no immediate complications      ED Vital Signs:  Vitals:    12/15/20 1408 12/15/20 1533 12/15/20 1535   BP: (!) 229/100 (!) 191/84 (!) 215/96   Pulse: 72     Resp: 18     Temp: 98.2 °F (36.8 °C)     TempSrc: Oral     SpO2: 95%         Abnormal Lab Results:  Labs Reviewed - No data to display       Imaging Results          CT Head Without Contrast (Final result)  Result time 12/15/20 15:43:29    Final result by Marcial Murphy MD (12/15/20 15:43:29)                 Impression:      1.  Negative for acute intracranial process. Negative for hemorrhage, or skull fracture.    2.  Cerebral atrophy noted.  Intracranial atherosclerotic disease noted.  Small vessel ischemic changes noted.    3.  Other stable findings as noted above.    All CT scans at this facility are performed  using dose modulation techniques as appropriate to performed exam including the following:  automated exposure control; adjustment of mA and/or kV according to the patients size (this includes techniques or standardized protocols for targeted exams where dose is matched to indication/reason for exam: i.e. extremities or head);  iterative reconstruction technique.      Electronically signed by: Marcial Murphy MD  Date:    12/15/2020  Time:    15:43             Narrative:    EXAMINATION:  CT HEAD WITHOUT CONTRAST    CLINICAL HISTORY:  Headache, post traumatic;    TECHNIQUE:  Axial images through the brain and posterior fossa were obtained without the use of IV contrast.  Sagittal and coronal reconstructions are provided for review.    COMPARISON:  January 7, 2020, and September 9, 2017    FINDINGS:  The ventricles are midline and the CSF spaces are prominent.  The gray-white matter junction is well preserved. Negative for intracranial vascular abnormalities. Negative for mass, mass effect, cerebral edema, hemorrhage or abnormal fluid  collections.  Intracranial atherosclerotic disease noted.  Small vessel ischemic changes noted.  Falx calcifications.  Hyperostosis frontalis interna.    Stable lucent area in the left frontal skull.  The skull and scalp are otherwise intact.   The   paranasal sinuses, mastoid air cells, middle ears and ear canals are clear. The globes are intact.                               X-Ray Shoulder Trauma Left (Final result)  Result time 12/15/20 15:12:10    Final result by Marcial Murphy MD (12/15/20 15:12:10)                 Impression:      1.  Negative for acute process.    2.  Stable findings as noted above.      Electronically signed by: Marcial Murphy MD  Date:    12/15/2020  Time:    15:12             Narrative:    EXAMINATION:  XR SHOULDER TRAUMA 3 VIEW LEFT    CLINICAL HISTORY:  Pain in left shoulder    TECHNIQUE:  Three or four views of the left shoulder were performed.    COMPARISON:  September 8, 2020    FINDINGS:  The glenohumeral joint and acromioclavicular joint are well aligned.  Coracoclavicular distance is normal.  Mild degenerative changes of the acromioclavicular joint again seen.  Negative for acute or healing fracture or dislocation.    Visualized portions of the chest remain clear.                               X-Ray Hand 3 view Left (Final result)  Result time 12/15/20 15:11:09    Final result by Marcial Murphy MD (12/15/20 15:11:09)                 Impression:      1.  Negative for acute process.    2.  Osteopenia.  Degenerative changes throughout the hand and wrist as detailed above, most significantly involving the 1st carpometacarpal joint.      Electronically signed by: Marcial Murphy MD  Date:    12/15/2020  Time:    15:11             Narrative:    EXAMINATION:  XR HAND COMPLETE 3 VIEW LEFT    CLINICAL HISTORY:  hand pain;.    TECHNIQUE:  PA, lateral, and oblique views of the left hand were performed.    COMPARISON:  None    FINDINGS:  Joint spaces are well maintained.  Negative for fracture  or dislocation.  Negative for soft tissue abnormalities.  There are mild-to-moderate degenerative changes of the 1st carpometacarpal joint.  Mild degenerative changes of the interphalangeal joints.  Negative for soft tissue abnormalities.                               X-Ray Hip 2 or 3 views Left (Final result)  Result time 12/15/20 15:10:08    Final result by Marcial Murphy MD (12/15/20 15:10:08)                 Impression:      1.  Negative for acute process.    2.  Stable findings as noted above.      Electronically signed by: Marcial Murphy MD  Date:    12/15/2020  Time:    15:10             Narrative:    EXAMINATION:  XR HIP 2 VIEW LEFT    CLINICAL HISTORY:  Pain hip/pelvic (719.45);    TECHNIQUE:  AP view of the pelvis and frog leg lateral view of the left hip were performed.    COMPARISON:  June 29, 2017    FINDINGS:  Moderate degenerative changes of the pubic symphysis.  Hip joints are well maintained with minor acetabular spurring.  Mild degenerative changes of the lower lumbar spine.  Mild osteopenia.    Negative for fracture or dislocation.  Normal bowel gas pattern.  Vascular calcifications.                                 The Emergency Provider reviewed the vital signs and test results, which are outlined above.     ED Discussion     3:48 PM: Reassessed pt at this time. Pt states her condition has improved at this time. Discussed with pt all pertinent ED information and results. Discussed pt dx and plan of tx. Gave pt all f/u and return to the ED instructions. All questions and concerns were addressed at this time. Pt expresses understanding of information and instructions, and is comfortable with plan to discharge. Pt is stable for discharge.    I discussed with patient and pt's daughter that evaluation in the ED does not suggest any emergent or life threatening medical conditions requiring immediate intervention beyond what was provided in the ED, and I believe patient is safe for discharge.   Regardless, an unremarkable evaluation in the ED does not preclude the development or presence of a serious of life threatening condition. As such, patient was instructed to return immediately for any worsening or change in current symptoms.     MDM        Medical Decision Making:   Clinical Tests:   Radiological Study: Ordered and Reviewed           ED Medication(s):  Medications   cloNIDine tablet 0.2 mg (has no administration in time range)   lidocaine (PF) 10 mg/ml (1%) injection 10 mg (10 mg Other Given by Other 12/15/20 9257)       New Prescriptions    No medications on file       Follow-up Information     Shlomo Ware MD.    Specialty: Internal Medicine  Contact information:  [address] [facility] [id number] [phone]                       Scribe Attestation:   Scribe #1: I performed the above scribed service and the documentation accurately describes the services I performed. I attest to the accuracy of the note.     Attending:   Physician Attestation Statement for Scribe #1: I, Yordy Ramsey MD, personally performed the services described in this documentation, as scribed by Saima Ross, in my presence, and it is both accurate and complete.           Clinical Impression       ICD-10-CM ICD-9-CM   1. Fall, initial encounter  W19.XXXA E888.9   2. Shoulder pain, left  M25.512 719.41   3. Laceration of left hand without foreign body, initial encounter  S61.412A 882.0       Disposition:   Disposition: Discharged  Condition: Stable         Yordy Ramsey MD  12/15/20 8982

## 2020-12-15 NOTE — ED NOTES
Patient identifiers verified and correct for Rebecca Rios. Patient reports tripping on sidewalk and falling PTA. Reports hitting head but denies loss of consciousness. Patient complains of neck pain and left shoulder pain. Laceration noted to left hand. Family member at bedside.    LOC: The patient is awake, alert and aware of environment with an appropriate affect, the patient is oriented x 3 and speaking appropriately.  APPEARANCE: Patient resting comfortably and in no acute distress, patient is clean and well groomed, patient's clothing is properly fastened.  SKIN: The skin is warm and dry, color consistent with ethnicity, patient has normal skin turgor and moist mucus membranes, skin intact, no breakdown or bruising noted.  MUSCULOSKELETAL: Patient moving all extremities spontaneously.  RESPIRATORY: Airway is open and patent, respirations are spontaneous.  CARDIAC: Patient has a normal rate, no periphreal edema noted, capillary refill < 3 seconds.  ABDOMEN: Soft and non tender to palpation.

## 2020-12-16 ENCOUNTER — TELEPHONE (OUTPATIENT)
Dept: GASTROENTEROLOGY | Facility: CLINIC | Age: 80
End: 2020-12-16

## 2020-12-16 NOTE — TELEPHONE ENCOUNTER
Called to check on pt who stated she is very sore from her fall yesterday. Also spoke with pt's daughter who stated she will call back to get pt's Gastro appt rescheduled.

## 2021-01-21 DIAGNOSIS — I10 ESSENTIAL HYPERTENSION: ICD-10-CM

## 2021-01-21 RX ORDER — AMLODIPINE BESYLATE 5 MG/1
5 TABLET ORAL DAILY
Qty: 90 TABLET | Refills: 3 | Status: SHIPPED | OUTPATIENT
Start: 2021-01-21 | End: 2022-05-17

## 2021-03-15 ENCOUNTER — OFFICE VISIT (OUTPATIENT)
Dept: URGENT CARE | Facility: CLINIC | Age: 81
End: 2021-03-15
Payer: MEDICARE

## 2021-03-15 VITALS
DIASTOLIC BLOOD PRESSURE: 73 MMHG | OXYGEN SATURATION: 97 % | HEART RATE: 64 BPM | RESPIRATION RATE: 16 BRPM | TEMPERATURE: 98 F | BODY MASS INDEX: 27.09 KG/M2 | SYSTOLIC BLOOD PRESSURE: 149 MMHG | WEIGHT: 162.81 LBS

## 2021-03-15 DIAGNOSIS — R05.9 COUGH: ICD-10-CM

## 2021-03-15 DIAGNOSIS — I10 ESSENTIAL (PRIMARY) HYPERTENSION: ICD-10-CM

## 2021-03-15 DIAGNOSIS — M25.562 ACUTE PAIN OF LEFT KNEE: ICD-10-CM

## 2021-03-15 DIAGNOSIS — J01.90 ACUTE BACTERIAL SINUSITIS: ICD-10-CM

## 2021-03-15 DIAGNOSIS — B96.89 ACUTE BACTERIAL SINUSITIS: ICD-10-CM

## 2021-03-15 DIAGNOSIS — E66.9 OBESITY (BMI 30.0-34.9): Primary | ICD-10-CM

## 2021-03-15 PROCEDURE — 99999 PR PBB SHADOW E&M-EST. PATIENT-LVL V: ICD-10-PCS | Mod: PBBFAC,,, | Performed by: NURSE PRACTITIONER

## 2021-03-15 PROCEDURE — 99214 PR OFFICE/OUTPT VISIT, EST, LEVL IV, 30-39 MIN: ICD-10-PCS | Mod: S$PBB,,, | Performed by: NURSE PRACTITIONER

## 2021-03-15 PROCEDURE — 99999 PR PBB SHADOW E&M-EST. PATIENT-LVL V: CPT | Mod: PBBFAC,,, | Performed by: NURSE PRACTITIONER

## 2021-03-15 PROCEDURE — 99214 OFFICE O/P EST MOD 30 MIN: CPT | Mod: S$PBB,,, | Performed by: NURSE PRACTITIONER

## 2021-03-15 PROCEDURE — 99215 OFFICE O/P EST HI 40 MIN: CPT | Mod: PBBFAC,PO | Performed by: NURSE PRACTITIONER

## 2021-03-15 RX ORDER — BENZONATATE 100 MG/1
200 CAPSULE ORAL 3 TIMES DAILY PRN
Qty: 60 CAPSULE | Refills: 1 | Status: SHIPPED | OUTPATIENT
Start: 2021-03-15 | End: 2021-06-18

## 2021-03-15 RX ORDER — AMOXICILLIN AND CLAVULANATE POTASSIUM 875; 125 MG/1; MG/1
1 TABLET, FILM COATED ORAL EVERY 12 HOURS
Qty: 20 TABLET | Refills: 0 | Status: SHIPPED | OUTPATIENT
Start: 2021-03-15 | End: 2021-03-26

## 2021-03-15 RX ORDER — BUPROPION HYDROCHLORIDE 300 MG/1
300 TABLET ORAL DAILY
COMMUNITY
Start: 2021-01-25 | End: 2021-06-18

## 2021-03-15 RX ORDER — PREDNISONE 10 MG/1
10 TABLET ORAL 2 TIMES DAILY
Qty: 6 TABLET | Refills: 0 | Status: SHIPPED | OUTPATIENT
Start: 2021-03-15 | End: 2021-03-18

## 2021-03-15 RX ORDER — DEXCHLORPHENIRAMINE MALEATE, DEXTROMETHORPHAN HBR, PHENYLEPHRINE HCL 1; 10; 5 MG/5ML; MG/5ML; MG/5ML
SYRUP ORAL
Status: ON HOLD | COMMUNITY
Start: 2021-01-05 | End: 2023-08-17 | Stop reason: HOSPADM

## 2021-03-26 ENCOUNTER — HOSPITAL ENCOUNTER (OUTPATIENT)
Dept: CARDIOLOGY | Facility: HOSPITAL | Age: 81
Discharge: HOME | End: 2021-03-26
Payer: MEDICARE

## 2021-03-26 ENCOUNTER — OFFICE VISIT (OUTPATIENT)
Dept: CARDIOLOGY | Facility: CLINIC | Age: 81
End: 2021-03-26
Payer: MEDICARE

## 2021-03-26 VITALS
OXYGEN SATURATION: 98 % | DIASTOLIC BLOOD PRESSURE: 80 MMHG | WEIGHT: 162 LBS | SYSTOLIC BLOOD PRESSURE: 122 MMHG | HEIGHT: 65 IN | BODY MASS INDEX: 26.99 KG/M2

## 2021-03-26 DIAGNOSIS — I27.20 PULMONARY HYPERTENSION: ICD-10-CM

## 2021-03-26 DIAGNOSIS — R11.0 NAUSEA: ICD-10-CM

## 2021-03-26 DIAGNOSIS — I25.10 CORONARY ARTERY DISEASE INVOLVING NATIVE CORONARY ARTERY OF NATIVE HEART WITHOUT ANGINA PECTORIS: ICD-10-CM

## 2021-03-26 DIAGNOSIS — I10 ESSENTIAL (PRIMARY) HYPERTENSION: ICD-10-CM

## 2021-03-26 DIAGNOSIS — I25.10 CORONARY ARTERY DISEASE INVOLVING NATIVE CORONARY ARTERY OF NATIVE HEART WITHOUT ANGINA PECTORIS: Primary | ICD-10-CM

## 2021-03-26 DIAGNOSIS — I50.32 CHRONIC DIASTOLIC HEART FAILURE: ICD-10-CM

## 2021-03-26 DIAGNOSIS — R07.89 OTHER CHEST PAIN: ICD-10-CM

## 2021-03-26 DIAGNOSIS — N18.9 CHRONIC KIDNEY DISEASE, UNSPECIFIED CKD STAGE: ICD-10-CM

## 2021-03-26 DIAGNOSIS — E66.9 OBESITY (BMI 30.0-34.9): ICD-10-CM

## 2021-03-26 DIAGNOSIS — E78.2 MIXED HYPERLIPIDEMIA: ICD-10-CM

## 2021-03-26 PROCEDURE — 99999 PR PBB SHADOW E&M-EST. PATIENT-LVL IV: ICD-10-PCS | Mod: PBBFAC,,, | Performed by: PHYSICIAN ASSISTANT

## 2021-03-26 PROCEDURE — 93005 ELECTROCARDIOGRAM TRACING: CPT

## 2021-03-26 PROCEDURE — 99999 PR PBB SHADOW E&M-EST. PATIENT-LVL IV: CPT | Mod: PBBFAC,,, | Performed by: PHYSICIAN ASSISTANT

## 2021-03-26 PROCEDURE — 99214 OFFICE O/P EST MOD 30 MIN: CPT | Mod: S$PBB,,, | Performed by: PHYSICIAN ASSISTANT

## 2021-03-26 PROCEDURE — 93010 EKG 12-LEAD: ICD-10-PCS | Mod: ,,, | Performed by: INTERNAL MEDICINE

## 2021-03-26 PROCEDURE — 99214 PR OFFICE/OUTPT VISIT, EST, LEVL IV, 30-39 MIN: ICD-10-PCS | Mod: S$PBB,,, | Performed by: PHYSICIAN ASSISTANT

## 2021-03-26 PROCEDURE — 99214 OFFICE O/P EST MOD 30 MIN: CPT | Mod: PBBFAC,25 | Performed by: PHYSICIAN ASSISTANT

## 2021-03-26 PROCEDURE — 93010 ELECTROCARDIOGRAM REPORT: CPT | Mod: ,,, | Performed by: INTERNAL MEDICINE

## 2021-04-05 ENCOUNTER — PATIENT MESSAGE (OUTPATIENT)
Dept: CARDIOLOGY | Facility: HOSPITAL | Age: 81
End: 2021-04-05

## 2021-04-06 ENCOUNTER — OFFICE VISIT (OUTPATIENT)
Dept: GASTROENTEROLOGY | Facility: CLINIC | Age: 81
End: 2021-04-06
Payer: MEDICARE

## 2021-04-06 ENCOUNTER — TELEPHONE (OUTPATIENT)
Dept: CARDIOLOGY | Facility: HOSPITAL | Age: 81
End: 2021-04-06

## 2021-04-06 VITALS
HEART RATE: 74 BPM | SYSTOLIC BLOOD PRESSURE: 120 MMHG | HEIGHT: 65 IN | DIASTOLIC BLOOD PRESSURE: 62 MMHG | OXYGEN SATURATION: 98 % | BODY MASS INDEX: 27.77 KG/M2 | WEIGHT: 166.69 LBS

## 2021-04-06 DIAGNOSIS — R07.89 ATYPICAL CHEST PAIN: ICD-10-CM

## 2021-04-06 DIAGNOSIS — Z12.11 COLON CANCER SCREENING: ICD-10-CM

## 2021-04-06 DIAGNOSIS — K44.9 HIATAL HERNIA: Primary | ICD-10-CM

## 2021-04-06 DIAGNOSIS — K21.9 GASTROESOPHAGEAL REFLUX DISEASE, UNSPECIFIED WHETHER ESOPHAGITIS PRESENT: ICD-10-CM

## 2021-04-06 PROCEDURE — 99999 PR PBB SHADOW E&M-EST. PATIENT-LVL IV: ICD-10-PCS | Mod: PBBFAC,,, | Performed by: PHYSICIAN ASSISTANT

## 2021-04-06 PROCEDURE — 99214 OFFICE O/P EST MOD 30 MIN: CPT | Mod: S$PBB,,, | Performed by: PHYSICIAN ASSISTANT

## 2021-04-06 PROCEDURE — 99214 OFFICE O/P EST MOD 30 MIN: CPT | Mod: PBBFAC | Performed by: PHYSICIAN ASSISTANT

## 2021-04-06 PROCEDURE — 99999 PR PBB SHADOW E&M-EST. PATIENT-LVL IV: CPT | Mod: PBBFAC,,, | Performed by: PHYSICIAN ASSISTANT

## 2021-04-06 PROCEDURE — 99214 PR OFFICE/OUTPT VISIT, EST, LEVL IV, 30-39 MIN: ICD-10-PCS | Mod: S$PBB,,, | Performed by: PHYSICIAN ASSISTANT

## 2021-04-06 RX ORDER — TRAZODONE HYDROCHLORIDE 50 MG/1
TABLET ORAL
COMMUNITY
Start: 2021-03-29 | End: 2021-06-18

## 2021-05-05 ENCOUNTER — HOSPITAL ENCOUNTER (OUTPATIENT)
Dept: CARDIOLOGY | Facility: HOSPITAL | Age: 81
Discharge: HOME | End: 2021-05-05
Attending: PHYSICIAN ASSISTANT
Payer: MEDICARE

## 2021-05-05 ENCOUNTER — HOSPITAL ENCOUNTER (OUTPATIENT)
Dept: RADIOLOGY | Facility: HOSPITAL | Age: 81
Discharge: HOME | End: 2021-05-05
Attending: PHYSICIAN ASSISTANT
Payer: MEDICARE

## 2021-05-05 DIAGNOSIS — I10 ESSENTIAL (PRIMARY) HYPERTENSION: ICD-10-CM

## 2021-05-05 DIAGNOSIS — I25.10 CORONARY ARTERY DISEASE INVOLVING NATIVE CORONARY ARTERY OF NATIVE HEART WITHOUT ANGINA PECTORIS: ICD-10-CM

## 2021-05-05 DIAGNOSIS — I50.32 CHRONIC DIASTOLIC HEART FAILURE: ICD-10-CM

## 2021-05-05 DIAGNOSIS — R07.89 OTHER CHEST PAIN: ICD-10-CM

## 2021-05-05 DIAGNOSIS — N18.9 CHRONIC KIDNEY DISEASE, UNSPECIFIED CKD STAGE: ICD-10-CM

## 2021-05-05 DIAGNOSIS — E66.9 OBESITY (BMI 30.0-34.9): ICD-10-CM

## 2021-05-05 DIAGNOSIS — E78.2 MIXED HYPERLIPIDEMIA: ICD-10-CM

## 2021-05-05 PROCEDURE — 93016 STRESS TEST WITH MYOCARDIAL PERFUSION (CUPID ONLY): ICD-10-PCS | Mod: ,,, | Performed by: INTERNAL MEDICINE

## 2021-05-05 PROCEDURE — 93016 CV STRESS TEST SUPVJ ONLY: CPT | Mod: ,,, | Performed by: INTERNAL MEDICINE

## 2021-05-05 PROCEDURE — A9502 TC99M TETROFOSMIN: HCPCS

## 2021-05-05 PROCEDURE — 78452 HT MUSCLE IMAGE SPECT MULT: CPT | Mod: 26,,, | Performed by: INTERNAL MEDICINE

## 2021-05-05 PROCEDURE — 93017 CV STRESS TEST TRACING ONLY: CPT

## 2021-05-05 PROCEDURE — 93018 STRESS TEST WITH MYOCARDIAL PERFUSION (CUPID ONLY): ICD-10-PCS | Mod: ,,, | Performed by: INTERNAL MEDICINE

## 2021-05-05 PROCEDURE — 93018 CV STRESS TEST I&R ONLY: CPT | Mod: ,,, | Performed by: INTERNAL MEDICINE

## 2021-05-05 PROCEDURE — 78452 STRESS TEST WITH MYOCARDIAL PERFUSION (CUPID ONLY): ICD-10-PCS | Mod: 26,,, | Performed by: INTERNAL MEDICINE

## 2021-05-05 PROCEDURE — 78452 HT MUSCLE IMAGE SPECT MULT: CPT

## 2021-05-05 PROCEDURE — 63600175 PHARM REV CODE 636 W HCPCS: Performed by: PHYSICIAN ASSISTANT

## 2021-05-05 RX ORDER — REGADENOSON 0.08 MG/ML
0.4 INJECTION, SOLUTION INTRAVENOUS ONCE
Status: COMPLETED | OUTPATIENT
Start: 2021-05-05 | End: 2021-05-05

## 2021-05-05 RX ADMIN — REGADENOSON 0.4 MG: 0.08 INJECTION, SOLUTION INTRAVENOUS at 12:05

## 2021-05-06 ENCOUNTER — TELEPHONE (OUTPATIENT)
Dept: CARDIOLOGY | Facility: CLINIC | Age: 81
End: 2021-05-06

## 2021-05-06 DIAGNOSIS — K44.9 HIATAL HERNIA: Primary | ICD-10-CM

## 2021-05-06 DIAGNOSIS — Z12.11 COLON CANCER SCREENING: ICD-10-CM

## 2021-05-06 DIAGNOSIS — K21.9 GASTROESOPHAGEAL REFLUX DISEASE, UNSPECIFIED WHETHER ESOPHAGITIS PRESENT: ICD-10-CM

## 2021-05-06 DIAGNOSIS — R07.89 ATYPICAL CHEST PAIN: ICD-10-CM

## 2021-05-06 LAB
CV STRESS BASE HR: 60 BPM
DIASTOLIC BLOOD PRESSURE: 69 MMHG
NUC REST EJECTION FRACTION: 69
NUC STRESS EJECTION FRACTION: 68 %
OHS CV CPX 85 PERCENT MAX PREDICTED HEART RATE MALE: 117
OHS CV CPX MAX PREDICTED HEART RATE: 137
OHS CV CPX PATIENT IS FEMALE: 1
OHS CV CPX PATIENT IS MALE: 0
OHS CV CPX PEAK DIASTOLIC BLOOD PRESSURE: 61 MMHG
OHS CV CPX PEAK HEAR RATE: 82 BPM
OHS CV CPX PEAK RATE PRESSURE PRODUCT: NORMAL
OHS CV CPX PEAK SYSTOLIC BLOOD PRESSURE: 151 MMHG
OHS CV CPX PERCENT MAX PREDICTED HEART RATE ACHIEVED: 60
OHS CV CPX RATE PRESSURE PRODUCT PRESENTING: 8520
STRESS ECHO POST EXERCISE DUR MIN: 1 MINUTES
STRESS ECHO POST EXERCISE DUR SEC: 9 SECONDS
SYSTOLIC BLOOD PRESSURE: 142 MMHG

## 2021-05-06 RX ORDER — SODIUM, POTASSIUM,MAG SULFATES 17.5-3.13G
1 SOLUTION, RECONSTITUTED, ORAL ORAL DAILY
Qty: 1 KIT | Refills: 0 | Status: SHIPPED | OUTPATIENT
Start: 2021-05-06 | End: 2021-05-08

## 2021-05-11 DIAGNOSIS — I27.20 PULMONARY HYPERTENSION: Primary | ICD-10-CM

## 2021-05-17 ENCOUNTER — TELEPHONE (OUTPATIENT)
Dept: GASTROENTEROLOGY | Facility: CLINIC | Age: 81
End: 2021-05-17

## 2021-05-17 ENCOUNTER — LAB VISIT (OUTPATIENT)
Dept: LAB | Facility: HOSPITAL | Age: 81
End: 2021-05-17
Attending: PHYSICIAN ASSISTANT
Payer: MEDICARE

## 2021-05-17 ENCOUNTER — OFFICE VISIT (OUTPATIENT)
Dept: URGENT CARE | Facility: CLINIC | Age: 81
End: 2021-05-17
Payer: MEDICARE

## 2021-05-17 ENCOUNTER — TELEPHONE (OUTPATIENT)
Dept: PULMONOLOGY | Facility: CLINIC | Age: 81
End: 2021-05-17

## 2021-05-17 VITALS
DIASTOLIC BLOOD PRESSURE: 83 MMHG | OXYGEN SATURATION: 97 % | WEIGHT: 165.88 LBS | TEMPERATURE: 99 F | HEART RATE: 74 BPM | BODY MASS INDEX: 27.61 KG/M2 | SYSTOLIC BLOOD PRESSURE: 147 MMHG

## 2021-05-17 DIAGNOSIS — R05.9 COUGH: ICD-10-CM

## 2021-05-17 DIAGNOSIS — B96.89 ACUTE BACTERIAL SINUSITIS: Primary | ICD-10-CM

## 2021-05-17 DIAGNOSIS — I25.10 CORONARY ARTERY DISEASE INVOLVING NATIVE CORONARY ARTERY OF NATIVE HEART WITHOUT ANGINA PECTORIS: ICD-10-CM

## 2021-05-17 DIAGNOSIS — E78.2 MIXED HYPERLIPIDEMIA: ICD-10-CM

## 2021-05-17 DIAGNOSIS — J01.90 ACUTE BACTERIAL SINUSITIS: Primary | ICD-10-CM

## 2021-05-17 DIAGNOSIS — I10 ESSENTIAL (PRIMARY) HYPERTENSION: ICD-10-CM

## 2021-05-17 DIAGNOSIS — R68.83 CHILLS: ICD-10-CM

## 2021-05-17 LAB
CTP QC/QA: YES
SARS-COV-2 RDRP RESP QL NAA+PROBE: NEGATIVE

## 2021-05-17 PROCEDURE — 99999 PR PBB SHADOW E&M-EST. PATIENT-LVL IV: CPT | Mod: PBBFAC,,, | Performed by: PHYSICIAN ASSISTANT

## 2021-05-17 PROCEDURE — U0002 COVID-19 LAB TEST NON-CDC: HCPCS | Mod: PBBFAC,PO | Performed by: PHYSICIAN ASSISTANT

## 2021-05-17 PROCEDURE — 36415 COLL VENOUS BLD VENIPUNCTURE: CPT | Mod: PO | Performed by: PHYSICIAN ASSISTANT

## 2021-05-17 PROCEDURE — 99214 PR OFFICE/OUTPT VISIT, EST, LEVL IV, 30-39 MIN: ICD-10-PCS | Mod: S$PBB,,, | Performed by: PHYSICIAN ASSISTANT

## 2021-05-17 PROCEDURE — 80061 LIPID PANEL: CPT | Performed by: PHYSICIAN ASSISTANT

## 2021-05-17 PROCEDURE — 80053 COMPREHEN METABOLIC PANEL: CPT | Performed by: PHYSICIAN ASSISTANT

## 2021-05-17 PROCEDURE — 99214 OFFICE O/P EST MOD 30 MIN: CPT | Mod: S$PBB,,, | Performed by: PHYSICIAN ASSISTANT

## 2021-05-17 PROCEDURE — 99214 OFFICE O/P EST MOD 30 MIN: CPT | Mod: PBBFAC,PO | Performed by: PHYSICIAN ASSISTANT

## 2021-05-17 PROCEDURE — 99999 PR PBB SHADOW E&M-EST. PATIENT-LVL IV: ICD-10-PCS | Mod: PBBFAC,,, | Performed by: PHYSICIAN ASSISTANT

## 2021-05-17 RX ORDER — FLUTICASONE PROPIONATE 50 MCG
2 SPRAY, SUSPENSION (ML) NASAL DAILY
Qty: 16 G | Refills: 0 | Status: SHIPPED | OUTPATIENT
Start: 2021-05-17 | End: 2021-06-16

## 2021-05-17 RX ORDER — PROMETHAZINE HYDROCHLORIDE AND DEXTROMETHORPHAN HYDROBROMIDE 6.25; 15 MG/5ML; MG/5ML
5 SYRUP ORAL NIGHTLY PRN
Qty: 118 ML | Refills: 0 | Status: SHIPPED | OUTPATIENT
Start: 2021-05-17 | End: 2021-05-27

## 2021-05-17 RX ORDER — AMOXICILLIN AND CLAVULANATE POTASSIUM 875; 125 MG/1; MG/1
1 TABLET, FILM COATED ORAL 2 TIMES DAILY
Qty: 10 TABLET | Refills: 0 | Status: SHIPPED | OUTPATIENT
Start: 2021-05-17 | End: 2021-05-22

## 2021-05-18 ENCOUNTER — TELEPHONE (OUTPATIENT)
Dept: GASTROENTEROLOGY | Facility: CLINIC | Age: 81
End: 2021-05-18

## 2021-05-18 LAB
ALBUMIN SERPL BCP-MCNC: 3.5 G/DL (ref 3.5–5.2)
ALP SERPL-CCNC: 82 U/L (ref 55–135)
ALT SERPL W/O P-5'-P-CCNC: 11 U/L (ref 10–44)
ANION GAP SERPL CALC-SCNC: 12 MMOL/L (ref 8–16)
AST SERPL-CCNC: 19 U/L (ref 10–40)
BILIRUB SERPL-MCNC: 0.4 MG/DL (ref 0.1–1)
BUN SERPL-MCNC: 10 MG/DL (ref 8–23)
CALCIUM SERPL-MCNC: 9.2 MG/DL (ref 8.7–10.5)
CHLORIDE SERPL-SCNC: 105 MMOL/L (ref 95–110)
CHOLEST SERPL-MCNC: 169 MG/DL (ref 120–199)
CHOLEST/HDLC SERPL: 3.8 {RATIO} (ref 2–5)
CO2 SERPL-SCNC: 27 MMOL/L (ref 23–29)
CREAT SERPL-MCNC: 0.7 MG/DL (ref 0.5–1.4)
EST. GFR  (AFRICAN AMERICAN): >60 ML/MIN/1.73 M^2
EST. GFR  (NON AFRICAN AMERICAN): >60 ML/MIN/1.73 M^2
GLUCOSE SERPL-MCNC: 94 MG/DL (ref 70–110)
HDLC SERPL-MCNC: 45 MG/DL (ref 40–75)
HDLC SERPL: 26.6 % (ref 20–50)
LDLC SERPL CALC-MCNC: 93.6 MG/DL (ref 63–159)
NONHDLC SERPL-MCNC: 124 MG/DL
POTASSIUM SERPL-SCNC: 4.3 MMOL/L (ref 3.5–5.1)
PROT SERPL-MCNC: 6.4 G/DL (ref 6–8.4)
SODIUM SERPL-SCNC: 144 MMOL/L (ref 136–145)
TRIGL SERPL-MCNC: 152 MG/DL (ref 30–150)

## 2021-06-10 DIAGNOSIS — R05.9 COUGH: Primary | ICD-10-CM

## 2021-06-18 ENCOUNTER — OFFICE VISIT (OUTPATIENT)
Dept: PULMONOLOGY | Facility: CLINIC | Age: 81
End: 2021-06-18
Payer: MEDICARE

## 2021-06-18 ENCOUNTER — HOSPITAL ENCOUNTER (OUTPATIENT)
Dept: RADIOLOGY | Facility: HOSPITAL | Age: 81
Discharge: HOME | End: 2021-06-18
Attending: INTERNAL MEDICINE
Payer: MEDICARE

## 2021-06-18 VITALS
WEIGHT: 165.38 LBS | DIASTOLIC BLOOD PRESSURE: 90 MMHG | BODY MASS INDEX: 27.56 KG/M2 | RESPIRATION RATE: 17 BRPM | SYSTOLIC BLOOD PRESSURE: 140 MMHG | HEART RATE: 59 BPM | HEIGHT: 65 IN | OXYGEN SATURATION: 97 %

## 2021-06-18 DIAGNOSIS — I27.20 PULMONARY HYPERTENSION: ICD-10-CM

## 2021-06-18 DIAGNOSIS — G47.34 NOCTURNAL HYPOXEMIA: ICD-10-CM

## 2021-06-18 DIAGNOSIS — K44.9 HIATAL HERNIA WITH GERD: ICD-10-CM

## 2021-06-18 DIAGNOSIS — F13.20 BENZODIAZEPINE DEPENDENCE, CONTINUOUS: ICD-10-CM

## 2021-06-18 DIAGNOSIS — R91.8 MULTIPLE PULMONARY NODULES: ICD-10-CM

## 2021-06-18 DIAGNOSIS — E66.3 OVERWEIGHT (BMI 25.0-29.9): ICD-10-CM

## 2021-06-18 DIAGNOSIS — K21.9 HIATAL HERNIA WITH GERD: ICD-10-CM

## 2021-06-18 DIAGNOSIS — I50.32 CHRONIC DIASTOLIC HEART FAILURE: Primary | ICD-10-CM

## 2021-06-18 DIAGNOSIS — F11.90 CHRONIC NARCOTIC USE: ICD-10-CM

## 2021-06-18 DIAGNOSIS — Z01.811 PRE-OPERATIVE RESPIRATORY EXAMINATION: ICD-10-CM

## 2021-06-18 PROCEDURE — 99214 OFFICE O/P EST MOD 30 MIN: CPT | Mod: PBBFAC,25 | Performed by: NURSE PRACTITIONER

## 2021-06-18 PROCEDURE — 71046 XR CHEST PA AND LATERAL: ICD-10-PCS | Mod: 26,,, | Performed by: RADIOLOGY

## 2021-06-18 PROCEDURE — 71046 X-RAY EXAM CHEST 2 VIEWS: CPT | Mod: 26,,, | Performed by: RADIOLOGY

## 2021-06-18 PROCEDURE — 99215 OFFICE O/P EST HI 40 MIN: CPT | Mod: S$PBB,ICN,, | Performed by: NURSE PRACTITIONER

## 2021-06-18 PROCEDURE — 99999 PR PBB SHADOW E&M-EST. PATIENT-LVL IV: CPT | Mod: PBBFAC,,, | Performed by: NURSE PRACTITIONER

## 2021-06-18 PROCEDURE — 99999 PR PBB SHADOW E&M-EST. PATIENT-LVL IV: ICD-10-PCS | Mod: PBBFAC,,, | Performed by: NURSE PRACTITIONER

## 2021-06-18 PROCEDURE — 71046 X-RAY EXAM CHEST 2 VIEWS: CPT | Mod: TC

## 2021-06-18 PROCEDURE — 99215 PR OFFICE/OUTPT VISIT, EST, LEVL V, 40-54 MIN: ICD-10-PCS | Mod: S$PBB,ICN,, | Performed by: NURSE PRACTITIONER

## 2021-06-25 ENCOUNTER — LAB VISIT (OUTPATIENT)
Dept: URGENT CARE | Facility: CLINIC | Age: 81
End: 2021-06-25
Payer: MEDICARE

## 2021-06-25 DIAGNOSIS — Z01.811 PRE-OPERATIVE RESPIRATORY EXAMINATION: ICD-10-CM

## 2021-06-25 DIAGNOSIS — K21.9 HIATAL HERNIA WITH GERD: ICD-10-CM

## 2021-06-25 DIAGNOSIS — K44.9 HIATAL HERNIA WITH GERD: ICD-10-CM

## 2021-06-25 PROCEDURE — U0003 INFECTIOUS AGENT DETECTION BY NUCLEIC ACID (DNA OR RNA); SEVERE ACUTE RESPIRATORY SYNDROME CORONAVIRUS 2 (SARS-COV-2) (CORONAVIRUS DISEASE [COVID-19]), AMPLIFIED PROBE TECHNIQUE, MAKING USE OF HIGH THROUGHPUT TECHNOLOGIES AS DESCRIBED BY CMS-2020-01-R: HCPCS | Performed by: NURSE PRACTITIONER

## 2021-06-25 PROCEDURE — U0005 INFEC AGEN DETEC AMPLI PROBE: HCPCS | Performed by: NURSE PRACTITIONER

## 2021-06-26 LAB — SARS-COV-2 RNA RESP QL NAA+PROBE: NOT DETECTED

## 2021-06-28 ENCOUNTER — CLINICAL SUPPORT (OUTPATIENT)
Dept: PULMONOLOGY | Facility: CLINIC | Age: 81
End: 2021-06-28
Payer: MEDICARE

## 2021-06-28 DIAGNOSIS — I27.20 PULMONARY HYPERTENSION: ICD-10-CM

## 2021-06-28 DIAGNOSIS — Z01.811 PRE-OPERATIVE RESPIRATORY EXAMINATION: ICD-10-CM

## 2021-06-28 DIAGNOSIS — K21.9 HIATAL HERNIA WITH GERD: ICD-10-CM

## 2021-06-28 DIAGNOSIS — K44.9 HIATAL HERNIA WITH GERD: ICD-10-CM

## 2021-06-28 PROCEDURE — 94060 PR EVAL OF BRONCHOSPASM: ICD-10-PCS | Mod: 26,S$PBB,, | Performed by: INTERNAL MEDICINE

## 2021-06-28 PROCEDURE — 94726 PLETHYSMOGRAPHY LUNG VOLUMES: CPT | Mod: 26,S$PBB,, | Performed by: INTERNAL MEDICINE

## 2021-06-28 PROCEDURE — 94726 PLETHYSMOGRAPHY LUNG VOLUMES: CPT | Mod: PBBFAC

## 2021-06-28 PROCEDURE — 94726 PULM FUNCT TST PLETHYSMOGRAP: ICD-10-PCS | Mod: 26,S$PBB,, | Performed by: INTERNAL MEDICINE

## 2021-06-28 PROCEDURE — 94060 EVALUATION OF WHEEZING: CPT | Mod: PBBFAC

## 2021-06-28 PROCEDURE — 94762 N-INVAS EAR/PLS OXIMTRY CONT: CPT | Mod: PBBFAC

## 2021-06-28 PROCEDURE — 94060 EVALUATION OF WHEEZING: CPT | Mod: 26,S$PBB,, | Performed by: INTERNAL MEDICINE

## 2021-06-29 ENCOUNTER — HOSPITAL ENCOUNTER (OUTPATIENT)
Dept: RADIOLOGY | Facility: HOSPITAL | Age: 81
Discharge: HOME | End: 2021-06-29
Attending: NURSE PRACTITIONER
Payer: MEDICARE

## 2021-06-29 DIAGNOSIS — R91.8 MULTIPLE PULMONARY NODULES: ICD-10-CM

## 2021-06-29 LAB
BRPFT: ABNORMAL
ERV LLN: -16449.46
ERV PRE REF: 60.2 %
ERV REF: 0.54
FEF 25 75 CHG: 12.6 %
FEF 25 75 LLN: 0.69
FEF 25 75 POST REF: 75.6 %
FEF 25 75 PRE REF: 67.1 %
FEF 25 75 REF: 1.63
FET100 CHG: 3.5 %
FEV1 CHG: 1.5 %
FEV1 FVC CHG: 2 %
FEV1 FVC LLN: 63
FEV1 FVC POST REF: 95.1 %
FEV1 FVC PRE REF: 93.3 %
FEV1 FVC REF: 77
FEV1 LLN: 1.43
FEV1 POST REF: 84.9 %
FEV1 PRE REF: 83.7 %
FEV1 REF: 2.03
FRCPLETH LLN: 1.95
FRCPLETH PREREF: 105 %
FRCPLETH REF: 2.77
FVC CHG: -0.5 %
FVC LLN: 1.88
FVC POST REF: 88.2 %
FVC PRE REF: 88.6 %
FVC REF: 2.67
MVV LLN: 67
MVV PRE REF: 40.7 %
MVV REF: 79
PEF CHG: 19.6 %
PEF LLN: 3.37
PEF POST REF: 86.4 %
PEF PRE REF: 72.2 %
PEF REF: 5.13
POST FEF 25 75: 1.23 L/S (ref 0.69–2.57)
POST FET 100: 7.29 SEC
POST FEV1 FVC: 73.27 % (ref 62.6–91.45)
POST FEV1: 1.73 L (ref 1.43–2.63)
POST FVC: 2.36 L (ref 1.88–3.46)
POST PEF: 4.43 L/S (ref 3.37–6.89)
PRE ERV: 0.33 L (ref -16449.46–16450.54)
PRE FEF 25 75: 1.09 L/S (ref 0.69–2.57)
PRE FET 100: 7.04 SEC
PRE FEV1 FVC: 71.84 % (ref 62.6–91.45)
PRE FEV1: 1.7 L (ref 1.43–2.63)
PRE FRC PL: 2.91 L
PRE FVC: 2.37 L (ref 1.88–3.46)
PRE MVV: 32 L/MIN (ref 66.89–90.5)
PRE PEF: 3.7 L/S (ref 3.37–6.89)
PRE RV: 1.87 L (ref 1.66–2.81)
PRE TLC: 4.24 L (ref 4.11–6.09)
RAW LLN: 3.06
RAW PRE REF: 255 %
RAW PRE: 7.8 CMH2O*S/L (ref 3.06–3.06)
RAW REF: 3.06
RV LLN: 1.66
RV PRE REF: 83.9 %
RV REF: 2.23
RVTLC LLN: 36
RVTLC PRE REF: 97.1 %
RVTLC PRE: 44.16 % (ref 35.89–55.07)
RVTLC REF: 45
TLC LLN: 4.11
TLC PRE REF: 83.2 %
TLC REF: 5.1
VC LLN: 1.88
VC PRE REF: 88.6 %
VC PRE: 2.37 L (ref 1.88–3.46)
VC REF: 2.67
VTGRAWPRE: 2.5 L

## 2021-06-29 PROCEDURE — 71250 CT THORAX DX C-: CPT | Mod: TC

## 2021-07-06 DIAGNOSIS — Z20.822 ENCOUNTER FOR LABORATORY TESTING FOR COVID-19 VIRUS: ICD-10-CM

## 2021-07-19 ENCOUNTER — PATIENT MESSAGE (OUTPATIENT)
Dept: GASTROENTEROLOGY | Facility: CLINIC | Age: 81
End: 2021-07-19

## 2021-11-16 ENCOUNTER — TELEPHONE (OUTPATIENT)
Dept: CARDIOLOGY | Facility: CLINIC | Age: 81
End: 2021-11-16
Payer: MEDICARE

## 2022-01-04 ENCOUNTER — PATIENT MESSAGE (OUTPATIENT)
Dept: ENDOSCOPY | Facility: HOSPITAL | Age: 82
End: 2022-01-04
Payer: MEDICARE

## 2022-01-28 ENCOUNTER — OFFICE VISIT (OUTPATIENT)
Dept: CARDIOLOGY | Facility: CLINIC | Age: 82
End: 2022-01-28
Payer: MEDICARE

## 2022-01-28 VITALS
OXYGEN SATURATION: 97 % | HEART RATE: 79 BPM | HEIGHT: 65 IN | RESPIRATION RATE: 16 BRPM | BODY MASS INDEX: 26.48 KG/M2 | SYSTOLIC BLOOD PRESSURE: 160 MMHG | DIASTOLIC BLOOD PRESSURE: 80 MMHG | WEIGHT: 158.94 LBS

## 2022-01-28 DIAGNOSIS — I25.118 CORONARY ARTERY DISEASE OF NATIVE ARTERY OF NATIVE HEART WITH STABLE ANGINA PECTORIS: ICD-10-CM

## 2022-01-28 DIAGNOSIS — R07.89 ATYPICAL CHEST PAIN: ICD-10-CM

## 2022-01-28 DIAGNOSIS — I10 ESSENTIAL (PRIMARY) HYPERTENSION: Primary | ICD-10-CM

## 2022-01-28 DIAGNOSIS — I50.32 CHRONIC DIASTOLIC HEART FAILURE: ICD-10-CM

## 2022-01-28 DIAGNOSIS — I27.20 PULMONARY HYPERTENSION: ICD-10-CM

## 2022-01-28 DIAGNOSIS — I65.23 ASYMPTOMATIC STENOSIS OF BOTH CAROTID ARTERIES WITHOUT INFARCTION: ICD-10-CM

## 2022-01-28 DIAGNOSIS — E66.3 OVERWEIGHT (BMI 25.0-29.9): ICD-10-CM

## 2022-01-28 DIAGNOSIS — E78.2 MIXED HYPERLIPIDEMIA: ICD-10-CM

## 2022-01-28 PROCEDURE — 99999 PR PBB SHADOW E&M-EST. PATIENT-LVL III: CPT | Mod: PBBFAC,,, | Performed by: INTERNAL MEDICINE

## 2022-01-28 PROCEDURE — 99214 OFFICE O/P EST MOD 30 MIN: CPT | Mod: S$PBB,,, | Performed by: INTERNAL MEDICINE

## 2022-01-28 PROCEDURE — 99999 PR PBB SHADOW E&M-EST. PATIENT-LVL III: ICD-10-PCS | Mod: PBBFAC,,, | Performed by: INTERNAL MEDICINE

## 2022-01-28 PROCEDURE — 99213 OFFICE O/P EST LOW 20 MIN: CPT | Mod: PBBFAC | Performed by: INTERNAL MEDICINE

## 2022-01-28 PROCEDURE — 99214 PR OFFICE/OUTPT VISIT, EST, LEVL IV, 30-39 MIN: ICD-10-PCS | Mod: S$PBB,,, | Performed by: INTERNAL MEDICINE

## 2022-01-28 NOTE — PROGRESS NOTES
"Subjective:    Patient ID:  Rebecca Rios is a 81 y.o. female who presents for evaluation of Hypertension, Hyperlipidemia, Coronary Artery Disease, Congestive Heart Failure, and Carotid Artery Disease      HPI Pt presents for eval.  Her current med conditions include HTN, diastolic CHF, carotid disease, MR, PHTN, LVH, hyperlipidemia, carries dx of CAD in chart. Nonsmoker.   Past hx pertinent for following:  Stress mpi 2014 fixed anteroapical defect, trivial ischemia.   Echo 2014 normal EF, DD, mod TR, PHTN.  Stress MPI May 2018 no ischemia, normal LVEF.  Echo Sept 2017 normal LVEF, LVH.  Now here.  I last saw pt April 2019.  Has seen Cards mid level few times since I last saw her.  Mid level ordered stress test May 2021 for chest pain sxs.  Stress test showed no ischemia, normal EF.  Echo Jan 2020 normal EF, indeterminate diastolic function, LVH, mild MR, mild-mod TR.  Carotid u/s Jan 2020 mild disease.  Every "once in a while" gets a cp, and thinks "gas" related sxs.  No typical anginal sxs.  No CP with exertion.  No CHF sxs.  Weight down 30 pounds since I last saw her.  Loss of appetite.  Lipids last check controlled.  No longer takes statin.  She takes fish oil.  No TIA/CVA sxs.   BP elevated today, asx.        Past Medical History:   Diagnosis Date    Anxiety     Asymptomatic stenosis of both carotid arteries without infarction 1/28/2022    Coronary artery disease     Depression     Fractures     Hot flash, menopausal     HTN (hypertension)     Hyperlipidemia     Hypothyroid      Current Outpatient Medications   Medication Instructions    ALPRAZolam (XANAX) 0.5 mg, Oral, 2 times daily PRN    amLODIPine (NORVASC) 5 mg, Oral, Daily    aspirin (ECOTRIN) 81 mg, Oral, Daily    DULoxetine (CYMBALTA) 30 MG capsule TAKE 1 CAPSULE BY MOUTH ONCE DAILY    ergocalciferol (ERGOCALCIFEROL) 50,000 Units, Oral, Every 7 days    esomeprazole (NEXIUM) 40 mg, Oral, Daily    fluticasone propionate (FLONASE) 100 mcg, " "Each Nostril, Daily    HYDROcodone-acetaminophen (NORCO)  mg per tablet TAKE 1 TABLET BY MOUTH EVERY 8 HOURS AS NEEDED FOR PAIN    levocetirizine (XYZAL) 5 MG tablet TAKE 1 TABLET BY MOUTH EVERY EVENING.    levothyroxine (SYNTHROID) 75 MCG tablet TAKE ONE TABLET BY MOUTH ONCE DAILY    lisinopriL (PRINIVIL,ZESTRIL) 40 mg, Oral, Daily    POLYTUSSIN DM 1-5-10 mg/5 mL Syrp TAKE 2 TEASPOONFULS (10ML) BY MOUTH EVERY 8 TO 12 HOURS AS NEEDED FOR COUGH    tiZANidine (ZANAFLEX) 4 mg, Oral, Nightly         Review of Systems   Constitutional: Positive for weight loss.   HENT: Negative.    Eyes: Negative.    Cardiovascular: Positive for chest pain.   Respiratory: Negative.    Endocrine: Negative.    Hematologic/Lymphatic: Negative.    Skin: Negative.    Musculoskeletal: Positive for arthritis and joint pain.   Gastrointestinal: Negative.    Genitourinary: Negative.    Neurological: Negative.    Psychiatric/Behavioral: Negative.    Allergic/Immunologic: Negative.        BP (!) 160/80 (BP Location: Right arm, Patient Position: Sitting, BP Method: Large (Manual))   Pulse 79   Resp 16   Ht 5' 5" (1.651 m)   Wt 72.1 kg (158 lb 15.2 oz)   SpO2 97%   BMI 26.45 kg/m²     Wt Readings from Last 3 Encounters:   01/28/22 72.1 kg (158 lb 15.2 oz)   06/18/21 75 kg (165 lb 5.5 oz)   05/17/21 75.3 kg (165 lb 14.3 oz)     Temp Readings from Last 3 Encounters:   05/17/21 98.5 °F (36.9 °C) (Oral)   03/15/21 98.3 °F (36.8 °C)   12/15/20 98.2 °F (36.8 °C) (Oral)     BP Readings from Last 3 Encounters:   01/28/22 (!) 160/80   06/18/21 (!) 140/90   05/17/21 (!) 147/83     Pulse Readings from Last 3 Encounters:   01/28/22 79   06/18/21 (!) 59   05/17/21 74          Objective:    Physical Exam  Vitals and nursing note reviewed.   Constitutional:       General: She is active. She is not in acute distress.Vital signs are normal.      Appearance: Normal appearance. She is well-developed and well-nourished. She is not ill-appearing, " sickly-appearing or diaphoretic.   HENT:      Head: Normocephalic.   Neck:      Thyroid: No thyromegaly.      Vascular: Normal carotid pulses. No carotid bruit, hepatojugular reflux or JVD.   Cardiovascular:      Rate and Rhythm: Normal rate and regular rhythm.      Chest Wall: PMI is not displaced.      Pulses: Normal pulses.           Radial pulses are 2+ on the right side and 2+ on the left side.      Heart sounds: Normal heart sounds, S1 normal and S2 normal. No murmur heard.  No friction rub. No gallop.    Pulmonary:      Effort: Pulmonary effort is normal.      Breath sounds: Normal breath sounds. No wheezing or rales.   Abdominal:      General: Bowel sounds are normal. There is no ascites or abdominal bruit. Aorta is normal.      Palpations: Abdomen is soft. There is no hepatomegaly, splenomegaly, mass or pulsatile liver.      Tenderness: There is no abdominal tenderness.   Musculoskeletal:         General: No edema.      Cervical back: Neck supple.   Lymphadenopathy:      Cervical: No cervical adenopathy.   Skin:     General: Skin is warm.   Neurological:      Mental Status: She is alert and oriented to person, place, and time.   Psychiatric:         Mood and Affect: Mood and affect normal.         Behavior: Behavior normal. Behavior is cooperative.         I have reviewed all pertinent labs and cardiac studies.      Chemistry        Component Value Date/Time     05/17/2021 1453    K 4.3 05/17/2021 1453     05/17/2021 1453    CO2 27 05/17/2021 1453    BUN 10 05/17/2021 1453    CREATININE 0.7 05/17/2021 1453    GLU 94 05/17/2021 1453        Component Value Date/Time    CALCIUM 9.2 05/17/2021 1453    ALKPHOS 82 05/17/2021 1453    AST 19 05/17/2021 1453    ALT 11 05/17/2021 1453    BILITOT 0.4 05/17/2021 1453    ESTGFRAFRICA >60.0 05/17/2021 1453    EGFRNONAA >60.0 05/17/2021 1453        Lab Results   Component Value Date    WBC 7.52 07/05/2018    HGB 13.1 07/05/2018    HCT 40.7 07/05/2018    MCV 94  07/05/2018     07/05/2018       No results found for: LABA1C, HGBA1C  Lab Results   Component Value Date    CHOL 169 05/17/2021    CHOL 130 11/08/2019    CHOL 168 04/23/2019     Lab Results   Component Value Date    HDL 45 05/17/2021    HDL 44 11/08/2019    HDL 55 04/23/2019     Lab Results   Component Value Date    LDLCALC 93.6 05/17/2021    LDLCALC 57.0 (L) 11/08/2019    LDLCALC 86.6 04/23/2019     Lab Results   Component Value Date    TRIG 152 (H) 05/17/2021    TRIG 145 11/08/2019    TRIG 132 04/23/2019     Lab Results   Component Value Date    CHOLHDL 26.6 05/17/2021    CHOLHDL 33.8 11/08/2019    CHOLHDL 32.7 04/23/2019     Results for orders placed during the hospital encounter of 05/05/21    Nuclear Stress - Cardiology Interpreted    Interpretation Summary    Normal myocardial perfusion scan. There is no evidence of myocardial ischemia or infarction.    The gated perfusion images showed an ejection fraction of 69% at rest. The gated perfusion images showed an ejection fraction of 68% post stress.    The EKG portion of this study is negative for ischemia.    The patient reported no chest pain during the stress test.    During stress, occasional PACs are noted.          Assessment:       1. Essential (primary) hypertension    2. Pulmonary hypertension    3. Coronary artery disease of native artery of native heart with stable angina pectoris    4. Chronic diastolic heart failure    5. Atypical chest pain    6. Mixed hyperlipidemia    7. Overweight (BMI 25.0-29.9)    8. Asymptomatic stenosis of both carotid arteries without infarction         Plan:             Home BP monitoring advised.  Check BP 3 days/week, am/pm regularly.  Goal BP <130/80.  If BP runs above goal, increase Amlodipine to 10 mg qd from 5 mg qd dosing.  Continue Lisinopril.  CHF compensated.  Low salt diet.  Weight control.  Daily exercise.  Advised statin; does not want statin right now.  Reassess lipids next appt; readdress issue  with pt.  Continue fish oil.  Monitor atypical CP sxs, stable.  Negative ischemic workup on stress test last year.  Reviewed all tests and above medical conditions with patient in detail and formulated treatment plan.  Echo next appt to assess PAP.  F/u in 6 months w lipids, carotid u/s.      I have reviewed all pertinent labs and cardiac studies independently. Plans and recommendations have been formulated under my direct supervision. All questions answered and patient voiced understanding.

## 2022-07-06 ENCOUNTER — HOSPITAL ENCOUNTER (OUTPATIENT)
Dept: CARDIOLOGY | Facility: HOSPITAL | Age: 82
Discharge: HOME | End: 2022-07-06
Attending: INTERNAL MEDICINE
Payer: MEDICARE

## 2022-07-06 ENCOUNTER — TELEPHONE (OUTPATIENT)
Dept: CARDIOLOGY | Facility: HOSPITAL | Age: 82
End: 2022-07-06
Payer: MEDICARE

## 2022-07-06 VITALS
SYSTOLIC BLOOD PRESSURE: 148 MMHG | DIASTOLIC BLOOD PRESSURE: 70 MMHG | HEIGHT: 65 IN | BODY MASS INDEX: 26.33 KG/M2 | HEART RATE: 70 BPM | WEIGHT: 158 LBS

## 2022-07-06 VITALS
DIASTOLIC BLOOD PRESSURE: 80 MMHG | WEIGHT: 158 LBS | SYSTOLIC BLOOD PRESSURE: 160 MMHG | HEIGHT: 65 IN | BODY MASS INDEX: 26.33 KG/M2

## 2022-07-06 DIAGNOSIS — I50.32 CHRONIC DIASTOLIC HEART FAILURE: ICD-10-CM

## 2022-07-06 DIAGNOSIS — I25.118 CORONARY ARTERY DISEASE OF NATIVE ARTERY OF NATIVE HEART WITH STABLE ANGINA PECTORIS: ICD-10-CM

## 2022-07-06 DIAGNOSIS — I27.20 PULMONARY HYPERTENSION: ICD-10-CM

## 2022-07-06 DIAGNOSIS — E78.2 MIXED HYPERLIPIDEMIA: Primary | ICD-10-CM

## 2022-07-06 DIAGNOSIS — R07.89 ATYPICAL CHEST PAIN: ICD-10-CM

## 2022-07-06 DIAGNOSIS — I65.23 ASYMPTOMATIC STENOSIS OF BOTH CAROTID ARTERIES WITHOUT INFARCTION: ICD-10-CM

## 2022-07-06 DIAGNOSIS — I10 ESSENTIAL (PRIMARY) HYPERTENSION: ICD-10-CM

## 2022-07-06 LAB
LEFT ARM DIASTOLIC BLOOD PRESSURE: 70 MMHG
LEFT ARM SYSTOLIC BLOOD PRESSURE: 148 MMHG
LEFT CBA DIAS: 18 CM/S
LEFT CBA SYS: 69 CM/S
LEFT CCA DIST DIAS: 11 CM/S
LEFT CCA DIST SYS: 73 CM/S
LEFT CCA MID DIAS: 14 CM/S
LEFT CCA MID SYS: 81 CM/S
LEFT CCA PROX DIAS: 15 CM/S
LEFT CCA PROX SYS: 78 CM/S
LEFT ECA DIAS: 7 CM/S
LEFT ECA SYS: 70 CM/S
LEFT ICA DIST DIAS: 28 CM/S
LEFT ICA DIST SYS: 127 CM/S
LEFT ICA MID DIAS: 17 CM/S
LEFT ICA MID SYS: 101 CM/S
LEFT ICA PROX DIAS: 21 CM/S
LEFT ICA PROX SYS: 86 CM/S
LEFT VERTEBRAL DIAS: 15 CM/S
LEFT VERTEBRAL SYS: 59 CM/S
OHS CV CAROTID RIGHT ICA EDV HIGHEST: 34
OHS CV CAROTID ULTRASOUND LEFT ICA/CCA RATIO: 1.74
OHS CV CAROTID ULTRASOUND RIGHT ICA/CCA RATIO: 1.18
OHS CV PV CAROTID LEFT HIGHEST CCA: 81
OHS CV PV CAROTID LEFT HIGHEST ICA: 127
OHS CV PV CAROTID RIGHT HIGHEST CCA: 89
OHS CV PV CAROTID RIGHT HIGHEST ICA: 103
OHS CV US CAROTID LEFT HIGHEST EDV: 28
RIGHT ARM DIASTOLIC BLOOD PRESSURE: 60 MMHG
RIGHT ARM SYSTOLIC BLOOD PRESSURE: 126 MMHG
RIGHT CBA DIAS: 17 CM/S
RIGHT CBA SYS: 81 CM/S
RIGHT CCA DIST DIAS: 21 CM/S
RIGHT CCA DIST SYS: 87 CM/S
RIGHT CCA MID DIAS: 14 CM/S
RIGHT CCA MID SYS: 89 CM/S
RIGHT CCA PROX DIAS: 14 CM/S
RIGHT CCA PROX SYS: 79 CM/S
RIGHT ECA DIAS: 5 CM/S
RIGHT ECA SYS: 87 CM/S
RIGHT ICA DIST DIAS: 34 CM/S
RIGHT ICA DIST SYS: 103 CM/S
RIGHT ICA MID DIAS: 20 CM/S
RIGHT ICA MID SYS: 85 CM/S
RIGHT ICA PROX DIAS: 13 CM/S
RIGHT ICA PROX SYS: 68 CM/S
RIGHT VERTEBRAL DIAS: 10 CM/S
RIGHT VERTEBRAL SYS: 58 CM/S

## 2022-07-06 PROCEDURE — 93306 ECHO (CUPID ONLY): ICD-10-PCS | Mod: 26,,, | Performed by: STUDENT IN AN ORGANIZED HEALTH CARE EDUCATION/TRAINING PROGRAM

## 2022-07-06 PROCEDURE — 93306 TTE W/DOPPLER COMPLETE: CPT

## 2022-07-06 PROCEDURE — 93880 EXTRACRANIAL BILAT STUDY: CPT

## 2022-07-06 PROCEDURE — 93880 CV US DOPPLER CAROTID (CUPID ONLY): ICD-10-PCS | Mod: 26,,, | Performed by: INTERNAL MEDICINE

## 2022-07-06 PROCEDURE — 93306 TTE W/DOPPLER COMPLETE: CPT | Mod: 26,,, | Performed by: STUDENT IN AN ORGANIZED HEALTH CARE EDUCATION/TRAINING PROGRAM

## 2022-07-06 PROCEDURE — 93880 EXTRACRANIAL BILAT STUDY: CPT | Mod: 26,,, | Performed by: INTERNAL MEDICINE

## 2022-07-06 RX ORDER — ROSUVASTATIN CALCIUM 20 MG/1
20 TABLET, COATED ORAL NIGHTLY
Qty: 90 TABLET | Refills: 3 | Status: SHIPPED | OUTPATIENT
Start: 2022-07-06 | End: 2022-10-19

## 2022-07-06 NOTE — TELEPHONE ENCOUNTER
Please call pt.  Lipids worse; cholesterol 227.    Recommend starting Rosuvastatin 20 mg nightly and this will help lower her lipids a lot.  Schedule repeat CMP, FLP in 3 months.    Carotid u/s results are stable.        Dr Padilla

## 2022-07-07 ENCOUNTER — TELEPHONE (OUTPATIENT)
Dept: CARDIOLOGY | Facility: HOSPITAL | Age: 82
End: 2022-07-07
Payer: MEDICARE

## 2022-07-07 ENCOUNTER — PATIENT MESSAGE (OUTPATIENT)
Dept: CARDIOLOGY | Facility: CLINIC | Age: 82
End: 2022-07-07
Payer: MEDICARE

## 2022-07-07 LAB
AORTIC ROOT ANNULUS: 2.85 CM
ASCENDING AORTA: 2.91 CM
AV INDEX (PROSTH): 0.65
AV MEAN GRADIENT: 4 MMHG
AV PEAK GRADIENT: 7 MMHG
AV VALVE AREA: 2.06 CM2
AV VELOCITY RATIO: 0.69
BSA FOR ECHO PROCEDURE: 1.81 M2
CV ECHO LV RWT: 0.42 CM
DOP CALC AO PEAK VEL: 1.29 M/S
DOP CALC AO VTI: 33.7 CM
DOP CALC LVOT AREA: 3.2 CM2
DOP CALC LVOT DIAMETER: 2.01 CM
DOP CALC LVOT PEAK VEL: 0.89 M/S
DOP CALC LVOT STROKE VOLUME: 69.46 CM3
DOP CALC RVOT PEAK VEL: 0.54 M/S
DOP CALC RVOT VTI: 14 CM
DOP CALCLVOT PEAK VEL VTI: 21.9 CM
E WAVE DECELERATION TIME: 210.28 MSEC
E/A RATIO: 2.83
E/E' RATIO: 11.33 M/S
ECHO LV POSTERIOR WALL: 0.91 CM (ref 0.6–1.1)
EJECTION FRACTION: 55 %
FRACTIONAL SHORTENING: 27 % (ref 28–44)
INTERVENTRICULAR SEPTUM: 0.92 CM (ref 0.6–1.1)
IVC DIAMETER: 1.19 CM
IVRT: 105.61 MSEC
LA MAJOR: 5.71 CM
LA MINOR: 5.65 CM
LA WIDTH: 4.8 CM
LEFT ATRIUM SIZE: 4.19 CM
LEFT ATRIUM VOLUME INDEX MOD: 50.4 ML/M2
LEFT ATRIUM VOLUME INDEX: 54.2 ML/M2
LEFT ATRIUM VOLUME MOD: 90.16 CM3
LEFT ATRIUM VOLUME: 97.1 CM3
LEFT INTERNAL DIMENSION IN SYSTOLE: 3.17 CM (ref 2.1–4)
LEFT VENTRICLE DIASTOLIC VOLUME INDEX: 47.75 ML/M2
LEFT VENTRICLE DIASTOLIC VOLUME: 85.47 ML
LEFT VENTRICLE MASS INDEX: 72 G/M2
LEFT VENTRICLE SYSTOLIC VOLUME INDEX: 22.4 ML/M2
LEFT VENTRICLE SYSTOLIC VOLUME: 40.09 ML
LEFT VENTRICULAR INTERNAL DIMENSION IN DIASTOLE: 4.35 CM (ref 3.5–6)
LEFT VENTRICULAR MASS: 128.49 G
LV LATERAL E/E' RATIO: 10.63 M/S
LV SEPTAL E/E' RATIO: 12.14 M/S
LVOT MG: 1.7 MMHG
LVOT MV: 0.6 CM/S
MV PEAK A VEL: 0.3 M/S
MV PEAK E VEL: 0.85 M/S
MV STENOSIS PRESSURE HALF TIME: 60.98 MS
MV VALVE AREA P 1/2 METHOD: 3.61 CM2
PISA TR MAX VEL: 3.37 M/S
PV MEAN GRADIENT: 0.64 MMHG
PV MV: 0.45 M/S
PV PEAK VELOCITY: 0.65 CM/S
RA MAJOR: 4.76 CM
RA PRESSURE: 3 MMHG
RA WIDTH: 3.88 CM
RIGHT VENTRICULAR END-DIASTOLIC DIMENSION: 3.23 CM
SINUS: 2.6 CM
STJ: 2.32 CM
TDI LATERAL: 0.08 M/S
TDI SEPTAL: 0.07 M/S
TDI: 0.08 M/S
TR MAX PG: 45 MMHG
TRICUSPID ANNULAR PLANE SYSTOLIC EXCURSION: 1.98 CM
TV REST PULMONARY ARTERY PRESSURE: 48 MMHG

## 2022-07-07 NOTE — TELEPHONE ENCOUNTER
Called patient to advise per Dr. Padilla       Please call pt.  Lipids worse; cholesterol 227.     Recommend starting Rosuvastatin 20 mg nightly and this will help lower her lipids a lot.  Schedule repeat CMP, FLP in 3 months.     Carotid u/s results are stable.        Dr Padilla    No answer, left detailed vm with instructions to return call     Called all the numbers and left portal message. Repeat labs scheduled.

## 2022-07-08 NOTE — TELEPHONE ENCOUNTER
Called patient to advise per Dr. Padilla      Please call pt  Echo results overall stable findings.  Normal heart strength.  Stable abnormalities noted.  Continue current meds and f/u next appt later this year as scheduled.     Dr Padilla    Verbalized understanding

## 2022-07-08 NOTE — TELEPHONE ENCOUNTER
Please call pt  Echo results overall stable findings.  Normal heart strength.  Stable abnormalities noted.  Continue current meds and f/u next appt later this year as scheduled.    Dr Padilla

## 2022-07-25 ENCOUNTER — CLINICAL SUPPORT (OUTPATIENT)
Dept: AUDIOLOGY | Facility: CLINIC | Age: 82
End: 2022-07-25
Payer: MEDICARE

## 2022-07-25 ENCOUNTER — OFFICE VISIT (OUTPATIENT)
Dept: OTOLARYNGOLOGY | Facility: CLINIC | Age: 82
End: 2022-07-25
Payer: MEDICARE

## 2022-07-25 VITALS — BODY MASS INDEX: 26.63 KG/M2 | TEMPERATURE: 98 F | WEIGHT: 160.06 LBS

## 2022-07-25 DIAGNOSIS — H90.3 SENSORINEURAL HEARING LOSS, ASYMMETRICAL: Primary | ICD-10-CM

## 2022-07-25 DIAGNOSIS — H90.3 SENSORINEURAL HEARING LOSS (SNHL) OF BOTH EARS: ICD-10-CM

## 2022-07-25 DIAGNOSIS — H91.90 PERCEIVED HEARING CHANGES: ICD-10-CM

## 2022-07-25 DIAGNOSIS — H91.90 PERCEIVED HEARING CHANGES: Primary | ICD-10-CM

## 2022-07-25 PROCEDURE — 99999 PR PBB SHADOW E&M-EST. PATIENT-LVL IV: CPT | Mod: PBBFAC,,, | Performed by: PHYSICIAN ASSISTANT

## 2022-07-25 PROCEDURE — 92567 TYMPANOMETRY: CPT | Mod: PBBFAC | Performed by: AUDIOLOGIST-HEARING AID FITTER

## 2022-07-25 PROCEDURE — 92557 COMPREHENSIVE HEARING TEST: CPT | Mod: PBBFAC | Performed by: AUDIOLOGIST-HEARING AID FITTER

## 2022-07-25 PROCEDURE — 99999 PR PBB SHADOW E&M-EST. PATIENT-LVL IV: ICD-10-PCS | Mod: PBBFAC,,, | Performed by: PHYSICIAN ASSISTANT

## 2022-07-25 PROCEDURE — 99213 PR OFFICE/OUTPT VISIT, EST, LEVL III, 20-29 MIN: ICD-10-PCS | Mod: S$PBB,,, | Performed by: PHYSICIAN ASSISTANT

## 2022-07-25 PROCEDURE — 99213 OFFICE O/P EST LOW 20 MIN: CPT | Mod: S$PBB,,, | Performed by: PHYSICIAN ASSISTANT

## 2022-07-25 PROCEDURE — 99212 OFFICE O/P EST SF 10 MIN: CPT | Mod: PBBFAC | Performed by: AUDIOLOGIST-HEARING AID FITTER

## 2022-07-25 PROCEDURE — 99999 PR PBB SHADOW E&M-EST. PATIENT-LVL II: CPT | Mod: PBBFAC,,, | Performed by: AUDIOLOGIST-HEARING AID FITTER

## 2022-07-25 PROCEDURE — 99214 OFFICE O/P EST MOD 30 MIN: CPT | Mod: PBBFAC,27 | Performed by: PHYSICIAN ASSISTANT

## 2022-07-25 PROCEDURE — 99999 PR PBB SHADOW E&M-EST. PATIENT-LVL II: ICD-10-PCS | Mod: PBBFAC,,, | Performed by: AUDIOLOGIST-HEARING AID FITTER

## 2022-07-25 RX ORDER — MELOXICAM 7.5 MG/1
7.5 TABLET ORAL DAILY
COMMUNITY
Start: 2022-03-23 | End: 2022-10-19

## 2022-07-25 RX ORDER — SERTRALINE HYDROCHLORIDE 25 MG/1
TABLET, FILM COATED ORAL
COMMUNITY
Start: 2022-03-24

## 2022-07-25 RX ORDER — CHOLECALCIFEROL (VITAMIN D3) 125 MCG
5000 CAPSULE ORAL
COMMUNITY

## 2022-07-25 RX ORDER — ONDANSETRON 4 MG/1
TABLET, ORALLY DISINTEGRATING ORAL
Status: ON HOLD | COMMUNITY
Start: 2022-06-03 | End: 2023-08-17 | Stop reason: HOSPADM

## 2022-07-25 RX ORDER — AMOXICILLIN 500 MG
2 CAPSULE ORAL
COMMUNITY
End: 2022-10-19

## 2022-07-25 RX ORDER — DIPHENOXYLATE HYDROCHLORIDE AND ATROPINE SULFATE 2.5; .025 MG/1; MG/1
TABLET ORAL
Status: ON HOLD | COMMUNITY
Start: 2022-06-03 | End: 2023-08-17 | Stop reason: HOSPADM

## 2022-07-25 RX ORDER — ACETAMINOPHEN, DIPHENHYDRAMINE HCL, PHENYLEPHRINE HCL 325; 25; 5 MG/1; MG/1; MG/1
TABLET ORAL
COMMUNITY

## 2022-07-25 NOTE — PROGRESS NOTES
Subjective:   Patient ID: Rebecca Rios is a 81 y.o. female.    Chief Complaint: No chief complaint on file.    Rebecca Rios was seen 07/25/2022 with  complains of bilateral hearing loss, left poorer than right, that has been present for many years. She denies tinnitus. She had problems with dizziness in the past; barrett normal VNG in 2017 with [facility]. No recent problems with dizziness.     Review of patient's allergies indicates:   Allergen Reactions    Erythromycin     Macrolide antibiotics Nausea And Vomiting           Review of Systems   Constitutional: Negative for chills, fatigue, fever and unexpected weight change.   HENT: Positive for hearing loss. Negative for congestion, dental problem, ear discharge, ear pain, facial swelling, nosebleeds, postnasal drip, rhinorrhea, sinus pressure, sneezing, sore throat, tinnitus, trouble swallowing and voice change.    Eyes: Negative for redness, itching and visual disturbance.   Respiratory: Negative for cough, choking, shortness of breath and wheezing.    Cardiovascular: Negative for chest pain and palpitations.   Gastrointestinal: Negative for abdominal pain.        No reflux.   Musculoskeletal: Negative for gait problem.   Skin: Negative for rash.   Neurological: Negative for dizziness, light-headedness and headaches.         Objective:   There were no vitals taken for this visit.    Physical Exam  Constitutional:       General: She is not in acute distress.     Appearance: She is well-developed.   HENT:      Head: Normocephalic and atraumatic.      Right Ear: Tympanic membrane, ear canal and external ear normal.      Left Ear: Tympanic membrane, ear canal and external ear normal.      Nose: Nose normal. No nasal deformity, septal deviation, mucosal edema or rhinorrhea.      Right Sinus: No maxillary sinus tenderness or frontal sinus tenderness.      Left Sinus: No maxillary sinus tenderness or frontal sinus tenderness.      Mouth/Throat:      Mouth: Mucous  membranes are not pale and not dry.      Dentition: No dental caries.      Pharynx: Uvula midline. No oropharyngeal exudate or posterior oropharyngeal erythema.   Eyes:      General: Lids are normal. No scleral icterus.     Extraocular Movements:      Right eye: Normal extraocular motion and no nystagmus.      Left eye: Normal extraocular motion and no nystagmus.      Conjunctiva/sclera: Conjunctivae normal.      Right eye: Right conjunctiva is not injected. No chemosis.     Left eye: Left conjunctiva is not injected. No chemosis.     Pupils: Pupils are equal, round, and reactive to light.   Neck:      Thyroid: No thyroid mass or thyromegaly.      Trachea: Trachea and phonation normal. No tracheal tenderness or tracheal deviation.   Pulmonary:      Effort: Pulmonary effort is normal. No respiratory distress.      Breath sounds: No stridor.   Abdominal:      General: There is no distension.   Lymphadenopathy:      Head:      Right side of head: No submental, submandibular, preauricular, posterior auricular or occipital adenopathy.      Left side of head: No submental, submandibular, preauricular, posterior auricular or occipital adenopathy.      Cervical: No cervical adenopathy.   Skin:     General: Skin is warm and dry.      Findings: No erythema or rash.   Neurological:      Mental Status: She is alert and oriented to person, place, and time.      Cranial Nerves: No cranial nerve deficit.   Psychiatric:         Behavior: Behavior normal.          Imaging :                  Assessment:     1. Perceived hearing changes    2. Sensorineural hearing loss (SNHL) of both ears        Plan:     Perceived hearing changes  -     Ambulatory referral/consult to Audiology; Future; Expected date: 08/01/2022    Sensorineural hearing loss (SNHL) of both ears      We discussed her audiogram at length and that she is a candidate for hearing aids. HA consult scheduled with audiology. Will continue annual audiogram to monitor mild  asymmetry as well.

## 2022-07-25 NOTE — PROGRESS NOTES
Referring provider: Zeinab Le PA-C    Rebecca Rios was seen 07/25/2022 for an audiological evaluation.  Patient complains of bilateral hearing loss, left poorer than right, that has been present for many years. She denies tinnitus. She had problems with dizziness in the past; barrett normal VNG in 2017 with [facility]. No recent problems with dizziness.     Results reveal a mild-to-severe sensorineural hearing loss 250-8000 Hz bilaterally with an asymmetry for the left ear at 3487-1110 Hz.   Speech Reception Thresholds were 40 dBHL for the right ear and 45 dBHL for the left ear.   Word recognition scores were excellent for the right ear and good for the left ear.   Tympanograms were Type A for the right ear and Type A for the left ear.    Patient was counseled on the above findings.    Recommendations:  1. ENT  2. Hearing aids, binaural. HAC is scheduled. Patient is provided a copy of her audiogram and hearing aid information packet.   3. Annual audiogram to monitor asymmetry in hearing

## 2022-08-02 ENCOUNTER — OFFICE VISIT (OUTPATIENT)
Dept: OPHTHALMOLOGY | Facility: CLINIC | Age: 82
End: 2022-08-02
Payer: MEDICARE

## 2022-08-02 DIAGNOSIS — H52.7 REFRACTION DISORDER: ICD-10-CM

## 2022-08-02 DIAGNOSIS — Z96.1 PSEUDOPHAKIA OF BOTH EYES: Primary | ICD-10-CM

## 2022-08-02 PROCEDURE — 99999 PR PBB SHADOW E&M-EST. PATIENT-LVL III: ICD-10-PCS | Mod: PBBFAC,,, | Performed by: OPHTHALMOLOGY

## 2022-08-02 PROCEDURE — 92004 PR EYE EXAM, NEW PATIENT,COMPREHESV: ICD-10-PCS | Mod: S$PBB,,, | Performed by: OPHTHALMOLOGY

## 2022-08-02 PROCEDURE — 99999 PR PBB SHADOW E&M-EST. PATIENT-LVL III: CPT | Mod: PBBFAC,,, | Performed by: OPHTHALMOLOGY

## 2022-08-02 PROCEDURE — 92015 DETERMINE REFRACTIVE STATE: CPT | Mod: ,,, | Performed by: OPHTHALMOLOGY

## 2022-08-02 PROCEDURE — 92004 COMPRE OPH EXAM NEW PT 1/>: CPT | Mod: S$PBB,,, | Performed by: OPHTHALMOLOGY

## 2022-08-02 PROCEDURE — 92015 PR REFRACTION: ICD-10-PCS | Mod: ,,, | Performed by: OPHTHALMOLOGY

## 2022-08-02 PROCEDURE — 99213 OFFICE O/P EST LOW 20 MIN: CPT | Mod: PBBFAC | Performed by: OPHTHALMOLOGY

## 2022-08-02 NOTE — PROGRESS NOTES
HPI     Blurred Vision     Comments: Complete Exam    Patient states VA has decreased at all ranges, images appear blurry when   driving and finds OU itches and constantly tear but not at the same time.   When OU begins to water she also has a FB sensation with symptoms that   come and go.              Comments     PCP: Dr: Amber Pagan        PCIOL OD 10-22-09 Distance   PCIOL OS 11-15-09 Near   YAG OS  PCO OD          Last edited by Celestino Joyce MD on 8/2/2022  3:41 PM. (History)            Assessment /Plan     For exam results, see Encounter Report.      ICD-10-CM ICD-9-CM    1. Pseudophakia of both eyes  Z96.1 V43.1 Doing well        2. Refraction disorder  H52.7 367.9      moct done and wnl   RETURN TO CLINIC 1 year with TRF to resume care in [place]

## 2022-10-07 ENCOUNTER — LAB VISIT (OUTPATIENT)
Dept: LAB | Facility: HOSPITAL | Age: 82
End: 2022-10-07
Attending: INTERNAL MEDICINE
Payer: MEDICARE

## 2022-10-07 DIAGNOSIS — Z12.31 BREAST CANCER SCREENING BY MAMMOGRAM: Primary | ICD-10-CM

## 2022-10-07 DIAGNOSIS — E78.2 MIXED HYPERLIPIDEMIA: ICD-10-CM

## 2022-10-07 PROCEDURE — 80053 COMPREHEN METABOLIC PANEL: CPT | Performed by: INTERNAL MEDICINE

## 2022-10-07 PROCEDURE — 80061 LIPID PANEL: CPT | Performed by: INTERNAL MEDICINE

## 2022-10-07 PROCEDURE — 36415 COLL VENOUS BLD VENIPUNCTURE: CPT | Mod: PO | Performed by: INTERNAL MEDICINE

## 2022-10-08 LAB
ALBUMIN SERPL BCP-MCNC: 3.7 G/DL (ref 3.5–5.2)
ALP SERPL-CCNC: 77 U/L (ref 55–135)
ALT SERPL W/O P-5'-P-CCNC: 10 U/L (ref 10–44)
ANION GAP SERPL CALC-SCNC: 11 MMOL/L (ref 8–16)
AST SERPL-CCNC: 20 U/L (ref 10–40)
BILIRUB SERPL-MCNC: 0.5 MG/DL (ref 0.1–1)
BUN SERPL-MCNC: 12 MG/DL (ref 8–23)
CALCIUM SERPL-MCNC: 8.9 MG/DL (ref 8.7–10.5)
CHLORIDE SERPL-SCNC: 103 MMOL/L (ref 95–110)
CHOLEST SERPL-MCNC: 210 MG/DL (ref 120–199)
CHOLEST/HDLC SERPL: 5 {RATIO} (ref 2–5)
CO2 SERPL-SCNC: 26 MMOL/L (ref 23–29)
CREAT SERPL-MCNC: 0.6 MG/DL (ref 0.5–1.4)
EST. GFR  (NO RACE VARIABLE): >60 ML/MIN/1.73 M^2
GLUCOSE SERPL-MCNC: 84 MG/DL (ref 70–110)
HDLC SERPL-MCNC: 42 MG/DL (ref 40–75)
HDLC SERPL: 20 % (ref 20–50)
LDLC SERPL CALC-MCNC: 137.6 MG/DL (ref 63–159)
NONHDLC SERPL-MCNC: 168 MG/DL
POTASSIUM SERPL-SCNC: 4 MMOL/L (ref 3.5–5.1)
PROT SERPL-MCNC: 5.8 G/DL (ref 6–8.4)
SODIUM SERPL-SCNC: 140 MMOL/L (ref 136–145)
TRIGL SERPL-MCNC: 152 MG/DL (ref 30–150)

## 2022-10-12 ENCOUNTER — TELEPHONE (OUTPATIENT)
Dept: CARDIOLOGY | Facility: CLINIC | Age: 82
End: 2022-10-12
Payer: MEDICARE

## 2022-10-12 DIAGNOSIS — E78.5 HYPERLIPIDEMIA, UNSPECIFIED HYPERLIPIDEMIA TYPE: Primary | ICD-10-CM

## 2022-10-19 ENCOUNTER — OFFICE VISIT (OUTPATIENT)
Dept: CARDIOLOGY | Facility: CLINIC | Age: 82
End: 2022-10-19
Payer: MEDICARE

## 2022-10-19 ENCOUNTER — HOSPITAL ENCOUNTER (OUTPATIENT)
Dept: CARDIOLOGY | Facility: HOSPITAL | Age: 82
Discharge: HOME | End: 2022-10-19
Attending: INTERNAL MEDICINE
Payer: MEDICARE

## 2022-10-19 VITALS
WEIGHT: 153.88 LBS | HEART RATE: 58 BPM | SYSTOLIC BLOOD PRESSURE: 136 MMHG | BODY MASS INDEX: 25.64 KG/M2 | RESPIRATION RATE: 16 BRPM | HEIGHT: 65 IN | DIASTOLIC BLOOD PRESSURE: 60 MMHG | OXYGEN SATURATION: 98 %

## 2022-10-19 DIAGNOSIS — I25.118 CORONARY ARTERY DISEASE OF NATIVE ARTERY OF NATIVE HEART WITH STABLE ANGINA PECTORIS: Primary | ICD-10-CM

## 2022-10-19 DIAGNOSIS — I65.23 ASYMPTOMATIC STENOSIS OF BOTH CAROTID ARTERIES WITHOUT INFARCTION: ICD-10-CM

## 2022-10-19 DIAGNOSIS — E78.5 HYPERLIPIDEMIA, UNSPECIFIED HYPERLIPIDEMIA TYPE: ICD-10-CM

## 2022-10-19 DIAGNOSIS — I50.32 CHRONIC DIASTOLIC HEART FAILURE: ICD-10-CM

## 2022-10-19 DIAGNOSIS — I34.0 NONRHEUMATIC MITRAL VALVE REGURGITATION: ICD-10-CM

## 2022-10-19 DIAGNOSIS — I27.20 PULMONARY HYPERTENSION: ICD-10-CM

## 2022-10-19 DIAGNOSIS — E66.3 OVERWEIGHT (BMI 25.0-29.9): ICD-10-CM

## 2022-10-19 DIAGNOSIS — R07.89 ATYPICAL CHEST PAIN: ICD-10-CM

## 2022-10-19 DIAGNOSIS — I10 ESSENTIAL (PRIMARY) HYPERTENSION: ICD-10-CM

## 2022-10-19 DIAGNOSIS — I36.1 NONRHEUMATIC TRICUSPID VALVE REGURGITATION: ICD-10-CM

## 2022-10-19 DIAGNOSIS — E78.2 MIXED HYPERLIPIDEMIA: ICD-10-CM

## 2022-10-19 DIAGNOSIS — R07.89 OTHER CHEST PAIN: ICD-10-CM

## 2022-10-19 PROCEDURE — 99999 PR PBB SHADOW E&M-EST. PATIENT-LVL III: CPT | Mod: PBBFAC,,, | Performed by: INTERNAL MEDICINE

## 2022-10-19 PROCEDURE — 99999 PR PBB SHADOW E&M-EST. PATIENT-LVL III: ICD-10-PCS | Mod: PBBFAC,,, | Performed by: INTERNAL MEDICINE

## 2022-10-19 PROCEDURE — 93010 ELECTROCARDIOGRAM REPORT: CPT | Mod: ,,, | Performed by: INTERNAL MEDICINE

## 2022-10-19 PROCEDURE — 99213 OFFICE O/P EST LOW 20 MIN: CPT | Mod: PBBFAC | Performed by: INTERNAL MEDICINE

## 2022-10-19 PROCEDURE — 99214 PR OFFICE/OUTPT VISIT, EST, LEVL IV, 30-39 MIN: ICD-10-PCS | Mod: S$PBB,,, | Performed by: INTERNAL MEDICINE

## 2022-10-19 PROCEDURE — 93005 ELECTROCARDIOGRAM TRACING: CPT

## 2022-10-19 PROCEDURE — 93010 EKG 12-LEAD: ICD-10-PCS | Mod: ,,, | Performed by: INTERNAL MEDICINE

## 2022-10-19 PROCEDURE — 99214 OFFICE O/P EST MOD 30 MIN: CPT | Mod: S$PBB,,, | Performed by: INTERNAL MEDICINE

## 2022-10-19 NOTE — PROGRESS NOTES
"Subjective:    Patient ID:  Rebecca Rios is a 81 y.o. female who presents for evaluation of Hypertension, Hyperlipidemia, Carotid Artery Disease, and Congestive Heart Failure        HPIPt presents for eval.  Her current med conditions include HTN, diastolic CHF, carotid disease, MR, PHTN, LVH, hyperlipidemia, carries dx of CAD in chart.   Nonsmoker.   Past hx pertinent for following:  Stress mpi 2014 fixed anteroapical defect, trivial ischemia.   Echo 2014 normal EF, DD, mod TR, PHTN.  Stress MPI May 2018 no ischemia, normal LVEF.  Echo Sept 2017 normal LVEF, LVH.  Echo Jan 2020 normal EF, indeterminate diastolic function, LVH, mild MR, mild-mod TR.  Carotid u/s Jan 2020 mild disease.   - nuclear stress test May 2021.  Now here.  Chronic atypical CP sxs are stable.  No acute sxs.  No CHF sxs.  No TIA/CVA sxs.  Lipids stable.  Didn't tolerate Crestor due to "headaches".  Uses krill oil now.   Weight down over last year.   BP overall controlled.  Carotid u/s July 2022 40 - 49% LICA, 0 - 19% ANTHONY.  Echo July 2022 normal EF, DD, LAE, mild MR, mild-mod TR, PAP 48 mmHg.  Ecg today sinus kiel (very discrete P waves), no ischemia (computer states junctional rhythm).  No dizziness.  No syncope.      Past Medical History:   Diagnosis Date    Anxiety     Asymptomatic stenosis of both carotid arteries without infarction 1/28/2022    Atypical chest pain 11/9/2017    CAD (coronary artery disease) 11/18/2014    Chronic diastolic heart failure 8/17/2017    Coronary artery disease     Depression     Fractures     Hot flash, menopausal     HTN (hypertension)     Hyperlipidemia     Hypothyroid     Nonrheumatic mitral valve regurgitation 10/19/2022    Nonrheumatic tricuspid valve regurgitation 10/19/2022    Pulmonary hypertension 8/17/2017       Current Outpatient Medications:     alprazolam (XANAX) 0.5 MG tablet, Take 0.5 mg by mouth 2 (two) times daily as needed for Anxiety., Disp: , Rfl:     amLODIPine (NORVASC) 5 MG tablet, TAKE " 1 TABLET (5 MG TOTAL) BY MOUTH ONCE DAILY., Disp: 90 tablet, Rfl: 3    aspirin (ECOTRIN) 81 MG EC tablet, Take 1 tablet (81 mg total) by mouth once daily., Disp: 30 tablet, Rfl: 2    cholecalciferol, vitamin D3, 125 mcg (5,000 unit) capsule, Take 5,000 Units by mouth., Disp: , Rfl:     diphenoxylate-atropine 2.5-0.025 mg (LOMOTIL) 2.5-0.025 mg per tablet, as needed., Disp: , Rfl:     DULoxetine (CYMBALTA) 30 MG capsule, TAKE 1 CAPSULE BY MOUTH ONCE DAILY, Disp: , Rfl:     ergocalciferol (ERGOCALCIFEROL) 50,000 unit Cap, Take 1 capsule (50,000 Units total) by mouth every 7 days., Disp: 12 capsule, Rfl: 3    esomeprazole (NEXIUM) 40 MG capsule, Take 40 mg by mouth once daily. , Disp: , Rfl:     fluticasone propionate (FLONASE) 50 mcg/actuation nasal spray, 2 sprays (100 mcg total) by Each Nostril route once daily., Disp: 1 Bottle, Rfl: 12    HYDROcodone-acetaminophen (NORCO)  mg per tablet, TAKE 1 TABLET BY MOUTH EVERY 8 HOURS AS NEEDED FOR PAIN, Disp: , Rfl:     levocetirizine (XYZAL) 5 MG tablet, TAKE 1 TABLET BY MOUTH EVERY EVENING., Disp: 30 tablet, Rfl: 10    levothyroxine (SYNTHROID) 75 MCG tablet, TAKE ONE TABLET BY MOUTH ONCE DAILY, Disp: 30 tablet, Rfl: 6    lisinopriL (PRINIVIL,ZESTRIL) 40 MG tablet, TAKE 1 TABLET (40 MG TOTAL) BY MOUTH ONCE DAILY., Disp: 90 tablet, Rfl: 3    melatonin 10 mg Tab, Take by mouth., Disp: , Rfl:     ondansetron (ZOFRAN-ODT) 4 MG TbDL, as needed., Disp: , Rfl:     POLYTUSSIN DM 1-5-10 mg/5 mL Syrp, TAKE 2 TEASPOONFULS (10ML) BY MOUTH EVERY 8 TO 12 HOURS AS NEEDED FOR COUGH, Disp: , Rfl:     sertraline (ZOLOFT) 25 MG tablet, Take by mouth., Disp: , Rfl:     tizanidine (ZANAFLEX) 4 MG tablet, Take 4 mg by mouth every evening. , Disp: , Rfl:       Review of Systems   Constitutional: Positive for weight loss.   HENT: Negative.     Eyes: Negative.    Cardiovascular:  Positive for chest pain.   Respiratory: Negative.     Endocrine: Negative.    Hematologic/Lymphatic: Negative.   "  Skin: Negative.    Musculoskeletal:  Positive for arthritis and joint pain.   Gastrointestinal:  Positive for change in bowel habit.   Genitourinary: Negative.    Neurological: Negative.    Psychiatric/Behavioral: Negative.     Allergic/Immunologic: Negative.         /60 (BP Location: Right arm, Patient Position: Sitting, BP Method: Large (Manual))   Pulse (!) 58   Resp 16   Ht 5' 5" (1.651 m)   Wt 69.8 kg (153 lb 14.1 oz)   SpO2 98%   BMI 25.61 kg/m²     Wt Readings from Last 3 Encounters:   10/19/22 69.8 kg (153 lb 14.1 oz)   07/25/22 72.6 kg (160 lb 0.9 oz)   07/06/22 71.7 kg (158 lb)     Temp Readings from Last 3 Encounters:   07/25/22 98.1 °F (36.7 °C) (Temporal)   05/17/21 98.5 °F (36.9 °C) (Oral)   03/15/21 98.3 °F (36.8 °C)     BP Readings from Last 3 Encounters:   10/19/22 136/60   07/06/22 (!) 160/80   07/06/22 (!) 148/70     Pulse Readings from Last 3 Encounters:   10/19/22 (!) 58   07/06/22 70   01/28/22 79       Objective:    Physical Exam  Vitals and nursing note reviewed.   Constitutional:       General: She is not in acute distress.     Appearance: Normal appearance. She is well-developed. She is not ill-appearing or diaphoretic.   HENT:      Head: Normocephalic.   Neck:      Thyroid: No thyromegaly.      Vascular: No carotid bruit or JVD.   Cardiovascular:      Rate and Rhythm: Regular rhythm. Bradycardia present.      Pulses: Normal pulses.           Radial pulses are 2+ on the right side and 2+ on the left side.      Heart sounds: Normal heart sounds, S1 normal and S2 normal. No murmur heard.    No friction rub. No gallop.   Pulmonary:      Effort: Pulmonary effort is normal.      Breath sounds: Normal breath sounds. No wheezing or rales.   Abdominal:      General: Bowel sounds are normal. There is no abdominal bruit.      Palpations: Abdomen is soft. There is no hepatomegaly, splenomegaly or mass.      Tenderness: There is no abdominal tenderness.   Musculoskeletal:      Cervical " back: Neck supple.   Lymphadenopathy:      Cervical: No cervical adenopathy.   Skin:     General: Skin is warm.   Neurological:      Mental Status: She is alert and oriented to person, place, and time.   Psychiatric:         Behavior: Behavior normal. Behavior is cooperative.       I have reviewed all pertinent labs and cardiac studies.      Chemistry        Component Value Date/Time     10/07/2022 1340    K 4.0 10/07/2022 1340     10/07/2022 1340    CO2 26 10/07/2022 1340    BUN 12 10/07/2022 1340    CREATININE 0.6 10/07/2022 1340    GLU 84 10/07/2022 1340        Component Value Date/Time    CALCIUM 8.9 10/07/2022 1340    ALKPHOS 77 10/07/2022 1340    AST 20 10/07/2022 1340    ALT 10 10/07/2022 1340    BILITOT 0.5 10/07/2022 1340    ESTGFRAFRICA >60.0 07/06/2022 0956    EGFRNONAA >60.0 07/06/2022 0956        Lab Results   Component Value Date    WBC 7.52 07/05/2018    HGB 13.1 07/05/2018    HCT 40.7 07/05/2018    MCV 94 07/05/2018     07/05/2018       Lab Results   Component Value Date    TSH 0.792 08/17/2017     No results found for: LABA1C, HGBA1C  Lab Results   Component Value Date    CHOL 210 (H) 10/07/2022    CHOL 227 (H) 07/06/2022    CHOL 169 05/17/2021     Lab Results   Component Value Date    HDL 42 10/07/2022    HDL 47 07/06/2022    HDL 45 05/17/2021     Lab Results   Component Value Date    LDLCALC 137.6 10/07/2022    LDLCALC 153.6 07/06/2022    LDLCALC 93.6 05/17/2021     Lab Results   Component Value Date    TRIG 152 (H) 10/07/2022    TRIG 132 07/06/2022    TRIG 152 (H) 05/17/2021     Lab Results   Component Value Date    CHOLHDL 20.0 10/07/2022    CHOLHDL 20.7 07/06/2022    CHOLHDL 26.6 05/17/2021         Results for orders placed during the hospital encounter of 07/06/22    Echo    Interpretation Summary  · The left ventricle is normal in size with normal systolic function.  · Grade III left ventricular diastolic dysfunction.  · The estimated PA systolic pressure is 48 mmHg.  ·  Normal right ventricular size with normal right ventricular systolic function.  · There is pulmonary hypertension.  · Normal central venous pressure (3 mmHg).  · Severe left atrial enlargement.  · Mild mitral regurgitation.  · Mild to moderate tricuspid regurgitation.  · The estimated ejection fraction is 55%.        Assessment:       1. Coronary artery disease of native artery of native heart with stable angina pectoris    2. Pulmonary hypertension    3. Overweight (BMI 25.0-29.9)    4. Other chest pain    5. Mixed hyperlipidemia    6. Essential (primary) hypertension    7. Chronic diastolic heart failure    8. Asymptomatic stenosis of both carotid arteries without infarction    9. Atypical chest pain    10. Nonrheumatic mitral valve regurgitation    11. Nonrheumatic tricuspid valve regurgitation         Plan:             Stable cardiovascular conditions at present time on current medical treatment.  Reviewed all tests and above medical conditions with patient in detail and formulated treatment plan.  Continue optimal medical treatment for cardiovascular conditions.  Cardiac low salt diet advised.  Daily exercise encouraged, with the goal 30 +  minutes aerobic exercise as tolerated.  Maintaining healthy weight and weight loss goals (if needed) were discussed in clinic.  Need for BP control and HTN goals (if needed) were discussed and tx plan formulated.  Importance of optimal lipid control were discussed in detail as well as possible pharmacologic and lifestyle changes that may be needed.  F/u in 6 months w fasting labs.      I have reviewed all pertinent labs and cardiac studies independently. Plans and recommendations have been formulated under my direct supervision. All questions answered and patient voiced understanding.

## 2022-11-11 ENCOUNTER — TELEPHONE (OUTPATIENT)
Dept: UROLOGY | Facility: CLINIC | Age: 82
End: 2022-11-11
Payer: MEDICARE

## 2022-11-11 NOTE — TELEPHONE ENCOUNTER
Pt stated that she was told by her PCP to see a Urologist due to recurring UTI's and hematuria. Pt requested to be seen Monday in the afternoon @ [facility]. Offered pt an appt brendon/ Carolynn Casillas on Monday. Pt accepted appt.

## 2022-11-14 ENCOUNTER — OFFICE VISIT (OUTPATIENT)
Dept: UROLOGY | Facility: CLINIC | Age: 82
End: 2022-11-14
Payer: MEDICARE

## 2022-11-14 VITALS — WEIGHT: 153 LBS | BODY MASS INDEX: 25.49 KG/M2 | HEIGHT: 65 IN

## 2022-11-14 DIAGNOSIS — N30.00 ACUTE CYSTITIS WITHOUT HEMATURIA: Primary | ICD-10-CM

## 2022-11-14 PROBLEM — G89.29 CHRONIC LOW BACK PAIN: Status: RESOLVED | Noted: 2017-05-17 | Resolved: 2022-11-14

## 2022-11-14 PROBLEM — R07.89 ATYPICAL CHEST PAIN: Status: RESOLVED | Noted: 2017-11-09 | Resolved: 2022-11-14

## 2022-11-14 PROBLEM — R11.0 NAUSEA: Status: RESOLVED | Noted: 2021-03-26 | Resolved: 2022-11-14

## 2022-11-14 PROBLEM — R05.9 COUGH: Status: RESOLVED | Noted: 2019-01-11 | Resolved: 2022-11-14

## 2022-11-14 PROBLEM — M54.50 CHRONIC LOW BACK PAIN: Status: RESOLVED | Noted: 2017-05-17 | Resolved: 2022-11-14

## 2022-11-14 PROCEDURE — 99214 OFFICE O/P EST MOD 30 MIN: CPT | Mod: S$PBB,,, | Performed by: NURSE PRACTITIONER

## 2022-11-14 PROCEDURE — 99213 OFFICE O/P EST LOW 20 MIN: CPT | Mod: PBBFAC | Performed by: NURSE PRACTITIONER

## 2022-11-14 PROCEDURE — 99214 PR OFFICE/OUTPT VISIT, EST, LEVL IV, 30-39 MIN: ICD-10-PCS | Mod: S$PBB,,, | Performed by: NURSE PRACTITIONER

## 2022-11-14 PROCEDURE — 99999 PR PBB SHADOW E&M-EST. PATIENT-LVL III: CPT | Mod: PBBFAC,,, | Performed by: NURSE PRACTITIONER

## 2022-11-14 PROCEDURE — 99999 PR PBB SHADOW E&M-EST. PATIENT-LVL III: ICD-10-PCS | Mod: PBBFAC,,, | Performed by: NURSE PRACTITIONER

## 2022-11-14 NOTE — PROGRESS NOTES
Chief Complaint:   Follow-up urinary tract infection    HPI:   Patient is 81-year-old female that was seen by primary care physician and prescribed antibiotics secondary to UTI.  Patient states that she is presenting today to follow-up to assess for resolution to UTI.  Urine in clinic is negative.  PVR is 6 mL.  Denies gross hematuria, pelvic or flank pain.  No fever.  Patient states that she is asymptomatic, after completion of antibiotics.    Allergies:  Erythromycin and Macrolide antibiotics    Medications:  has a current medication list which includes the following prescription(s): alprazolam, amlodipine, aspirin, cholecalciferol (vitamin d3), diphenoxylate-atropine 2.5-0.025 mg, duloxetine, ergocalciferol, esomeprazole, fluticasone propionate, hydrocodone-acetaminophen, levocetirizine, levothyroxine, lisinopril, melatonin, ondansetron, polytussin dm, sertraline, and tizanidine.    Review of Systems:  General: No fever, chills, fatigability, or weight loss.  Skin: No rashes, itching, or changes in color or texture of skin.  Chest: Denies CURTIS, cyanosis, wheezing, cough, and sputum production.  Abdomen: Appetite fine. No weight loss. Denies diarrhea, abdominal pain, hematemesis, or blood in stool.  Musculoskeletal: No joint stiffness or swelling. Denies back pain.  : As above.  All other review of systems negative.    PMH:   has a past medical history of Anxiety, Asymptomatic stenosis of both carotid arteries without infarction (1/28/2022), Atypical chest pain (11/9/2017), CAD (coronary artery disease) (11/18/2014), Chronic diastolic heart failure (8/17/2017), Coronary artery disease, Depression, Fractures, Hot flash, menopausal, HTN (hypertension), Hyperlipidemia, Hypothyroid, Nonrheumatic mitral valve regurgitation (10/19/2022), Nonrheumatic tricuspid valve regurgitation (10/19/2022), and Pulmonary hypertension (8/17/2017).    PSH:   has a past surgical history that includes Hysterectomy; open luis;  Cholecystectomy; and Carpal tunnel release (Bilateral).    FamHx: family history includes Hypertension in her mother.    SocHx:  reports that she has never smoked. She has never used smokeless tobacco. She reports that she does not drink alcohol and does not use drugs.      Physical Exam:  General: A&Ox3, no apparent distress, no deformities  Neck: No masses, normal thyroid  Lungs: normal inspiration, no use of accessory muscles  Heart: normal pulse, no arrhythmias  Abdomen: Soft, NT, ND, no masses, no hernias, no hepatosplenomegaly  Lymphatic: Neck and groin nodes negative    Labs/Studies:   See HPI    Impression/Plan:   Urinary tract infection  Urine in clinic is negative and patient is asymptomatic.  Patient can follow-up with our clinic p.r.n..

## 2023-08-15 ENCOUNTER — HOSPITAL ENCOUNTER (OUTPATIENT)
Facility: HOSPITAL | Age: 83
Discharge: HOME HEALTH | End: 2023-08-17
Attending: EMERGENCY MEDICINE | Admitting: HOSPITALIST
Payer: MEDICARE

## 2023-08-15 DIAGNOSIS — M25.561 BILATERAL KNEE PAIN: ICD-10-CM

## 2023-08-15 DIAGNOSIS — S42.402A CLOSED FRACTURE OF LEFT ELBOW, INITIAL ENCOUNTER: Primary | ICD-10-CM

## 2023-08-15 DIAGNOSIS — M25.562 BILATERAL KNEE PAIN: ICD-10-CM

## 2023-08-15 DIAGNOSIS — Z01.818 PREOPERATIVE CLEARANCE: ICD-10-CM

## 2023-08-15 DIAGNOSIS — S52.022A OLECRANON FRACTURE, LEFT, CLOSED, INITIAL ENCOUNTER: ICD-10-CM

## 2023-08-15 DIAGNOSIS — M79.602 LEFT ARM PAIN: ICD-10-CM

## 2023-08-15 DIAGNOSIS — R07.9 CHEST PAIN: ICD-10-CM

## 2023-08-15 LAB
ALBUMIN SERPL BCP-MCNC: 3.8 G/DL (ref 3.5–5.2)
ALP SERPL-CCNC: 65 U/L (ref 55–135)
ALT SERPL W/O P-5'-P-CCNC: 12 U/L (ref 10–44)
ANION GAP SERPL CALC-SCNC: 11 MMOL/L (ref 8–16)
APTT PPP: 21.7 SEC (ref 21–32)
AST SERPL-CCNC: 25 U/L (ref 10–40)
BASOPHILS # BLD AUTO: 0.02 K/UL (ref 0–0.2)
BASOPHILS NFR BLD: 0.3 % (ref 0–1.9)
BILIRUB SERPL-MCNC: 0.3 MG/DL (ref 0.1–1)
BUN SERPL-MCNC: 17 MG/DL (ref 8–23)
CALCIUM SERPL-MCNC: 9.9 MG/DL (ref 8.7–10.5)
CHLORIDE SERPL-SCNC: 104 MMOL/L (ref 95–110)
CO2 SERPL-SCNC: 28 MMOL/L (ref 23–29)
CREAT SERPL-MCNC: 0.8 MG/DL (ref 0.5–1.4)
DIFFERENTIAL METHOD: ABNORMAL
EOSINOPHIL # BLD AUTO: 0 K/UL (ref 0–0.5)
EOSINOPHIL NFR BLD: 0.3 % (ref 0–8)
ERYTHROCYTE [DISTWIDTH] IN BLOOD BY AUTOMATED COUNT: 15.9 % (ref 11.5–14.5)
EST. GFR  (NO RACE VARIABLE): >60 ML/MIN/1.73 M^2
GLUCOSE SERPL-MCNC: 93 MG/DL (ref 70–110)
HCT VFR BLD AUTO: 35.6 % (ref 37–48.5)
HGB BLD-MCNC: 10.7 G/DL (ref 12–16)
IMM GRANULOCYTES # BLD AUTO: 0.04 K/UL (ref 0–0.04)
IMM GRANULOCYTES NFR BLD AUTO: 0.6 % (ref 0–0.5)
INR PPP: 1.1 (ref 0.8–1.2)
LYMPHOCYTES # BLD AUTO: 2.4 K/UL (ref 1–4.8)
LYMPHOCYTES NFR BLD: 32.5 % (ref 18–48)
MCH RBC QN AUTO: 26.7 PG (ref 27–31)
MCHC RBC AUTO-ENTMCNC: 30.1 G/DL (ref 32–36)
MCV RBC AUTO: 89 FL (ref 82–98)
MONOCYTES # BLD AUTO: 0.7 K/UL (ref 0.3–1)
MONOCYTES NFR BLD: 9.4 % (ref 4–15)
NEUTROPHILS # BLD AUTO: 4.2 K/UL (ref 1.8–7.7)
NEUTROPHILS NFR BLD: 56.9 % (ref 38–73)
NRBC BLD-RTO: 0 /100 WBC
PLATELET # BLD AUTO: 219 K/UL (ref 150–450)
PMV BLD AUTO: 10.5 FL (ref 9.2–12.9)
POTASSIUM SERPL-SCNC: 3.9 MMOL/L (ref 3.5–5.1)
PROT SERPL-MCNC: 6.5 G/DL (ref 6–8.4)
PROTHROMBIN TIME: 11.2 SEC (ref 9–12.5)
RBC # BLD AUTO: 4.01 M/UL (ref 4–5.4)
SODIUM SERPL-SCNC: 143 MMOL/L (ref 136–145)
WBC # BLD AUTO: 7.27 K/UL (ref 3.9–12.7)

## 2023-08-15 PROCEDURE — 85610 PROTHROMBIN TIME: CPT | Performed by: EMERGENCY MEDICINE

## 2023-08-15 PROCEDURE — G0378 HOSPITAL OBSERVATION PER HR: HCPCS

## 2023-08-15 PROCEDURE — 80053 COMPREHEN METABOLIC PANEL: CPT | Performed by: EMERGENCY MEDICINE

## 2023-08-15 PROCEDURE — 63600175 PHARM REV CODE 636 W HCPCS: Performed by: EMERGENCY MEDICINE

## 2023-08-15 PROCEDURE — 96375 TX/PRO/DX INJ NEW DRUG ADDON: CPT

## 2023-08-15 PROCEDURE — 85025 COMPLETE CBC W/AUTO DIFF WBC: CPT | Performed by: EMERGENCY MEDICINE

## 2023-08-15 PROCEDURE — 85730 THROMBOPLASTIN TIME PARTIAL: CPT | Performed by: EMERGENCY MEDICINE

## 2023-08-15 PROCEDURE — 96361 HYDRATE IV INFUSION ADD-ON: CPT

## 2023-08-15 PROCEDURE — 96376 TX/PRO/DX INJ SAME DRUG ADON: CPT

## 2023-08-15 PROCEDURE — 29105 APPLICATION LONG ARM SPLINT: CPT | Mod: LT

## 2023-08-15 PROCEDURE — 99285 EMERGENCY DEPT VISIT HI MDM: CPT | Mod: 25

## 2023-08-15 PROCEDURE — 96374 THER/PROPH/DIAG INJ IV PUSH: CPT

## 2023-08-15 RX ORDER — FENTANYL CITRATE 50 UG/ML
75 INJECTION, SOLUTION INTRAMUSCULAR; INTRAVENOUS
Status: COMPLETED | OUTPATIENT
Start: 2023-08-15 | End: 2023-08-15

## 2023-08-15 RX ORDER — PROMETHAZINE HYDROCHLORIDE 25 MG/1
25 TABLET ORAL EVERY 6 HOURS PRN
Status: DISCONTINUED | OUTPATIENT
Start: 2023-08-15 | End: 2023-08-17 | Stop reason: HOSPADM

## 2023-08-15 RX ORDER — POLYETHYLENE GLYCOL 3350 17 G/17G
17 POWDER, FOR SOLUTION ORAL DAILY PRN
Status: DISCONTINUED | OUTPATIENT
Start: 2023-08-15 | End: 2023-08-17 | Stop reason: HOSPADM

## 2023-08-15 RX ORDER — NALOXONE HCL 0.4 MG/ML
0.02 VIAL (ML) INJECTION
Status: DISCONTINUED | OUTPATIENT
Start: 2023-08-15 | End: 2023-08-17 | Stop reason: HOSPADM

## 2023-08-15 RX ORDER — MAG HYDROX/ALUMINUM HYD/SIMETH 200-200-20
30 SUSPENSION, ORAL (FINAL DOSE FORM) ORAL 4 TIMES DAILY PRN
Status: DISCONTINUED | OUTPATIENT
Start: 2023-08-15 | End: 2023-08-17 | Stop reason: HOSPADM

## 2023-08-15 RX ORDER — GLUCAGON 1 MG
1 KIT INJECTION
Status: DISCONTINUED | OUTPATIENT
Start: 2023-08-15 | End: 2023-08-17 | Stop reason: HOSPADM

## 2023-08-15 RX ORDER — MORPHINE SULFATE 4 MG/ML
2 INJECTION, SOLUTION INTRAMUSCULAR; INTRAVENOUS EVERY 4 HOURS PRN
Status: DISCONTINUED | OUTPATIENT
Start: 2023-08-15 | End: 2023-08-17 | Stop reason: HOSPADM

## 2023-08-15 RX ORDER — ONDANSETRON 2 MG/ML
4 INJECTION INTRAMUSCULAR; INTRAVENOUS
Status: COMPLETED | OUTPATIENT
Start: 2023-08-15 | End: 2023-08-15

## 2023-08-15 RX ORDER — ACETAMINOPHEN 325 MG/1
650 TABLET ORAL EVERY 8 HOURS PRN
Status: DISCONTINUED | OUTPATIENT
Start: 2023-08-15 | End: 2023-08-17 | Stop reason: HOSPADM

## 2023-08-15 RX ORDER — MORPHINE SULFATE 4 MG/ML
4 INJECTION, SOLUTION INTRAMUSCULAR; INTRAVENOUS
Status: COMPLETED | OUTPATIENT
Start: 2023-08-15 | End: 2023-08-15

## 2023-08-15 RX ORDER — IBUPROFEN 200 MG
16 TABLET ORAL
Status: DISCONTINUED | OUTPATIENT
Start: 2023-08-15 | End: 2023-08-17 | Stop reason: HOSPADM

## 2023-08-15 RX ORDER — SODIUM CHLORIDE, SODIUM LACTATE, POTASSIUM CHLORIDE, CALCIUM CHLORIDE 600; 310; 30; 20 MG/100ML; MG/100ML; MG/100ML; MG/100ML
INJECTION, SOLUTION INTRAVENOUS CONTINUOUS
Status: DISCONTINUED | OUTPATIENT
Start: 2023-08-16 | End: 2023-08-17

## 2023-08-15 RX ORDER — ACETAMINOPHEN 650 MG/1
650 SUPPOSITORY RECTAL EVERY 4 HOURS PRN
Status: DISCONTINUED | OUTPATIENT
Start: 2023-08-15 | End: 2023-08-17 | Stop reason: HOSPADM

## 2023-08-15 RX ORDER — IBUPROFEN 200 MG
24 TABLET ORAL
Status: DISCONTINUED | OUTPATIENT
Start: 2023-08-15 | End: 2023-08-17 | Stop reason: HOSPADM

## 2023-08-15 RX ORDER — SODIUM CHLORIDE 0.9 % (FLUSH) 0.9 %
3 SYRINGE (ML) INJECTION EVERY 12 HOURS PRN
Status: DISCONTINUED | OUTPATIENT
Start: 2023-08-15 | End: 2023-08-17 | Stop reason: HOSPADM

## 2023-08-15 RX ORDER — ONDANSETRON 2 MG/ML
4 INJECTION INTRAMUSCULAR; INTRAVENOUS EVERY 8 HOURS PRN
Status: DISCONTINUED | OUTPATIENT
Start: 2023-08-15 | End: 2023-08-17 | Stop reason: HOSPADM

## 2023-08-15 RX ORDER — HYDROCODONE BITARTRATE AND ACETAMINOPHEN 5; 325 MG/1; MG/1
1 TABLET ORAL EVERY 6 HOURS PRN
Status: DISCONTINUED | OUTPATIENT
Start: 2023-08-15 | End: 2023-08-16

## 2023-08-15 RX ORDER — MORPHINE SULFATE 4 MG/ML
2 INJECTION, SOLUTION INTRAMUSCULAR; INTRAVENOUS
Status: COMPLETED | OUTPATIENT
Start: 2023-08-15 | End: 2023-08-15

## 2023-08-15 RX ORDER — SIMETHICONE 80 MG
1 TABLET,CHEWABLE ORAL 4 TIMES DAILY PRN
Status: DISCONTINUED | OUTPATIENT
Start: 2023-08-15 | End: 2023-08-17 | Stop reason: HOSPADM

## 2023-08-15 RX ORDER — TALC
6 POWDER (GRAM) TOPICAL NIGHTLY PRN
Status: DISCONTINUED | OUTPATIENT
Start: 2023-08-15 | End: 2023-08-17 | Stop reason: HOSPADM

## 2023-08-15 RX ADMIN — FENTANYL CITRATE 75 MCG: 50 INJECTION INTRAMUSCULAR; INTRAVENOUS at 05:08

## 2023-08-15 RX ADMIN — MORPHINE SULFATE 4 MG: 4 INJECTION INTRAVENOUS at 09:08

## 2023-08-15 RX ADMIN — ONDANSETRON 4 MG: 2 INJECTION INTRAMUSCULAR; INTRAVENOUS at 05:08

## 2023-08-15 RX ADMIN — MORPHINE SULFATE 2 MG: 4 INJECTION INTRAVENOUS at 07:08

## 2023-08-15 NOTE — ED PROVIDER NOTES
SCRIBE #1 NOTE: I, Jannet Brock, am scribing for, and in the presence of, Dante Pinedo MD. I have scribed the entire note.       History     Chief Complaint   Patient presents with    Fall     Per [facility], patient fell at GI clinic, obvious deformity to left elbow; splinted by [facility] prior to arrival to ED today     Review of patient's allergies indicates:   Allergen Reactions    Erythromycin     Macrolide antibiotics Nausea And Vomiting         History of Present Illness     HPI    8/15/2023, 5:14 PM  History obtained from the patient      History of Present Illness: Rebecca Rios is a 82 y.o. female patient with a PMHx of depression, anxiety, HTN, HLD, CAD, and hypothyroid who presents to the Emergency Department for evaluation of ground level non-syncopal fall which onset PTA. Reports left arm pain as her primary complaint and bilateral knee pain as her secondary complaint. Symptoms are constant and moderate in severity. No mitigating or exacerbating factors reported.  Patient denies any HA, neck pain, LOC, CP, SOB, n/v/d, and all other sxs at this time. No prior Tx. No further complaints or concerns at this time.       Arrival mode: AAS    PCP: Shlomo Ware MD        Past Medical History:  Past Medical History:   Diagnosis Date    Anxiety     Asymptomatic stenosis of both carotid arteries without infarction 1/28/2022    Atypical chest pain 11/9/2017    CAD (coronary artery disease) 11/18/2014    Chronic diastolic heart failure 8/17/2017    Coronary artery disease     Depression     Fractures     Hot flash, menopausal     HTN (hypertension)     Hyperlipidemia     Hypothyroid     Nonrheumatic mitral valve regurgitation 10/19/2022    Nonrheumatic tricuspid valve regurgitation 10/19/2022    Pulmonary hypertension 8/17/2017       Past Surgical History:  Past Surgical History:   Procedure Laterality Date    CARPAL TUNNEL RELEASE Bilateral     CHOLECYSTECTOMY      HYSTERECTOMY      open luis           Family  History:  Family History   Problem Relation Age of Onset    Hypertension Mother        Social History:  Social History     Tobacco Use    Smoking status: Never    Smokeless tobacco: Never   Substance and Sexual Activity    Alcohol use: No    Drug use: No    Sexual activity: Not Currently     Partners: Male        Review of Systems     Review of Systems   Constitutional:  Negative for fever.   HENT:  Negative for sore throat.    Respiratory:  Negative for shortness of breath.    Cardiovascular:  Negative for chest pain.   Gastrointestinal:  Negative for diarrhea, nausea and vomiting.   Genitourinary:  Negative for dysuria.   Musculoskeletal:  Positive for arthralgias (L elbow and both knees). Negative for back pain.   Skin:  Positive for color change (Bruising, L elbow). Negative for rash.   Neurological:  Negative for weakness.   Hematological:  Does not bruise/bleed easily.      Physical Exam     Initial Vitals [08/15/23 1528]   BP Pulse Resp Temp SpO2   (!) 161/104 65 20 98 °F (36.7 °C) 96 %      MAP       --          Physical Exam  Nursing Notes and Vital Signs Reviewed.  Constitutional: Patient is in no acute distress. Well-developed and well-nourished.  Head: Atraumatic. Normocephalic.  Eyes: PERRL. EOM intact. Conjunctivae are not pale. No scleral icterus.  ENT: Mucous membranes are moist.  Neck: Supple. Full ROM. No lymphadenopathy.  Cardiovascular: Regular rate. Regular rhythm. No murmurs, rubs, or gallops. Distal pulses are 2+ and symmetric.  Pulmonary/Chest: No respiratory distress. Clear to auscultation bilaterally. No wheezing or rales.  Abdominal: Soft and non-distended.  There is no tenderness.  No rebound, guarding, or rigidity.  Genitourinary: No CVA tenderness  Musculoskeletal: No tenderness or ROM limitations to R knee. Slight tenderness and ROM limitations to L knee. Edema, ecchymosis, and tenderness to L elbow.  Skin: Warm and dry. No lacerations.  Neurological:  Alert, awake, and appropriate.   "Normal speech.  No acute focal neurological deficits are appreciated. GCS 15  Psychiatric: Normal affect. Good eye contact. Appropriate in content.     ED Course   Splint Application    Date/Time: 8/15/2023 8:42 PM    Performed by: Dante Pinedo MD  Authorized by: Dante Pinedo MD  Consent Done: Yes  Consent: Verbal consent obtained.  Risks and benefits: risks, benefits and alternatives were discussed  Consent given by: patient  Patient understanding: patient states understanding of the procedure being performed  Patient consent: the patient's understanding of the procedure matches consent given  Procedure consent: procedure consent matches procedure scheduled  Relevant documents: relevant documents present and verified  Patient identity confirmed: verbally with patient  Location details: left elbow  Splint type: Posterior long arm splint.  Supplies used: Ortho-Glass  Post-procedure: The splinted body part was neurovascularly unchanged following the procedure.  Patient tolerance: Patient tolerated the procedure well with no immediate complications        ED Vital Signs:  Vitals:    08/15/23 1528 08/15/23 1748 08/15/23 1805 08/15/23 1927   BP: (!) 161/104  (!) 159/73    Pulse: 65  60    Resp: 20 18 20 16   Temp: 98 °F (36.7 °C)      TempSrc: Oral      SpO2: 96%  97%    Weight:   64.2 kg (141 lb 9.6 oz)    Height: 5' 5" (1.651 m)  5' 5" (1.651 m)        Abnormal Lab Results:  Labs Reviewed   CBC W/ AUTO DIFFERENTIAL   COMPREHENSIVE METABOLIC PANEL   APTT   PROTIME-INR        All Lab Results:  None      Imaging Results:  Imaging Results              X-Ray Elbow Complete Left (Final result)  Result time 08/15/23 18:08:20      Final result by Lloyd Denney MD (08/15/23 18:08:20)                   Impression:      Displaced olecranon fracture.      Electronically signed by: Lloyd Denney  Date:    08/15/2023  Time:    18:08               Narrative:    EXAMINATION:  XR FOREARM LEFT; XR ELBOW COMPLETE 3 VIEW " LEFT    CLINICAL HISTORY:  Pain in left arm    TECHNIQUE:  AP and lateral views of the left forearm were performed.    COMPARISON:  None    FINDINGS:  Olecranon fracture is identified.  No other radial and ulnar fracture is seen.  Exam limited by obliquity.  The lateral humeral condyle appears irregular suggestive of nondisplaced injury.  Soft tissue inflammation and swelling noted.                                       X-Ray Forearm Left (Final result)  Result time 08/15/23 18:08:20      Final result by Lloyd Denney MD (08/15/23 18:08:20)                   Impression:      Displaced olecranon fracture.      Electronically signed by: Lloyd Denney  Date:    08/15/2023  Time:    18:08               Narrative:    EXAMINATION:  XR FOREARM LEFT; XR ELBOW COMPLETE 3 VIEW LEFT    CLINICAL HISTORY:  Pain in left arm    TECHNIQUE:  AP and lateral views of the left forearm were performed.    COMPARISON:  None    FINDINGS:  Olecranon fracture is identified.  No other radial and ulnar fracture is seen.  Exam limited by obliquity.  The lateral humeral condyle appears irregular suggestive of nondisplaced injury.  Soft tissue inflammation and swelling noted.                                       X-Ray Knee 3 View Bilateral (Final result)  Result time 08/15/23 18:14:18      Final result by Llody Denney MD (08/15/23 18:14:18)                   Impression:      As above      Electronically signed by: Lloyd Denney  Date:    08/15/2023  Time:    18:14               Narrative:    EXAMINATION:  XR KNEE 3 VIEW BILATERAL    CLINICAL HISTORY:  Pain in right knee    TECHNIQUE:  AP, lateral, and Merchant views of both knees were performed.    COMPARISON:  None    FINDINGS:  Moderate severe degenerative joint disease of the knee.  Suprapatellar joint effusion.  Deformity of the patella likely old.  Otherwise no acute process seen                                              The Emergency Provider reviewed the vital signs and test  results, which are outlined above.     ED Discussion       7:42 PM: Discussed pt's case with Dr. Mendez (Orthopedic surgery) who recommends placing the pt in a well padded long arm posterior splint, to admit to  and make NPO after midnight, and will consult pt in the morning.    8:27 PM: Discussed case with Alvaro West MD ([facility] Medicine). Dr. West agrees with current care and management of pt and accepts admission.   Admitting Service:   Admitting Physician: Dr. West  Admit to: OBS med/surg  8:27 PM: Re-evaluated pt. I have discussed test results, shared treatment plan, and the need for admission with patient and family at bedside. Pt and family express understanding at this time and agree with all information. All questions answered. Pt and family have no further questions or concerns at this time. Pt is ready for admit.         Medical Decision Making:   Initial Assessment:   82-year-old female presenting with swelling and tenderness to her left elbow after a ground level nonsyncopal fall.  High suspicion for fracture   Differential Diagnosis:   Left upper extremity fracture, left upper extremity dislocation, patella fracture  Independently Interpreted Test(s):   I have ordered and independently interpreted X-rays - see prior notes.  Clinical Tests:   Radiological Study: Ordered and Reviewed  ED Management:  Left elbow fracture identified.  Orthopedics consulted.  See orthopedic recommendations above.  Patient admitted to Medicine.  Labs ordered and pending at the time care was handed off to Hospital Medicine.  Pain treated in the emergency department with IV opioids           ED Medication(s):  Medications   fentaNYL 50 mcg/mL injection 75 mcg (75 mcg Intravenous Given 8/15/23 1748)   ondansetron injection 4 mg (4 mg Intravenous Given 8/15/23 1747)   morphine injection 2 mg (2 mg Intravenous Given 8/15/23 1927)       New Prescriptions    No medications on file               Scribe Attestation:    Scribe #1: I performed the above scribed service and the documentation accurately describes the services I performed. I attest to the accuracy of the note.     Attending:   Physician Attestation Statement for Scribe #1: I, Dante Pinedo MD, personally performed the services described in this documentation, as scribed by Jannet Brock, in my presence, and it is both accurate and complete.           Clinical Impression       ICD-10-CM ICD-9-CM   1. Closed fracture of left elbow, initial encounter  S42.402A 812.40   2. Left arm pain  M79.602 729.5   3. Bilateral knee pain  M25.561 719.46    M25.562        Disposition:   Disposition: Placed in Observation  Condition: Fair         Dante Pinedo MD  08/16/23 6732

## 2023-08-15 NOTE — Clinical Note
Diagnosis: Closed fracture of left elbow, initial encounter [[id number]]   Future Attending Provider: INES SELF [[id number]]   Admitting Provider:: INES SELF. [[id number]]

## 2023-08-15 NOTE — ED NOTES
Bed: 02  Expected date:   Expected time:   Means of arrival: Ambulance Service  Comments:  When clean

## 2023-08-16 PROBLEM — K86.89 PANCREATIC INSUFFICIENCY: Status: ACTIVE | Noted: 2023-08-16

## 2023-08-16 PROBLEM — S52.022A OLECRANON FRACTURE, LEFT, CLOSED, INITIAL ENCOUNTER: Status: ACTIVE | Noted: 2023-08-16

## 2023-08-16 PROCEDURE — 97530 THERAPEUTIC ACTIVITIES: CPT

## 2023-08-16 PROCEDURE — 27000221 HC OXYGEN, UP TO 24 HOURS

## 2023-08-16 PROCEDURE — 25000003 PHARM REV CODE 250: Performed by: NURSE PRACTITIONER

## 2023-08-16 PROCEDURE — 97161 PT EVAL LOW COMPLEX 20 MIN: CPT

## 2023-08-16 PROCEDURE — 36569 INSJ PICC 5 YR+ W/O IMAGING: CPT

## 2023-08-16 PROCEDURE — G0378 HOSPITAL OBSERVATION PER HR: HCPCS

## 2023-08-16 PROCEDURE — 99213 OFFICE O/P EST LOW 20 MIN: CPT | Mod: ,,, | Performed by: ORTHOPAEDIC SURGERY

## 2023-08-16 PROCEDURE — 99213 PR OFFICE/OUTPT VISIT, EST, LEVL III, 20-29 MIN: ICD-10-PCS | Mod: ,,, | Performed by: ORTHOPAEDIC SURGERY

## 2023-08-16 PROCEDURE — 63600175 PHARM REV CODE 636 W HCPCS: Performed by: FAMILY MEDICINE

## 2023-08-16 PROCEDURE — 36573 INSJ PICC RS&I 5 YR+: CPT

## 2023-08-16 PROCEDURE — 97535 SELF CARE MNGMENT TRAINING: CPT

## 2023-08-16 PROCEDURE — 93010 ELECTROCARDIOGRAM REPORT: CPT | Mod: ,,, | Performed by: INTERNAL MEDICINE

## 2023-08-16 PROCEDURE — 93005 ELECTROCARDIOGRAM TRACING: CPT

## 2023-08-16 PROCEDURE — 97166 OT EVAL MOD COMPLEX 45 MIN: CPT

## 2023-08-16 PROCEDURE — 96361 HYDRATE IV INFUSION ADD-ON: CPT

## 2023-08-16 PROCEDURE — 99900035 HC TECH TIME PER 15 MIN (STAT)

## 2023-08-16 PROCEDURE — 93010 EKG 12-LEAD: ICD-10-PCS | Mod: ,,, | Performed by: INTERNAL MEDICINE

## 2023-08-16 PROCEDURE — 63600175 PHARM REV CODE 636 W HCPCS: Performed by: NURSE PRACTITIONER

## 2023-08-16 PROCEDURE — C1751 CATH, INF, PER/CENT/MIDLINE: HCPCS

## 2023-08-16 RX ORDER — PANCRELIPASE 24000; 76000; 120000 [USP'U]/1; [USP'U]/1; [USP'U]/1
1 CAPSULE, DELAYED RELEASE PELLETS ORAL
COMMUNITY

## 2023-08-16 RX ORDER — CETIRIZINE HYDROCHLORIDE 10 MG/1
10 TABLET ORAL DAILY PRN
COMMUNITY
Start: 2023-06-14 | End: 2023-09-08 | Stop reason: ALTCHOICE

## 2023-08-16 RX ORDER — HYDROCODONE BITARTRATE AND ACETAMINOPHEN 10; 325 MG/1; MG/1
1 TABLET ORAL EVERY 8 HOURS PRN
Status: DISCONTINUED | OUTPATIENT
Start: 2023-08-16 | End: 2023-08-17 | Stop reason: HOSPADM

## 2023-08-16 RX ORDER — AMLODIPINE BESYLATE 5 MG/1
5 TABLET ORAL DAILY
COMMUNITY
End: 2023-09-20

## 2023-08-16 RX ORDER — AMLODIPINE BESYLATE 5 MG/1
5 TABLET ORAL DAILY
Status: DISCONTINUED | OUTPATIENT
Start: 2023-08-16 | End: 2023-08-17 | Stop reason: HOSPADM

## 2023-08-16 RX ORDER — SERTRALINE HYDROCHLORIDE 25 MG/1
25 TABLET, FILM COATED ORAL DAILY
Status: DISCONTINUED | OUTPATIENT
Start: 2023-08-16 | End: 2023-08-17 | Stop reason: HOSPADM

## 2023-08-16 RX ORDER — LEVOTHYROXINE SODIUM 75 UG/1
75 TABLET ORAL
Status: DISCONTINUED | OUTPATIENT
Start: 2023-08-16 | End: 2023-08-17 | Stop reason: HOSPADM

## 2023-08-16 RX ORDER — ALPRAZOLAM 0.5 MG/1
0.5 TABLET ORAL 2 TIMES DAILY PRN
Status: DISCONTINUED | OUTPATIENT
Start: 2023-08-16 | End: 2023-08-17 | Stop reason: HOSPADM

## 2023-08-16 RX ORDER — LISINOPRIL 20 MG/1
40 TABLET ORAL NIGHTLY
Status: DISCONTINUED | OUTPATIENT
Start: 2023-08-16 | End: 2023-08-17 | Stop reason: HOSPADM

## 2023-08-16 RX ADMIN — LISINOPRIL 40 MG: 20 TABLET ORAL at 09:08

## 2023-08-16 RX ADMIN — SODIUM CHLORIDE, POTASSIUM CHLORIDE, SODIUM LACTATE AND CALCIUM CHLORIDE: 600; 310; 30; 20 INJECTION, SOLUTION INTRAVENOUS at 05:08

## 2023-08-16 RX ADMIN — PANCRELIPASE 1 CAPSULE: 24000; 76000; 120000 CAPSULE, DELAYED RELEASE PELLETS ORAL at 12:08

## 2023-08-16 RX ADMIN — PANCRELIPASE 1 CAPSULE: 24000; 76000; 120000 CAPSULE, DELAYED RELEASE PELLETS ORAL at 08:08

## 2023-08-16 RX ADMIN — ONDANSETRON 4 MG: 2 INJECTION INTRAMUSCULAR; INTRAVENOUS at 12:08

## 2023-08-16 RX ADMIN — PANCRELIPASE 1 CAPSULE: 24000; 76000; 120000 CAPSULE, DELAYED RELEASE PELLETS ORAL at 05:08

## 2023-08-16 RX ADMIN — HYDROCODONE BITARTRATE AND ACETAMINOPHEN 1 TABLET: 10; 325 TABLET ORAL at 02:08

## 2023-08-16 RX ADMIN — AMLODIPINE BESYLATE 5 MG: 5 TABLET ORAL at 08:08

## 2023-08-16 RX ADMIN — HYDROCODONE BITARTRATE AND ACETAMINOPHEN 1 TABLET: 10; 325 TABLET ORAL at 06:08

## 2023-08-16 RX ADMIN — HYDROCODONE BITARTRATE AND ACETAMINOPHEN 1 TABLET: 10; 325 TABLET ORAL at 10:08

## 2023-08-16 RX ADMIN — ALPRAZOLAM 0.5 MG: 0.5 TABLET ORAL at 09:08

## 2023-08-16 RX ADMIN — ALUMINUM HYDROXIDE, MAGNESIUM HYDROXIDE, AND DIMETHICONE 30 ML: 200; 20; 200 SUSPENSION ORAL at 10:08

## 2023-08-16 RX ADMIN — ALUMINUM HYDROXIDE, MAGNESIUM HYDROXIDE, AND DIMETHICONE 30 ML: 200; 20; 200 SUSPENSION ORAL at 03:08

## 2023-08-16 RX ADMIN — LEVOTHYROXINE SODIUM 75 MCG: 75 TABLET ORAL at 06:08

## 2023-08-16 RX ADMIN — SERTRALINE HYDROCHLORIDE 25 MG: 25 TABLET ORAL at 08:08

## 2023-08-16 RX ADMIN — SODIUM CHLORIDE, POTASSIUM CHLORIDE, SODIUM LACTATE AND CALCIUM CHLORIDE: 600; 310; 30; 20 INJECTION, SOLUTION INTRAVENOUS at 12:08

## 2023-08-16 NOTE — ASSESSMENT & PLAN NOTE
Chronic. Stable. Not in acute exacerbation and currently denies endorsing any suicidal/homicidal ideations.   Plan:  -Continue home medications (zoloft)

## 2023-08-16 NOTE — H&P
Formerly [facility] - Emergency Dept.  [facility] Medicine  History & Physical    Patient Name: Rebecca Rios  MRN: [id number]  Patient Class: OP- Observation  Admission Date: 8/15/2023  Attending Physician: Alvaro West MD   Primary Care Provider: Shlomo Ware MD         Patient information was obtained from patient, past medical records and ER records.     Subjective:     Principal Problem:Olecranon fracture, left, closed, initial encounter    Chief Complaint:   Chief Complaint   Patient presents with    Fall     Per [facility], patient fell at GI clinic, obvious deformity to left elbow; splinted by [facility] prior to arrival to ED today        HPI: Rebecca Rios is a 82 y.o. female with a PMH  has a past medical history of Anxiety, Asymptomatic stenosis of both carotid arteries without infarction (1/28/2022), Atypical chest pain (11/9/2017), CAD (coronary artery disease) (11/18/2014), Chronic diastolic heart failure (8/17/2017), Coronary artery disease, Depression, Fractures, Hot flash, menopausal, HTN (hypertension), Hyperlipidemia, Hypothyroid, Nonrheumatic mitral valve regurgitation (10/19/2022), Nonrheumatic tricuspid valve regurgitation (10/19/2022), and Pulmonary hypertension (8/17/2017).  Presents to the ER for evaluation after having a ground level fall prior to arrival at her doctor's office.  Patient reportedly fell on her left arm and both knees during the fall.  Patient reports that the nurses at the clinic state that people fall in his spot all the time.  Apparently there was a defect in the floor causing people drip.  Patient reports left elbow pain worsens with movement and palpation.  Denies hitting her head or losing consciousness.  Denies any numbness/tingling to left upper extremity. Denies any neck pain/stiffness, chest pain, palpitations, shortness of breath, dyspnea exertion, headache, lightheadedness, dizziness, or any other symptoms at this time.  ER workup revealed olecranon fracture of left elbow.   On-call orthopedic surgeon consulted who recommended hospital Medicine consult with plans to take to OR in a.m. for surgical repair.  Patient received total of 75 mcg of fentanyl, 4 mg of morphine, 2 mg morphine, 4 mg Zofran in ED. patient in agreement with treatment plan.  Patient will be admitted under observation status.    PCP: Shlomo Ware      Past Medical History:   Diagnosis Date    Anxiety     Asymptomatic stenosis of both carotid arteries without infarction 1/28/2022    Atypical chest pain 11/9/2017    CAD (coronary artery disease) 11/18/2014    Chronic diastolic heart failure 8/17/2017    Coronary artery disease     Depression     Fractures     Hot flash, menopausal     HTN (hypertension)     Hyperlipidemia     Hypothyroid     Nonrheumatic mitral valve regurgitation 10/19/2022    Nonrheumatic tricuspid valve regurgitation 10/19/2022    Pulmonary hypertension 8/17/2017       Past Surgical History:   Procedure Laterality Date    CARPAL TUNNEL RELEASE Bilateral     CHOLECYSTECTOMY      HYSTERECTOMY      open luis         Review of patient's allergies indicates:   Allergen Reactions    Erythromycin     Macrolide antibiotics Nausea And Vomiting       No current facility-administered medications on file prior to encounter.     Current Outpatient Medications on File Prior to Encounter   Medication Sig    alprazolam (XANAX) 0.5 MG tablet Take 0.5 mg by mouth 2 (two) times daily as needed for Anxiety.    amLODIPine (NORVASC) 5 MG tablet Take 5 mg by mouth once daily.    cetirizine (ZYRTEC) 10 MG tablet Take 10 mg by mouth daily as needed.    cholecalciferol, vitamin D3, 125 mcg (5,000 unit) capsule Take 5,000 Units by mouth.    ergocalciferol (ERGOCALCIFEROL) 50,000 unit Cap Take 1 capsule (50,000 Units total) by mouth every 7 days.    esomeprazole (NEXIUM) 40 MG capsule Take 40 mg by mouth once daily.     fluticasone propionate (FLONASE) 50 mcg/actuation nasal spray 2 sprays (100  mcg total) by Each Nostril route once daily.    HYDROcodone-acetaminophen (NORCO)  mg per tablet TAKE 1 TABLET BY MOUTH EVERY 8 HOURS AS NEEDED FOR PAIN    levothyroxine (SYNTHROID) 75 MCG tablet TAKE ONE TABLET BY MOUTH ONCE DAILY    lipase-protease-amylase 24,000-76,000-120,000 units (CREON) 24,000-76,000 -120,000 unit capsule Take 1 capsule by mouth 3 (three) times daily with meals.    lisinopriL (PRINIVIL,ZESTRIL) 40 MG tablet TAKE 1 TABLET (40 MG TOTAL) BY MOUTH ONCE DAILY. (Patient taking differently: Take 40 mg by mouth every evening.)    melatonin 10 mg Tab Take by mouth.    ondansetron (ZOFRAN-ODT) 4 MG TbDL as needed.    sertraline (ZOLOFT) 25 MG tablet Take by mouth.    aspirin (ECOTRIN) 81 MG EC tablet Take 1 tablet (81 mg total) by mouth once daily.    diphenoxylate-atropine 2.5-0.025 mg (LOMOTIL) 2.5-0.025 mg per tablet as needed.    DULoxetine (CYMBALTA) 30 MG capsule TAKE 1 CAPSULE BY MOUTH ONCE DAILY    POLYTUSSIN DM 1-5-10 mg/5 mL Syrp TAKE 2 TEASPOONFULS (10ML) BY MOUTH EVERY 8 TO 12 HOURS AS NEEDED FOR COUGH    tizanidine (ZANAFLEX) 4 MG tablet Take 4 mg by mouth every evening.     [DISCONTINUED] levocetirizine (XYZAL) 5 MG tablet TAKE 1 TABLET BY MOUTH EVERY EVENING.     Family History       Problem Relation (Age of Onset)    Hypertension Mother          Tobacco Use    Smoking status: Never    Smokeless tobacco: Never   Substance and Sexual Activity    Alcohol use: No    Drug use: No    Sexual activity: Not Currently     Partners: Male     Review of Systems   Musculoskeletal:  Positive for arthralgias and myalgias. Negative for back pain, joint swelling, neck pain and neck stiffness.   All other systems reviewed and are negative.    Objective:     Vital Signs (Most Recent):  Temp: 98 °F (36.7 °C) (08/15/23 2130)  Pulse: 79 (08/16/23 0200)  Resp: 18 (08/16/23 0200)  BP: (!) 123/57 (08/16/23 0200)  SpO2: 95 % (08/16/23 0200) Vital Signs (24h Range):  Temp:  [98 °F (36.7  °C)] 98 °F (36.7 °C)  Pulse:  [] 79  Resp:  [16-20] 18  SpO2:  [88 %-97 %] 95 %  BP: (108-162)/() 123/57     Weight: 64.2 kg (141 lb 9.6 oz)  Body mass index is 23.56 kg/m².     Physical Exam  Vitals and nursing note reviewed.   Constitutional:       General: She is awake. She is not in acute distress.     Appearance: Normal appearance. She is well-developed and well-groomed. She is not ill-appearing, toxic-appearing or diaphoretic.   HENT:      Head: Normocephalic and atraumatic.   Eyes:      Extraocular Movements: Extraocular movements intact.      Conjunctiva/sclera: Conjunctivae normal.   Cardiovascular:      Rate and Rhythm: Normal rate and regular rhythm.      Heart sounds: Normal heart sounds. No murmur heard.  Pulmonary:      Effort: Pulmonary effort is normal.      Breath sounds: Normal breath sounds.   Abdominal:      General: Bowel sounds are normal.      Palpations: Abdomen is soft.      Tenderness: There is no abdominal tenderness.   Musculoskeletal:      Cervical back: Normal range of motion and neck supple.      Comments: 5/5 strength throughout, with exception to left upper extremity which has a splint placed.  Wiggles all digits in the left hand.  Cap refill less than 2 seconds in all digits.  Plus two radial pulse noted.   Skin:     General: Skin is warm and dry.      Capillary Refill: Capillary refill takes less than 2 seconds.   Neurological:      General: No focal deficit present.      Mental Status: She is alert and oriented to person, place, and time. Mental status is at baseline.      GCS: GCS eye subscore is 4. GCS verbal subscore is 5. GCS motor subscore is 6.      Cranial Nerves: Cranial nerves 2-12 are intact.      Sensory: Sensation is intact.      Motor: Motor function is intact.   Psychiatric:         Mood and Affect: Mood normal.         Speech: Speech normal.         Behavior: Behavior normal. Behavior is cooperative.              LABS:  Recent Results (from the past 24  hour(s))   CBC auto differential    Collection Time: 08/15/23  8:46 PM   Result Value Ref Range    WBC 7.27 3.90 - 12.70 K/uL    RBC 4.01 4.00 - 5.40 M/uL    Hemoglobin 10.7 (L) 12.0 - 16.0 g/dL    Hematocrit 35.6 (L) 37.0 - 48.5 %    MCV 89 82 - 98 fL    MCH 26.7 (L) 27.0 - 31.0 pg    MCHC 30.1 (L) 32.0 - 36.0 g/dL    RDW 15.9 (H) 11.5 - 14.5 %    Platelets 219 150 - 450 K/uL    MPV 10.5 9.2 - 12.9 fL    Immature Granulocytes 0.6 (H) 0.0 - 0.5 %    Gran # (ANC) 4.2 1.8 - 7.7 K/uL    Immature Grans (Abs) 0.04 0.00 - 0.04 K/uL    Lymph # 2.4 1.0 - 4.8 K/uL    Mono # 0.7 0.3 - 1.0 K/uL    Eos # 0.0 0.0 - 0.5 K/uL    Baso # 0.02 0.00 - 0.20 K/uL    nRBC 0 0 /100 WBC    Gran % 56.9 38.0 - 73.0 %    Lymph % 32.5 18.0 - 48.0 %    Mono % 9.4 4.0 - 15.0 %    Eosinophil % 0.3 0.0 - 8.0 %    Basophil % 0.3 0.0 - 1.9 %    Differential Method Automated    Comprehensive metabolic panel    Collection Time: 08/15/23  8:46 PM   Result Value Ref Range    Sodium 143 136 - 145 mmol/L    Potassium 3.9 3.5 - 5.1 mmol/L    Chloride 104 95 - 110 mmol/L    CO2 28 23 - 29 mmol/L    Glucose 93 70 - 110 mg/dL    BUN 17 8 - 23 mg/dL    Creatinine 0.8 0.5 - 1.4 mg/dL    Calcium 9.9 8.7 - 10.5 mg/dL    Total Protein 6.5 6.0 - 8.4 g/dL    Albumin 3.8 3.5 - 5.2 g/dL    Total Bilirubin 0.3 0.1 - 1.0 mg/dL    Alkaline Phosphatase 65 55 - 135 U/L    AST 25 10 - 40 U/L    ALT 12 10 - 44 U/L    eGFR >60 >60 mL/min/1.73 m^2    Anion Gap 11 8 - 16 mmol/L   APTT    Collection Time: 08/15/23  8:46 PM   Result Value Ref Range    aPTT 21.7 21.0 - 32.0 sec   Protime-INR    Collection Time: 08/15/23  8:46 PM   Result Value Ref Range    Prothrombin Time 11.2 9.0 - 12.5 sec    INR 1.1 0.8 - 1.2       RADIOLOGY  X-Ray Chest 1 View    Result Date: 8/15/2023  EXAMINATION: XR CHEST 1 VIEW CLINICAL HISTORY: pre-op clearance; TECHNIQUE: Single frontal view of the chest was performed. COMPARISON: None FINDINGS: Hiatal hernia.  Prominent cardiac silhouette.  Mild  perihilar interstitial opacities. No pneumonic consolidation. Bones are intact.     No acute abnormality.  Hiatal hernia Electronically signed by: Lloyd Denney Date:    08/15/2023 Time:    22:09    X-Ray Knee 3 View Bilateral    Result Date: 8/15/2023  EXAMINATION: XR KNEE 3 VIEW BILATERAL CLINICAL HISTORY: Pain in right knee TECHNIQUE: AP, lateral, and Merchant views of both knees were performed. 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  Suprapatellar joint effusion.  Deformity of the patella likely old.  Otherwise no acute process seen     As above Electronically signed by: Lloyd Denney Date:    08/15/2023 Time:    18:14    X-Ray Elbow Complete Left    Result Date: 8/15/2023  EXAMINATION: XR FOREARM LEFT; XR ELBOW COMPLETE 3 VIEW LEFT CLINICAL HISTORY: Pain in left arm TECHNIQUE: AP and lateral views of the left forearm were performed. COMPARISON: None FINDINGS: Olecranon fracture is identified.  No other radial and ulnar fracture is seen.  Exam limited by obliquity.  The lateral humeral condyle appears irregular suggestive of nondisplaced injury.  Soft tissue inflammation and swelling noted.     Displaced olecranon fracture. Electronically signed by: Lloyd Denney Date:    08/15/2023 Time:    18:08    X-Ray Forearm Left    Result Date: 8/15/2023  EXAMINATION: XR FOREARM LEFT; XR ELBOW COMPLETE 3 VIEW LEFT CLINICAL HISTORY: Pain in left arm TECHNIQUE: AP and lateral views of the left forearm were performed. COMPARISON: None FINDINGS: Olecranon fracture is identified.  No other radial and ulnar fracture is seen.  Exam limited by obliquity.  The lateral humeral condyle appears irregular suggestive of nondisplaced injury.  Soft tissue inflammation and swelling noted.     Displaced olecranon fracture. Electronically signed by: Lloyd Denney Date:    08/15/2023 Time:    18:08      EKG    MICROBIOLOGY    MDM     Amount and/or Complexity of Data Reviewed  Clinical lab tests:  reviewed  Tests in the radiology section of CPT®: reviewed  Tests in the medicine section of CPT®: reviewed  Discussion of test results with the performing providers: yes  Decide to obtain previous medical records or to obtain history from someone other than the patient: yes  Obtain history from someone other than the patient: yes  Review and summarize past medical records: yes  Discuss the patient with other providers: yes  Independent visualization of images, tracings, or specimens: yes          Assessment/Plan:     * Olecranon fracture, left, closed, initial encounter  RCRI revealed class 2 risk with 6.0% 30 day risk of death, mi, cardiac arrest.  Plan:  -NPO  -Continue current pain regimen, titrate as needed  -Bowel regimen  -Bedrest  -None weightbearing LUE  -Continue splint per ortho recs  -IVFs prn  -Antiemetics prn  -Tylenol as needed for fever   -PT/OT         Essential (primary) hypertension  Currently normotensive. BP usually well controlled per patient with home medications.  Plan:  -Optimize pain control   -Continue home medications (norvasc, lisinopril), titrate as needed   -Monitor BP  -Low salt/cardiac diet when not NPO  -IV hydralazine prn for SBP>160 or DBP>90           CAD (coronary artery disease)  Patient with known CAD which is controlled Will continue Statin and monitor for S/Sx of angina/ACS. Continue to monitor on telemetry.         Chronic diastolic heart failure  Patient is identified as having Diastolic (HFpEF) heart failure that is Chronic. CHF is currently controlled. Latest ECHO performed and demonstrates- Results for orders placed during the hospital encounter of 07/06/22    Echo    Interpretation Summary  · The left ventricle is normal in size with normal systolic function.  · Grade III left ventricular diastolic dysfunction.  · The estimated PA systolic pressure is 48 mmHg.  · Normal right ventricular size with normal right ventricular systolic function.  · There is pulmonary  "hypertension.  · Normal central venous pressure (3 mmHg).  · Severe left atrial enlargement.  · Mild mitral regurgitation.  · Mild to moderate tricuspid regurgitation.  · The estimated ejection fraction is 55%.  . Continue ACE/ARB and monitor clinical status closely. Monitor on telemetry. Patient is off CHF pathway.  Monitor strict Is&Os and daily weights.  Place on fluid restriction of 1.5 L. Continue to stress to patient importance of self efficacy and  on diet for CHF. Last BNP reviewed- and noted below No results for input(s): "BNP", "BNPTRIAGEBLO" in the last 168 hours..            Chronic kidney disease  Appears near baseline.  Plan:  -Avoid nephrotoxins  -Monitor renal function  -Renally dose medications  -IVFs prn        Chronic pain  Plan:  -continue norco 10-325mg tablets      Hypothyroidism  Patient has chronic hypothyroidism. TFTs reviewed-   Lab Results   Component Value Date    TSH 1.62 07/03/2023   . Will continue chronic levothyroxine and adjust for and clinical changes.        Mild episode of recurrent major depressive disorder  Chronic. Stable. Not in acute exacerbation and currently denies endorsing any suicidal/homicidal ideations.   Plan:  -Continue home medications (zoloft)        Anxiety  Chronic. Stable. Not in acute exacerbation and currently denies endorsing any suicidal/homicidal ideations.   Plan:  -Continue home medications (xanax prn)          VTE Risk Mitigation (From admission, onward)         Ordered     Reason for No Pharmacological VTE Prophylaxis  Once        Question:  Reasons:  Answer:  Physician Provided (leave comment)    08/15/23 2142     IP VTE HIGH RISK PATIENT  Once         08/15/23 2142     Place sequential compression device  Until discontinued         08/15/23 2142                 //Core Measures   -DVT proph: SCDs, withholding anticoagulation pending surgical intervention   -Code status Full    -Surrogate:none    Components of this note were documented using a " voice recognition system and are subject to errors not corrected at the time the document was proof read. Please contact the author for any clarifications.       Ezequiel West NP  Department of Hospital Medicine  [facility] - Emergency Dept.

## 2023-08-16 NOTE — PLAN OF CARE
OT anamaria completed. Recommends rehab.  CGA for bed mobility. Min A-Mod A for sit>stand with RW. Min A for ambulation 8ft x2 reps with RW. MARCIE PANDYA.

## 2023-08-16 NOTE — PROCEDURES
"Rebecca Rios is a 82 y.o. female patient.    Temp: 98.9 °F (37.2 °C) (08/16/23 0703)  Pulse: 66 (08/16/23 0703)  Resp: 16 (08/16/23 0703)  BP: (!) 150/67 (08/16/23 0703)  SpO2: 99 % (08/16/23 0703)  Weight: 67.3 kg (148 lb 5.9 oz) (08/16/23 0557)  Height: 5' 5" (165.1 cm) (08/15/23 1805)    PICC  Date/Time: 8/16/2023 10:02 AM  Performed by: Juan Francisco Singh RN  Supervising provider: Karen Castillo RN  Consent Done: Yes  Time out: Immediately prior to procedure a time out was called to verify the correct patient, procedure, equipment, support staff and site/side marked as required  Indications: med administration and vascular access  Anesthesia: local infiltration  Local anesthetic: lidocaine 1% without epinephrine  Anesthetic Total (mL): 3  Skin prep agent dried: skin prep agent completely dried prior to procedure  Sterile barriers: all five maximum sterile barriers used - cap, mask, sterile gown, sterile gloves, and large sterile sheet  Hand hygiene: hand hygiene performed prior to central venous catheter insertion  Location details: right basilic  Catheter type: double lumen  Catheter size: 4 Fr  Catheter Length: 37cm    Ultrasound guidance: yes  Vessel Caliber: medium and patent, compressibility normal  Vascular Doppler: not done  Needle advanced into vessel with real time Ultrasound guidance.  Guidewire confirmed in vessel.  Sterile sheath used.  no esophageal manometryNumber of attempts: 1  Post-procedure: blood return through all ports, chlorhexidine patch and sterile dressing applied  Estimated blood loss (mL): 0  Specimens: No  Implants: No  Assessment: placement verified by x-ray (see rad. report)          Name Juan Francisco Singh RN  8/16/2023    "

## 2023-08-16 NOTE — ASSESSMENT & PLAN NOTE
Appears near baseline.  Plan:  -Avoid nephrotoxins  -Monitor renal function  -Renally dose medications  -IVFs prn

## 2023-08-16 NOTE — ASSESSMENT & PLAN NOTE
"Patient is identified as having Diastolic (HFpEF) heart failure that is Chronic. CHF is currently controlled. Latest ECHO performed and demonstrates- Results for orders placed during the hospital encounter of 07/06/22    Echo    Interpretation Summary  · The left ventricle is normal in size with normal systolic function.  · Grade III left ventricular diastolic dysfunction.  · The estimated PA systolic pressure is 48 mmHg.  · Normal right ventricular size with normal right ventricular systolic function.  · There is pulmonary hypertension.  · Normal central venous pressure (3 mmHg).  · Severe left atrial enlargement.  · Mild mitral regurgitation.  · Mild to moderate tricuspid regurgitation.  · The estimated ejection fraction is 55%.  . Continue ACE/ARB and monitor clinical status closely. Monitor on telemetry. Patient is off CHF pathway.  Monitor strict Is&Os and daily weights.  Place on fluid restriction of 1.5 L. Continue to stress to patient importance of self efficacy and  on diet for CHF. Last BNP reviewed- and noted below No results for input(s): "BNP", "BNPTRIAGEBLO" in the last 168 hours..      Compensated     "

## 2023-08-16 NOTE — ASSESSMENT & PLAN NOTE
Chronic. Stable. Not in acute exacerbation and currently denies endorsing any suicidal/homicidal ideations.   Plan:  -Continue home medications (xanax prn)

## 2023-08-16 NOTE — ASSESSMENT & PLAN NOTE
RCRI revealed class 2 risk with 6.0% 30 day risk of death, mi, cardiac arrest.  Plan:  -NPO  -Continue current pain regimen, titrate as needed  -Bowel regimen  -Bedrest  -None weightbearing LUE  -Continue splint per ortho recs  -IVFs prn  -Antiemetics prn  -Tylenol as needed for fever   -PT/OT

## 2023-08-16 NOTE — ASSESSMENT & PLAN NOTE
"Patient is identified as having Diastolic (HFpEF) heart failure that is Chronic. CHF is currently controlled. Latest ECHO performed and demonstrates- Results for orders placed during the hospital encounter of 07/06/22    Echo    Interpretation Summary  · The left ventricle is normal in size with normal systolic function.  · Grade III left ventricular diastolic dysfunction.  · The estimated PA systolic pressure is 48 mmHg.  · Normal right ventricular size with normal right ventricular systolic function.  · There is pulmonary hypertension.  · Normal central venous pressure (3 mmHg).  · Severe left atrial enlargement.  · Mild mitral regurgitation.  · Mild to moderate tricuspid regurgitation.  · The estimated ejection fraction is 55%.  . Continue ACE/ARB and monitor clinical status closely. Monitor on telemetry. Patient is off CHF pathway.  Monitor strict Is&Os and daily weights.  Place on fluid restriction of 1.5 L. Continue to stress to patient importance of self efficacy and  on diet for CHF. Last BNP reviewed- and noted below No results for input(s): "BNP", "BNPTRIAGEBLO" in the last 168 hours..          "

## 2023-08-16 NOTE — PLAN OF CARE
Plan of care discussed with pt. Pt verbalized understanding. Free from injury. No s/s of distress noted. Vitals stable. LYLE PICC infusing LR @ 75. Meds as ordered. Pain controlled. Diet tolerated.Q2 hour rounding. No complaints. Will continue to monitor pt. 12 hour chart review.

## 2023-08-16 NOTE — ASSESSMENT & PLAN NOTE
Currently normotensive. BP usually well controlled per patient with home medications.  Plan:  -Optimize pain control   -Continue home medications (norvasc, lisinopril), titrate as needed   -Monitor BP  -Low salt/cardiac diet when not NPO  -IV hydralazine prn for SBP>160 or DBP>90

## 2023-08-16 NOTE — ASSESSMENT & PLAN NOTE
Patient has chronic hypothyroidism. TFTs reviewed-   Lab Results   Component Value Date    TSH 1.62 07/03/2023   . Will continue chronic levothyroxine and adjust for and clinical changes.

## 2023-08-16 NOTE — HOSPITAL COURSE
8/16 admitted for left olecranon fracture after ground level fall. Poor venous access. Picc line ordered. Physical/occupational therapy consulted. Ortho following for surgical vs conservative management.    8/17    Patient forgoing surgical intervention for olecranon fracture. Ortho resplinted elbow and repeat xray with improved displacement. Physical/occupational therapy recommended rehab, but patient also forgoing to discharge home instead with homehealth physical/occupational therapy.     Overnight, experienced chest pain and anxiety. Proton pump inhibitor not resumed and experienced rebound acid reflux. Troponin(s) within normal limits. No rib fractures on chest x-ray.    On exam, no acute distress, no respiratory distress. Left arm splinted and dressed in ace wrap, clean, dry, intact.    Patient seen and evaluated by me. Patient was determined to be suitable for discharge. Patient deemed stable for discharge to home with homehealth physical/occupational therapy and nurse practitioner to visit home program.

## 2023-08-16 NOTE — PT/OT/SLP EVAL
Physical Therapy Evaluation    Patient Name:  Rebecca Rios   MRN:  [id number]    Recommendations:     Discharge Recommendations: rehabilitation facility   Discharge Equipment Recommendations: to be determined by next level of care   Barriers to discharge: Decreased caregiver support    Assessment:     Rebecca Rios is a 82 y.o. female admitted with a medical diagnosis of Olecranon fracture, left, closed, initial encounter.  She presents with the following impairments/functional limitations: weakness, impaired endurance, impaired functional mobility, gait instability, impaired balance, pain, decreased safety awareness, decreased lower extremity function, decreased coordination, orthopedic precautions.    Pt would benefit from intensive therapies in an inpatient setting with 3 hours of therapy/day. Pt's needs cannot be met at a lower level of care. Pt is motivated to progress. Rehabilitation facility recommended to return patient to PLOF, prevent future falls and decrease readmission risk.    Rehab Prognosis: Good; patient would benefit from acute skilled PT services to address these deficits and reach maximum level of function.    Recent Surgery: * No surgery found *      Plan:     During this hospitalization, patient to be seen 3 x/week to address the identified rehab impairments via gait training, therapeutic activities, therapeutic exercises and progress toward the following goals:    Plan of Care Expires:  08/30/23    Subjective     Chief Complaint: Pt is motivated to participate, pt reports she needs her brief and linens changed  Patient/Family Comments/goals: none stated  Pain/Comfort:  Pain Rating 1: 8/10  Location - Side 1: Left  Location - Orientation 1: generalized  Location 1: elbow  Pain Addressed 1: Reposition, Distraction  Pain Rating Post-Intervention 1: 8/10    Patients cultural, spiritual, Restorationist conflicts given the current situation: no    Living Environment:  Pt reports that her daughter lives  with her in a 1 story home, ramp to enter, daughter is with her 24/7 but is disabled and cannot physically assist.  Prior to admission, patients level of function was INDEP bathing, dressing, ADLs, household and community ambulation, intermittent use of QC, driving, retired.  Equipment used at home: cane, quad, grab bar, raised toilet, bath bench, walker, rolling.  DME owned (not currently used): none.  Upon discharge, patient will have assistance from UNKNOWN.    Objective:     Communicated with nurse Mcelroy prior to session.  Patient found supine with telemetry, PICC line (L UE ACE wrap)  upon PT entry to room.    General Precautions: Standard, fall  Orthopedic Precautions:LUE non weight bearing   Braces:  (L UE splint)  Respiratory Status: Room air    Exams:  Cognitive Exam:  Patient is oriented to Person, Place, Time, and Situation  Sensation:    -       Intact  Skin Integrity/Edema:      -       Skin integrity: Visible skin intact  RLE ROM: WFL  RLE Strength: Grossly 4-/5  LLE ROM: WFL  LLE Strength: Grossly 4-/5    Functional Mobility:  Bed Mobility:     Scooting: contact guard assistance  Supine to Sit: contact guard assistance  Sit to Supine: contact guard assistance  Transfers:     Sit to Stand:  minimum assistance with rolling walker from raised bed, MOD A from toilet  Bed to Chair: minimum assistance with  rolling walker  using  Step Transfer  Toilet Transfer: minimum assistance with  rolling walker  using  Step Transfer  Gait: Ambulated 8ft x2 MIN A using RW, no LOB, no c/o dizziness or SOB  Balance: Demonstrated fair sitting balance, poor dynamic balance during gait  Pt compliant with L UE NWB, assistance for RW management on L side    AM-PAC 6 CLICK MOBILITY  Total Score:15     Treatment & Education:  Pt educated on role of PT in acute care and POC. Educated on importance of OOB activities, activity pacing, and HEP (marching/hip flex, hip abd, heel slides/LAQ, quad sets, ankle pumps) in order to  "maintain/regain strength. Encouraged to sit up in chair for all meals. Educated on proper use of RW for safety and to reduce risk of falling. Educated on "call don't fall" policy and increased risk of falling due to weakness, instructed to utilize call bell for assistance with all transfers. Pt agreeable to all requests.    Patient left up in chair with all lines intact, call button in reach, and family present.    GOALS:   Multidisciplinary Problems       Physical Therapy Goals          Problem: Physical Therapy    Goal Priority Disciplines Outcome Goal Variances Interventions   Physical Therapy Goal     PT, PT/OT      Description: Goals to be met by 8/30/23.  1. Pt will complete bed mobility SBA.  2. Pt will complete sit to stand MIN A.  3. Pt will ambulate 100ft CGA.  4. Pt will increase AMPAC score by 2 points to progress functional mobility.                         History:     Past Medical History:   Diagnosis Date    Anxiety     Asymptomatic stenosis of both carotid arteries without infarction 1/28/2022    Atypical chest pain 11/9/2017    CAD (coronary artery disease) 11/18/2014    Chronic diastolic heart failure 8/17/2017    Coronary artery disease     Depression     Fractures     Hot flash, menopausal     HTN (hypertension)     Hyperlipidemia     Hypothyroid     Nonrheumatic mitral valve regurgitation 10/19/2022    Nonrheumatic tricuspid valve regurgitation 10/19/2022    Pulmonary hypertension 8/17/2017       Past Surgical History:   Procedure Laterality Date    CARPAL TUNNEL RELEASE Bilateral     CHOLECYSTECTOMY      HYSTERECTOMY      open luis         Time Tracking:     PT Received On: 08/16/23  PT Start Time: 1127     PT Stop Time: 1207  PT Total Time (min): 40 min     Billable Minutes: Evaluation 15min and Therapeutic Activity 25min      08/16/2023  "

## 2023-08-16 NOTE — PROGRESS NOTES
[facility]  Progress Note    Patient Name: Rebecca Rios  MRN: [id number]  Patient Class: OP- Observation   Admission Date: 8/15/2023  Length of Stay: 0 days  Attending Physician: Erick Mcgee MD  Primary Care Provider: Shlomo Ware MD        Subjective:     Principal Problem:Olecranon fracture, left, closed, initial encounter        HPI:  Rebecca Rios is a 82 y.o. female with a PMH  has a past medical history of Anxiety, Asymptomatic stenosis of both carotid arteries without infarction (1/28/2022), Atypical chest pain (11/9/2017), CAD (coronary artery disease) (11/18/2014), Chronic diastolic heart failure (8/17/2017), Coronary artery disease, Depression, Fractures, Hot flash, menopausal, HTN (hypertension), Hyperlipidemia, Hypothyroid, Nonrheumatic mitral valve regurgitation (10/19/2022), Nonrheumatic tricuspid valve regurgitation (10/19/2022), and Pulmonary hypertension (8/17/2017).  Presents to the ER for evaluation after having a ground level fall prior to arrival at her doctor's office.  Patient reportedly fell on her left arm and both knees during the fall.  Patient reports that the nurses at the clinic state that people fall in his spot all the time.  Apparently there was a defect in the floor causing people drip.  Patient reports left elbow pain worsens with movement and palpation.  Denies hitting her head or losing consciousness.  Denies any numbness/tingling to left upper extremity. Denies any neck pain/stiffness, chest pain, palpitations, shortness of breath, dyspnea exertion, headache, lightheadedness, dizziness, or any other symptoms at this time.  ER workup revealed olecranon fracture of left elbow.  On-call orthopedic surgeon consulted who recommended hospital Medicine consult with plans to take to OR in a.m. for surgical repair.  Patient received total of 75 mcg of fentanyl, 4 mg of morphine, 2 mg morphine, 4 mg Zofran in ED. patient in agreement with treatment plan.   Patient will be admitted under observation status.    PCP: Shlomo Ware      Overview/Hospital Course:  8/16 admitted for left olecranon fracture after ground level fall. Poor venous access. Picc line ordered. Physical/occupational therapy consulted. Ortho following for surgical vs conservative management.      Interval History: See hospital course for today      Review of Systems   Constitutional:  Negative for activity change, appetite change, fatigue and fever.   Respiratory:  Negative for shortness of breath.    Cardiovascular:  Negative for chest pain and leg swelling.   Gastrointestinal:  Negative for abdominal pain, nausea and vomiting.   Musculoskeletal:  Positive for arthralgias, joint swelling and myalgias.   Skin:  Positive for wound.   Neurological:  Negative for weakness.   Hematological:  Bruises/bleeds easily.   Psychiatric/Behavioral:  Negative for agitation, behavioral problems, confusion, decreased concentration and dysphoric mood. The patient is not nervous/anxious.      Objective:     Vital Signs (Most Recent):  Temp: 98.9 °F (37.2 °C) (08/16/23 0703)  Pulse: 66 (08/16/23 0703)  Resp: 16 (08/16/23 0703)  BP: (!) 150/67 (08/16/23 0703)  SpO2: 99 % (08/16/23 0703) Vital Signs (24h Range):  Temp:  [98 °F (36.7 °C)-98.9 °F (37.2 °C)] 98.9 °F (37.2 °C)  Pulse:  [] 66  Resp:  [16-20] 16  SpO2:  [88 %-99 %] 99 %  BP: (108-193)/() 150/67     Weight: 67.3 kg (148 lb 5.9 oz)  Body mass index is 24.69 kg/m².  No intake or output data in the 24 hours ending 08/16/23 1008      Physical Exam  Vitals and nursing note reviewed. Exam conducted with a chaperone present (family).   Constitutional:       General: She is not in acute distress.     Appearance: She is ill-appearing. She is not toxic-appearing.      Interventions: Nasal cannula in place.   HENT:      Head: Normocephalic and atraumatic.      Mouth/Throat:      Mouth: Mucous membranes are moist.   Cardiovascular:      Rate and Rhythm:  Normal rate.   Pulmonary:      Effort: Pulmonary effort is normal. No respiratory distress.   Abdominal:      Palpations: Abdomen is soft.      Tenderness: There is no abdominal tenderness.   Musculoskeletal:         General: Swelling, tenderness, deformity and signs of injury present.      Left shoulder: No tenderness or bony tenderness. Normal range of motion.      Right elbow: No swelling. Normal range of motion.      Left elbow: Decreased range of motion.      Left wrist: Normal. Normal range of motion.        Arms:       Right upper leg: Normal.      Left upper leg: Normal.      Right knee: Normal range of motion. No tenderness.      Left knee: Normal range of motion. No tenderness.      Right lower leg: No edema.      Left lower leg: No edema.      Right ankle: Normal.      Left ankle: Normal.   Skin:     General: Skin is warm.      Coloration: Skin is pale.      Findings: Bruising present.   Neurological:      Mental Status: She is alert and oriented to person, place, and time.      Motor: No weakness.   Psychiatric:         Mood and Affect: Mood normal.         Behavior: Behavior normal. Behavior is cooperative.             Significant Labs: All pertinent labs within the past 24 hours have been reviewed.  CBC:   Recent Labs   Lab 08/15/23  2046   WBC 7.27   HGB 10.7*   HCT 35.6*        CMP:   Recent Labs   Lab 08/15/23  2046      K 3.9      CO2 28   GLU 93   BUN 17   CREATININE 0.8   CALCIUM 9.9   PROT 6.5   ALBUMIN 3.8   BILITOT 0.3   ALKPHOS 65   AST 25   ALT 12   ANIONGAP 11       Significant Imaging: I have reviewed all pertinent imaging results/findings within the past 24 hours.  Left elbow xray      Assessment/Plan:      * Olecranon fracture, left, closed, initial encounter  RCRI revealed class 2 risk with 6.0% 30 day risk of death, mi, cardiac arrest.  Plan:  -NPO  -Continue current pain regimen, titrate as needed  -Bowel regimen  -Bedrest  -None weightbearing LUE  -Continue splint  "per ortho recs  -IVFs prn  -Antiemetics prn  -Tylenol as needed for fever   -PT/OT     Ortho following  Surgery vs conservative management  Npo after midnight pending clinical course      Pancreatic insufficiency  Resume home enzymes       Chronic diastolic heart failure  Patient is identified as having Diastolic (HFpEF) heart failure that is Chronic. CHF is currently controlled. Latest ECHO performed and demonstrates- Results for orders placed during the hospital encounter of 07/06/22    Echo    Interpretation Summary  · The left ventricle is normal in size with normal systolic function.  · Grade III left ventricular diastolic dysfunction.  · The estimated PA systolic pressure is 48 mmHg.  · Normal right ventricular size with normal right ventricular systolic function.  · There is pulmonary hypertension.  · Normal central venous pressure (3 mmHg).  · Severe left atrial enlargement.  · Mild mitral regurgitation.  · Mild to moderate tricuspid regurgitation.  · The estimated ejection fraction is 55%.  . Continue ACE/ARB and monitor clinical status closely. Monitor on telemetry. Patient is off CHF pathway.  Monitor strict Is&Os and daily weights.  Place on fluid restriction of 1.5 L. Continue to stress to patient importance of self efficacy and  on diet for CHF. Last BNP reviewed- and noted below No results for input(s): "BNP", "BNPTRIAGEBLO" in the last 168 hours..      Compensated       Chronic kidney disease  Appears near baseline.  Plan:  -Avoid nephrotoxins  -Monitor renal function  -Renally dose medications  -IVFs prn        Essential (primary) hypertension  Currently normotensive. BP usually well controlled per patient with home medications.  Plan:  -Optimize pain control   -Continue home medications (norvasc, lisinopril), titrate as needed   -Monitor BP  -Low salt/cardiac diet when not NPO  -IV hydralazine prn for SBP>160 or DBP>90           Mild episode of recurrent major depressive disorder  Chronic. " Stable. Not in acute exacerbation and currently denies endorsing any suicidal/homicidal ideations.   Plan:  -Continue home medications (zoloft)        CAD (coronary artery disease)  Patient with known CAD which is controlled Will continue Statin and monitor for S/Sx of angina/ACS. Continue to monitor on telemetry.         Chronic pain  Plan:  -continue norco 10-325mg tablets      Anxiety  Chronic. Stable. Not in acute exacerbation and currently denies endorsing any suicidal/homicidal ideations.   Plan:  -Continue home medications (xanax prn)        Hypothyroidism  Patient has chronic hypothyroidism. TFTs reviewed-   Lab Results   Component Value Date    TSH 1.62 07/03/2023   . Will continue chronic levothyroxine and adjust for and clinical changes.        VTE Risk Mitigation (From admission, onward)         Ordered     Reason for No Pharmacological VTE Prophylaxis  Once        Question:  Reasons:  Answer:  Physician Provided (leave comment)    08/15/23 2142     IP VTE HIGH RISK PATIENT  Once         08/15/23 2142     Place sequential compression device  Until discontinued         08/15/23 2142                Discharge Planning   KADIE:      Code Status: Full Code   Is the patient medically ready for discharge?:     Reason for patient still in hospital (select all that apply): Patient trending condition, Laboratory test, Treatment, Imaging, Consult recommendations, PT / OT recommendations and Pending disposition                     Erick Mcgee MD  Department of Hospital Medicine   [facility] - Med Surg

## 2023-08-16 NOTE — SUBJECTIVE & OBJECTIVE
Interval History: See hospital course for today      Review of Systems   Constitutional:  Negative for activity change, appetite change, fatigue and fever.   Respiratory:  Negative for shortness of breath.    Cardiovascular:  Negative for chest pain and leg swelling.   Gastrointestinal:  Negative for abdominal pain, nausea and vomiting.   Musculoskeletal:  Positive for arthralgias, joint swelling and myalgias.   Skin:  Positive for wound.   Neurological:  Negative for weakness.   Hematological:  Bruises/bleeds easily.   Psychiatric/Behavioral:  Negative for agitation, behavioral problems, confusion, decreased concentration and dysphoric mood. The patient is not nervous/anxious.      Objective:     Vital Signs (Most Recent):  Temp: 98.9 °F (37.2 °C) (08/16/23 0703)  Pulse: 66 (08/16/23 0703)  Resp: 16 (08/16/23 0703)  BP: (!) 150/67 (08/16/23 0703)  SpO2: 99 % (08/16/23 0703) Vital Signs (24h Range):  Temp:  [98 °F (36.7 °C)-98.9 °F (37.2 °C)] 98.9 °F (37.2 °C)  Pulse:  [] 66  Resp:  [16-20] 16  SpO2:  [88 %-99 %] 99 %  BP: (108-193)/() 150/67     Weight: 67.3 kg (148 lb 5.9 oz)  Body mass index is 24.69 kg/m².  No intake or output data in the 24 hours ending 08/16/23 1008      Physical Exam  Vitals and nursing note reviewed. Exam conducted with a chaperone present (family).   Constitutional:       General: She is not in acute distress.     Appearance: She is ill-appearing. She is not toxic-appearing.      Interventions: Nasal cannula in place.   HENT:      Head: Normocephalic and atraumatic.      Mouth/Throat:      Mouth: Mucous membranes are moist.   Cardiovascular:      Rate and Rhythm: Normal rate.   Pulmonary:      Effort: Pulmonary effort is normal. No respiratory distress.   Abdominal:      Palpations: Abdomen is soft.      Tenderness: There is no abdominal tenderness.   Musculoskeletal:         General: Swelling, tenderness, deformity and signs of injury present.      Left shoulder: No tenderness  or bony tenderness. Normal range of motion.      Right elbow: No swelling. Normal range of motion.      Left elbow: Decreased range of motion.      Left wrist: Normal. Normal range of motion.        Arms:       Right upper leg: Normal.      Left upper leg: Normal.      Right knee: Normal range of motion. No tenderness.      Left knee: Normal range of motion. No tenderness.      Right lower leg: No edema.      Left lower leg: No edema.      Right ankle: Normal.      Left ankle: Normal.   Skin:     General: Skin is warm.      Coloration: Skin is pale.      Findings: Bruising present.   Neurological:      Mental Status: She is alert and oriented to person, place, and time.      Motor: No weakness.   Psychiatric:         Mood and Affect: Mood normal.         Behavior: Behavior normal. Behavior is cooperative.             Significant Labs: All pertinent labs within the past 24 hours have been reviewed.  CBC:   Recent Labs   Lab 08/15/23  2046   WBC 7.27   HGB 10.7*   HCT 35.6*        CMP:   Recent Labs   Lab 08/15/23  2046      K 3.9      CO2 28   GLU 93   BUN 17   CREATININE 0.8   CALCIUM 9.9   PROT 6.5   ALBUMIN 3.8   BILITOT 0.3   ALKPHOS 65   AST 25   ALT 12   ANIONGAP 11       Significant Imaging: I have reviewed all pertinent imaging results/findings within the past 24 hours.  Left elbow xray

## 2023-08-16 NOTE — ASSESSMENT & PLAN NOTE
RCRI revealed class 2 risk with 6.0% 30 day risk of death, mi, cardiac arrest.  Plan:  -NPO  -Continue current pain regimen, titrate as needed  -Bowel regimen  -Bedrest  -None weightbearing LUE  -Continue splint per ortho recs  -IVFs prn  -Antiemetics prn  -Tylenol as needed for fever   -PT/OT     Ortho following  Surgery vs conservative management  Npo after midnight pending clinical course

## 2023-08-16 NOTE — PT/OT/SLP EVAL
Occupational Therapy   Evaluation    Name: Rebecca Rios  MRN: [id number]  Admitting Diagnosis: Olecranon fracture, left, closed, initial encounter  Recent Surgery: * No surgery found *      Recommendations:     Discharge Recommendations: rehabilitation facility  Discharge Equipment Recommendations:  to be determined by next level of care  Barriers to discharge:  Decreased caregiver support    Assessment:     Rebecca Rios is a 82 y.o. female with a medical diagnosis of Olecranon fracture, left, closed, initial encounter.  She presents with the following performance deficits affecting function: weakness, impaired endurance, impaired self care skills, impaired functional mobility, gait instability, impaired balance, decreased upper extremity function, decreased lower extremity function, decreased safety awareness, pain, decreased ROM, impaired fine motor, impaired cardiopulmonary response to activity, orthopedic precautions.      Rehab Prognosis: Good; patient would benefit from acute skilled OT services to address these deficits and reach maximum level of function.       Pt would benefit from intensive therapies in an inpatient setting with 3 hours of therapy/day. Pt's needs cannot be met at a lower level of care. Pt is motivated to progress. Rehabilitation facility recommended to return patient to OF, prevent future falls and decrease readmission risk.    Plan:     Patient to be seen 2 x/week to address the above listed problems via self-care/home management, therapeutic activities, therapeutic exercises  Plan of Care Expires: 08/30/23  Plan of Care Reviewed with: patient, family    Subjective     Chief Complaint: Pain in L elbow and B knees  Patient/Family Comments/goals: get better, return home    Occupational Profile:  Living Environment: lives with handicap daughter in a 1 story house with ramp to enter. Pt has handicap bathroom with walk in shower and built in bench.  Previous level of function: Pt Mod (I)  with ADLs and Mod (I) - (I) with functional mobility using QC occasionally at night.  Roles and Routines: drives  Equipment Used at Home: cane, quad, grab bar, raised toilet, bath bench, walker, rolling  Assistance upon Discharge: limited assist from daughter    Pain/Comfort:  Pain Rating 1: 8/10  Location - Side 1: Left  Location - Orientation 1: generalized  Location 1: elbow  Pain Addressed 1: Reposition, Distraction  Pain Rating Post-Intervention 1: 8/10  Pain Rating 2: 8/10  Location - Side 2: Bilateral  Location - Orientation 2: generalized  Location 2: knee  Pain Addressed 2: Reposition, Distraction  Pain Rating Post-Intervention 2: 8/10  Objective:     Communicated with: Nurse and epic chart review prior to session.  Patient found supine with peripheral IV, telemetry upon OT entry to room.    General Precautions: Standard, fall  Orthopedic Precautions: LUE non weight bearing  Braces:  (LUE splint)  Respiratory Status: Room air    Occupational Performance:    Bed Mobility:    Patient completed Rolling/Turning to Left with  contact guard assistance  Patient completed Scooting/Bridging with contact guard assistance  Patient completed Supine to Sit with contact guard assistance    Functional Mobility/Transfers:  Sit<>stand from EOB with Min A and RW.  Sit<>stand from toilet with Mod A and RW.  Patient completed Bed <> Chair Transfer using Stand Pivot technique with minimum assistance with rolling walker  Patient completed Toilet Transfer Stand Pivot technique with minimum assistance with  rolling walker  Functional Mobility: Patient completed x8ft x2 reps functional mobility with Min A and RW to increase dynamic standing balance and activity tolerance needed for ADL completion.     Cognitive/Visual Perceptual:  Cognitive/Psychosocial Skills:     -       Oriented to: Person, Place, Time, and Situation   -       Follows Commands/attention:Follows multistep  commands  -       Communication: clear/fluent  -        Memory: No Deficits noted  -       Safety awareness/insight to disability: impaired     Physical Exam:  Sensation:    -       Intact  Dominant hand:    -       right  Upper Extremity Range of Motion:     -       Right Upper Extremity: WNL  -       Left Upper Extremity: Deficits: LUE splint, limited shoulder and elbow AROM  Upper Extremity Strength:    -       Right Upper Extremity: 4+/5 grossly  -       Left Upper Extremity: Deficits: 3-/5 shoulder, elbow NT d/t splint   Strength:    -       Right Upper Extremity: WFL  -       Left Upper Extremity: fair    AMPAC 6 Click ADL:  AMPAC Total Score: 16    Treatment & Education:  Upper Body Dressing: maximal assistance doff/fabi gown  Lower Body Dressing: maximal assistance doff/fabi brief  Toileting: stand by assistance on bathroom commode using R hand  Educated pt on LUE NWB status, functional implications, and importance of compliance. Pt compliant with precautions throughout mobility with verbal cueing. Patient educated on role of OT in acute setting and benefits of participation. Educated on techniques to use to increase independence and decrease fall risk with functional transfers. Educated on importance of OOB activity and calling for A to transfer back to bed. Encouraged completion of B UE AROM therex throughout the day to tolerance to increase functional strength and activity tolerance. Educated patient on importance of increased tolerance to upright position and direct impact on CV endurance and strength. Patient encouraged to sit up in chair for a minimum of 2 consecutive hours per day.  Patient stated understanding and in agreement with POC.     Patient left up in chair with all lines intact, call button in reach, and family present    GOALS:   Multidisciplinary Problems       Occupational Therapy Goals          Problem: Occupational Therapy    Goal Priority Disciplines Outcome Interventions   Occupational Therapy Goal     OT, PT/OT     Description: Goals  to be met by: 8/30/23     Patient will increase functional independence with ADLs by performing:    UE Dressing with Minimal Assistance.  LE Dressing with Minimal Assistance.  Grooming while bedside chair with Set-up Assistance.  Toilet transfer to toilet with Modified [place].  Upper extremity exercise program x15 reps per handout, with assistance as needed.                         History:     Past Medical History:   Diagnosis Date    Anxiety     Asymptomatic stenosis of both carotid arteries without infarction 1/28/2022    Atypical chest pain 11/9/2017    CAD (coronary artery disease) 11/18/2014    Chronic diastolic heart failure 8/17/2017    Coronary artery disease     Depression     Fractures     Hot flash, menopausal     HTN (hypertension)     Hyperlipidemia     Hypothyroid     Nonrheumatic mitral valve regurgitation 10/19/2022    Nonrheumatic tricuspid valve regurgitation 10/19/2022    Pulmonary hypertension 8/17/2017         Past Surgical History:   Procedure Laterality Date    CARPAL TUNNEL RELEASE Bilateral     CHOLECYSTECTOMY      HYSTERECTOMY      open luis         Time Tracking:     OT Date of Treatment: 08/16/23  OT Start Time: 1140  OT Stop Time: 1220  OT Total Time (min): 40 min    Billable Minutes:Evaluation 15  Self Care/Home Management 10  Therapeutic Activity 15    8/16/2023  Nolvia Lim, OT

## 2023-08-16 NOTE — PLAN OF CARE
[facility] - Med Surg  Initial Discharge Assessment       Primary Care Provider: Shlomo Ware MD    Admission Diagnosis: Preoperative clearance [Z01.818]  Left arm pain [M79.602]  Bilateral knee pain [M25.561, M25.562]  Chest pain [R07.9]  Closed fracture of left elbow, initial encounter [S42.402A]    Admission Date: 8/15/2023  Expected Discharge Date:     Transition of Care Barriers: None    Payor: MEDICARE / Plan: MEDICARE PART A & B / Product Type: Government /     Extended Emergency Contact Information  Primary Emergency Contact: Mali Gudino   [place]  Mobile Phone: [phone]  Relation: Daughter  Secondary Emergency Contact: Zeinab Damico  Address: [address] [place] [address]  Mobile Phone: [phone]  Relation: Daughter    Discharge Plan A: Home with family, Home Health         [facility] - [place], [place] - [address] [facility] [address]  [address] [facility] [address]  [place] [address]  Phone: [phone] Fax: [phone]      Initial Assessment (most recent)       Adult Discharge Assessment - 08/16/23 1257          Discharge Assessment    Assessment Type Discharge Planning Assessment     Confirmed/corrected address, phone number and insurance Yes     Confirmed Demographics Correct on Facesheet     Source of Information patient     Communicated KADIE with patient/caregiver Date not available/Unable to determine     Reason For Admission fracture     People in Home child(israel), adult     Facility Arrived From: home     Do you expect to return to your current living situation? Yes     Do you have help at home or someone to help you manage your care at home? Yes     Who are your caregiver(s) and their phone number(s)? daughters     Prior to hospitilization cognitive status: Alert/Oriented     Current cognitive status: Alert/Oriented     Walking or Climbing Stairs ambulation difficulty, requires equipment     Mobility  Management cane     Equipment Currently Used at Home cane, straight     Readmission within 30 days? No     Patient currently being followed by outpatient case management? No     Do you currently have service(s) that help you manage your care at home? No     Do you take prescription medications? Yes     Do you have prescription coverage? Yes     Do you have any problems affording any of your prescribed medications? No     Is the patient taking medications as prescribed? yes     Who is going to help you get home at discharge? daughterMali     How do you get to doctors appointments? family or friend will provide     Are you on dialysis? No     DME Needed Upon Discharge  none     Discharge Plan discussed with: Patient     Transition of Care Barriers None     Discharge Plan A Home with family;Home Health        Housing Stability    In the last 12 months, was there a time when you were not able to pay the mortgage or rent on time? No     In the last 12 months, was there a time when you did not have a steady place to sleep or slept in a shelter (including now)? No        Transportation Needs    In the past 12 months, has lack of transportation kept you from medical appointments or from getting medications? No     In the past 12 months, has lack of transportation kept you from meetings, work, or from getting things needed for daily living? No        Food Insecurity    Within the past 12 months, you worried that your food would run out before you got the money to buy more. Never true     Within the past 12 months, the food you bought just didn't last and you didn't have money to get more. 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 Patient is independent, states daughter (Mali) will  provide transportation home upon d/c. CM following for needs.

## 2023-08-16 NOTE — PLAN OF CARE
PT EVAL complete. Required CGA for bed mobility, MIN A for STS, ambulated 8ft x2 MIN A. Recommending inpatient rehab upon d/c.

## 2023-08-16 NOTE — NURSING
Attempted to regain IV access to pt's right forearm. Unsuccessful. Notified MD. New orders placed.

## 2023-08-16 NOTE — HPI
Rebecca Rios is a 82 y.o. female with a PMH  has a past medical history of Anxiety, Asymptomatic stenosis of both carotid arteries without infarction (1/28/2022), Atypical chest pain (11/9/2017), CAD (coronary artery disease) (11/18/2014), Chronic diastolic heart failure (8/17/2017), Coronary artery disease, Depression, Fractures, Hot flash, menopausal, HTN (hypertension), Hyperlipidemia, Hypothyroid, Nonrheumatic mitral valve regurgitation (10/19/2022), Nonrheumatic tricuspid valve regurgitation (10/19/2022), and Pulmonary hypertension (8/17/2017).  Presents to the ER for evaluation after having a ground level fall prior to arrival at her doctor's office.  Patient reportedly fell on her left arm and both knees during the fall.  Patient reports that the nurses at the clinic state that people fall in his spot all the time.  Apparently there was a defect in the floor causing people drip.  Patient reports left elbow pain worsens with movement and palpation.  Denies hitting her head or losing consciousness.  Denies any numbness/tingling to left upper extremity. Denies any neck pain/stiffness, chest pain, palpitations, shortness of breath, dyspnea exertion, headache, lightheadedness, dizziness, or any other symptoms at this time.  ER workup revealed olecranon fracture of left elbow.  On-call orthopedic surgeon consulted who recommended hospital Medicine consult with plans to take to OR in a.m. for surgical repair.  Patient received total of 75 mcg of fentanyl, 4 mg of morphine, 2 mg morphine, 4 mg Zofran in ED. patient in agreement with treatment plan.  Patient will be admitted under observation status.    PCP: Shlomo Ware

## 2023-08-16 NOTE — CONSULTS
DATE OF INJURY   (08/15/2023)    CC    left elbow pain       HPI     Rebecca Rios is a 82 y.o. right hand dominant  female I am asked to see regarding her left elbow pain.  Two of her daughters are at the bedside as is her close friend Miranda.    She reports she tripped over a spot at a doctor's office and fell.  She has had left elbow pain since that time.  She was brought to the ER and was found to have a displaced olecranon fracture.    She reports in the splint her arm does not really hurt.    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      PAST MEDICAL HISTORY   Past Medical History:   Diagnosis Date    Anxiety     Asymptomatic stenosis of both carotid arteries without infarction 1/28/2022    Atypical chest pain 11/9/2017    CAD (coronary artery disease) 11/18/2014    Chronic diastolic heart failure 8/17/2017    Coronary artery disease     Depression     Fractures     Hot flash, menopausal     HTN (hypertension)     Hyperlipidemia     Hypothyroid     Nonrheumatic mitral valve regurgitation 10/19/2022    Nonrheumatic tricuspid valve regurgitation 10/19/2022    Pulmonary hypertension 8/17/2017          PAST SURGICAL HISTORY  Past Surgical History:   Procedure Laterality Date    CARPAL TUNNEL RELEASE Bilateral     CHOLECYSTECTOMY      HYSTERECTOMY      open luis              ALLERGIES       Review of patient's allergies indicates:   Allergen Reactions    Erythromycin     Macrolide antibiotics Nausea And Vomiting          MEDICATIONS        Current Facility-Administered Medications:     acetaminophen suppository 650 mg, 650 mg, Rectal, Q4H PRN, Ezequiel West NP    acetaminophen tablet 650 mg, 650 mg, Oral, Q8H PRN, Ezequiel West, NP    ALPRAZolam tablet 0.5 mg, 0.5 mg, Oral, BID PRN, Ezequiel West NP    aluminum-magnesium hydroxide-simethicone 200-200-20 mg/5 mL suspension 30 mL, 30 mL, Oral, QID PRN, Ezequiel West NP    amLODIPine tablet 5 mg, 5 mg, Oral, Daily, Ezequiel West  BRENDEN GARCIA, 5 mg at 08/16/23 0849    dextrose 10% bolus 125 mL 125 mL, 12.5 g, Intravenous, PRN, Alvaro West MD    dextrose 10% bolus 250 mL 250 mL, 25 g, Intravenous, PRN, Alvaro West MD    glucagon (human recombinant) injection 1 mg, 1 mg, Intramuscular, PRN, Ezequiel West NP    glucose chewable tablet 16 g, 16 g, Oral, PRN, Ezequiel West NP    glucose chewable tablet 24 g, 24 g, Oral, PRN, Ezequiel West NP    HYDROcodone-acetaminophen  mg per tablet 1 tablet, 1 tablet, Oral, Q8H PRN, Ezequiel West NP, 1 tablet at 08/16/23 1412    lactated ringers infusion, , Intravenous, Continuous, Erick Mcgee MD, Last Rate: 75 mL/hr at 08/16/23 0806, Rate Change at 08/16/23 0806    levothyroxine tablet 75 mcg, 75 mcg, Oral, Before breakfast, Ezequiel West NP, 75 mcg at 08/16/23 0608    lipase-protease-amylase 24,000-76,000-120,000 units capsule 1 capsule, 1 capsule, Oral, TID WM, Ezequiel West NP, 1 capsule at 08/16/23 1231    lisinopriL tablet 40 mg, 40 mg, Oral, QHS, Ezequiel West NP    melatonin tablet 6 mg, 6 mg, Oral, Nightly PRN, Ezequiel West NP    morphine injection 2 mg, 2 mg, Intravenous, Q4H PRN, Ezequiel West NP    naloxone 0.4 mg/mL injection 0.02 mg, 0.02 mg, Intravenous, PRN, Ezequiel West NP    ondansetron injection 4 mg, 4 mg, Intravenous, Q8H PREMMANUEL, Ezequiel West NP, 4 mg at 08/16/23 0040    polyethylene glycol packet 17 g, 17 g, Oral, Daily PRN, Ezequiel West NP    promethazine tablet 25 mg, 25 mg, Oral, Q6H PRN, Ezequiel Wset, NP    sertraline tablet 25 mg, 25 mg, Oral, Daily, Ezequiel West, NP, 25 mg at 08/16/23 0849    simethicone chewable tablet 80 mg, 1 tablet, Oral, QID PRN, Ezequiel West, NP    sodium chloride 0.9% flush 3 mL, 3 mL, Intravenous, Q12H PRN, Ezequiel West, NP         SOCIAL HISTORY        Social History     Occupational History    Not on file   Tobacco Use    Smoking status:  Never    Smokeless tobacco: Never   Substance and Sexual Activity    Alcohol use: No    Drug use: No    Sexual activity: Not Currently     Partners: Male               FAMILY HISTORY   Family History   Problem Relation Age of Onset    Hypertension Mother           REVIEW OF SYSTEMS:      GEN:   Denies fever, chills, malaise, unexpected weight changes.    HEENT: Denies headaches, diplopia, rhinnorea, epistaxis,          difficulty swallowing.    CV:    Denies hypertension, chest pain, CAD, MI, palpitations,         PVD  ENDO:  Denies diabetes, temperature intolerance  GI:    Denies reflux, nausea, vomiting, constipation, diarrhea.       Denies dysuria/nocturia/prostate problems  NEURO: Denies stroke, epilepsy/seizures, paralysis/paresis or         neuropathy.  PSYCH  Denies PTSD,depression and ADD  PULM:  Denies sleep apnea, shortness of breath, wheezing, cough,         hemoptysis, COPD, asthma  MSK:   See HPI. Denies arthritis, myalgias, fracture.     PHYSICAL EXAMINATION  Vitals:    08/15/23 2130 08/15/23 2330 08/16/23 0021 08/16/23 0030   BP: (!) 162/83 (!) 108/57 132/60    Pulse: 87 89 104    Resp: 16 16 16    Temp: 98 °F (36.7 °C)      TempSrc: Oral      SpO2:  (!) 93% (!) 90% (S) (!) 89%   Weight:       Height:        08/16/23 0045 08/16/23 0100 08/16/23 0200 08/16/23 0300   BP:  (!) 113/57 (!) 123/57 (!) 193/84   Pulse:  86 79 75   Resp:  18 18 17   Temp:       TempSrc:       SpO2: (S) (!) 88% (S) 96% 95% (!) 94%   Weight:       Height:        08/16/23 0400 08/16/23 0430 08/16/23 0500 08/16/23 0557   BP: (!) 140/64 123/60 124/61 139/66   Pulse: 73 69 72 62   Resp: 18 16 16 18   Temp:    98.7 °F (37.1 °C)   TempSrc:    Oral   SpO2: 95% 95% 95% 98%   Weight:    67.3 kg (148 lb 5.9 oz)   Height:        08/16/23 0703 08/16/23 1200 08/16/23 1412   BP: (!) 150/67 (!) 165/71    Pulse: 66 77    Resp: 16 20 18   Temp: 98.9 °F (37.2 °C) 98.4 °F (36.9 °C)    TempSrc: Oral Oral    SpO2: 99% (!) 94%    Weight:       Height:            GEN            NAD, well appearing     PSYCH          A&Ox3, pleasant and cooperative     left UE:    Skin intact.   Splint in place.  Arm/forearm soft.  TTP over olecranon but not distal humerus. Sensation Intact radial/ulnar/median nerves.  Radial/ulnar/median nerves intact to motor.  Cap refill <2s.        DIAGNOSTIC IMAGING  left  Elbow XR: 3 Views personally reviewed.  Proximal portion of olecranon fracture with displaced proximal fragment.  Bone very osteopenic.  No dislocation.        DISCUSSION  Diagnostic imaging, clinical findings, and patient's symptoms reviewed  and correlated.  Discussed non-operative treatments including splinting and algesia.  Discussed operative interventions, the risks of the fixation failing and infection and the recovery, and generalized progression of activity.    Patient and family given an opportunity to ask questions.  When her questions were answered, she  stated she did not want surgery       DIAGNOSIS:       1.     left olecranon fracture, closed displaced             PLAN:  1.  Will resplint her tomorrow in more extension  2. Ice to affected area PRN pain/swelling  3. Continue P/OT   4.  May need home health  5. Follow-up in 1 week with ortho trauma

## 2023-08-16 NOTE — SUBJECTIVE & OBJECTIVE
Past Medical History:   Diagnosis Date    Anxiety     Asymptomatic stenosis of both carotid arteries without infarction 1/28/2022    Atypical chest pain 11/9/2017    CAD (coronary artery disease) 11/18/2014    Chronic diastolic heart failure 8/17/2017    Coronary artery disease     Depression     Fractures     Hot flash, menopausal     HTN (hypertension)     Hyperlipidemia     Hypothyroid     Nonrheumatic mitral valve regurgitation 10/19/2022    Nonrheumatic tricuspid valve regurgitation 10/19/2022    Pulmonary hypertension 8/17/2017       Past Surgical History:   Procedure Laterality Date    CARPAL TUNNEL RELEASE Bilateral     CHOLECYSTECTOMY      HYSTERECTOMY      open luis         Review of patient's allergies indicates:   Allergen Reactions    Erythromycin     Macrolide antibiotics Nausea And Vomiting       No current facility-administered medications on file prior to encounter.     Current Outpatient Medications on File Prior to Encounter   Medication Sig    alprazolam (XANAX) 0.5 MG tablet Take 0.5 mg by mouth 2 (two) times daily as needed for Anxiety.    amLODIPine (NORVASC) 5 MG tablet Take 5 mg by mouth once daily.    cetirizine (ZYRTEC) 10 MG tablet Take 10 mg by mouth daily as needed.    cholecalciferol, vitamin D3, 125 mcg (5,000 unit) capsule Take 5,000 Units by mouth.    ergocalciferol (ERGOCALCIFEROL) 50,000 unit Cap Take 1 capsule (50,000 Units total) by mouth every 7 days.    esomeprazole (NEXIUM) 40 MG capsule Take 40 mg by mouth once daily.     fluticasone propionate (FLONASE) 50 mcg/actuation nasal spray 2 sprays (100 mcg total) by Each Nostril route once daily.    HYDROcodone-acetaminophen (NORCO)  mg per tablet TAKE 1 TABLET BY MOUTH EVERY 8 HOURS AS NEEDED FOR PAIN    levothyroxine (SYNTHROID) 75 MCG tablet TAKE ONE TABLET BY MOUTH ONCE DAILY    lipase-protease-amylase 24,000-76,000-120,000 units (CREON) 24,000-76,000 -120,000 unit capsule Take 1 capsule by mouth 3 (three) times daily  with meals.    lisinopriL (PRINIVIL,ZESTRIL) 40 MG tablet TAKE 1 TABLET (40 MG TOTAL) BY MOUTH ONCE DAILY. (Patient taking differently: Take 40 mg by mouth every evening.)    melatonin 10 mg Tab Take by mouth.    ondansetron (ZOFRAN-ODT) 4 MG TbDL as needed.    sertraline (ZOLOFT) 25 MG tablet Take by mouth.    aspirin (ECOTRIN) 81 MG EC tablet Take 1 tablet (81 mg total) by mouth once daily.    diphenoxylate-atropine 2.5-0.025 mg (LOMOTIL) 2.5-0.025 mg per tablet as needed.    DULoxetine (CYMBALTA) 30 MG capsule TAKE 1 CAPSULE BY MOUTH ONCE DAILY    POLYTUSSIN DM 1-5-10 mg/5 mL Syrp TAKE 2 TEASPOONFULS (10ML) BY MOUTH EVERY 8 TO 12 HOURS AS NEEDED FOR COUGH    tizanidine (ZANAFLEX) 4 MG tablet Take 4 mg by mouth every evening.     [DISCONTINUED] levocetirizine (XYZAL) 5 MG tablet TAKE 1 TABLET BY MOUTH EVERY EVENING.     Family History       Problem Relation (Age of Onset)    Hypertension Mother          Tobacco Use    Smoking status: Never    Smokeless tobacco: Never   Substance and Sexual Activity    Alcohol use: No    Drug use: No    Sexual activity: Not Currently     Partners: Male     Review of Systems   Musculoskeletal:  Positive for arthralgias and myalgias. Negative for back pain, joint swelling, neck pain and neck stiffness.   All other systems reviewed and are negative.    Objective:     Vital Signs (Most Recent):  Temp: 98 °F (36.7 °C) (08/15/23 2130)  Pulse: 79 (08/16/23 0200)  Resp: 18 (08/16/23 0200)  BP: (!) 123/57 (08/16/23 0200)  SpO2: 95 % (08/16/23 0200) Vital Signs (24h Range):  Temp:  [98 °F (36.7 °C)] 98 °F (36.7 °C)  Pulse:  [] 79  Resp:  [16-20] 18  SpO2:  [88 %-97 %] 95 %  BP: (108-162)/() 123/57     Weight: 64.2 kg (141 lb 9.6 oz)  Body mass index is 23.56 kg/m².     Physical Exam  Vitals and nursing note reviewed.   Constitutional:       General: She is awake. She is not in acute distress.     Appearance: Normal appearance. She is well-developed and well-groomed. She is not  ill-appearing, toxic-appearing or diaphoretic.   HENT:      Head: Normocephalic and atraumatic.   Eyes:      Extraocular Movements: Extraocular movements intact.      Conjunctiva/sclera: Conjunctivae normal.   Cardiovascular:      Rate and Rhythm: Normal rate and regular rhythm.      Heart sounds: Normal heart sounds. No murmur heard.  Pulmonary:      Effort: Pulmonary effort is normal.      Breath sounds: Normal breath sounds.   Abdominal:      General: Bowel sounds are normal.      Palpations: Abdomen is soft.      Tenderness: There is no abdominal tenderness.   Musculoskeletal:      Cervical back: Normal range of motion and neck supple.      Comments: 5/5 strength throughout, with exception to left upper extremity which has a splint placed.  Wiggles all digits in the left hand.  Cap refill less than 2 seconds in all digits.  Plus two radial pulse noted.   Skin:     General: Skin is warm and dry.      Capillary Refill: Capillary refill takes less than 2 seconds.   Neurological:      General: No focal deficit present.      Mental Status: She is alert and oriented to person, place, and time. Mental status is at baseline.      GCS: GCS eye subscore is 4. GCS verbal subscore is 5. GCS motor subscore is 6.      Cranial Nerves: Cranial nerves 2-12 are intact.      Sensory: Sensation is intact.      Motor: Motor function is intact.   Psychiatric:         Mood and Affect: Mood normal.         Speech: Speech normal.         Behavior: Behavior normal. Behavior is cooperative.              LABS:  Recent Results (from the past 24 hour(s))   CBC auto differential    Collection Time: 08/15/23  8:46 PM   Result Value Ref Range    WBC 7.27 3.90 - 12.70 K/uL    RBC 4.01 4.00 - 5.40 M/uL    Hemoglobin 10.7 (L) 12.0 - 16.0 g/dL    Hematocrit 35.6 (L) 37.0 - 48.5 %    MCV 89 82 - 98 fL    MCH 26.7 (L) 27.0 - 31.0 pg    MCHC 30.1 (L) 32.0 - 36.0 g/dL    RDW 15.9 (H) 11.5 - 14.5 %    Platelets 219 150 - 450 K/uL    MPV 10.5 9.2 - 12.9  fL    Immature Granulocytes 0.6 (H) 0.0 - 0.5 %    Gran # (ANC) 4.2 1.8 - 7.7 K/uL    Immature Grans (Abs) 0.04 0.00 - 0.04 K/uL    Lymph # 2.4 1.0 - 4.8 K/uL    Mono # 0.7 0.3 - 1.0 K/uL    Eos # 0.0 0.0 - 0.5 K/uL    Baso # 0.02 0.00 - 0.20 K/uL    nRBC 0 0 /100 WBC    Gran % 56.9 38.0 - 73.0 %    Lymph % 32.5 18.0 - 48.0 %    Mono % 9.4 4.0 - 15.0 %    Eosinophil % 0.3 0.0 - 8.0 %    Basophil % 0.3 0.0 - 1.9 %    Differential Method Automated    Comprehensive metabolic panel    Collection Time: 08/15/23  8:46 PM   Result Value Ref Range    Sodium 143 136 - 145 mmol/L    Potassium 3.9 3.5 - 5.1 mmol/L    Chloride 104 95 - 110 mmol/L    CO2 28 23 - 29 mmol/L    Glucose 93 70 - 110 mg/dL    BUN 17 8 - 23 mg/dL    Creatinine 0.8 0.5 - 1.4 mg/dL    Calcium 9.9 8.7 - 10.5 mg/dL    Total Protein 6.5 6.0 - 8.4 g/dL    Albumin 3.8 3.5 - 5.2 g/dL    Total Bilirubin 0.3 0.1 - 1.0 mg/dL    Alkaline Phosphatase 65 55 - 135 U/L    AST 25 10 - 40 U/L    ALT 12 10 - 44 U/L    eGFR >60 >60 mL/min/1.73 m^2    Anion Gap 11 8 - 16 mmol/L   APTT    Collection Time: 08/15/23  8:46 PM   Result Value Ref Range    aPTT 21.7 21.0 - 32.0 sec   Protime-INR    Collection Time: 08/15/23  8:46 PM   Result Value Ref Range    Prothrombin Time 11.2 9.0 - 12.5 sec    INR 1.1 0.8 - 1.2       RADIOLOGY  X-Ray Chest 1 View    Result Date: 8/15/2023  EXAMINATION: XR CHEST 1 VIEW CLINICAL HISTORY: pre-op clearance; TECHNIQUE: Single frontal view of the chest was performed. COMPARISON: None FINDINGS: Hiatal hernia.  Prominent cardiac silhouette.  Mild perihilar interstitial opacities. No pneumonic consolidation. Bones are intact.     No acute abnormality.  Hiatal hernia Electronically signed by: Lloyd Denney Date:    08/15/2023 Time:    22:09    X-Ray Knee 3 View Bilateral    Result Date: 8/15/2023  EXAMINATION: XR KNEE 3 VIEW BILATERAL CLINICAL HISTORY: Pain in right knee TECHNIQUE: AP, lateral, and Merchant views of both knees were performed.  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  Suprapatellar joint effusion.  Deformity of the patella likely old.  Otherwise no acute process seen     As above Electronically signed by: Lloyd Denney Date:    08/15/2023 Time:    18:14    X-Ray Elbow Complete Left    Result Date: 8/15/2023  EXAMINATION: XR FOREARM LEFT; XR ELBOW COMPLETE 3 VIEW LEFT CLINICAL HISTORY: Pain in left arm TECHNIQUE: AP and lateral views of the left forearm were performed. COMPARISON: None FINDINGS: Olecranon fracture is identified.  No other radial and ulnar fracture is seen.  Exam limited by obliquity.  The lateral humeral condyle appears irregular suggestive of nondisplaced injury.  Soft tissue inflammation and swelling noted.     Displaced olecranon fracture. Electronically signed by: Lloyd Denney Date:    08/15/2023 Time:    18:08    X-Ray Forearm Left    Result Date: 8/15/2023  EXAMINATION: XR FOREARM LEFT; XR ELBOW COMPLETE 3 VIEW LEFT CLINICAL HISTORY: Pain in left arm TECHNIQUE: AP and lateral views of the left forearm were performed. COMPARISON: None FINDINGS: Olecranon fracture is identified.  No other radial and ulnar fracture is seen.  Exam limited by obliquity.  The lateral humeral condyle appears irregular suggestive of nondisplaced injury.  Soft tissue inflammation and swelling noted.     Displaced olecranon fracture. Electronically signed by: Lloyd Denney Date:    08/15/2023 Time:    18:08      EKG    MICROBIOLOGY    MDM     Amount and/or Complexity of Data Reviewed  Clinical lab tests: reviewed  Tests in the radiology section of CPT®: reviewed  Tests in the medicine section of CPT®: reviewed  Discussion of test results with the performing providers: yes  Decide to obtain previous medical records or to obtain history from someone other than the patient: yes  Obtain history from someone other than the patient: yes  Review and summarize past medical records: yes  Discuss the patient  with other providers: yes  Independent visualization of images, tracings, or specimens: yes

## 2023-08-16 NOTE — ASSESSMENT & PLAN NOTE
Patient with known CAD which is controlled Will continue Statin and monitor for S/Sx of angina/ACS. Continue to monitor on telemetry.

## 2023-08-17 VITALS
TEMPERATURE: 99 F | OXYGEN SATURATION: 95 % | BODY MASS INDEX: 24.72 KG/M2 | DIASTOLIC BLOOD PRESSURE: 63 MMHG | WEIGHT: 148.38 LBS | HEART RATE: 74 BPM | SYSTOLIC BLOOD PRESSURE: 142 MMHG | RESPIRATION RATE: 18 BRPM | HEIGHT: 65 IN

## 2023-08-17 LAB — TROPONIN I SERPL DL<=0.01 NG/ML-MCNC: 0.01 NG/ML (ref 0–0.03)

## 2023-08-17 PROCEDURE — 96361 HYDRATE IV INFUSION ADD-ON: CPT

## 2023-08-17 PROCEDURE — 97530 THERAPEUTIC ACTIVITIES: CPT

## 2023-08-17 PROCEDURE — 97535 SELF CARE MNGMENT TRAINING: CPT

## 2023-08-17 PROCEDURE — 63600175 PHARM REV CODE 636 W HCPCS: Performed by: NURSE PRACTITIONER

## 2023-08-17 PROCEDURE — 99213 PR OFFICE/OUTPT VISIT, EST, LEVL III, 20-29 MIN: ICD-10-PCS | Mod: 95,,, | Performed by: ORTHOPAEDIC SURGERY

## 2023-08-17 PROCEDURE — 99213 OFFICE O/P EST LOW 20 MIN: CPT | Mod: 95,,, | Performed by: ORTHOPAEDIC SURGERY

## 2023-08-17 PROCEDURE — 25000003 PHARM REV CODE 250: Performed by: NURSE PRACTITIONER

## 2023-08-17 PROCEDURE — 96374 THER/PROPH/DIAG INJ IV PUSH: CPT | Mod: 59

## 2023-08-17 PROCEDURE — G0378 HOSPITAL OBSERVATION PER HR: HCPCS

## 2023-08-17 PROCEDURE — 84484 ASSAY OF TROPONIN QUANT: CPT | Performed by: FAMILY MEDICINE

## 2023-08-17 PROCEDURE — 94761 N-INVAS EAR/PLS OXIMETRY MLT: CPT

## 2023-08-17 PROCEDURE — 25000003 PHARM REV CODE 250: Performed by: FAMILY MEDICINE

## 2023-08-17 PROCEDURE — 97116 GAIT TRAINING THERAPY: CPT

## 2023-08-17 RX ORDER — PANTOPRAZOLE SODIUM 40 MG/1
40 TABLET, DELAYED RELEASE ORAL DAILY
Status: DISCONTINUED | OUTPATIENT
Start: 2023-08-17 | End: 2023-08-17 | Stop reason: HOSPADM

## 2023-08-17 RX ADMIN — ALUMINUM HYDROXIDE, MAGNESIUM HYDROXIDE, AND DIMETHICONE 30 ML: 200; 20; 200 SUSPENSION ORAL at 04:08

## 2023-08-17 RX ADMIN — PANCRELIPASE 1 CAPSULE: 24000; 76000; 120000 CAPSULE, DELAYED RELEASE PELLETS ORAL at 09:08

## 2023-08-17 RX ADMIN — PANTOPRAZOLE SODIUM 40 MG: 40 TABLET, DELAYED RELEASE ORAL at 11:08

## 2023-08-17 RX ADMIN — LEVOTHYROXINE SODIUM 75 MCG: 75 TABLET ORAL at 06:08

## 2023-08-17 RX ADMIN — SERTRALINE HYDROCHLORIDE 25 MG: 25 TABLET ORAL at 09:08

## 2023-08-17 RX ADMIN — PANCRELIPASE 1 CAPSULE: 24000; 76000; 120000 CAPSULE, DELAYED RELEASE PELLETS ORAL at 11:08

## 2023-08-17 RX ADMIN — HYDROCODONE BITARTRATE AND ACETAMINOPHEN 1 TABLET: 10; 325 TABLET ORAL at 06:08

## 2023-08-17 RX ADMIN — ONDANSETRON 4 MG: 2 INJECTION INTRAMUSCULAR; INTRAVENOUS at 08:08

## 2023-08-17 RX ADMIN — AMLODIPINE BESYLATE 5 MG: 5 TABLET ORAL at 09:08

## 2023-08-17 NOTE — PT/OT/SLP PROGRESS
Occupational Therapy  Treatment    Rebecca Rios   MRN: [id number]   Admitting Diagnosis: Olecranon fracture, left, closed, initial encounter    OT Date of Treatment: 08/17/23   OT Start Time: 1515  OT Stop Time: 1540  OT Total Time (min): 25 min    Billable Minutes:  Self Care/Home Management 15 MINUTES and Therapeutic Activity 10 MINUTES    OT/GENESIS: OT          General Precautions: Standard, fall  Orthopedic Precautions: LUE non weight bearing  Braces: N/A  Respiratory Status: Room air         Subjective:  Communicated with NURSE AND Epic CHART REVIEW prior to session.    Pain/Comfort  Pain Rating 1: 5/10  Location - Side 1: Left  Location - Orientation 1: generalized  Location 1: elbow  Pain Rating Post-Intervention 1: 5/10    Objective:  Patient found with:  (L UE ACE WRAP)     Functional Mobility:      Transfers:   MIN A WITH HAND HELD ASSIST    Functional Ambulation:   PT AMBULATED 30 FEET X 2 WITH MIN A WITH ROLLING WALKER AT SLOW PACE AND ADHERING TO WEIGHT BEARING PRECAUTIONS ON L UE    Activities of Daily Living:  G/H TASK SBA AT SINK SIDE WITH MIN A WITH STANDING BALANCE   LE MAX A WITH TOBY PANTS  Balance:   Static Sit: FAIR+: Able to take MINIMAL challenges from all directions  Dynamic Sit: FAIR: Cannot move trunk without losing balance  Static Stand: POOR+: Needs MINIMAL assist to maintain  Dynamic stand: POOR+: Needs MIN (minimal ) assist during gait      AM-PAC 6 CLICK ADL   How much help from another person does this patient currently need?   1 = Unable, Total/Dependent Assistance  2 = A lot, Maximum/Moderate Assistance  3 = A little, Minimum/Contact Guard/Supervision  4 = None, Modified [place]/Independent    Putting on and taking off regular lower body clothing? : 2  Bathing (including washing, rinsing, drying)?: 2  Toileting, which includes using toilet, bedpan, or urinal? : 3  Putting on and taking off regular upper body clothing?: 3  Taking care of personal grooming such as brushing teeth?:  "4  Eating meals?: 4  Daily Activity Total Score: 18     AM-PAC Raw Score CMS "G-Code Modifier Level of Impairment Assistance   6 % Total / Unable   7 - 8 CM 80 - 100% Maximal Assist   9-13 CL 60 - 80% Moderate Assist   14 - 19 CK 40 - 60% Moderate Assist   20 - 22 CJ 20 - 40% Minimal Assist   23 CI 1-20% SBA / CGA   24 CH 0% Independent/ Mod I       Patient left up in chair with all lines intact, call button in reach, NURSE MALISSA notified, and FAMILY present    ASSESSMENT:  Rebecca Rios is a 82 y.o. female with a medical diagnosis of Olecranon fracture, left, closed, initial encounter and presents with DEBILITY AND GENERALIZED WEAKNESS.    Rehab identified problem list/impairments:  weakness, impaired balance, decreased safety awareness, impaired endurance, impaired self care skills, decreased ROM, impaired functional mobility, gait instability, decreased upper extremity function    Rehab potential is good.    Activity tolerance: Good    Discharge recommendations: rehabilitation facility   Barriers to discharge:      Equipment recommendations: to be determined by next level of care    GOALS:   Multidisciplinary Problems       Occupational Therapy Goals          Problem: Occupational Therapy    Goal Priority Disciplines Outcome Interventions   Occupational Therapy Goal     OT, PT/OT Ongoing, Progressing    Description: Goals to be met by: 8/30/23     Patient will increase functional independence with ADLs by performing:    UE Dressing with Minimal Assistance.  LE Dressing with Minimal Assistance.  Grooming while bedside chair with Set-up Assistance.  Toilet transfer to toilet with Modified [place].  Upper extremity exercise program x15 reps per handout, with assistance as needed.                         Plan:  Patient to be seen 2 x/week to address the above listed problems via self-care/home management, therapeutic activities, therapeutic exercises  Plan of Care expires: 08/30/23  Plan of Care " reviewed with: patient, family         08/17/2023

## 2023-08-17 NOTE — PLAN OF CARE
Plan of care reviewed with patient with verbal understanding. Chart and orders reviewed.  Pt remains free from falls this shift, Safety precautions in place.   Pt currently resting comfortably in bed. Pain controlled with po pain med (see emar for pain admin).  At 4 am pt complained of chest pain, pain did not radiate, was in the center front and back of chest. Dr West was informed. ECG and troponin was ordered. See chart for results. When rechecking pt said she had indigestion and ask for some Maalox (see emar for admin).  Hourly rounding with bed in lowest position, side rails up, call light in reach.  Will continue to monitor until end of shift.       Problem: Adult Inpatient Plan of Care  Goal: Plan of Care Review  Flowsheets (Taken 8/17/2023 0605)  Plan of Care Reviewed With: patient  Goal: Patient-Specific Goal (Individualized)  Flowsheets (Taken 8/17/2023 0605)  Anxieties, Fears or Concerns: worried about her hospital care  Individualized Care Needs: pt does not like the door closed as it makes her feel clostrophobic.     Problem: Pain Acute  Goal: Acceptable Pain Control and Functional Ability  Outcome: Ongoing, Progressing

## 2023-08-17 NOTE — PROGRESS NOTES
* No surgery found *       SUBJECTIVE:   Patient reports:  Elbow doing ok.    Had pain in chest last night.  ECG/Troponins done.  Wondering if pain is from fall.    Nursing reports:   Troponins/ecg normal.  Reports chest pain improved after she belched.     Physical Exam:   Vital Signs   Wt Readings from Last 1 Encounters:   08/16/23 67.3 kg (148 lb 5.9 oz)     Temp Readings from Last 1 Encounters:   08/17/23 98.3 °F (36.8 °C) (Oral)     BP Readings from Last 1 Encounters:   08/17/23 (!) 147/69     Pulse Readings from Last 1 Encounters:   08/17/23 73       Body mass index is 24.69 kg/m².    General Appearance:   NAD, well appearing, cooperative    Neurologic:  Alert and oriented x3    Pysch:  Age appropriate    STATION:   Semi-Montano's in bed     Musculoskeletal:     Left   arm:  Splint in place/removed.  Cast padding very loosed.  Padding at elbow opened - skin intact.  No erythema.  Very TTP over olecranon. Swelling improving.  Ecchymosis early.  ROM of wrist good.  Elbow ROM not tested. Distal neurovascular status intact.            Assessment:        1.  RIGHT elbow olecranon fracture, closed, displaced      Plan:        New fiberglass splint applied with elbow in near extension. Pt tolerated well.  Will order new xray  Ok to dc from ortho standpoint - see ortho dc instructions.             Anna Mendez DO, CAQSM, [facility]  Orthopaedic Surgeon

## 2023-08-17 NOTE — DISCHARGE INSTRUCTIONS
A nurse practitioner may be contacting you to assess your status post-hospitalization.     Homehealth with physical/occupational therapy order has been ordered. Someone will be in contact.

## 2023-08-17 NOTE — PLAN OF CARE
Pt tolerated interventions well. Required MOD A for STS, ambulated 30ft x2 MIN A. Recommending inpatient rehab upon d/c.

## 2023-08-17 NOTE — PLAN OF CARE
Pt provided with discharge paperwork. Pt verbalized understanding of discharge instructions, home medications, and follow up appointments. IV removed. Pt awaiting transport for discharge to home.

## 2023-08-17 NOTE — PT/OT/SLP PROGRESS
Physical Therapy Treatment    Patient Name:  Rebecca Rios   MRN:  [id number]    Recommendations:     Discharge Recommendations: rehabilitation facility  Discharge Equipment Recommendations: to be determined by next level of care  Barriers to discharge: Decreased caregiver support    Assessment:     Rebecca Rios is a 82 y.o. female admitted with a medical diagnosis of Olecranon fracture, left, closed, initial encounter.  She presents with the following impairments/functional limitations: weakness, impaired endurance, gait instability, impaired functional mobility, impaired balance, pain, decreased safety awareness, decreased lower extremity function, decreased coordination, orthopedic precautions.    Pt would benefit from intensive therapies in an inpatient setting with 3 hours of therapy/day. Pt's needs cannot be met at a lower level of care. Pt is motivated to progress. Rehabilitation facility recommended to return patient to PLOF, prevent future falls and decrease readmission risk.    Rehab Prognosis: Good; patient would benefit from acute skilled PT services to address these deficits and reach maximum level of function.    Recent Surgery: * No surgery found *      Plan:     During this hospitalization, patient to be seen 3 x/week to address the identified rehab impairments via gait training, therapeutic activities, therapeutic exercises and progress toward the following goals:    Plan of Care Expires:  08/30/23    Subjective     Chief Complaint: Pt is motivated to participate, requested assistance donning pants  Patient/Family Comments/goals: to go home  Pain/Comfort:  Pain Rating 1: 5/10  Location - Side 1: Left  Location - Orientation 1: generalized  Location 1: elbow  Pain Addressed 1: Reposition, Distraction  Pain Rating Post-Intervention 1: 5/10      Objective:     Communicated with nurse La prior to session.  Patient found sitting edge of bed with  (L UE ACE wrap) upon PT entry to room.     General  Precautions: Standard, fall  Orthopedic Precautions: LUE non weight bearing  Braces:  (L UE splint)  Respiratory Status: Room air     Functional Mobility:  Bed Mobility:     Scooting: contact guard assistance  Transfers:     Sit to Stand:  moderate assistance with hand-held assist  Bed to Chair: minimum assistance with  rolling walker  using  Step Transfer  Gait: Ambulated 30ft x2 MIN A using RW, no gross LOB, no c/o dizziness or SOB  Balance: Demonstrated fair sitting balance, poor dynamic balance during gait  Stood at sink ~5 min to brush teeth  No weight through L UE on RW, NWB precautions maintained and pt compliant    AM-PAC 6 CLICK MOBILITY  Turning over in bed (including adjusting bedclothes, sheets and blankets)?: 3  Sitting down on and standing up from a chair with arms (e.g., wheelchair, bedside commode, etc.): 2  Moving from lying on back to sitting on the side of the bed?: 3  Moving to and from a bed to a chair (including a wheelchair)?: 3  Need to walk in hospital room?: 3  Climbing 3-5 steps with a railing?: 1  Basic Mobility Total Score: 15       Treatment & Education:  Pt tolerated interventions well. Reviewed importance of OOB activities and HEP (hip flex, LAQ, ham curls, ankle pumps) in order to maintain/regain strength. Reviewed proper use of RW for safety and to reduce risk of falling. Reviewed increased risk of falling due to weakness, instructed to utilize call bell for assistance for all transfers. Pt agreeable to all requests.    Patient left up in chair with all lines intact, call button in reach, and family present..    GOALS:   Multidisciplinary Problems       Physical Therapy Goals          Problem: Physical Therapy    Goal Priority Disciplines Outcome Goal Variances Interventions   Physical Therapy Goal     PT, PT/OT Ongoing, Progressing     Description: Goals to be met by 8/30/23.  1. Pt will complete bed mobility SBA.  2. Pt will complete sit to stand MIN A.  3. Pt will ambulate 100ft  CGA.  4. Pt will increase AMPAC score by 2 points to progress functional mobility.                         Time Tracking:     PT Received On: 08/17/23  PT Start Time: 1515     PT Stop Time: 1540  PT Total Time (min): 25 min     Billable Minutes: Gait Training 10min and Therapeutic Activity 15min    Treatment Type: Treatment  PT/PTA: PT     Number of PTA visits since last PT visit: 0     08/17/2023

## 2023-08-17 NOTE — DISCHARGE SUMMARY
[facility] Medicine  Discharge Summary      Patient Name: Rebecca Rios  MRN: [id number]  [facility]: [id number]  Patient Class: OP- Observation  Admission Date: 8/15/2023  Hospital Length of Stay: 0 days  Discharge Date and Time:  08/17/2023 2:09 PM  Attending Physician: Erick Mcgee MD   Discharging Provider: Erick Mcgee MD  Primary Care Provider: Shlomo Ware MD    Primary Care Team: Networked reference to record PCT     HPI:   Rebecca Rios is a 82 y.o. female with a PMH  has a past medical history of Anxiety, Asymptomatic stenosis of both carotid arteries without infarction (1/28/2022), Atypical chest pain (11/9/2017), CAD (coronary artery disease) (11/18/2014), Chronic diastolic heart failure (8/17/2017), Coronary artery disease, Depression, Fractures, Hot flash, menopausal, HTN (hypertension), Hyperlipidemia, Hypothyroid, Nonrheumatic mitral valve regurgitation (10/19/2022), Nonrheumatic tricuspid valve regurgitation (10/19/2022), and Pulmonary hypertension (8/17/2017).  Presents to the ER for evaluation after having a ground level fall prior to arrival at her doctor's office.  Patient reportedly fell on her left arm and both knees during the fall.  Patient reports that the nurses at the clinic state that people fall in his spot all the time.  Apparently there was a defect in the floor causing people drip.  Patient reports left elbow pain worsens with movement and palpation.  Denies hitting her head or losing consciousness.  Denies any numbness/tingling to left upper extremity. Denies any neck pain/stiffness, chest pain, palpitations, shortness of breath, dyspnea exertion, headache, lightheadedness, dizziness, or any other symptoms at this time.  ER workup revealed olecranon fracture of left elbow.  On-call orthopedic surgeon consulted who recommended hospital Medicine consult with plans to take to OR in a.m. for surgical repair.  Patient received total of 75 mcg of fentanyl, 4 mg of  morphine, 2 mg morphine, 4 mg Zofran in ED. patient in agreement with treatment plan.  Patient will be admitted under observation status.    PCP: Shlomo Ware      * No surgery found *      Hospital Course:   8/16 admitted for left olecranon fracture after ground level fall. Poor venous access. Picc line ordered. Physical/occupational therapy consulted. Ortho following for surgical vs conservative management.    8/17    Patient forgoing surgical intervention for olecranon fracture. Ortho resplinted elbow and repeat xray with improved displacement. Physical/occupational therapy recommended rehab, but patient also forgoing to discharge home instead with homehealth physical/occupational therapy.     Overnight, experienced chest pain and anxiety. Proton pump inhibitor not resumed and experienced rebound acid reflux. Troponin(s) within normal limits. No rib fractures on chest x-ray.    On exam, no acute distress, no respiratory distress. Left arm splinted and dressed in ace wrap, clean, dry, intact.    Patient seen and evaluated by me. Patient was determined to be suitable for discharge. Patient deemed stable for discharge to home with homehealth physical/occupational therapy and nurse practitioner to visit home program.         Goals of Care Treatment Preferences:  Code Status: Full Code      Consults:   Consults (From admission, onward)        Status Ordering Provider     Inpatient consult to PICC team ([facility])  Once        Provider:  (Not yet assigned)    Acknowledged ULISES RAMON     [facility]  [facility]        Provider:  (Not yet assigned)    Acknowledged ULISES RAMON     IP consult to case management  Once        Provider:  (Not yet assigned)    Completed ULISES RAMON          No new Assessment & Plan notes have been filed under this hospital service since the last note was generated.  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 Schedule an appointment as soon as possible for a visit in 3 day(s).    Specialty: Internal Medicine  Why: hospital follow up  Contact information:  [address] [facility] [facility] [address]  [phone]             [facility], [facility]. Schedule an appointment as soon as possible for a visit in 2 week(s).    Why: hospital follow up  Contact information:  [address] [place] [address] [address]  [facility] [id number] [phone]             [facility] Follow up.    Specialties: Home Health Services, Home Therapy Services, Home Living Aide Services  Why: home health  Contact information:  [address]  [facility] [id number] [phone]                     Patient Instructions:      Ambulatory referral/consult to Home Health   Standing Status: Future   Referral Priority: Routine Referral Type: Home Health   Referral Reason: Specialty Services Required   Requested Specialty: Home Health Services   Number of Visits Requested: 1     Ambulatory referral/consult to [facility]   Standing Status: Future   Referral Priority: Routine Referral Type: Consultation   Referral Reason: Specialty Services Required   Number of Visits  Requested: 1     Diet Adult Regular     Ice to affected area     Other restrictions (specify):   Order Comments: Limit use of left arm       Significant Diagnostic Studies: Labs:   CMP   Recent Labs   Lab 08/15/23  2046      K 3.9      CO2 28   GLU 93   BUN 17   CREATININE 0.8   CALCIUM 9.9   PROT 6.5   ALBUMIN 3.8   BILITOT 0.3   ALKPHOS 65   AST 25   ALT 12   ANIONGAP 11   , CBC   Recent Labs   Lab 08/15/23  2046   WBC 7.27   HGB 10.7*   HCT 35.6*      , INR   Lab Results   Component Value Date    INR 1.1 08/15/2023   , Troponin   Recent Labs   Lab 08/17/23  0359   TROPONINI 0.010    and All labs within the past 24 hours have been reviewed  Radiology: X-Ray: CXR: X-Ray Chest 1 View (CXR):   Results for orders placed or performed during the hospital encounter of 08/15/23   X-Ray Chest 1 View    Narrative    EXAMINATION:  XR CHEST 1 VIEW    CLINICAL HISTORY:  pre-op clearance;    TECHNIQUE:  Single frontal view of the chest was performed.    COMPARISON:  None    FINDINGS:  Hiatal hernia.  Prominent cardiac silhouette.  Mild perihilar interstitial opacities.    No pneumonic consolidation.    Bones are intact.      Impression    No acute abnormality.  Hiatal hernia      Electronically signed by: Lloyd Dneney  Date:    08/15/2023  Time:    22:09    and xray elbow, forearm, knees    Pending Diagnostic Studies:     None         Medications:  Reconciled Home Medications:      Medication List      CHANGE how you take these medications    amLODIPine 5 MG tablet  Commonly known as: NORVASC  Take 5 mg by mouth once daily.  What changed: Another medication with the same name was removed. Continue taking this medication, and follow the directions you see here.     lisinopriL 40 MG tablet  Commonly known as: PRINIVIL,ZESTRIL  TAKE 1 TABLET (40 MG TOTAL) BY MOUTH ONCE DAILY.  What changed: when to take this        CONTINUE taking these medications    ALPRAZolam 0.5 MG tablet  Commonly known as: XANAX  Take 0.5  mg by mouth 2 (two) times daily as needed for Anxiety.     aspirin 81 MG EC tablet  Commonly known as: ECOTRIN  Take 1 tablet (81 mg total) by mouth once daily.     cetirizine 10 MG tablet  Commonly known as: ZYRTEC  Take 10 mg by mouth daily as needed.     cholecalciferol (vitamin D3) 125 mcg (5,000 unit) capsule  Take 5,000 Units by mouth.     CREON 24,000-76,000 -120,000 unit capsule  Generic drug: lipase-protease-amylase 24,000-76,000-120,000 units  Take 1 capsule by mouth 3 (three) times daily with meals.     ergocalciferol 50,000 unit Cap  Commonly known as: ERGOCALCIFEROL  Take 1 capsule (50,000 Units total) by mouth every 7 days.     esomeprazole 40 MG capsule  Commonly known as: NEXIUM  Take 40 mg by mouth once daily.     fluticasone propionate 50 mcg/actuation nasal spray  Commonly known as: FLONASE  2 sprays (100 mcg total) by Each Nostril route once daily.     HYDROcodone-acetaminophen  mg per tablet  Commonly known as: NORCO  TAKE 1 TABLET BY MOUTH EVERY 8 HOURS AS NEEDED FOR PAIN     levothyroxine 75 MCG tablet  Commonly known as: SYNTHROID  TAKE ONE TABLET BY MOUTH ONCE DAILY     melatonin 10 mg Tab  Take by mouth.     sertraline 25 MG tablet  Commonly known as: ZOLOFT  Take by mouth.        STOP taking these medications    diphenoxylate-atropine 2.5-0.025 mg 2.5-0.025 mg per tablet  Commonly known as: LOMOTIL     DULoxetine 30 MG capsule  Commonly known as: CYMBALTA     ondansetron 4 MG Tbdl  Commonly known as: ZOFRAN-ODT     POLYTUSSIN DM(DEXCHLORPHENRMN) 1-5-10 mg/5 mL Syrp  Generic drug: dexchlorphen-phenylephrine-DM     tiZANidine 4 MG tablet  Commonly known as: ZANAFLEX            Indwelling Lines/Drains at time of discharge:   Lines/Drains/Airways     Peripherally Inserted Central Catheter Line  Duration           PICC Double Lumen 08/16/23 1002 right basilic 1 day                Time spent on the discharge of patient: 51 minutes         Erick Mcgee MD  Department of Hospital  Medicine  [facility]

## 2023-08-17 NOTE — PROGRESS NOTES
Orthopedic Discharge Instructions    Weight bearing status:  NWB: left upper extremity  Precautions:  may use hand    Ice to left upper extremity as needed    Pt to maintain splint.    Splint care instructions:  Keep in place until seen by ortho  Keep clean and dry  Do not pull splint apart  Do not stick things into the splint    Follow-up with [facility] in one week has been made.             [facility] [address] [place] [address]      Phone [phone]      Fax [phone]

## 2023-08-17 NOTE — PLAN OF CARE
[facility]  Discharge Final Note    Primary Care Provider: Shlomo Ware MD    Expected Discharge Date: 8/17/2023    Final Discharge Note (most recent)       Final Note - 08/17/23 0919          Final Note    Assessment Type Final Discharge Note     Anticipated Discharge Disposition Home-Health Care [facility]     Hospital Resources/Appts/Education Provided Post-Acute resouces added to AVS;Appointments scheduled and added to AVS        Post-Acute Status    Post-Acute Authorization Home Health     Home Health Status Set-up Complete/Auth obtained                     Important Message from Medicare             Contact Info       Shlomo Ware MD   Specialty: Internal Medicine   Relationship: PCP - General    [facility]  [address]  [place] [address]   Phone: [phone]       Next Steps: Schedule an appointment as soon as possible for a visit in 3 day(s)    Instructions: hospital follow up    [facility]    [address] [place] [address] [address]  [place] [address]   Phone: [phone]       Next Steps: Schedule an appointment as soon as possible for a visit in 2 week(s)    Instructions: hospital follow up    [facility]  [address]   Specialty: Home Health Services, Home Therapy Services, Home Living Aide Services    [address]  [place] [address]   Phone: [phone]       Next Steps: Follow up    Instructions: home health          DC Dispo: home with [facility], daughter and sister  PCP f/u: patient to schedule as follows with [facility]  Ortho Clinic F/U: August 25th

## 2023-08-18 ENCOUNTER — PES CALL (OUTPATIENT)
Dept: ADMINISTRATIVE | Facility: CLINIC | Age: 83
End: 2023-08-18
Payer: MEDICARE

## 2023-08-22 ENCOUNTER — OFFICE VISIT (OUTPATIENT)
Dept: HOME HEALTH SERVICES | Facility: CLINIC | Age: 83
End: 2023-08-22
Payer: MEDICARE

## 2023-08-22 VITALS
DIASTOLIC BLOOD PRESSURE: 74 MMHG | OXYGEN SATURATION: 95 % | RESPIRATION RATE: 18 BRPM | SYSTOLIC BLOOD PRESSURE: 132 MMHG | HEART RATE: 65 BPM

## 2023-08-22 DIAGNOSIS — Z09 HOSPITAL DISCHARGE FOLLOW-UP: Primary | ICD-10-CM

## 2023-08-22 DIAGNOSIS — R53.1 WEAKNESS: ICD-10-CM

## 2023-08-22 DIAGNOSIS — S42.402A CLOSED FRACTURE OF LEFT ELBOW, INITIAL ENCOUNTER: ICD-10-CM

## 2023-08-22 PROCEDURE — 99350 PR HOME VISIT,ESTAB PATIENT,LEVEL IV: ICD-10-PCS | Mod: S$GLB,,,

## 2023-08-22 PROCEDURE — 99350 HOME/RES VST EST HIGH MDM 60: CPT | Mod: S$GLB,,,

## 2023-08-22 NOTE — PROGRESS NOTES
[facility]  Transition of Care Home Visit    Visit Date: 8/22/2023  Encounter Provider: Ra Alva   PCP:  Shlomo Ware MD    PRESENTING HISTORY      Patient ID: Rebecca Rios is a 82 y.o. female.    Consult Requested By:  Dr. Erick Mcgee  Reason for Consult:  Hospital Follow Up.    Rebecca is being seen at home due to being seen at home due to physical debility that presents a taxing effort to leave the home, to mitigate high risk of hospital readmission and/or due to the limited availability of reliable or safe options for transportation to the point of access to the provider. Prior to treatment on this visit the chart was reviewed and patient verbal consent was obtained.      Chief Complaint: Transitional Care        History of Present Illness: Ms. Rebecca Rios is a 82 y.o. female who was recently admitted to the hospital.    8/16-8/17 admission with medical diagnoses including Anxiety, Asymptomatic stenosis of both carotid arteries without infarction (1/28/2022), Atypical chest pain (11/9/2017), CAD (coronary artery disease) (11/18/2014), Chronic diastolic heart failure (8/17/2017), Coronary artery disease, Depression, Fractures, Hot flash, menopausal, HTN (hypertension), Hyperlipidemia, Hypothyroid, Nonrheumatic mitral valve regurgitation (10/19/2022), Nonrheumatic tricuspid valve regurgitation (10/19/2022), and Pulmonary hypertension (8/17/2017).  Presents to the ER for evaluation after having a ground level fall prior to arrival at her doctor's office.  Patient reportedly fell on her left arm and both knees during the fall.  Patient reports that the nurses at the clinic state that people fall in his spot all the time.  Apparently there was a defect in the floor causing people drip.  Patient reports left elbow pain worsens with movement and palpation.  Denies hitting her head or losing consciousness.  Denies any numbness/tingling to left upper extremity. Denies any neck pain/stiffness, chest  pain, palpitations, shortness of breath, dyspnea exertion, headache, lightheadedness, dizziness, or any other symptoms at this time.  ER workup revealed olecranon fracture of left elbow.  On-call orthopedic surgeon consulted who recommended hospital Medicine consult with plans to take to OR in a.m. for surgical repair.  Patient received total of 75 mcg of fentanyl, 4 mg of morphine, 2 mg morphine, 4 mg Zofran in ED. patient in agreement with treatment plan.  Patient will be admitted under observation status.     Hospital Course:   8/16 admitted for left olecranon fracture after ground level fall. Poor venous access. Picc line ordered. Physical/occupational therapy consulted. Ortho following for surgical vs conservative management.     8/17     Patient forgoing surgical intervention for olecranon fracture. Ortho resplinted elbow and repeat xray with improved displacement. Physical/occupational therapy recommended rehab, but patient also forgoing to discharge home instead with homehealth physical/occupational therapy.      Overnight, experienced chest pain and anxiety. Proton pump inhibitor not resumed and experienced rebound acid reflux. Troponin(s) within normal limits. No rib fractures on chest x-ray.     On exam, no acute distress, no respiratory distress. Left arm splinted and dressed in ace wrap, clean, dry, intact.  ___________________________________________________________________    Today:    HPI:  Patient is a 82 y.o. female being seen today for a post hospital discharge assessment following a recent admit for ground level fall. Patient presented with medical diagnoses including Anxiety, Asymptomatic stenosis of both carotid arteries without infarction, Atypical chest pain, CAD, Chronic diastolic heart failure, Coronary artery disease, Depression, Fractures, Hot flash, menopausal, HTN, Hyperlipidemia, Hypothyroid, Nonrheumatic mitral valve regurgitation, Nonrheumatic tricuspid valve regurgitation, and  Pulmonary hypertension. She was hospitalized 8/16-8/17. See hospital course.   Patient is found in their home today, in no acute distress and agreeable to this visit.  Her vital signs are stable, AAOx3. Two of her daughters, son, and sister are present during visit. Patient reports feeling good since discharge. Her left arm has ace wrap from wrist to mid upper arm.  She is working with HH and PT/OT. Placed order for wheelchair to assist with ambulation to perform ADL's.  Patient reports compliance with all medications.  Denies any neck pain/stiffness, chest pain, palpitations, shortness of breath, dyspnea exertion, headache, lightheadedness, dizziness, or any other symptoms at this time. Patient has no further complaints or concerns at this time.  Questions elicited. Time allowed for questions, all issues addressed. Contact info given for any concerns or changes.             Review of Systems   Constitutional: Negative.    HENT: Negative.     Eyes: Negative.    Respiratory: Negative.  Negative for chest tightness.    Cardiovascular: Negative.  Negative for leg swelling.   Gastrointestinal:  Positive for diarrhea (occasional).   Endocrine: Negative.    Genitourinary: Negative.    Musculoskeletal:  Positive for arthralgias (knees).   Skin: Negative.    Allergic/Immunologic: Negative.    Neurological: Negative.    Hematological: Negative.    Psychiatric/Behavioral: Negative.  Negative for agitation.    All other systems reviewed and are negative.      Assessments:  Environmental: Patient lives in a single story home.  There is adequate lighting and the temperature is comfortable.    Functional Status: Ambulating with cane. Walker ordered. Working with PT/OT  Safety: Fall precautions.   Nutritional: Adequate food in the home.   Home Health/DME/Supplies: [facility]    PAST HISTORY:     Past Medical History:   Diagnosis Date    Anxiety     Asymptomatic stenosis of both carotid arteries without infarction 1/28/2022     Atypical chest pain 11/9/2017    CAD (coronary artery disease) 11/18/2014    Chronic diastolic heart failure 8/17/2017    Coronary artery disease     Depression     Fractures     Hot flash, menopausal     HTN (hypertension)     Hyperlipidemia     Hypothyroid     Nonrheumatic mitral valve regurgitation 10/19/2022    Nonrheumatic tricuspid valve regurgitation 10/19/2022    Pulmonary hypertension 8/17/2017       Past Surgical History:   Procedure Laterality Date    CARPAL TUNNEL RELEASE Bilateral     CHOLECYSTECTOMY      HYSTERECTOMY      open luis         Family History   Problem Relation Age of Onset    Hypertension Mother        Social History     Socioeconomic History    Marital status: Other   Tobacco Use    Smoking status: Never    Smokeless tobacco: Never   Substance and Sexual Activity    Alcohol use: No    Drug use: No    Sexual activity: Not Currently     Partners: Male     Social Determinants of Health     Food Insecurity: No Food Insecurity (8/16/2023)    Hunger Vital Sign     Worried About Running Out of Food in the Last Year: Never true     Ran Out of Food in the Last Year: Never true   Transportation Needs: No Transportation Needs (8/16/2023)    PRAPARE - Transportation     Lack of Transportation (Medical): No     Lack of Transportation (Non-Medical): No   Housing Stability: Unknown (8/16/2023)    Housing Stability Vital Sign     Unable to Pay for Housing in the Last Year: No     Unstable Housing in the Last Year: No       MEDICATIONS & ALLERGIES:     Current Outpatient Medications on File Prior to Visit   Medication Sig Dispense Refill    alprazolam (XANAX) 0.5 MG tablet Take 0.5 mg by mouth 2 (two) times daily as needed for Anxiety.      amLODIPine (NORVASC) 5 MG tablet Take 5 mg by mouth once daily.      aspirin (ECOTRIN) 81 MG EC tablet Take 1 tablet (81 mg total) by mouth once daily. 30 tablet 2    cetirizine (ZYRTEC) 10 MG tablet Take 10 mg by mouth daily as needed.      cholecalciferol, vitamin  D3, 125 mcg (5,000 unit) capsule Take 5,000 Units by mouth.      esomeprazole (NEXIUM) 40 MG capsule Take 40 mg by mouth once daily.       fluticasone propionate (FLONASE) 50 mcg/actuation nasal spray 2 sprays (100 mcg total) by Each Nostril route once daily. 1 Bottle 12    HYDROcodone-acetaminophen (NORCO)  mg per tablet TAKE 1 TABLET BY MOUTH EVERY 8 HOURS AS NEEDED FOR PAIN      levothyroxine (SYNTHROID) 75 MCG tablet TAKE ONE TABLET BY MOUTH ONCE DAILY 30 tablet 6    lipase-protease-amylase 24,000-76,000-120,000 units (CREON) 24,000-76,000 -120,000 unit capsule Take 1 capsule by mouth 3 (three) times daily with meals.      lisinopriL (PRINIVIL,ZESTRIL) 40 MG tablet TAKE 1 TABLET (40 MG TOTAL) BY MOUTH ONCE DAILY. (Patient taking differently: Take 40 mg by mouth every evening.) 90 tablet 3    melatonin 10 mg Tab Take by mouth.      sertraline (ZOLOFT) 25 MG tablet Take by mouth.      [DISCONTINUED] ergocalciferol (ERGOCALCIFEROL) 50,000 unit Cap Take 1 capsule (50,000 Units total) by mouth every 7 days. 12 capsule 3     No current facility-administered medications on file prior to visit.        Review of patient's allergies indicates:   Allergen Reactions    Erythromycin     Macrolide antibiotics Nausea And Vomiting       OBJECTIVE:     Vital Signs:  Vitals:    08/22/23 1323   BP: 132/74   Pulse: 65   Resp: 18     There is no height or weight on file to calculate BMI.     Physical Exam:  Physical Exam  Vitals reviewed.   Constitutional:       General: She is not in acute distress.     Appearance: Normal appearance. She is well-developed.   HENT:      Head: Normocephalic and atraumatic.      Nose: Nose normal.      Mouth/Throat:      Mouth: Mucous membranes are dry.      Pharynx: Oropharynx is clear.   Eyes:      Pupils: Pupils are equal, round, and reactive to light.   Cardiovascular:      Rate and Rhythm: Normal rate and regular rhythm.      Pulses: Normal pulses.      Heart sounds: Normal heart sounds.  "  Pulmonary:      Effort: Pulmonary effort is normal.      Breath sounds: Normal breath sounds.   Abdominal:      General: Bowel sounds are normal.      Palpations: Abdomen is soft.   Musculoskeletal:         General: Signs of injury (left arm fracture, ace wrap in place) present. Normal range of motion.      Cervical back: Normal range of motion and neck supple.   Skin:     General: Skin is warm and dry.      Findings: Bruising (ELAINA) present.   Neurological:      General: No focal deficit present.      Mental Status: She is alert and oriented to person, place, and time. Mental status is at baseline.   Psychiatric:         Mood and Affect: Mood normal.         Behavior: Behavior normal.         Thought Content: Thought content normal.         Judgment: Judgment normal.         Laboratory  Lab Results   Component Value Date    WBC 7.27 08/15/2023    HGB 10.7 (L) 08/15/2023    HCT 35.6 (L) 08/15/2023    MCV 89 08/15/2023     08/15/2023     Lab Results   Component Value Date    INR 1.1 08/15/2023     Lab Results   Component Value Date    HGBA1C 5.8 03/23/2023     No results for input(s): "POCTGLUCOSE" in the last 72 hours.    Diagnostic Results:      TRANSITION OF CARE:     [facility] Contact Note: 8/18    Family and/or Caretaker present at visit?  Yes.  Diagnostic tests reviewed/disposition: No diagnosic tests pending after this hospitalization.  Disease/illness education: Take all medication as prescribed. Activity as tolerated. Keep all upcoming appts.   Home health/community services discussion/referrals: Patient has home health established at [facility] .   Establishment or re-establishment of referral orders for community resources: No other necessary community resources.   Discussion with other health care providers: No discussion with other health care providers necessary.     Transition of Care Visit:  I have reviewed and updated the history and problem list.  I have reconciled the medication list. "  I have discussed the hospitalization and current medical issues, prognosis and plans with the patient/family.  I  spent more than 50% of time discussing the care with the patient/family.  Total Face-to-Face Encounter: 60 minutes.    Medications Reconciliation:   I have reconciled the patient's home medications and discharge medications with the patient/family. I have updated all changes.  Refer to After-Visit Medication List.    ASSESSMENT & PLAN:     HIGH RISK CONDITION(S):      Problem List Items Addressed This Visit    None  Visit Diagnoses       Hospital discharge follow-up    -  Primary    Closed fracture of left elbow, initial encounter        Recent fall with fracture  Splint with ace wrap in place  Pain medication prn  Continue PT/OT    Relevant Orders    WALKER FOR HOME USE    Weakness        Ambulating with cane currently  Ordered rollator to assist with ADL's  Continue PT/OT/HH  Fall precautions    Relevant Orders    WALKER FOR HOME USE             Were controlled substances prescribed?  No    Instructions for the patient:  - Continue all medications, treatments and therapies as ordered.   - Follow all instructions, recommendations as discussed.  - Maintain Safety Precautions at all times.  - Attend all medical appointments as scheduled.  - For worsening symptoms: call Primary Care Physician or Nurse Practitioner.  - For emergencies, call 911 or immediately report to the nearest emergency room.     Scheduled Follow-up :  Future Appointments   Date Time Provider Department Center   8/23/2023 11:00 AM ORTHO TRAUMA CLINIC [facility]       After Visit Medication List :     Medication List            Accurate as of August 22, 2023  4:44 PM. If you have any questions, ask your nurse or doctor.                CHANGE how you take these medications      lisinopriL 40 MG tablet  Commonly known as: PRINIVIL,ZESTRIL  TAKE 1 TABLET (40 MG TOTAL) BY MOUTH ONCE DAILY.  What changed: when to take this             CONTINUE taking these medications      ALPRAZolam 0.5 MG tablet  Commonly known as: XANAX     amLODIPine 5 MG tablet  Commonly known as: NORVASC     aspirin 81 MG EC tablet  Commonly known as: ECOTRIN  Take 1 tablet (81 mg total) by mouth once daily.     cetirizine 10 MG tablet  Commonly known as: ZYRTEC     cholecalciferol (vitamin D3) 125 mcg (5,000 unit) capsule     CREON 24,000-76,000 -120,000 unit capsule  Generic drug: lipase-protease-amylase 24,000-76,000-120,000 units     esomeprazole 40 MG capsule  Commonly known as: NEXIUM     fluticasone propionate 50 mcg/actuation nasal spray  Commonly known as: FLONASE  2 sprays (100 mcg total) by Each Nostril route once daily.     HYDROcodone-acetaminophen  mg per tablet  Commonly known as: NORCO     levothyroxine 75 MCG tablet  Commonly known as: SYNTHROID  TAKE ONE TABLET BY MOUTH ONCE DAILY     melatonin 10 mg Tab     sertraline 25 MG tablet  Commonly known as: ZOLOFT              Signature: Ra Alva NP

## 2023-08-23 ENCOUNTER — OFFICE VISIT (OUTPATIENT)
Dept: ORTHOPEDICS | Facility: CLINIC | Age: 83
End: 2023-08-23
Payer: MEDICARE

## 2023-08-23 VITALS
HEART RATE: 65 BPM | BODY MASS INDEX: 26.29 KG/M2 | WEIGHT: 148.38 LBS | TEMPERATURE: 98 F | HEIGHT: 63 IN | DIASTOLIC BLOOD PRESSURE: 58 MMHG | SYSTOLIC BLOOD PRESSURE: 95 MMHG

## 2023-08-23 DIAGNOSIS — S52.022D OLECRANON FRACTURE, LEFT, CLOSED, WITH ROUTINE HEALING, SUBSEQUENT ENCOUNTER: Primary | ICD-10-CM

## 2023-08-23 DIAGNOSIS — M25.522 LEFT ELBOW PAIN: Primary | ICD-10-CM

## 2023-08-23 PROCEDURE — 99215 OFFICE O/P EST HI 40 MIN: CPT | Mod: PBBFAC | Performed by: PHYSICIAN ASSISTANT

## 2023-08-23 PROCEDURE — 99214 PR OFFICE/OUTPT VISIT, EST, LEVL IV, 30-39 MIN: ICD-10-PCS | Mod: S$PBB,,, | Performed by: PHYSICIAN ASSISTANT

## 2023-08-23 PROCEDURE — 99999 PR PBB SHADOW E&M-EST. PATIENT-LVL V: ICD-10-PCS | Mod: PBBFAC,,, | Performed by: PHYSICIAN ASSISTANT

## 2023-08-23 PROCEDURE — 99999 PR PBB SHADOW E&M-EST. PATIENT-LVL V: CPT | Mod: PBBFAC,,, | Performed by: PHYSICIAN ASSISTANT

## 2023-08-23 PROCEDURE — 99214 OFFICE O/P EST MOD 30 MIN: CPT | Mod: S$PBB,,, | Performed by: PHYSICIAN ASSISTANT

## 2023-08-23 RX ORDER — DIPHENOXYLATE HYDROCHLORIDE AND ATROPINE SULFATE 2.5; .025 MG/1; MG/1
1 TABLET ORAL 4 TIMES DAILY PRN
COMMUNITY
Start: 2022-10-27

## 2023-08-23 RX ORDER — HYDROQUINONE 40 MG/G
1 CREAM TOPICAL 2 TIMES DAILY PRN
COMMUNITY
Start: 2022-09-08 | End: 2023-09-08

## 2023-08-23 RX ORDER — LORATADINE 10 MG/1
10 TABLET ORAL DAILY
COMMUNITY

## 2023-08-23 RX ORDER — AMOXICILLIN 500 MG
2 CAPSULE ORAL DAILY
COMMUNITY

## 2023-08-23 RX ORDER — ACETAMINOPHEN 500 MG
500 TABLET ORAL EVERY 6 HOURS PRN
COMMUNITY

## 2023-08-23 RX ORDER — DICYCLOMINE HYDROCHLORIDE 20 MG/1
20 TABLET ORAL 4 TIMES DAILY
COMMUNITY
Start: 2023-08-15

## 2023-08-24 ENCOUNTER — PATIENT MESSAGE (OUTPATIENT)
Dept: CARDIOLOGY | Facility: CLINIC | Age: 83
End: 2023-08-24
Payer: MEDICARE

## 2023-08-24 NOTE — PROGRESS NOTES
Subjective:      Patient ID: Rebecca Rios is a 82 y.o. female.    Chief Complaint: Pain of the Left Elbow      HPI: Rebecca Rios is an 82-year-old female in clinic today for follow-up of left olecranon fracture.  Patient was seen for this problem last week while she was an inpatient.  She has been wearing the splint that was placed since then.  She reports that pain is about the same.  She denies numbness or tingling in the extremity.    Past Medical History:   Diagnosis Date    Anxiety     Asymptomatic stenosis of both carotid arteries without infarction 1/28/2022    Atypical chest pain 11/9/2017    CAD (coronary artery disease) 11/18/2014    Chronic diastolic heart failure 8/17/2017    Coronary artery disease     Depression     Fractures     Hot flash, menopausal     HTN (hypertension)     Hyperlipidemia     Hypothyroid     Nonrheumatic mitral valve regurgitation 10/19/2022    Nonrheumatic tricuspid valve regurgitation 10/19/2022    Pulmonary hypertension 8/17/2017       Current Outpatient Medications:     acetaminophen (TYLENOL) 500 MG tablet, Take 500 mg by mouth every 6 (six) hours as needed., Disp: , Rfl:     alprazolam (XANAX) 0.5 MG tablet, Take 0.5 mg by mouth 2 (two) times daily as needed for Anxiety., Disp: , Rfl:     amLODIPine (NORVASC) 5 MG tablet, Take 5 mg by mouth once daily., Disp: , Rfl:     aspirin (ECOTRIN) 81 MG EC tablet, Take 1 tablet (81 mg total) by mouth once daily., Disp: 30 tablet, Rfl: 2    cholecalciferol, vitamin D3, 125 mcg (5,000 unit) capsule, Take 5,000 Units by mouth., Disp: , Rfl:     dicyclomine (BENTYL) 20 mg tablet, Take 20 mg by mouth 4 (four) times daily., Disp: , Rfl:     diphenoxylate-atropine 2.5-0.025 mg (LOMOTIL) 2.5-0.025 mg per tablet, Take 1 tablet by mouth 4 (four) times daily as needed., Disp: , Rfl:     esomeprazole (NEXIUM) 40 MG capsule, Take 40 mg by mouth once daily. , Disp: , Rfl:     fluticasone propionate (FLONASE) 50 mcg/actuation nasal spray, 2 sprays  "(100 mcg total) by Each Nostril route once daily., Disp: 1 Bottle, Rfl: 12    HYDROcodone-acetaminophen (NORCO)  mg per tablet, TAKE 1 TABLET BY MOUTH EVERY 8 HOURS AS NEEDED FOR PAIN, Disp: , Rfl:     hydroquinone 4 % Crea, Apply 1 Application topically 2 (two) times daily as needed., Disp: , Rfl:     levothyroxine (SYNTHROID) 75 MCG tablet, TAKE ONE TABLET BY MOUTH ONCE DAILY, Disp: 30 tablet, Rfl: 6    lipase-protease-amylase 24,000-76,000-120,000 units (CREON) 24,000-76,000 -120,000 unit capsule, Take 1 capsule by mouth 3 (three) times daily with meals., Disp: , Rfl:     lisinopriL (PRINIVIL,ZESTRIL) 40 MG tablet, TAKE 1 TABLET (40 MG TOTAL) BY MOUTH ONCE DAILY. (Patient taking differently: Take 40 mg by mouth every evening.), Disp: 90 tablet, Rfl: 3    loratadine (CLARITIN) 10 mg tablet, Take 10 mg by mouth once daily., Disp: , Rfl:     melatonin 10 mg Tab, Take by mouth., Disp: , Rfl:     omega-3 fatty acids/fish oil (FISH OIL-OMEGA-3 FATTY ACIDS) 300-1,000 mg capsule, Take 2 g by mouth once daily., Disp: , Rfl:     sertraline (ZOLOFT) 25 MG tablet, Take by mouth., Disp: , Rfl:     cetirizine (ZYRTEC) 10 MG tablet, Take 10 mg by mouth daily as needed., Disp: , Rfl:   Review of patient's allergies indicates:   Allergen Reactions    Erythromycin     Macrolide antibiotics Nausea And Vomiting       BP (!) 95/58 (BP Location: Right arm, Patient Position: Sitting, BP Method: Medium (Automatic))   Pulse 65   Temp 98.2 °F (36.8 °C) (Oral)   Ht 5' 3" (1.6 m)   Wt 67.3 kg (148 lb 5.9 oz)   BMI 26.28 kg/m²     ROS      Objective:    Ortho Exam   Left elbow:  Splint was removed for physical exam   Skin is intact   Moderate edema  Moderate TTP   ROM greatly decreased secondary to pain  Compartments are soft and compressible  Motor exam normal   Sensation and pulses intact  Cap refill brisk    GEN: Well developed, well nourished female. AAOX3. No acute distress.   Head: Normocephalic, atraumatic.   Eyes: " RAMÓN  Neck: Trachea is midline, no adenopathy  Resp: Breathing unlabored.  Neuro: Motor function normal, Cranial nerves intact  Psych: Mood and affect appropriate.       Assessment:     Imaging:  No new imaging ordered today.  Previous left elbow x-rays were reviewed and are significant for left olecranon fracture.        1. Olecranon fracture, left, closed, with routine healing, subsequent encounter          Plan:       Reviewed the radiographs with the patient.  Patient was placed back into the same splint after application of new cast padding.  Patient reported the fit was comfortable.  She should continue with this splint for another 2 weeks and then we will see her back in clinic for repeat radiographs and further evaluation.       Follow up in about 2 weeks (around 9/6/2023).          Patient note was created using MModal Dictation.  Any errors in syntax or even information may not have been identified and edited on initial review prior to signing this note.

## 2023-09-01 ENCOUNTER — TELEPHONE (OUTPATIENT)
Dept: ORTHOPEDICS | Facility: CLINIC | Age: 83
End: 2023-09-01
Payer: MEDICARE

## 2023-09-01 NOTE — TELEPHONE ENCOUNTER
Spoke with patient's daughter and she will continue to ice and take her Tylenol and pain medication to help with her pain and swelling. Understanding verbalized.

## 2023-09-01 NOTE — TELEPHONE ENCOUNTER
----- Message from Radhika Cornejo MA sent at 9/1/2023 12:17 PM CDT -----  Contact: patient  [phone]    ----- Message -----  From: Yahaira Abbott  Sent: 9/1/2023  12:15 PM CDT  To: On Orth[facility] Clinical Staff      ----- Message -----  From: Katie Alegria  Sent: 9/1/2023  10:49 AM CDT  To: [facility]-Ray Clinical Support    Patient's daughter Mali Gudino  called requesting a call back from the clinical staff, to schedule patient in before 09/08 if possible, patient has a half cast with a broken elbow and now in a lot of pain she can' sleep and would like to have x-ray done sooner  daughter Mali [phone]

## 2023-09-08 ENCOUNTER — HOSPITAL ENCOUNTER (OUTPATIENT)
Dept: RADIOLOGY | Facility: HOSPITAL | Age: 83
Discharge: HOME | End: 2023-09-08
Attending: PHYSICIAN ASSISTANT
Payer: MEDICARE

## 2023-09-08 ENCOUNTER — OFFICE VISIT (OUTPATIENT)
Dept: ORTHOPEDICS | Facility: CLINIC | Age: 83
End: 2023-09-08
Payer: MEDICARE

## 2023-09-08 VITALS
HEIGHT: 63 IN | BODY MASS INDEX: 26.22 KG/M2 | HEART RATE: 61 BPM | TEMPERATURE: 97 F | SYSTOLIC BLOOD PRESSURE: 109 MMHG | DIASTOLIC BLOOD PRESSURE: 55 MMHG | WEIGHT: 148 LBS

## 2023-09-08 DIAGNOSIS — M25.522 LEFT ELBOW PAIN: ICD-10-CM

## 2023-09-08 DIAGNOSIS — M25.522 LEFT ELBOW PAIN: Primary | ICD-10-CM

## 2023-09-08 DIAGNOSIS — S52.022G: Primary | ICD-10-CM

## 2023-09-08 PROCEDURE — 99999 PR PBB SHADOW E&M-EST. PATIENT-LVL V: CPT | Mod: PBBFAC,,, | Performed by: ORTHOPAEDIC SURGERY

## 2023-09-08 PROCEDURE — 99999 PR PBB SHADOW E&M-EST. PATIENT-LVL V: ICD-10-PCS | Mod: PBBFAC,,, | Performed by: ORTHOPAEDIC SURGERY

## 2023-09-08 PROCEDURE — 73080 X-RAY EXAM OF ELBOW: CPT | Mod: 26,LT,, | Performed by: STUDENT IN AN ORGANIZED HEALTH CARE EDUCATION/TRAINING PROGRAM

## 2023-09-08 PROCEDURE — 99213 OFFICE O/P EST LOW 20 MIN: CPT | Mod: S$PBB,,, | Performed by: ORTHOPAEDIC SURGERY

## 2023-09-08 PROCEDURE — 99215 OFFICE O/P EST HI 40 MIN: CPT | Mod: PBBFAC | Performed by: ORTHOPAEDIC SURGERY

## 2023-09-08 PROCEDURE — 73080 XR ELBOW COMPLETE 3 VIEW LEFT: ICD-10-PCS | Mod: 26,LT,, | Performed by: STUDENT IN AN ORGANIZED HEALTH CARE EDUCATION/TRAINING PROGRAM

## 2023-09-08 PROCEDURE — 99213 PR OFFICE/OUTPT VISIT, EST, LEVL III, 20-29 MIN: ICD-10-PCS | Mod: S$PBB,,, | Performed by: ORTHOPAEDIC SURGERY

## 2023-09-08 PROCEDURE — 73080 X-RAY EXAM OF ELBOW: CPT | Mod: TC,LT

## 2023-09-08 NOTE — PATIENT INSTRUCTIONS
Maintain elbow brace at all times.  May be opened for skin checks if elbow held straight and being opened by OT or family.  May cleanse skin with wipes around the brace  May move the hand/wrist as much you want.

## 2023-09-08 NOTE — PROGRESS NOTES
Date of Injury     (08/15/2023)       Chief Complaint: Pain of the Left Elbow      HPI: Rebecca Rios is a 82 y.o. RHD female who is here for follow-up of her  left elbow injury.  She is accompanied by her oldest daughter today.     Ms. Rios reports she has been having pain at night.  She is annoyed by the splint.  She had a small amount of pain when they bent her elbow for the x-ray.    She is not taking much extra pain medication.      She and her daughter that lives with her are looking forward to her driving again and going shopping.     Past Medical History:   Diagnosis Date    Anxiety     Asymptomatic stenosis of both carotid arteries without infarction 1/28/2022    Atypical chest pain 11/9/2017    CAD (coronary artery disease) 11/18/2014    Chronic diastolic heart failure 8/17/2017    Coronary artery disease     Depression     Fractures     Hot flash, menopausal     HTN (hypertension)     Hyperlipidemia     Hypothyroid     Nonrheumatic mitral valve regurgitation 10/19/2022    Nonrheumatic tricuspid valve regurgitation 10/19/2022    Pulmonary hypertension 8/17/2017       Past Surgical History:   Procedure Laterality Date    CARPAL TUNNEL RELEASE Bilateral     CHOLECYSTECTOMY      HYSTERECTOMY      open luis       Family History   Problem Relation Age of Onset    Hypertension Mother      Social History     Socioeconomic History    Marital status: Other   Tobacco Use    Smoking status: Never    Smokeless tobacco: Never   Substance and Sexual Activity    Alcohol use: No    Drug use: No    Sexual activity: Not Currently     Partners: Male     Social Determinants of Health     Food Insecurity: No Food Insecurity (8/16/2023)    Hunger Vital Sign     Worried About Running Out of Food in the Last Year: Never true     Ran Out of Food in the Last Year: Never true   Transportation Needs: No Transportation Needs (8/16/2023)    PRAPARE - Transportation     Lack of Transportation (Medical): No     Lack of  Transportation (Non-Medical): No   Housing Stability: Unknown (8/16/2023)    Housing Stability Vital Sign     Unable to Pay for Housing in the Last Year: No     Unstable Housing in the Last Year: No     Medication List with Changes/Refills   Current Medications    ACETAMINOPHEN (TYLENOL) 500 MG TABLET    Take 500 mg by mouth every 6 (six) hours as needed.    ALPRAZOLAM (XANAX) 0.5 MG TABLET    Take 0.5 mg by mouth 2 (two) times daily as needed for Anxiety.    AMLODIPINE (NORVASC) 5 MG TABLET    Take 5 mg by mouth once daily.    ASPIRIN (ECOTRIN) 81 MG EC TABLET    Take 1 tablet (81 mg total) by mouth once daily.    CHOLECALCIFEROL, VITAMIN D3, 125 MCG (5,000 UNIT) CAPSULE    Take 5,000 Units by mouth.    DICYCLOMINE (BENTYL) 20 MG TABLET    Take 20 mg by mouth 4 (four) times daily.    DIPHENOXYLATE-ATROPINE 2.5-0.025 MG (LOMOTIL) 2.5-0.025 MG PER TABLET    Take 1 tablet by mouth 4 (four) times daily as needed.    ESOMEPRAZOLE (NEXIUM) 40 MG CAPSULE    Take 40 mg by mouth once daily.     FLUTICASONE PROPIONATE (FLONASE) 50 MCG/ACTUATION NASAL SPRAY    2 sprays (100 mcg total) by Each Nostril route once daily.    HYDROCODONE-ACETAMINOPHEN (NORCO)  MG PER TABLET    TAKE 1 TABLET BY MOUTH EVERY 8 HOURS AS NEEDED FOR PAIN    HYDROQUINONE 4 % CREA    Apply 1 Application topically 2 (two) times daily as needed.    LEVOTHYROXINE (SYNTHROID) 75 MCG TABLET    TAKE ONE TABLET BY MOUTH ONCE DAILY    LIPASE-PROTEASE-AMYLASE 24,000-76,000-120,000 UNITS (CREON) 24,000-76,000 -120,000 UNIT CAPSULE    Take 1 capsule by mouth 3 (three) times daily with meals.    LISINOPRIL (PRINIVIL,ZESTRIL) 40 MG TABLET    TAKE 1 TABLET (40 MG TOTAL) BY MOUTH ONCE DAILY.    LORATADINE (CLARITIN) 10 MG TABLET    Take 10 mg by mouth once daily.    MELATONIN 10 MG TAB    Take by mouth.    OMEGA-3 FATTY ACIDS/FISH OIL (FISH OIL-OMEGA-3 FATTY ACIDS) 300-1,000 MG CAPSULE    Take 2 g by mouth once daily.    SERTRALINE (ZOLOFT) 25 MG TABLET    Take by  mouth.   Discontinued Medications    CETIRIZINE (ZYRTEC) 10 MG TABLET    Take 10 mg by mouth daily as needed.     Review of patient's allergies indicates:   Allergen Reactions    Erythromycin     Macrolide antibiotics Nausea And Vomiting       Physical Exam:     Wt Readings from Last 1 Encounters:   08/23/23 67.3 kg (148 lb 5.9 oz)     Temp Readings from Last 1 Encounters:   08/23/23 98.2 °F (36.8 °C) (Oral)     BP Readings from Last 1 Encounters:   08/23/23 (!) 95/58     Pulse Readings from Last 1 Encounters:   08/23/23 65       There is no height or weight on file to calculate BMI.    General Appearance:   NAD, well appearing, cooperative    Neurologic:  Alert and oriented x3    Pysch:  Age appropriate     STATION:  Seated in wheelchair, erect    Musculoskeletal:     LEFT elbow: Skin intact but dime sized area of redness over proximal ulna shaft.  No erythema.  Swelling much improved.  Ecchymosis along subcutaneous ulna border extending nearly to wrist.  Good AROM of wrist/shoulder  Radial/ulnar/median nerves inect.  Distal neurovascular status intact.                        IMAGING: 3v of LEFT elbow - taken out of splint, elbow was flexed.  Olecranon distracted in flexed position.        Osteopenia.  No new fracture.    Assessment:       Encounter Diagnosis   Name Primary?    Closed olecranon fracture, left, with delayed healing, subsequent encounter Yes              DISCUSSION:   Patient's symptoms, imaging, diagnosis and prognosis reviewed and discussed.  Discussed  healing progression.   Discussed weight bearing status and the progression Discussed the need for compliance with treatment.     Patient given an opportunity to ask questions.       Plan:       Rebecca was seen today for pain.    Diagnoses and all orders for this visit:    Closed olecranon fracture, left, with delayed healing, subsequent encounter  -     ORTHOTIC DEVICE (DME)         Hinged elbow brace locket at 10 degrees of extension  May be opened  by OT/family for skin checks  May cleanse skin around the brace   Follow-up in 3 weeks with xrays in brace            Anna Mendez DO, CACINDYM, JUVENALAO  Orthopaedic Surgeon

## 2023-09-12 ENCOUNTER — PATIENT MESSAGE (OUTPATIENT)
Dept: ORTHOPEDICS | Facility: CLINIC | Age: 83
End: 2023-09-12
Payer: MEDICARE

## 2023-09-13 ENCOUNTER — EXTERNAL HOME HEALTH (OUTPATIENT)
Dept: HOME HEALTH SERVICES | Facility: HOSPITAL | Age: 83
End: 2023-09-13
Payer: MEDICARE

## 2023-09-15 ENCOUNTER — PATIENT MESSAGE (OUTPATIENT)
Dept: ORTHOPEDICS | Facility: CLINIC | Age: 83
End: 2023-09-15
Payer: MEDICARE

## 2023-09-18 ENCOUNTER — DOCUMENT SCAN (OUTPATIENT)
Dept: HOME HEALTH SERVICES | Facility: HOSPITAL | Age: 83
End: 2023-09-18
Payer: MEDICARE

## 2023-09-18 ENCOUNTER — OFFICE VISIT (OUTPATIENT)
Dept: ORTHOPEDICS | Facility: CLINIC | Age: 83
End: 2023-09-18
Payer: MEDICARE

## 2023-09-18 VITALS
SYSTOLIC BLOOD PRESSURE: 160 MMHG | HEART RATE: 60 BPM | HEIGHT: 63 IN | BODY MASS INDEX: 26.21 KG/M2 | WEIGHT: 147.94 LBS | TEMPERATURE: 97 F | DIASTOLIC BLOOD PRESSURE: 71 MMHG

## 2023-09-18 DIAGNOSIS — M25.522 LEFT ELBOW PAIN: ICD-10-CM

## 2023-09-18 DIAGNOSIS — S52.022D OLECRANON FRACTURE, LEFT, CLOSED, WITH ROUTINE HEALING, SUBSEQUENT ENCOUNTER: Primary | ICD-10-CM

## 2023-09-18 PROCEDURE — 99213 PR OFFICE/OUTPT VISIT, EST, LEVL III, 20-29 MIN: ICD-10-PCS | Mod: S$PBB,,, | Performed by: ORTHOPAEDIC SURGERY

## 2023-09-18 PROCEDURE — 99214 OFFICE O/P EST MOD 30 MIN: CPT | Mod: PBBFAC | Performed by: ORTHOPAEDIC SURGERY

## 2023-09-18 PROCEDURE — 99213 OFFICE O/P EST LOW 20 MIN: CPT | Mod: S$PBB,,, | Performed by: ORTHOPAEDIC SURGERY

## 2023-09-18 PROCEDURE — 99999 PR PBB SHADOW E&M-EST. PATIENT-LVL IV: ICD-10-PCS | Mod: PBBFAC,,, | Performed by: ORTHOPAEDIC SURGERY

## 2023-09-18 PROCEDURE — 99999 PR PBB SHADOW E&M-EST. PATIENT-LVL IV: CPT | Mod: PBBFAC,,, | Performed by: ORTHOPAEDIC SURGERY

## 2023-09-18 NOTE — PROGRESS NOTES
Date of Injury     (08/15/2023)        Chief Complaint: Pain of the Left Elbow      HPI: Rebecca Rios is a 82 y.o. RHD female who is here for follow-up of her  left olecranon.  She is brought in by her daughter.     They made the appointment today because the hinged elbow brace keeps sliding down.  She has tried tightening the straps abut it's just aggravating her.  She is wondering if she can have a cast.    Past Medical History:   Diagnosis Date    Anxiety     Asymptomatic stenosis of both carotid arteries without infarction 1/28/2022    Atypical chest pain 11/9/2017    CAD (coronary artery disease) 11/18/2014    Chronic diastolic heart failure 8/17/2017    Coronary artery disease     Depression     Fractures     Hot flash, menopausal     HTN (hypertension)     Hyperlipidemia     Hypothyroid     Nonrheumatic mitral valve regurgitation 10/19/2022    Nonrheumatic tricuspid valve regurgitation 10/19/2022    Pulmonary hypertension 8/17/2017       Past Surgical History:   Procedure Laterality Date    CARPAL TUNNEL RELEASE Bilateral     CHOLECYSTECTOMY      HYSTERECTOMY      open luis       Family History   Problem Relation Age of Onset    Hypertension Mother      Social History     Socioeconomic History    Marital status: Other   Tobacco Use    Smoking status: Never    Smokeless tobacco: Never   Substance and Sexual Activity    Alcohol use: No    Drug use: No    Sexual activity: Not Currently     Partners: Male     Social Determinants of Health     Food Insecurity: No Food Insecurity (8/16/2023)    Hunger Vital Sign     Worried About Running Out of Food in the Last Year: Never true     Ran Out of Food in the Last Year: Never true   Transportation Needs: No Transportation Needs (8/16/2023)    PRAPARE - Transportation     Lack of Transportation (Medical): No     Lack of Transportation (Non-Medical): No   Housing Stability: Unknown (8/16/2023)    Housing Stability Vital Sign     Unable to Pay for Housing in the  Last Year: No     Unstable Housing in the Last Year: No     Medication List with Changes/Refills   Current Medications    ACETAMINOPHEN (TYLENOL) 500 MG TABLET    Take 500 mg by mouth every 6 (six) hours as needed.    ALPRAZOLAM (XANAX) 0.5 MG TABLET    Take 0.5 mg by mouth 2 (two) times daily as needed for Anxiety.    AMLODIPINE (NORVASC) 5 MG TABLET    Take 5 mg by mouth once daily.    ASPIRIN (ECOTRIN) 81 MG EC TABLET    Take 1 tablet (81 mg total) by mouth once daily.    CHOLECALCIFEROL, VITAMIN D3, 125 MCG (5,000 UNIT) CAPSULE    Take 5,000 Units by mouth.    DICYCLOMINE (BENTYL) 20 MG TABLET    Take 20 mg by mouth 4 (four) times daily.    DIPHENOXYLATE-ATROPINE 2.5-0.025 MG (LOMOTIL) 2.5-0.025 MG PER TABLET    Take 1 tablet by mouth 4 (four) times daily as needed.    ESOMEPRAZOLE (NEXIUM) 40 MG CAPSULE    Take 40 mg by mouth once daily.     FLUTICASONE PROPIONATE (FLONASE) 50 MCG/ACTUATION NASAL SPRAY    2 sprays (100 mcg total) by Each Nostril route once daily.    HYDROCODONE-ACETAMINOPHEN (NORCO)  MG PER TABLET    TAKE 1 TABLET BY MOUTH EVERY 8 HOURS AS NEEDED FOR PAIN    LEVOTHYROXINE (SYNTHROID) 75 MCG TABLET    TAKE ONE TABLET BY MOUTH ONCE DAILY    LIPASE-PROTEASE-AMYLASE 24,000-76,000-120,000 UNITS (CREON) 24,000-76,000 -120,000 UNIT CAPSULE    Take 1 capsule by mouth 3 (three) times daily with meals.    LISINOPRIL (PRINIVIL,ZESTRIL) 40 MG TABLET    TAKE 1 TABLET (40 MG TOTAL) BY MOUTH ONCE DAILY.    LORATADINE (CLARITIN) 10 MG TABLET    Take 10 mg by mouth once daily.    MELATONIN 10 MG TAB    Take by mouth.    OMEGA-3 FATTY ACIDS/FISH OIL (FISH OIL-OMEGA-3 FATTY ACIDS) 300-1,000 MG CAPSULE    Take 2 g by mouth once daily.    SERTRALINE (ZOLOFT) 25 MG TABLET    Take by mouth.     Review of patient's allergies indicates:   Allergen Reactions    Erythromycin     Macrolide antibiotics Nausea And Vomiting       Physical Exam:     Wt Readings from Last 1 Encounters:   09/18/23 67.1 kg (147 lb 14.9 oz)      Temp Readings from Last 1 Encounters:   09/18/23 97.1 °F (36.2 °C) (Oral)     BP Readings from Last 1 Encounters:   09/18/23 (!) 160/71     Pulse Readings from Last 1 Encounters:   09/18/23 60       Body mass index is 26.2 kg/m².    General Appearance:   NAD, well appearing, cooperative    Neurologic:  Alert and oriented x3    Pysch:  Age appropriate    GAIT:  Smooth concentric gait with assistive device    Musculoskeletal:     Left elbow: Skin intact.  Red spot seen at last appointment has resolved.  No erythema. TTP over olecranon.  Defect in olecranon palpated and tender.  Swelling resolved throughout the arm.  Ecchymosis nearly resolved.  ROM of wrist good.   Distal neurovascular status intact.                           Assessment:       Encounter Diagnoses   Name Primary?    Olecranon fracture, left, closed, with routine healing, subsequent encounter Yes    Left elbow pain               DISCUSSION:   Patient's symptoms, imaging, diagnosis and prognosis reviewed and discussed.  Discussed  healing progression.   Discussed weight bearing status and the progression Discussed the need for compliance with treatment.     Patient given an opportunity to ask questions.       Plan:       Rebecca was seen today for pain.    Diagnoses and all orders for this visit:    Olecranon fracture, left, closed, with routine healing, subsequent encounter    Left elbow pain         Long arm posterior fiberglass splint applied with elbow in full extension with good padding.  Patient and daughter instructed to call if splint starts rubbing  Follow-up in approx 1 month with xrays IN SPLINT         Anna Mendez DO, RUTH ANN, [facility]  Orthopaedic Surgeon

## 2023-09-20 RX ORDER — AMLODIPINE BESYLATE 5 MG/1
TABLET ORAL
Qty: 90 TABLET | Refills: 3 | Status: SHIPPED | OUTPATIENT
Start: 2023-09-20 | End: 2024-02-01 | Stop reason: ALTCHOICE

## 2023-09-28 ENCOUNTER — PATIENT MESSAGE (OUTPATIENT)
Dept: ORTHOPEDICS | Facility: CLINIC | Age: 83
End: 2023-09-28
Payer: MEDICARE

## 2023-10-05 ENCOUNTER — TELEPHONE (OUTPATIENT)
Dept: ORTHOPEDICS | Facility: CLINIC | Age: 83
End: 2023-10-05
Payer: MEDICARE

## 2023-10-05 NOTE — TELEPHONE ENCOUNTER
Spoke with Leslie/ [facility] and she will get an Aide out once weekly for splint adjustment and skin checks for patient and has advised patient regarding skin care around her splint. Understanding verbalized.

## 2023-10-05 NOTE — TELEPHONE ENCOUNTER
----- Message from Jose Judd sent at 10/5/2023  9:23 AM CDT -----  Contact: Leslie/[facility]  Patients home health nurse Leslie is calling to speak with the nurse in regards to concerns about patients splint and a rash. Please give Leslie a callback at [phone].

## 2023-10-18 PROCEDURE — G0179 PR HOME HEALTH MD RECERTIFICATION: ICD-10-PCS | Mod: ,,, | Performed by: NURSE PRACTITIONER

## 2023-10-18 PROCEDURE — G0179 MD RECERTIFICATION HHA PT: HCPCS | Mod: ,,, | Performed by: NURSE PRACTITIONER

## 2023-10-25 ENCOUNTER — OFFICE VISIT (OUTPATIENT)
Dept: ORTHOPEDICS | Facility: CLINIC | Age: 83
End: 2023-10-25
Payer: MEDICARE

## 2023-10-25 ENCOUNTER — HOSPITAL ENCOUNTER (OUTPATIENT)
Dept: RADIOLOGY | Facility: HOSPITAL | Age: 83
Discharge: HOME | End: 2023-10-25
Attending: ORTHOPAEDIC SURGERY
Payer: MEDICARE

## 2023-10-25 VITALS
WEIGHT: 147 LBS | HEIGHT: 63 IN | SYSTOLIC BLOOD PRESSURE: 149 MMHG | BODY MASS INDEX: 26.05 KG/M2 | TEMPERATURE: 99 F | DIASTOLIC BLOOD PRESSURE: 66 MMHG | HEART RATE: 63 BPM

## 2023-10-25 DIAGNOSIS — M25.522 LEFT ELBOW PAIN: Primary | ICD-10-CM

## 2023-10-25 DIAGNOSIS — S52.022D CLOSED FRACTURE OF OLECRANON PROCESS OF LEFT ULNA WITH ROUTINE HEALING, SUBSEQUENT ENCOUNTER: Primary | ICD-10-CM

## 2023-10-25 DIAGNOSIS — M25.522 LEFT ELBOW PAIN: ICD-10-CM

## 2023-10-25 DIAGNOSIS — Z91.81 AT HIGH RISK FOR INJURY RELATED TO FALL: ICD-10-CM

## 2023-10-25 PROCEDURE — 99213 PR OFFICE/OUTPT VISIT, EST, LEVL III, 20-29 MIN: ICD-10-PCS | Mod: S$PBB,,, | Performed by: ORTHOPAEDIC SURGERY

## 2023-10-25 PROCEDURE — 73080 XR ELBOW COMPLETE 3 VIEW LEFT: ICD-10-PCS | Mod: 26,LT,, | Performed by: RADIOLOGY

## 2023-10-25 PROCEDURE — 73080 X-RAY EXAM OF ELBOW: CPT | Mod: 26,LT,, | Performed by: RADIOLOGY

## 2023-10-25 PROCEDURE — 99214 OFFICE O/P EST MOD 30 MIN: CPT | Mod: PBBFAC | Performed by: ORTHOPAEDIC SURGERY

## 2023-10-25 PROCEDURE — 99999 PR PBB SHADOW E&M-EST. PATIENT-LVL IV: ICD-10-PCS | Mod: PBBFAC,,, | Performed by: ORTHOPAEDIC SURGERY

## 2023-10-25 PROCEDURE — 99213 OFFICE O/P EST LOW 20 MIN: CPT | Mod: S$PBB,,, | Performed by: ORTHOPAEDIC SURGERY

## 2023-10-25 PROCEDURE — 99999 PR PBB SHADOW E&M-EST. PATIENT-LVL IV: CPT | Mod: PBBFAC,,, | Performed by: ORTHOPAEDIC SURGERY

## 2023-10-25 PROCEDURE — 73080 X-RAY EXAM OF ELBOW: CPT | Mod: TC,LT

## 2023-10-25 NOTE — PATIENT INSTRUCTIONS
May start to use the left arm as tolerated.    Have someone take you out to a parking lot to practice driving.    Do massage of the skin over the elbow to help loosen it up.     The elbow stiffness will improve.     You may use the elbow as tolerated -stiffness/soreness is ok, pain is not.  Pain is a sign you're doing tooo much!

## 2023-10-25 NOTE — PROGRESS NOTES
Date of Injury     (date: 08/17/2023)                                                        Patient ID: Rebecca Rios is a 82 y.o. female.    Chief Complaint: Pain and Injury of the Left Elbow      HPI: Rebecca Rios is a 82 y.o. RHD female who is here for follow-up of her left elbow fracture. She reports the elbow is doing better.  Today, the patient's pain is mild .      She is accompanied by her daughter Brooke.      She and her daughter both report she has had several near falls.  She does not have a walker and the cane is very unsteady for her.     Past Medical History:   Diagnosis Date    Anxiety     Asymptomatic stenosis of both carotid arteries without infarction 1/28/2022    Atypical chest pain 11/9/2017    CAD (coronary artery disease) 11/18/2014    Chronic diastolic heart failure 8/17/2017    Coronary artery disease     Depression     Fractures     Hot flash, menopausal     HTN (hypertension)     Hyperlipidemia     Hypothyroid     Nonrheumatic mitral valve regurgitation 10/19/2022    Nonrheumatic tricuspid valve regurgitation 10/19/2022    Pulmonary hypertension 8/17/2017       Past Surgical History:   Procedure Laterality Date    CARPAL TUNNEL RELEASE Bilateral     CHOLECYSTECTOMY      HYSTERECTOMY      open luis       Family History   Problem Relation Age of Onset    Hypertension Mother      Social History     Socioeconomic History    Marital status: Other   Tobacco Use    Smoking status: Never    Smokeless tobacco: Never   Substance and Sexual Activity    Alcohol use: No    Drug use: No    Sexual activity: Not Currently     Partners: Male     Social Determinants of Health     Food Insecurity: No Food Insecurity (8/16/2023)    Hunger Vital Sign     Worried About Running Out of Food in the Last Year: Never true     Ran Out of Food in the Last Year: Never true   Transportation Needs: No Transportation Needs (8/16/2023)    PRAPARE - Transportation     Lack of Transportation (Medical): No     Lack  of Transportation (Non-Medical): No   Housing Stability: Unknown (8/16/2023)    Housing Stability Vital Sign     Unable to Pay for Housing in the Last Year: No     Unstable Housing in the Last Year: No     Medication List with Changes/Refills   New Medications    WALKER MISC    1 Units by Misc.(Non-Drug; Combo Route) route as needed.   Current Medications    ACETAMINOPHEN (TYLENOL) 500 MG TABLET    Take 500 mg by mouth every 6 (six) hours as needed.    ALPRAZOLAM (XANAX) 0.5 MG TABLET    Take 0.5 mg by mouth 2 (two) times daily as needed for Anxiety.    AMLODIPINE (NORVASC) 5 MG TABLET    TAKE 1 TABLET (5 MG) BY MOUTH ONCE DAILY    ASPIRIN (ECOTRIN) 81 MG EC TABLET    Take 1 tablet (81 mg total) by mouth once daily.    CHOLECALCIFEROL, VITAMIN D3, 125 MCG (5,000 UNIT) CAPSULE    Take 5,000 Units by mouth.    DICYCLOMINE (BENTYL) 20 MG TABLET    Take 20 mg by mouth 4 (four) times daily.    DIPHENOXYLATE-ATROPINE 2.5-0.025 MG (LOMOTIL) 2.5-0.025 MG PER TABLET    Take 1 tablet by mouth 4 (four) times daily as needed.    ESOMEPRAZOLE (NEXIUM) 40 MG CAPSULE    Take 40 mg by mouth once daily.     FLUTICASONE PROPIONATE (FLONASE) 50 MCG/ACTUATION NASAL SPRAY    2 sprays (100 mcg total) by Each Nostril route once daily.    HYDROCODONE-ACETAMINOPHEN (NORCO)  MG PER TABLET    TAKE 1 TABLET BY MOUTH EVERY 8 HOURS AS NEEDED FOR PAIN    LEVOTHYROXINE (SYNTHROID) 75 MCG TABLET    TAKE ONE TABLET BY MOUTH ONCE DAILY    LIPASE-PROTEASE-AMYLASE 24,000-76,000-120,000 UNITS (CREON) 24,000-76,000 -120,000 UNIT CAPSULE    Take 1 capsule by mouth 3 (three) times daily with meals.    LISINOPRIL (PRINIVIL,ZESTRIL) 40 MG TABLET    TAKE 1 TABLET (40 MG TOTAL) BY MOUTH ONCE DAILY.    LORATADINE (CLARITIN) 10 MG TABLET    Take 10 mg by mouth once daily.    MELATONIN 10 MG TAB    Take by mouth.    OMEGA-3 FATTY ACIDS/FISH OIL (FISH OIL-OMEGA-3 FATTY ACIDS) 300-1,000 MG CAPSULE    Take 2 g by mouth once daily.    SERTRALINE (ZOLOFT) 25 MG  TABLET    Take by mouth.     Review of patient's allergies indicates:   Allergen Reactions    Erythromycin     Macrolide antibiotics Nausea And Vomiting       Physical Exam:     Wt Readings from Last 1 Encounters:   10/25/23 66.7 kg (147 lb)     Temp Readings from Last 1 Encounters:   10/25/23 98.6 °F (37 °C) (Oral)     BP Readings from Last 1 Encounters:   10/25/23 (!) 149/66     Pulse Readings from Last 1 Encounters:   10/25/23 63       Body mass index is 26.04 kg/m².    General Appearance:   NAD, well appearing, cooperative    Neurologic:  Alert and oriented x3    Pysch:  Age appropriate    GAIT:  Seated erect    Musculoskeletal:     LEFT elbow:  Skin intact.  Forearm dry.  Skin at fracture site adhesed to the bone.  No erythema. Swelling resolved.  Ecchymosis resolved.  AROM .  Pushing strength with elbow 4/5.  Distal neurovascular status intact.                         MAGING: LEFT Elbow 3v in splint  Proximal olecranon fragment displaced suepriorly but in unchanged position.     Assessment:       Encounter Diagnoses   Name Primary?    Closed fracture of olecranon process of left ulna with routine healing, subsequent encounter Yes    At high risk for injury related to fall               DISCUSSION:   Patient's symptoms, imaging, diagnosis and prognosis reviewed and discussed.  Discussed the fracture position again and discussed it will not improve without surgery.  Discussed weight bearing status and the progression increased weight bearing as fracture heals.  D     Patient given an opportunity to ask questions.  When her questions, were answered, the following plan was adopted.      Plan:       Rebecca was seen today for pain and injury.    Diagnoses and all orders for this visit:    Closed fracture of olecranon process of left ulna with routine healing, subsequent encounter  -     SUBSEQUENT HOME HEALTH ORDERS    At high risk for injury related to fall         May use left arm as tolerated  Should do skin  massage to help mobilize skin that has scarred down.   Rx for rollator walker entered to help prevent further falls  Follow-up in 4w with XR     The patient understands, chooses and consents to this plan and accepts all   the risks which include but are not limited to the risks mentioned above.             Anna Mendez DO, CAQSM, [facility]  Orthopaedic Surgeon

## 2023-10-26 ENCOUNTER — PATIENT MESSAGE (OUTPATIENT)
Dept: ORTHOPEDICS | Facility: CLINIC | Age: 83
End: 2023-10-26
Payer: MEDICARE

## 2023-11-02 ENCOUNTER — TELEPHONE (OUTPATIENT)
Dept: ORTHOPEDICS | Facility: CLINIC | Age: 83
End: 2023-11-02
Payer: MEDICARE

## 2023-11-02 ENCOUNTER — PATIENT MESSAGE (OUTPATIENT)
Dept: ORTHOPEDICS | Facility: CLINIC | Age: 83
End: 2023-11-02
Payer: MEDICARE

## 2023-11-02 NOTE — TELEPHONE ENCOUNTER
Spoke with Daylin/ [facility] and informed her that the request was sent to Dr Mendez for an order and that I would let her know what she decides. Understanding verbalized.

## 2023-11-02 NOTE — TELEPHONE ENCOUNTER
----- Message from Emelia Griffin sent at 2023 12:51 PM CDT -----  Daylin with [facility] OT called regarding an order for a Rollator. Call back number is [phone] or fax to [phone]. It will need pt's name, , weight, height and diagnosis code. Thx. EL

## 2023-11-03 DIAGNOSIS — R26.81 UNSTEADY GAIT: Primary | ICD-10-CM

## 2023-11-06 DIAGNOSIS — R26.81 UNSTEADY GAIT: Primary | ICD-10-CM

## 2023-11-07 DIAGNOSIS — R26.81 UNSTEADY GAIT: Primary | ICD-10-CM

## 2023-11-08 ENCOUNTER — DOCUMENTATION ONLY (OUTPATIENT)
Dept: ORTHOPEDICS | Facility: CLINIC | Age: 83
End: 2023-11-08
Payer: MEDICARE

## 2023-11-08 NOTE — PROGRESS NOTES
The mobility limitation cannot be sufficiently resolved by the use of a cane.   Patient's functional mobility deficit can be sufficiently resolved with the use of a Rollator Walker.  Patient's mobility limitation significantly impairs their ability to participate in one of more activities of daily living.  The use of a Rollator Walker will significantly improve the patient's ability to participate in MRADLS and the patient will use it on regular basis in the home.

## 2023-11-10 ENCOUNTER — PATIENT MESSAGE (OUTPATIENT)
Dept: ORTHOPEDICS | Facility: CLINIC | Age: 83
End: 2023-11-10
Payer: MEDICARE

## 2023-11-21 ENCOUNTER — EXTERNAL HOME HEALTH (OUTPATIENT)
Dept: HOME HEALTH SERVICES | Facility: HOSPITAL | Age: 83
End: 2023-11-21
Payer: MEDICARE

## 2023-11-29 ENCOUNTER — HOSPITAL ENCOUNTER (OUTPATIENT)
Dept: RADIOLOGY | Facility: HOSPITAL | Age: 83
Discharge: HOME | End: 2023-11-29
Attending: ORTHOPAEDIC SURGERY
Payer: MEDICARE

## 2023-11-29 ENCOUNTER — OFFICE VISIT (OUTPATIENT)
Dept: ORTHOPEDICS | Facility: CLINIC | Age: 83
End: 2023-11-29
Payer: MEDICARE

## 2023-11-29 VITALS
SYSTOLIC BLOOD PRESSURE: 170 MMHG | BODY MASS INDEX: 26.05 KG/M2 | WEIGHT: 147 LBS | DIASTOLIC BLOOD PRESSURE: 76 MMHG | HEART RATE: 65 BPM | TEMPERATURE: 97 F | HEIGHT: 63 IN

## 2023-11-29 DIAGNOSIS — M25.522 LEFT ELBOW PAIN: ICD-10-CM

## 2023-11-29 DIAGNOSIS — S52.022G: Primary | ICD-10-CM

## 2023-11-29 DIAGNOSIS — R26.81 UNSTEADY GAIT: ICD-10-CM

## 2023-11-29 DIAGNOSIS — M25.562 ACUTE PAIN OF LEFT KNEE: Primary | ICD-10-CM

## 2023-11-29 PROCEDURE — 99999 PR PBB SHADOW E&M-EST. PATIENT-LVL IV: CPT | Mod: PBBFAC,,, | Performed by: ORTHOPAEDIC SURGERY

## 2023-11-29 PROCEDURE — 73080 XR ELBOW COMPLETE 3 VIEW LEFT: ICD-10-PCS | Mod: 26,LT,, | Performed by: RADIOLOGY

## 2023-11-29 PROCEDURE — 99213 OFFICE O/P EST LOW 20 MIN: CPT | Mod: S$PBB,,, | Performed by: ORTHOPAEDIC SURGERY

## 2023-11-29 PROCEDURE — 99999 PR PBB SHADOW E&M-EST. PATIENT-LVL IV: ICD-10-PCS | Mod: PBBFAC,,, | Performed by: ORTHOPAEDIC SURGERY

## 2023-11-29 PROCEDURE — 99213 PR OFFICE/OUTPT VISIT, EST, LEVL III, 20-29 MIN: ICD-10-PCS | Mod: S$PBB,,, | Performed by: ORTHOPAEDIC SURGERY

## 2023-11-29 PROCEDURE — 73080 X-RAY EXAM OF ELBOW: CPT | Mod: TC,LT

## 2023-11-29 PROCEDURE — 73080 X-RAY EXAM OF ELBOW: CPT | Mod: 26,LT,, | Performed by: RADIOLOGY

## 2023-11-29 PROCEDURE — 99214 OFFICE O/P EST MOD 30 MIN: CPT | Mod: PBBFAC | Performed by: ORTHOPAEDIC SURGERY

## 2023-12-06 NOTE — PROGRESS NOTES
Date of Injury     (08/17/2023)  Chief Complaint: Pain of the Left Elbow      HPI: Rebecca Rios is a 83 y.o. RHD female who is here for follow-up of her  Left elbow injury.  She is accompanied by one of her sisters.    She reports the elbow pain has resolved.  She has pain if she bumps it.  She reports she had good motion and strength after working with PT.    She reports the pain she reported is in her knee today.  She would like to have her knee examined.     Past Medical History:   Diagnosis Date    Anxiety     Asymptomatic stenosis of both carotid arteries without infarction 1/28/2022    Atypical chest pain 11/9/2017    CAD (coronary artery disease) 11/18/2014    Chronic diastolic heart failure 8/17/2017    Coronary artery disease     Depression     Fractures     Hot flash, menopausal     HTN (hypertension)     Hyperlipidemia     Hypothyroid     Nonrheumatic mitral valve regurgitation 10/19/2022    Nonrheumatic tricuspid valve regurgitation 10/19/2022    Pulmonary hypertension 8/17/2017       Past Surgical History:   Procedure Laterality Date    CARPAL TUNNEL RELEASE Bilateral     CHOLECYSTECTOMY      HYSTERECTOMY      open luis       Family History   Problem Relation Age of Onset    Hypertension Mother      Social History     Socioeconomic History    Marital status: Other   Tobacco Use    Smoking status: Never    Smokeless tobacco: Never   Substance and Sexual Activity    Alcohol use: No    Drug use: No    Sexual activity: Not Currently     Partners: Male     Social Determinants of Health     Food Insecurity: No Food Insecurity (8/16/2023)    Hunger Vital Sign     Worried About Running Out of Food in the Last Year: Never true     Ran Out of Food in the Last Year: Never true   Transportation Needs: No Transportation Needs (8/16/2023)    PRAPARE - Transportation     Lack of Transportation (Medical): No     Lack of Transportation (Non-Medical): No   Housing Stability: Unknown (8/16/2023)    Housing  Stability Vital Sign     Unable to Pay for Housing in the Last Year: No     Unstable Housing in the Last Year: No     Medication List with Changes/Refills   Current Medications    ACETAMINOPHEN (TYLENOL) 500 MG TABLET    Take 500 mg by mouth every 6 (six) hours as needed.    ALPRAZOLAM (XANAX) 0.5 MG TABLET    Take 0.5 mg by mouth 2 (two) times daily as needed for Anxiety.    AMLODIPINE (NORVASC) 5 MG TABLET    TAKE 1 TABLET (5 MG) BY MOUTH ONCE DAILY    ASPIRIN (ECOTRIN) 81 MG EC TABLET    Take 1 tablet (81 mg total) by mouth once daily.    CHOLECALCIFEROL, VITAMIN D3, 125 MCG (5,000 UNIT) CAPSULE    Take 5,000 Units by mouth.    DICYCLOMINE (BENTYL) 20 MG TABLET    Take 20 mg by mouth 4 (four) times daily.    DIPHENOXYLATE-ATROPINE 2.5-0.025 MG (LOMOTIL) 2.5-0.025 MG PER TABLET    Take 1 tablet by mouth 4 (four) times daily as needed.    ESOMEPRAZOLE (NEXIUM) 40 MG CAPSULE    Take 40 mg by mouth once daily.     FLUTICASONE PROPIONATE (FLONASE) 50 MCG/ACTUATION NASAL SPRAY    2 sprays (100 mcg total) by Each Nostril route once daily.    HYDROCODONE-ACETAMINOPHEN (NORCO)  MG PER TABLET    TAKE 1 TABLET BY MOUTH EVERY 8 HOURS AS NEEDED FOR PAIN    LEVOTHYROXINE (SYNTHROID) 75 MCG TABLET    TAKE ONE TABLET BY MOUTH ONCE DAILY    LIPASE-PROTEASE-AMYLASE 24,000-76,000-120,000 UNITS (CREON) 24,000-76,000 -120,000 UNIT CAPSULE    Take 1 capsule by mouth 3 (three) times daily with meals.    LISINOPRIL (PRINIVIL,ZESTRIL) 40 MG TABLET    TAKE 1 TABLET (40 MG TOTAL) BY MOUTH ONCE DAILY.    LORATADINE (CLARITIN) 10 MG TABLET    Take 10 mg by mouth once daily.    MELATONIN 10 MG TAB    Take by mouth.    OMEGA-3 FATTY ACIDS/FISH OIL (FISH OIL-OMEGA-3 FATTY ACIDS) 300-1,000 MG CAPSULE    Take 2 g by mouth once daily.    SERTRALINE (ZOLOFT) 25 MG TABLET    Take by mouth.    WALKER MISC    1 Units by Misc.(Non-Drug; Combo Route) route as needed.     Review of patient's allergies indicates:   Allergen Reactions    Erythromycin      Macrolide antibiotics Nausea And Vomiting       Physical Exam:     Wt Readings from Last 1 Encounters:   11/29/23 66.7 kg (147 lb)     Temp Readings from Last 1 Encounters:   11/29/23 97.4 °F (36.3 °C) (Oral)     BP Readings from Last 1 Encounters:   11/29/23 (!) 170/76     Pulse Readings from Last 1 Encounters:   11/29/23 65       Body mass index is 26.04 kg/m².    General Appearance:   NAD, well appearing, cooperative    Neurologic:  Alert and oriented x3    Pysch:  Age appropriate      Musculoskeletal:     Left elbow:  Skin intact.  No longer adherent to the bone but not fully mobile.  No erythema.  Swelling full resolved.  Ecchymosis fully resolved. Minimally TTP over the bone.  AROM 0-120.  Pushing strength 4/5.  Arm/forearm soft.  Distal neurovascular status intact.                        IMAGING: LEFT Elbow XR 3v  Proximal olecranon fragment migrated proximally and rotated position - essentially unchnaged since last xrays.  Osteopenia noted.     Assessment:       Encounter Diagnoses   Name Primary?    Closed olecranon fracture, left, with delayed healing, subsequent encounter Yes    Unsteady gait                Plan:       Rebecca was seen today for pain.    Diagnoses and all orders for this visit:    Closed olecranon fracture, left, with delayed healing, subsequent encounter    Unsteady gait         Continue to use elbow as tolerated  Continue to work on skin mobility  Follow-up in a couple of weeks for evaluation of knee pain - with XRs     The patient understands, chooses and consents to this plan and accepts all   the risks which include but are not limited to the risks mentioned above.             Anna Mendez, DO, CAQSM, FAOAO  Orthopaedic Surgeon

## 2023-12-18 ENCOUNTER — OFFICE VISIT (OUTPATIENT)
Dept: OPHTHALMOLOGY | Facility: CLINIC | Age: 83
End: 2023-12-18
Payer: MEDICARE

## 2023-12-18 DIAGNOSIS — Z96.1 PSEUDOPHAKIA OF BOTH EYES: Primary | ICD-10-CM

## 2023-12-18 DIAGNOSIS — H52.7 REFRACTION DISORDER: ICD-10-CM

## 2023-12-18 PROCEDURE — 99999 PR PBB SHADOW E&M-EST. PATIENT-LVL III: ICD-10-PCS | Mod: PBBFAC,,, | Performed by: OPTOMETRIST

## 2023-12-18 PROCEDURE — 99213 OFFICE O/P EST LOW 20 MIN: CPT | Mod: PBBFAC,PO | Performed by: OPTOMETRIST

## 2023-12-18 PROCEDURE — 92015 PR REFRACTION: ICD-10-PCS | Mod: ,,, | Performed by: OPTOMETRIST

## 2023-12-18 PROCEDURE — 92004 PR EYE EXAM, NEW PATIENT,COMPREHESV: ICD-10-PCS | Mod: S$PBB,,, | Performed by: OPTOMETRIST

## 2023-12-18 PROCEDURE — 92015 DETERMINE REFRACTIVE STATE: CPT | Mod: ,,, | Performed by: OPTOMETRIST

## 2023-12-18 PROCEDURE — 99999 PR PBB SHADOW E&M-EST. PATIENT-LVL III: CPT | Mod: PBBFAC,,, | Performed by: OPTOMETRIST

## 2023-12-18 PROCEDURE — 92004 COMPRE OPH EXAM NEW PT 1/>: CPT | Mod: S$PBB,,, | Performed by: OPTOMETRIST

## 2023-12-18 NOTE — PROGRESS NOTES
SUBJECTIVE  Rebecca Rios is 83 y.o. female  Corrected distance visual acuity was 20/60 in the right eye and 20/40 in the left eye. Corrected near visual acuity was J4 in the right eye and J4 in the left eye.   Chief Complaint   Patient presents with    Hypertensive Eye Exam     Pt here for annual HTN eye exam. Pt failed her DL test on 11/29/2023.Pt states when reading the chart with her glasses on the chart was blurry. Pt denies flashes and floaters.           HPI     Hypertensive Eye Exam     Additional comments: Pt here for annual HTN eye exam. Pt failed her DL   test on 11/29/2023.Pt states when reading the chart with her glasses on   the chart was blurry. Pt denies flashes and floaters.            Comments    PCP: Dr: Amber Pagan    PCIOL OD 10-22-09 Distance   PCIOL OS 11-15-09 Near   YAG OS  PCO OD             Last edited by Darwin Medina, OD on 12/18/2023  9:38 AM.         Assessment /Plan :  1. Pseudophakia of both eyes   Stable IOL OU.     2. Refraction disorder   Dispense Final Rx for glasses.  RTC 1 year  Discussed above and answered questions.  Filled out DL form

## 2024-01-03 ENCOUNTER — PATIENT MESSAGE (OUTPATIENT)
Dept: ORTHOPEDICS | Facility: CLINIC | Age: 84
End: 2024-01-03
Payer: MEDICARE

## 2024-01-04 DIAGNOSIS — S52.022D CLOSED FRACTURE OF OLECRANON PROCESS OF LEFT ULNA WITH ROUTINE HEALING, SUBSEQUENT ENCOUNTER: ICD-10-CM

## 2024-01-04 DIAGNOSIS — M25.522 LEFT ELBOW PAIN: ICD-10-CM

## 2024-01-04 DIAGNOSIS — S52.022D OLECRANON FRACTURE, LEFT, CLOSED, WITH ROUTINE HEALING, SUBSEQUENT ENCOUNTER: ICD-10-CM

## 2024-01-04 DIAGNOSIS — M25.562 ACUTE PAIN OF LEFT KNEE: Primary | ICD-10-CM

## 2024-01-04 DIAGNOSIS — R26.81 UNSTEADY GAIT: ICD-10-CM

## 2024-01-04 DIAGNOSIS — S52.022G: ICD-10-CM

## 2024-01-04 DIAGNOSIS — Z91.81 AT HIGH RISK FOR INJURY RELATED TO FALL: ICD-10-CM

## 2024-01-31 ENCOUNTER — PATIENT MESSAGE (OUTPATIENT)
Dept: ORTHOPEDICS | Facility: CLINIC | Age: 84
End: 2024-01-31
Payer: MEDICARE

## 2024-01-31 ENCOUNTER — TELEPHONE (OUTPATIENT)
Dept: ORTHOPEDICS | Facility: CLINIC | Age: 84
End: 2024-01-31
Payer: MEDICARE

## 2024-01-31 DIAGNOSIS — M25.562 PAIN IN BOTH KNEES, UNSPECIFIED CHRONICITY: Primary | ICD-10-CM

## 2024-01-31 DIAGNOSIS — M25.561 PAIN IN BOTH KNEES, UNSPECIFIED CHRONICITY: Primary | ICD-10-CM

## 2024-01-31 NOTE — TELEPHONE ENCOUNTER
----- Message from Kaye Greer sent at 1/31/2024  1:09 PM CST -----  Regarding: pt call back  Name of Who is Calling:Pt Daughter         What is the request in detail: Inquiring on next open appt please advise           Can the clinic reply by JOSENER: yes         [facility]:Telephone Information:  [employer]          [phone]

## 2024-02-01 ENCOUNTER — OFFICE VISIT (OUTPATIENT)
Dept: CARDIOLOGY | Facility: CLINIC | Age: 84
End: 2024-02-01
Payer: MEDICARE

## 2024-02-01 ENCOUNTER — TELEPHONE (OUTPATIENT)
Dept: CARDIOLOGY | Facility: HOSPITAL | Age: 84
End: 2024-02-01
Payer: MEDICARE

## 2024-02-01 VITALS
DIASTOLIC BLOOD PRESSURE: 62 MMHG | BODY MASS INDEX: 27.19 KG/M2 | WEIGHT: 153.44 LBS | HEART RATE: 64 BPM | OXYGEN SATURATION: 97 % | HEIGHT: 63 IN | SYSTOLIC BLOOD PRESSURE: 130 MMHG

## 2024-02-01 DIAGNOSIS — E66.3 OVERWEIGHT (BMI 25.0-29.9): ICD-10-CM

## 2024-02-01 DIAGNOSIS — I50.32 CHRONIC DIASTOLIC HEART FAILURE: Primary | ICD-10-CM

## 2024-02-01 DIAGNOSIS — R94.31 ABNORMAL ECG: ICD-10-CM

## 2024-02-01 DIAGNOSIS — I34.0 NONRHEUMATIC MITRAL VALVE REGURGITATION: ICD-10-CM

## 2024-02-01 DIAGNOSIS — I36.1 NONRHEUMATIC TRICUSPID VALVE REGURGITATION: ICD-10-CM

## 2024-02-01 DIAGNOSIS — I27.20 PULMONARY HYPERTENSION: ICD-10-CM

## 2024-02-01 DIAGNOSIS — E78.2 MIXED HYPERLIPIDEMIA: ICD-10-CM

## 2024-02-01 DIAGNOSIS — I25.118 CORONARY ARTERY DISEASE OF NATIVE ARTERY OF NATIVE HEART WITH STABLE ANGINA PECTORIS: ICD-10-CM

## 2024-02-01 DIAGNOSIS — R07.89 ATYPICAL CHEST PAIN: ICD-10-CM

## 2024-02-01 DIAGNOSIS — I10 ESSENTIAL (PRIMARY) HYPERTENSION: ICD-10-CM

## 2024-02-01 DIAGNOSIS — I65.23 ASYMPTOMATIC STENOSIS OF BOTH CAROTID ARTERIES WITHOUT INFARCTION: ICD-10-CM

## 2024-02-01 PROCEDURE — 99999 PR PBB SHADOW E&M-EST. PATIENT-LVL III: CPT | Mod: PBBFAC,,, | Performed by: INTERNAL MEDICINE

## 2024-02-01 PROCEDURE — 99213 OFFICE O/P EST LOW 20 MIN: CPT | Mod: PBBFAC,PO | Performed by: INTERNAL MEDICINE

## 2024-02-01 PROCEDURE — 99215 OFFICE O/P EST HI 40 MIN: CPT | Mod: S$PBB,,, | Performed by: INTERNAL MEDICINE

## 2024-02-01 RX ORDER — AMLODIPINE BESYLATE 10 MG/1
10 TABLET ORAL DAILY
Qty: 90 TABLET | Refills: 5 | Status: SHIPPED | OUTPATIENT
Start: 2024-02-01 | End: 2025-07-25

## 2024-02-01 NOTE — PROGRESS NOTES
"Subjective:    Patient ID:  Rebecca Rios is a 83 y.o. female who presents for evaluation of Congestive Heart Failure, Hyperlipidemia, Hypertension, and Carotid Artery Disease        HPIPt presents for eval.  Her current med conditions include HTN, diastolic CHF, carotid disease, MR, PHTN, LVH, hyperlipidemia, carries dx of CAD in chart.   Nonsmoker.   Past hx pertinent for following:  Stress mpi 2014 fixed anteroapical defect, trivial ischemia.   Echo 2014 normal EF, DD, mod TR, PHTN.  Stress MPI May 2018 no ischemia, normal LVEF.  Echo Sept 2017 normal LVEF, LVH.  Echo Jan 2020 normal EF, indeterminate diastolic function, LVH, mild MR, mild-mod TR.  Carotid u/s Jan 2020 mild disease.   - nuclear stress test May 2021.  Didn't tolerate Crestor due to "headaches".  Carotid u/s July 2022 40 - 49% LICA, 0 - 19% ANTHONY.  Echo July 2022 normal EF, DD, LAE, mild MR, mild-mod TR, PAP 48 mmHg.  Now here..  Pt last seen Oct 2022.  She mentions CP sxs.  Few episodes of atypical CP, short lasting, not typical for angina.  Last episode a month or longer and she thinks it is anxiety.  CHF is stable.  Denies dyspnea, pnd/orthopnea.  No syncope.  BP is controlled.  Takes 2 of her 5 mg Amlodipine nightly.  GERD controlled, on PPI tx.  Ecg Aug 2023 personally reviewed: difficult interpretation w artifact, likely sinus, nonspecific st-t abnl.  Lipids stable.      Past Medical History:   Diagnosis Date    Anxiety     Asymptomatic stenosis of both carotid arteries without infarction 1/28/2022    Atypical chest pain 11/9/2017    CAD (coronary artery disease) 11/18/2014    Chronic diastolic heart failure 8/17/2017    Coronary artery disease     Depression     Fractures     Hot flash, menopausal     HTN (hypertension)     Hyperlipidemia     Hypothyroid     Nonrheumatic mitral valve regurgitation 10/19/2022    Nonrheumatic tricuspid valve regurgitation 10/19/2022    Pulmonary hypertension 8/17/2017       Current Outpatient Medications:     " acetaminophen (TYLENOL) 500 MG tablet, Take 500 mg by mouth every 6 (six) hours as needed., Disp: , Rfl:     alprazolam (XANAX) 0.5 MG tablet, Take 0.5 mg by mouth 2 (two) times daily as needed for Anxiety., Disp: , Rfl:     amLODIPine (NORVASC) 5 MG tablet, TAKE 1 TABLET (5 MG) BY MOUTH ONCE DAILY, Disp: 90 tablet, Rfl: 3    aspirin (ECOTRIN) 81 MG EC tablet, Take 1 tablet (81 mg total) by mouth once daily., Disp: 30 tablet, Rfl: 2    cholecalciferol, vitamin D3, 125 mcg (5,000 unit) capsule, Take 5,000 Units by mouth., Disp: , Rfl:     dicyclomine (BENTYL) 20 mg tablet, Take 20 mg by mouth 4 (four) times daily., Disp: , Rfl:     diphenoxylate-atropine 2.5-0.025 mg (LOMOTIL) 2.5-0.025 mg per tablet, Take 1 tablet by mouth 4 (four) times daily as needed., Disp: , Rfl:     esomeprazole (NEXIUM) 40 MG capsule, Take 40 mg by mouth once daily. , Disp: , Rfl:     fluticasone propionate (FLONASE) 50 mcg/actuation nasal spray, 2 sprays (100 mcg total) by Each Nostril route once daily., Disp: 1 Bottle, Rfl: 12    HYDROcodone-acetaminophen (NORCO)  mg per tablet, TAKE 1 TABLET BY MOUTH EVERY 8 HOURS AS NEEDED FOR PAIN, Disp: , Rfl:     levothyroxine (SYNTHROID) 75 MCG tablet, TAKE ONE TABLET BY MOUTH ONCE DAILY, Disp: 30 tablet, Rfl: 6    lipase-protease-amylase 24,000-76,000-120,000 units (CREON) 24,000-76,000 -120,000 unit capsule, Take 1 capsule by mouth 3 (three) times daily with meals., Disp: , Rfl:     lisinopriL (PRINIVIL,ZESTRIL) 40 MG tablet, TAKE 1 TABLET (40 MG TOTAL) BY MOUTH ONCE DAILY. (Patient taking differently: Take 40 mg by mouth every evening.), Disp: 90 tablet, Rfl: 3    loratadine (CLARITIN) 10 mg tablet, Take 10 mg by mouth once daily., Disp: , Rfl:     melatonin 10 mg Tab, Take by mouth., Disp: , Rfl:     omega-3 fatty acids/fish oil (FISH OIL-OMEGA-3 FATTY ACIDS) 300-1,000 mg capsule, Take 2 g by mouth once daily., Disp: , Rfl:     sertraline (ZOLOFT) 25 MG tablet, Take by mouth., Disp: , Rfl:  "    walker Misc, 1 Units by Misc.(Non-Drug; Combo Route) route as needed., Disp: 1 each, Rfl: 0      Review of Systems   Constitutional: Negative.   HENT: Negative.     Eyes: Negative.    Cardiovascular:  Positive for chest pain.   Respiratory: Negative.     Endocrine: Negative.    Hematologic/Lymphatic: Negative.    Skin: Negative.    Musculoskeletal:  Positive for arthritis, joint pain and stiffness.   Gastrointestinal: Negative.    Genitourinary: Negative.    Neurological: Negative.    Psychiatric/Behavioral: Negative.     Allergic/Immunologic: Negative.           /62 (BP Location: Right arm, Patient Position: Sitting, BP Method: Medium (Manual))   Pulse 64   Ht 5' 3" (1.6 m)   Wt 69.6 kg (153 lb 7 oz)   SpO2 97%   BMI 27.18 kg/m²     Wt Readings from Last 3 Encounters:   02/01/24 69.6 kg (153 lb 7 oz)   11/29/23 66.7 kg (147 lb)   10/25/23 66.7 kg (147 lb)     Temp Readings from Last 3 Encounters:   11/29/23 97.4 °F (36.3 °C) (Oral)   10/25/23 98.6 °F (37 °C) (Oral)   09/18/23 97.1 °F (36.2 °C) (Oral)     BP Readings from Last 3 Encounters:   02/01/24 130/62   11/29/23 (!) 170/76   10/25/23 (!) 149/66     Pulse Readings from Last 3 Encounters:   02/01/24 64   11/29/23 65   10/25/23 63       Objective:    Physical Exam  Vitals and nursing note reviewed.   Constitutional:       General: She is not in acute distress.     Appearance: Normal appearance. She is well-developed. She is not ill-appearing or diaphoretic.   HENT:      Head: Normocephalic.   Neck:      Thyroid: No thyromegaly.      Vascular: No carotid bruit or JVD.   Cardiovascular:      Rate and Rhythm: Normal rate and regular rhythm.      Pulses: Normal pulses.           Radial pulses are 2+ on the right side and 2+ on the left side.      Heart sounds: Normal heart sounds, S1 normal and S2 normal. No murmur heard.     No friction rub. No gallop.   Pulmonary:      Effort: Pulmonary effort is normal.      Breath sounds: Normal breath sounds. No " wheezing or rales.   Abdominal:      General: Bowel sounds are normal. There is no abdominal bruit.      Palpations: Abdomen is soft.      Tenderness: There is no abdominal tenderness.   Musculoskeletal:      Cervical back: Neck supple.   Lymphadenopathy:      Cervical: No cervical adenopathy.   Skin:     General: Skin is warm.   Neurological:      Mental Status: She is alert and oriented to person, place, and time.   Psychiatric:         Behavior: Behavior normal. Behavior is cooperative.         I have reviewed all pertinent labs and cardiac studies.      Chemistry        Component Value Date/Time     08/15/2023 2046    K 3.9 08/15/2023 2046     08/15/2023 2046    CO2 28 08/15/2023 2046    BUN 17 08/15/2023 2046    CREATININE 0.8 08/15/2023 2046    GLU 93 08/15/2023 2046        Component Value Date/Time    CALCIUM 9.9 08/15/2023 2046    ALKPHOS 65 08/15/2023 2046    AST 25 08/15/2023 2046    ALT 12 08/15/2023 2046    BILITOT 0.3 08/15/2023 2046    ESTGFRAFRICA >60.0 07/06/2022 0956    EGFRNONAA >60.0 07/06/2022 0956        Lab Results   Component Value Date    WBC 7.27 08/15/2023    HGB 10.7 (L) 08/15/2023    HCT 35.6 (L) 08/15/2023    MCV 89 08/15/2023     08/15/2023       7 mo ago     Cholesterol 140 - 200 mg/dL 198   Triglycerides 35 - 150 mg/dL 193 H High    Note:  The reference ranges are for fasting patient results. No reference ranges have been established for non-fasting tests.   HDL >50 mg/dL 53   Cholesterol.in LDL - Non Fasting 60 - 135 106   Hdl/Cholesterol Ratio 0.00 - 4.45 3.74   Non HDL Cholesterol mg/dL 145.00         Results for orders placed during the hospital encounter of 07/06/22    Echo    Interpretation Summary  · The left ventricle is normal in size with normal systolic function.  · Grade III left ventricular diastolic dysfunction.  · The estimated PA systolic pressure is 48 mmHg.  · Normal right ventricular size with normal right ventricular systolic function.  · There  is pulmonary hypertension.  · Normal central venous pressure (3 mmHg).  · Severe left atrial enlargement.  · Mild mitral regurgitation.  · Mild to moderate tricuspid regurgitation.  · The estimated ejection fraction is 55%.        Assessment:       1. Chronic diastolic heart failure    2. Coronary artery disease of native artery of native heart with stable angina pectoris    3. Abnormal ECG    4. Asymptomatic stenosis of both carotid arteries without infarction    5. Atypical chest pain    6. Essential (primary) hypertension    7. Nonrheumatic tricuspid valve regurgitation    8. Nonrheumatic mitral valve regurgitation    9. Mixed hyperlipidemia    10. Pulmonary hypertension    11. Overweight (BMI 25.0-29.9)         Plan:             Chest pain: atypical features.  Ischemia evaluation advised.  Nuclear stress test.  Echocardiogram.  Consider LHC if stress test + for ischemia.  Diastolic CHF: Pt counseled on her CHF condition.  Check CMP + BNP.  Optimize med tx for CHF.  Abnl ecg: monitor.  Carotid disease: Medical mgt.  Check carotid u/s.  Reviewed all tests and above medical conditions with patient in detail and formulated treatment plan.  Continue optimal medical treatment for cardiovascular conditions.  Cardiac low salt diet advised.  Daily exercise as tolerated.  Weight control.  HTN: Need for BP control and HTN goals (if needed) were discussed and tx plan formulated.  Goal < 130/80.  Continue current HTN meds.  Lipids: Importance of optimal lipid control were discussed in detail as well as possible pharmacologic and lifestyle changes that may be needed.  Statin sensitive.  Low cholesterol diet.  PHTN: Monitor. F/u echo.  Valvular heart disease: monitor. F/u echo.    PHONE REVIEW.    F/u 1 year if all CV tests stable.      I have reviewed all pertinent labs and cardiac studies independently. Plans and recommendations have been formulated under my direct supervision. All questions answered and patient voiced  understanding.

## 2024-02-02 ENCOUNTER — LAB VISIT (OUTPATIENT)
Dept: LAB | Facility: HOSPITAL | Age: 84
End: 2024-02-02
Attending: INTERNAL MEDICINE
Payer: MEDICARE

## 2024-02-02 DIAGNOSIS — I25.118 CORONARY ARTERY DISEASE OF NATIVE ARTERY OF NATIVE HEART WITH STABLE ANGINA PECTORIS: ICD-10-CM

## 2024-02-02 DIAGNOSIS — I50.32 CHRONIC DIASTOLIC HEART FAILURE: ICD-10-CM

## 2024-02-02 DIAGNOSIS — E66.3 OVERWEIGHT (BMI 25.0-29.9): ICD-10-CM

## 2024-02-02 DIAGNOSIS — R07.89 ATYPICAL CHEST PAIN: ICD-10-CM

## 2024-02-02 DIAGNOSIS — I65.23 ASYMPTOMATIC STENOSIS OF BOTH CAROTID ARTERIES WITHOUT INFARCTION: ICD-10-CM

## 2024-02-02 DIAGNOSIS — I36.1 NONRHEUMATIC TRICUSPID VALVE REGURGITATION: ICD-10-CM

## 2024-02-02 DIAGNOSIS — R94.31 ABNORMAL ECG: ICD-10-CM

## 2024-02-02 DIAGNOSIS — I27.20 PULMONARY HYPERTENSION: ICD-10-CM

## 2024-02-02 DIAGNOSIS — I34.0 NONRHEUMATIC MITRAL VALVE REGURGITATION: ICD-10-CM

## 2024-02-02 DIAGNOSIS — I10 ESSENTIAL (PRIMARY) HYPERTENSION: ICD-10-CM

## 2024-02-02 PROCEDURE — 80053 COMPREHEN METABOLIC PANEL: CPT | Performed by: INTERNAL MEDICINE

## 2024-02-02 PROCEDURE — 36415 COLL VENOUS BLD VENIPUNCTURE: CPT | Mod: PO | Performed by: INTERNAL MEDICINE

## 2024-02-02 PROCEDURE — 83880 ASSAY OF NATRIURETIC PEPTIDE: CPT | Performed by: INTERNAL MEDICINE

## 2024-02-03 LAB
ALBUMIN SERPL BCP-MCNC: 3.7 G/DL (ref 3.5–5.2)
ALP SERPL-CCNC: 64 U/L (ref 55–135)
ALT SERPL W/O P-5'-P-CCNC: 9 U/L (ref 10–44)
ANION GAP SERPL CALC-SCNC: 7 MMOL/L (ref 8–16)
AST SERPL-CCNC: 17 U/L (ref 10–40)
BILIRUB SERPL-MCNC: 0.3 MG/DL (ref 0.1–1)
BNP SERPL-MCNC: 225 PG/ML (ref 0–99)
BUN SERPL-MCNC: 17 MG/DL (ref 8–23)
CALCIUM SERPL-MCNC: 9.3 MG/DL (ref 8.7–10.5)
CHLORIDE SERPL-SCNC: 106 MMOL/L (ref 95–110)
CO2 SERPL-SCNC: 28 MMOL/L (ref 23–29)
CREAT SERPL-MCNC: 0.7 MG/DL (ref 0.5–1.4)
EST. GFR  (NO RACE VARIABLE): >60 ML/MIN/1.73 M^2
GLUCOSE SERPL-MCNC: 78 MG/DL (ref 70–110)
POTASSIUM SERPL-SCNC: 4.4 MMOL/L (ref 3.5–5.1)
PROT SERPL-MCNC: 6.3 G/DL (ref 6–8.4)
SODIUM SERPL-SCNC: 141 MMOL/L (ref 136–145)

## 2024-02-03 NOTE — PROGRESS NOTES
Please contact the patient and let them know that their lab results were reviewed and do not require any change in treatment at present time.    Continue current meds.  Await echo and stress test results.    Dr Padilla

## 2024-02-05 ENCOUNTER — TELEPHONE (OUTPATIENT)
Dept: CARDIOLOGY | Facility: CLINIC | Age: 84
End: 2024-02-05
Payer: MEDICARE

## 2024-02-05 NOTE — TELEPHONE ENCOUNTER
lab results were reviewed and do not require any change in treatment at present time.     Continue current meds.   Await echo and stress test results.     Left portal message

## 2024-02-14 ENCOUNTER — HOSPITAL ENCOUNTER (OUTPATIENT)
Dept: RADIOLOGY | Facility: HOSPITAL | Age: 84
Discharge: HOME | End: 2024-02-14
Attending: ORTHOPAEDIC SURGERY
Payer: MEDICARE

## 2024-02-14 ENCOUNTER — OFFICE VISIT (OUTPATIENT)
Dept: ORTHOPEDICS | Facility: CLINIC | Age: 84
End: 2024-02-14
Payer: MEDICARE

## 2024-02-14 VITALS
BODY MASS INDEX: 26.75 KG/M2 | RESPIRATION RATE: 17 BRPM | SYSTOLIC BLOOD PRESSURE: 126 MMHG | DIASTOLIC BLOOD PRESSURE: 72 MMHG | HEART RATE: 60 BPM | HEIGHT: 63 IN | WEIGHT: 151 LBS | OXYGEN SATURATION: 97 % | TEMPERATURE: 98 F

## 2024-02-14 DIAGNOSIS — M17.0 BILATERAL PRIMARY OSTEOARTHRITIS OF KNEE: Primary | ICD-10-CM

## 2024-02-14 DIAGNOSIS — M25.562 PAIN IN BOTH KNEES, UNSPECIFIED CHRONICITY: ICD-10-CM

## 2024-02-14 DIAGNOSIS — M25.561 PAIN IN BOTH KNEES, UNSPECIFIED CHRONICITY: ICD-10-CM

## 2024-02-14 PROCEDURE — 99999 PR PBB SHADOW E&M-EST. PATIENT-LVL IV: CPT | Mod: PBBFAC,,, | Performed by: PHYSICIAN ASSISTANT

## 2024-02-14 PROCEDURE — 73564 X-RAY EXAM KNEE 4 OR MORE: CPT | Mod: 26,50,, | Performed by: STUDENT IN AN ORGANIZED HEALTH CARE EDUCATION/TRAINING PROGRAM

## 2024-02-14 PROCEDURE — 73564 X-RAY EXAM KNEE 4 OR MORE: CPT | Mod: TC,50

## 2024-02-14 PROCEDURE — 99214 OFFICE O/P EST MOD 30 MIN: CPT | Mod: S$PBB,,, | Performed by: PHYSICIAN ASSISTANT

## 2024-02-14 PROCEDURE — 99214 OFFICE O/P EST MOD 30 MIN: CPT | Mod: PBBFAC,25 | Performed by: PHYSICIAN ASSISTANT

## 2024-02-15 NOTE — PROGRESS NOTES
Subjective:      Patient ID: Rebecca Rios is a 83 y.o. female.    Chief Complaint: Pain of the Left Knee and Follow-up      HPI: Rebecca Rios is an 83-year-old female in clinic today for evaluation of bilateral knees.  Patient states that she has been having increasing pains in her knees.  She reports that she injured her knees at the same time as her elbow, but the elbow was worse, so her focus has been on that.  She denies any new trauma since that injury.  She states the pain is worse 1st thing in the morning and with prolonged standing.  She denies numbness or tingling in the extremity.    Past Medical History:   Diagnosis Date    Anxiety     Asymptomatic stenosis of both carotid arteries without infarction 1/28/2022    Atypical chest pain 11/9/2017    CAD (coronary artery disease) 11/18/2014    Chronic diastolic heart failure 8/17/2017    Coronary artery disease     Depression     Fractures     Hot flash, menopausal     HTN (hypertension)     Hyperlipidemia     Hypothyroid     Nonrheumatic mitral valve regurgitation 10/19/2022    Nonrheumatic tricuspid valve regurgitation 10/19/2022    Pulmonary hypertension 8/17/2017       Current Outpatient Medications:     acetaminophen (TYLENOL) 500 MG tablet, Take 500 mg by mouth every 6 (six) hours as needed., Disp: , Rfl:     alprazolam (XANAX) 0.5 MG tablet, Take 0.5 mg by mouth 2 (two) times daily as needed for Anxiety., Disp: , Rfl:     aspirin (ECOTRIN) 81 MG EC tablet, Take 1 tablet (81 mg total) by mouth once daily., Disp: 30 tablet, Rfl: 2    cholecalciferol, vitamin D3, 125 mcg (5,000 unit) capsule, Take 5,000 Units by mouth., Disp: , Rfl:     dicyclomine (BENTYL) 20 mg tablet, Take 20 mg by mouth 4 (four) times daily., Disp: , Rfl:     diphenoxylate-atropine 2.5-0.025 mg (LOMOTIL) 2.5-0.025 mg per tablet, Take 1 tablet by mouth 4 (four) times daily as needed., Disp: , Rfl:     esomeprazole (NEXIUM) 40 MG capsule, Take 40 mg by mouth once daily. , Disp: , Rfl:  "    fluticasone propionate (FLONASE) 50 mcg/actuation nasal spray, 2 sprays (100 mcg total) by Each Nostril route once daily., Disp: 1 Bottle, Rfl: 12    HYDROcodone-acetaminophen (NORCO)  mg per tablet, TAKE 1 TABLET BY MOUTH EVERY 8 HOURS AS NEEDED FOR PAIN, Disp: , Rfl:     levothyroxine (SYNTHROID) 75 MCG tablet, TAKE ONE TABLET BY MOUTH ONCE DAILY, Disp: 30 tablet, Rfl: 6    lipase-protease-amylase 24,000-76,000-120,000 units (CREON) 24,000-76,000 -120,000 unit capsule, Take 1 capsule by mouth 3 (three) times daily with meals., Disp: , Rfl:     lisinopriL (PRINIVIL,ZESTRIL) 40 MG tablet, TAKE 1 TABLET (40 MG TOTAL) BY MOUTH ONCE DAILY. (Patient taking differently: Take 40 mg by mouth every evening.), Disp: 90 tablet, Rfl: 3    loratadine (CLARITIN) 10 mg tablet, Take 10 mg by mouth once daily., Disp: , Rfl:     melatonin 10 mg Tab, Take by mouth., Disp: , Rfl:     omega-3 fatty acids/fish oil (FISH OIL-OMEGA-3 FATTY ACIDS) 300-1,000 mg capsule, Take 2 g by mouth once daily., Disp: , Rfl:     sertraline (ZOLOFT) 25 MG tablet, Take by mouth., Disp: , Rfl:     walker Misc, 1 Units by Misc.(Non-Drug; Combo Route) route as needed., Disp: 1 each, Rfl: 0    amLODIPine (NORVASC) 10 MG tablet, Take 1 tablet (10 mg total) by mouth once daily., Disp: 90 tablet, Rfl: 5  Review of patient's allergies indicates:   Allergen Reactions    Erythromycin     Macrolide antibiotics Nausea And Vomiting       /72 (BP Location: Left arm, Patient Position: Sitting)   Pulse 60   Temp 97.6 °F (36.4 °C)   Resp 17   Ht 5' 3" (1.6 m)   Wt 68.5 kg (151 lb)   SpO2 97%   BMI 26.75 kg/m²     ROS      Objective:    Ortho Exam   Bilateral knees:  Skin is intact  No edema   No effusion  Mild TTP at the joint line  ROM: Extension 0°, flexion greater than 105°   Calf and compartments are soft and compressible   Motor exam normal   Sensation and pulses intact  Cap refill brisk    GEN: Well developed, well nourished female. AAOX3. No " acute distress.   Head: Normocephalic, atraumatic.   Eyes: RAMÓN  Neck: Trachea is midline, no adenopathy  Resp: Breathing unlabored.  Neuro: Motor function normal, Cranial nerves intact  Psych: Mood and affect appropriate.       Assessment:     Imaging:  X-ray bilateral knees obtained today shows moderate left and mild right tricompartmental osteoarthritis.  No acute fracture or dislocation.        1. Bilateral primary osteoarthritis of knee          Plan:       Reviewed the radiographs with the patient.  Discussed arthritis with her.  Recommended rest, ice, compression, and elevation.  We also discussed over-the-counter anti-inflammatories.  I will set the patient up to see our joint team for further evaluation and treatment at their next available appointment.       Follow up if symptoms worsen or fail to improve.          Patient note was created using MModal Dictation.  Any errors in syntax or even information may not have been identified and edited on initial review prior to signing this note.

## 2024-02-25 ENCOUNTER — PATIENT MESSAGE (OUTPATIENT)
Dept: ORTHOPEDICS | Facility: CLINIC | Age: 84
End: 2024-02-25
Payer: MEDICARE

## 2024-03-20 ENCOUNTER — HOSPITAL ENCOUNTER (OUTPATIENT)
Dept: RADIOLOGY | Facility: HOSPITAL | Age: 84
Discharge: HOME | End: 2024-03-20
Attending: INTERNAL MEDICINE
Payer: MEDICARE

## 2024-03-20 ENCOUNTER — HOSPITAL ENCOUNTER (OUTPATIENT)
Dept: PULMONOLOGY | Facility: HOSPITAL | Age: 84
Discharge: HOME | End: 2024-03-20
Attending: INTERNAL MEDICINE
Payer: MEDICARE

## 2024-03-20 ENCOUNTER — HOSPITAL ENCOUNTER (OUTPATIENT)
Dept: CARDIOLOGY | Facility: HOSPITAL | Age: 84
Discharge: HOME | End: 2024-03-20
Attending: INTERNAL MEDICINE
Payer: MEDICARE

## 2024-03-20 VITALS
HEIGHT: 63 IN | SYSTOLIC BLOOD PRESSURE: 138 MMHG | DIASTOLIC BLOOD PRESSURE: 59 MMHG | WEIGHT: 151 LBS | HEART RATE: 51 BPM | BODY MASS INDEX: 26.75 KG/M2

## 2024-03-20 VITALS — BODY MASS INDEX: 26.75 KG/M2 | HEIGHT: 63 IN | WEIGHT: 151 LBS

## 2024-03-20 VITALS
WEIGHT: 151 LBS | HEIGHT: 63 IN | SYSTOLIC BLOOD PRESSURE: 130 MMHG | BODY MASS INDEX: 26.75 KG/M2 | DIASTOLIC BLOOD PRESSURE: 62 MMHG

## 2024-03-20 DIAGNOSIS — R07.89 ATYPICAL CHEST PAIN: ICD-10-CM

## 2024-03-20 DIAGNOSIS — I50.32 CHRONIC DIASTOLIC HEART FAILURE: ICD-10-CM

## 2024-03-20 DIAGNOSIS — I65.23 ASYMPTOMATIC STENOSIS OF BOTH CAROTID ARTERIES WITHOUT INFARCTION: ICD-10-CM

## 2024-03-20 DIAGNOSIS — I34.0 NONRHEUMATIC MITRAL VALVE REGURGITATION: ICD-10-CM

## 2024-03-20 DIAGNOSIS — I25.118 CORONARY ARTERY DISEASE OF NATIVE ARTERY OF NATIVE HEART WITH STABLE ANGINA PECTORIS: ICD-10-CM

## 2024-03-20 DIAGNOSIS — R94.31 ABNORMAL ECG: ICD-10-CM

## 2024-03-20 DIAGNOSIS — I10 ESSENTIAL (PRIMARY) HYPERTENSION: ICD-10-CM

## 2024-03-20 DIAGNOSIS — I36.1 NONRHEUMATIC TRICUSPID VALVE REGURGITATION: ICD-10-CM

## 2024-03-20 DIAGNOSIS — I27.20 PULMONARY HYPERTENSION: ICD-10-CM

## 2024-03-20 DIAGNOSIS — E66.3 OVERWEIGHT (BMI 25.0-29.9): ICD-10-CM

## 2024-03-20 LAB
AORTIC ROOT ANNULUS: 2.68 CM
ASCENDING AORTA: 2.81 CM
AV INDEX (PROSTH): 0.52
AV MEAN GRADIENT: 5 MMHG
AV PEAK GRADIENT: 9 MMHG
AV VALVE AREA BY VELOCITY RATIO: 1.38 CM²
AV VALVE AREA: 1.34 CM²
AV VELOCITY RATIO: 0.53
BSA FOR ECHO PROCEDURE: 1.74 M2
CV ECHO LV RWT: 0.49 CM
CV STRESS BASE HR: 53 BPM
DIASTOLIC BLOOD PRESSURE: 59 MMHG
DOP CALC AO PEAK VEL: 1.49 M/S
DOP CALC AO VTI: 38.8 CM
DOP CALC LVOT AREA: 2.6 CM2
DOP CALC LVOT DIAMETER: 1.82 CM
DOP CALC LVOT PEAK VEL: 0.79 M/S
DOP CALC LVOT STROKE VOLUME: 52 CM3
DOP CALC RVOT PEAK VEL: 0.6 M/S
DOP CALC RVOT VTI: 10.7 CM
DOP CALCLVOT PEAK VEL VTI: 20 CM
E WAVE DECELERATION TIME: 213.31 MSEC
E/A RATIO: 2.44
E/E' RATIO: 9.5 M/S
ECHO LV POSTERIOR WALL: 1 CM (ref 0.6–1.1)
EJECTION FRACTION- HIGH: 73 %
END DIASTOLIC INDEX-HIGH: 165 ML/M2
END DIASTOLIC INDEX-LOW: 101 ML/M2
END SYSTOLIC INDEX-HIGH: 64 ML/M2
END SYSTOLIC INDEX-LOW: 28 ML/M2
FRACTIONAL SHORTENING: 34 % (ref 28–44)
INTERVENTRICULAR SEPTUM: 0.89 CM (ref 0.6–1.1)
IVC DIAMETER: 1.09 CM
IVRT: 68.51 MSEC
LA MAJOR: 5.57 CM
LA MINOR: 5.35 CM
LA WIDTH: 3.7 CM
LEFT ARM DIASTOLIC BLOOD PRESSURE: 65 MMHG
LEFT ARM SYSTOLIC BLOOD PRESSURE: 123 MMHG
LEFT ATRIUM SIZE: 3.2 CM
LEFT ATRIUM VOLUME INDEX: 31.9 ML/M2
LEFT ATRIUM VOLUME: 54.93 CM3
LEFT CBA DIAS: 19 CM/S
LEFT CBA SYS: 83 CM/S
LEFT CCA DIST DIAS: 18 CM/S
LEFT CCA DIST SYS: 100 CM/S
LEFT CCA MID DIAS: 16 CM/S
LEFT CCA MID SYS: 104 CM/S
LEFT CCA PROX DIAS: 32 CM/S
LEFT CCA PROX SYS: 188 CM/S
LEFT ECA DIAS: 14 CM/S
LEFT ECA SYS: 104 CM/S
LEFT ICA DIST DIAS: 29 CM/S
LEFT ICA DIST SYS: 103 CM/S
LEFT ICA MID DIAS: 25 CM/S
LEFT ICA MID SYS: 85 CM/S
LEFT ICA PROX DIAS: 23 CM/S
LEFT ICA PROX SYS: 119 CM/S
LEFT INTERNAL DIMENSION IN SYSTOLE: 2.7 CM (ref 2.1–4)
LEFT VENTRICLE DIASTOLIC VOLUME INDEX: 42.54 ML/M2
LEFT VENTRICLE DIASTOLIC VOLUME: 73.17 ML
LEFT VENTRICLE MASS INDEX: 70 G/M2
LEFT VENTRICLE SYSTOLIC VOLUME INDEX: 15.7 ML/M2
LEFT VENTRICLE SYSTOLIC VOLUME: 27.08 ML
LEFT VENTRICULAR INTERNAL DIMENSION IN DIASTOLE: 4.08 CM (ref 3.5–6)
LEFT VENTRICULAR MASS: 121.12 G
LEFT VERTEBRAL DIAS: 17 CM/S
LEFT VERTEBRAL SYS: 88 CM/S
LV LATERAL E/E' RATIO: 9.5 M/S
LV SEPTAL E/E' RATIO: 9.5 M/S
LVOT MG: 1.15 MMHG
LVOT MV: 0.51 CM/S
MV PEAK A VEL: 0.39 M/S
MV PEAK E VEL: 0.95 M/S
NUC REST EJECTION FRACTION: 77
NUC STRESS EJECTION FRACTION: 75 %
OHS CV CAROTID RIGHT ICA EDV HIGHEST: 26
OHS CV CAROTID ULTRASOUND LEFT ICA/CCA RATIO: 1.19
OHS CV CAROTID ULTRASOUND RIGHT ICA/CCA RATIO: 0.89
OHS CV CPX 85 PERCENT MAX PREDICTED HEART RATE MALE: 118
OHS CV CPX MAX PREDICTED HEART RATE: 139
OHS CV CPX PATIENT IS FEMALE: 1
OHS CV CPX PATIENT IS MALE: 0
OHS CV CPX PEAK DIASTOLIC BLOOD PRESSURE: 52 MMHG
OHS CV CPX PEAK HEAR RATE: 74 BPM
OHS CV CPX PEAK RATE PRESSURE PRODUCT: NORMAL
OHS CV CPX PEAK SYSTOLIC BLOOD PRESSURE: 140 MMHG
OHS CV CPX PERCENT MAX PREDICTED HEART RATE ACHIEVED: 55
OHS CV CPX RATE PRESSURE PRODUCT PRESENTING: 7314
OHS CV PV CAROTID LEFT HIGHEST CCA: 188
OHS CV PV CAROTID LEFT HIGHEST ICA: 119
OHS CV PV CAROTID RIGHT HIGHEST CCA: 125
OHS CV PV CAROTID RIGHT HIGHEST ICA: 98
OHS CV US CAROTID LEFT HIGHEST EDV: 29
PISA TR MAX VEL: 3.18 M/S
PV MEAN GRADIENT: 1 MMHG
RA MAJOR: 4.2 CM
RA PRESSURE ESTIMATED: 3 MMHG
RA WIDTH: 3.1 CM
RETIRED EF AND QEF - SEE NOTES: 59 %
RIGHT ARM DIASTOLIC BLOOD PRESSURE: 70 MMHG
RIGHT ARM SYSTOLIC BLOOD PRESSURE: 126 MMHG
RIGHT CBA DIAS: 18 CM/S
RIGHT CBA SYS: 80 CM/S
RIGHT CCA DIST DIAS: 18 CM/S
RIGHT CCA DIST SYS: 110 CM/S
RIGHT CCA MID DIAS: 21 CM/S
RIGHT CCA MID SYS: 125 CM/S
RIGHT CCA PROX DIAS: 15 CM/S
RIGHT CCA PROX SYS: 124 CM/S
RIGHT ECA DIAS: 19 CM/S
RIGHT ECA SYS: 87 CM/S
RIGHT ICA DIST DIAS: 26 CM/S
RIGHT ICA DIST SYS: 98 CM/S
RIGHT ICA MID DIAS: 22 CM/S
RIGHT ICA MID SYS: 89 CM/S
RIGHT ICA PROX DIAS: 14 CM/S
RIGHT ICA PROX SYS: 77 CM/S
RIGHT VERTEBRAL DIAS: 11 CM/S
RIGHT VERTEBRAL SYS: 56 CM/S
RV TB RVSP: 6 MMHG
STJ: 2.91 CM
SYSTOLIC BLOOD PRESSURE: 138 MMHG
TDI LATERAL: 0.1 M/S
TDI SEPTAL: 0.1 M/S
TDI: 0.1 M/S
TR MAX PG: 40 MMHG
TRICUSPID ANNULAR PLANE SYSTOLIC EXCURSION: 1.92 CM
TV REST PULMONARY ARTERY PRESSURE: 43 MMHG
Z-SCORE OF LEFT VENTRICULAR DIMENSION IN END DIASTOLE: -1.56
Z-SCORE OF LEFT VENTRICULAR DIMENSION IN END SYSTOLE: -0.71

## 2024-03-20 PROCEDURE — 63600175 PHARM REV CODE 636 W HCPCS: Performed by: INTERNAL MEDICINE

## 2024-03-20 PROCEDURE — 93306 TTE W/DOPPLER COMPLETE: CPT | Mod: 26,,, | Performed by: INTERNAL MEDICINE

## 2024-03-20 PROCEDURE — 93880 EXTRACRANIAL BILAT STUDY: CPT

## 2024-03-20 PROCEDURE — 93018 CV STRESS TEST I&R ONLY: CPT | Mod: ,,, | Performed by: INTERNAL MEDICINE

## 2024-03-20 PROCEDURE — 93306 TTE W/DOPPLER COMPLETE: CPT

## 2024-03-20 PROCEDURE — A9502 TC99M TETROFOSMIN: HCPCS | Performed by: INTERNAL MEDICINE

## 2024-03-20 PROCEDURE — 78452 HT MUSCLE IMAGE SPECT MULT: CPT

## 2024-03-20 PROCEDURE — 93880 EXTRACRANIAL BILAT STUDY: CPT | Mod: 26,,, | Performed by: INTERNAL MEDICINE

## 2024-03-20 PROCEDURE — 93016 CV STRESS TEST SUPVJ ONLY: CPT | Mod: ,,, | Performed by: INTERNAL MEDICINE

## 2024-03-20 PROCEDURE — 78452 HT MUSCLE IMAGE SPECT MULT: CPT | Mod: 26,,, | Performed by: INTERNAL MEDICINE

## 2024-03-20 RX ORDER — REGADENOSON 0.08 MG/ML
0.4 INJECTION, SOLUTION INTRAVENOUS ONCE
Status: COMPLETED | OUTPATIENT
Start: 2024-03-20 | End: 2024-03-20

## 2024-03-20 RX ADMIN — TETROFOSMIN 32.4 MILLICURIE: 1.38 INJECTION, POWDER, LYOPHILIZED, FOR SOLUTION INTRAVENOUS at 11:03

## 2024-03-20 RX ADMIN — TETROFOSMIN 10.3 MILLICURIE: 1.38 INJECTION, POWDER, LYOPHILIZED, FOR SOLUTION INTRAVENOUS at 10:03

## 2024-03-20 RX ADMIN — REGADENOSON 0.4 MG: 0.08 INJECTION, SOLUTION INTRAVENOUS at 11:03

## 2024-03-21 ENCOUNTER — PATIENT MESSAGE (OUTPATIENT)
Dept: CARDIOLOGY | Facility: CLINIC | Age: 84
End: 2024-03-21
Payer: MEDICARE

## 2024-04-03 ENCOUNTER — PATIENT MESSAGE (OUTPATIENT)
Dept: PULMONOLOGY | Facility: CLINIC | Age: 84
End: 2024-04-03
Payer: MEDICARE

## 2024-07-08 DIAGNOSIS — I10 ESSENTIAL HYPERTENSION: ICD-10-CM

## 2024-07-08 DIAGNOSIS — I25.119 CORONARY ARTERY DISEASE INVOLVING NATIVE CORONARY ARTERY OF NATIVE HEART WITH ANGINA PECTORIS: ICD-10-CM

## 2024-07-08 RX ORDER — LISINOPRIL 40 MG/1
40 TABLET ORAL DAILY
Qty: 90 TABLET | Refills: 3 | Status: SHIPPED | OUTPATIENT
Start: 2024-07-08

## 2024-07-10 ENCOUNTER — HOSPITAL ENCOUNTER (EMERGENCY)
Facility: HOSPITAL | Age: 84
Discharge: HOME | End: 2024-07-10
Attending: EMERGENCY MEDICINE
Payer: MEDICARE

## 2024-07-10 VITALS
TEMPERATURE: 98 F | SYSTOLIC BLOOD PRESSURE: 168 MMHG | RESPIRATION RATE: 20 BRPM | OXYGEN SATURATION: 96 % | HEART RATE: 63 BPM | DIASTOLIC BLOOD PRESSURE: 79 MMHG

## 2024-07-10 DIAGNOSIS — I10 PRIMARY HYPERTENSION: Primary | ICD-10-CM

## 2024-07-10 DIAGNOSIS — R07.9 CHEST PAIN: ICD-10-CM

## 2024-07-10 LAB
ALBUMIN SERPL BCP-MCNC: 3.8 G/DL (ref 3.5–5.2)
ALP SERPL-CCNC: 71 U/L (ref 55–135)
ALT SERPL W/O P-5'-P-CCNC: 11 U/L (ref 10–44)
ANION GAP SERPL CALC-SCNC: 11 MMOL/L (ref 8–16)
AST SERPL-CCNC: 22 U/L (ref 10–40)
BASOPHILS # BLD AUTO: 0.01 K/UL (ref 0–0.2)
BASOPHILS NFR BLD: 0.2 % (ref 0–1.9)
BILIRUB SERPL-MCNC: 0.4 MG/DL (ref 0.1–1)
BNP SERPL-MCNC: 279 PG/ML (ref 0–99)
BUN SERPL-MCNC: 13 MG/DL (ref 8–23)
CALCIUM SERPL-MCNC: 9.4 MG/DL (ref 8.7–10.5)
CHLORIDE SERPL-SCNC: 108 MMOL/L (ref 95–110)
CO2 SERPL-SCNC: 24 MMOL/L (ref 23–29)
CREAT SERPL-MCNC: 0.7 MG/DL (ref 0.5–1.4)
DIFFERENTIAL METHOD BLD: ABNORMAL
EOSINOPHIL # BLD AUTO: 0 K/UL (ref 0–0.5)
EOSINOPHIL NFR BLD: 0.2 % (ref 0–8)
ERYTHROCYTE [DISTWIDTH] IN BLOOD BY AUTOMATED COUNT: 15.6 % (ref 11.5–14.5)
EST. GFR  (NO RACE VARIABLE): >60 ML/MIN/1.73 M^2
GLUCOSE SERPL-MCNC: 106 MG/DL (ref 70–110)
HCT VFR BLD AUTO: 32.5 % (ref 37–48.5)
HGB BLD-MCNC: 10.1 G/DL (ref 12–16)
IMM GRANULOCYTES # BLD AUTO: 0.03 K/UL (ref 0–0.04)
IMM GRANULOCYTES NFR BLD AUTO: 0.6 % (ref 0–0.5)
LYMPHOCYTES # BLD AUTO: 2.4 K/UL (ref 1–4.8)
LYMPHOCYTES NFR BLD: 45.6 % (ref 18–48)
MCH RBC QN AUTO: 28.9 PG (ref 27–31)
MCHC RBC AUTO-ENTMCNC: 31.1 G/DL (ref 32–36)
MCV RBC AUTO: 93 FL (ref 82–98)
MONOCYTES # BLD AUTO: 0.7 K/UL (ref 0.3–1)
MONOCYTES NFR BLD: 13.5 % (ref 4–15)
NEUTROPHILS # BLD AUTO: 2.1 K/UL (ref 1.8–7.7)
NEUTROPHILS NFR BLD: 39.9 % (ref 38–73)
NRBC BLD-RTO: 0 /100 WBC
PLATELET # BLD AUTO: 195 K/UL (ref 150–450)
PMV BLD AUTO: 11.1 FL (ref 9.2–12.9)
POTASSIUM SERPL-SCNC: 3.9 MMOL/L (ref 3.5–5.1)
PROT SERPL-MCNC: 6.3 G/DL (ref 6–8.4)
RBC # BLD AUTO: 3.49 M/UL (ref 4–5.4)
SODIUM SERPL-SCNC: 143 MMOL/L (ref 136–145)
TROPONIN I SERPL DL<=0.01 NG/ML-MCNC: <0.006 NG/ML (ref 0–0.03)
TROPONIN I SERPL DL<=0.01 NG/ML-MCNC: <0.006 NG/ML (ref 0–0.03)
WBC # BLD AUTO: 5.24 K/UL (ref 3.9–12.7)

## 2024-07-10 PROCEDURE — 85025 COMPLETE CBC W/AUTO DIFF WBC: CPT | Performed by: EMERGENCY MEDICINE

## 2024-07-10 PROCEDURE — 83880 ASSAY OF NATRIURETIC PEPTIDE: CPT | Performed by: EMERGENCY MEDICINE

## 2024-07-10 PROCEDURE — 93005 ELECTROCARDIOGRAM TRACING: CPT

## 2024-07-10 PROCEDURE — 84484 ASSAY OF TROPONIN QUANT: CPT | Performed by: EMERGENCY MEDICINE

## 2024-07-10 PROCEDURE — 99285 EMERGENCY DEPT VISIT HI MDM: CPT | Mod: 25

## 2024-07-10 PROCEDURE — 25000003 PHARM REV CODE 250: Performed by: EMERGENCY MEDICINE

## 2024-07-10 PROCEDURE — 80053 COMPREHEN METABOLIC PANEL: CPT | Performed by: EMERGENCY MEDICINE

## 2024-07-10 PROCEDURE — 93010 ELECTROCARDIOGRAM REPORT: CPT | Mod: ,,, | Performed by: INTERNAL MEDICINE

## 2024-07-10 RX ORDER — AMLODIPINE BESYLATE 2.5 MG/1
2.5 TABLET ORAL DAILY
COMMUNITY

## 2024-07-10 RX ORDER — ASPIRIN 325 MG
325 TABLET ORAL
Status: COMPLETED | OUTPATIENT
Start: 2024-07-10 | End: 2024-07-10

## 2024-07-10 RX ADMIN — ASPIRIN 325 MG ORAL TABLET 325 MG: 325 PILL ORAL at 03:07

## 2024-07-10 NOTE — ED PROVIDER NOTES
SCRIBE #1 NOTE: I, Xu Butt, am scribing for, and in the presence of, Valarie Lloyd MD. I have scribed the entire note.       History     Chief Complaint   Patient presents with    Chest Pain     Per EMS, pt c/o CP & SOB that started at 1830 last night. Denies CP upon arrival to ED. Pt states she 'just wants to get checked out.'     Review of patient's allergies indicates:   Allergen Reactions    Erythromycin     Macrolide antibiotics Nausea And Vomiting         History of Present Illness     HPI    7/10/2024, 2:44 AM  History obtained from the patient      History of Present Illness: Rebecca Rios is a 83 y.o. female patient with a PMHx of CAD, HTN, HLD who presents to the Emergency Department for evaluation of chest pain which onset gradually at 9:30 PM. Patient reports sweating, chest tightness and shortness of breath. Symptoms are constant and moderate in severity. Pt. Denies recent travel and surgeries. No mitigating or exacerbating factors reported. Patient denies any nausea, vomiting, fever, and all other sxs at this time. No prior Tx includes. No further complaints or concerns at this time.       Arrival mode: AASI    PCP: Shlomo Ware MD        Past Medical History:  Past Medical History:   Diagnosis Date    Anxiety     Asymptomatic stenosis of both carotid arteries without infarction 1/28/2022    Atypical chest pain 11/9/2017    CAD (coronary artery disease) 11/18/2014    Chronic diastolic heart failure 8/17/2017    Coronary artery disease     Depression     Fractures     Hot flash, menopausal     HTN (hypertension)     Hyperlipidemia     Hypothyroid     Nonrheumatic mitral valve regurgitation 10/19/2022    Nonrheumatic tricuspid valve regurgitation 10/19/2022    Pulmonary hypertension 8/17/2017       Past Surgical History:  Past Surgical History:   Procedure Laterality Date    CARPAL TUNNEL RELEASE Bilateral     CHOLECYSTECTOMY      HYSTERECTOMY      open luis           Family  History:  Family History   Problem Relation Name Age of Onset    Hypertension Mother         Social History:  Social History     Tobacco Use    Smoking status: Never    Smokeless tobacco: Never   Substance and Sexual Activity    Alcohol use: No    Drug use: No    Sexual activity: Not Currently     Partners: Male        Review of Systems     Review of Systems   Constitutional:  Positive for diaphoresis. Negative for fever.   HENT:  Negative for sore throat.    Respiratory:  Positive for chest tightness and shortness of breath.    Cardiovascular:  Positive for chest pain.   Gastrointestinal:  Negative for nausea and vomiting.   Genitourinary:  Negative for dysuria.   Musculoskeletal:  Negative for back pain.   Skin:  Negative for rash.   Neurological:  Negative for weakness.   Hematological:  Does not bruise/bleed easily.   All other systems reviewed and are negative.       Physical Exam     Initial Vitals [07/10/24 0057]   BP Pulse Resp Temp SpO2   (!) 168/82 66 16 98.2 °F (36.8 °C) 97 %      MAP       --          Physical Exam  Nursing Notes and Vital Signs Reviewed.  Constitutional: Patient is in no acute distress. Well-developed and well-nourished.  Head: Atraumatic. Normocephalic.  Eyes: PERRL. EOM intact. Conjunctivae are not pale. No scleral icterus.  ENT: Mucous membranes are moist. Oropharynx is clear and symmetric.    Neck: Supple. Full ROM. No lymphadenopathy.  Cardiovascular: Regular rate. Regular rhythm. No murmurs, rubs, or gallops. Distal pulses are 2+ and symmetric.  Pulmonary/Chest: Shortness of Breath, chest pain. No respiratory distress. Clear to auscultation bilaterally. No wheezing or rales.  Abdominal: Soft and non-distended.  There is no tenderness.  No rebound, guarding, or rigidity. Good bowel sounds.  Genitourinary: No CVA tenderness  Musculoskeletal: Moves all extremities. No obvious deformities. No edema. No calf tenderness.  Skin: Warm and dry.  Neurological:  Alert, awake, and  appropriate.  Normal speech.  No acute focal neurological deficits are appreciated.  Psychiatric: Normal affect. Good eye contact. Appropriate in content.     ED Course   Procedures  ED Vital Signs:  Vitals:    07/10/24 0057 07/10/24 0159 07/10/24 0215 07/10/24 0257   BP: (!) 168/82 (!) 182/81 (!) 172/76    Pulse: 66 68 64 60   Resp: 16 13 (!) 21    Temp: 98.2 °F (36.8 °C)      TempSrc: Oral      SpO2: 97% 98% 98%     07/10/24 0330 07/10/24 0500 07/10/24 0602   BP: (!) 195/88 (!) 189/84 (!) 168/79   Pulse: 67 62 63   Resp: 20 19 20   Temp:   98.2 °F (36.8 °C)   TempSrc:      SpO2: 99% 96% 96%       Abnormal Lab Results:  Labs Reviewed   CBC W/ AUTO DIFFERENTIAL - Abnormal; Notable for the following components:       Result Value    RBC 3.49 (*)     Hemoglobin 10.1 (*)     Hematocrit 32.5 (*)     MCHC 31.1 (*)     RDW 15.6 (*)     Immature Granulocytes 0.6 (*)     All other components within normal limits   B-TYPE NATRIURETIC PEPTIDE - Abnormal; Notable for the following components:     (*)     All other components within normal limits   COMPREHENSIVE METABOLIC PANEL   TROPONIN I   TROPONIN I        All Lab Results:  Results for orders placed or performed during the hospital encounter of 07/10/24   CBC auto differential   Result Value Ref Range    WBC 5.24 3.90 - 12.70 K/uL    RBC 3.49 (L) 4.00 - 5.40 M/uL    Hemoglobin 10.1 (L) 12.0 - 16.0 g/dL    Hematocrit 32.5 (L) 37.0 - 48.5 %    MCV 93 82 - 98 fL    MCH 28.9 27.0 - 31.0 pg    MCHC 31.1 (L) 32.0 - 36.0 g/dL    RDW 15.6 (H) 11.5 - 14.5 %    Platelets 195 150 - 450 K/uL    MPV 11.1 9.2 - 12.9 fL    Immature Granulocytes 0.6 (H) 0.0 - 0.5 %    Gran # (ANC) 2.1 1.8 - 7.7 K/uL    Immature Grans (Abs) 0.03 0.00 - 0.04 K/uL    Lymph # 2.4 1.0 - 4.8 K/uL    Mono # 0.7 0.3 - 1.0 K/uL    Eos # 0.0 0.0 - 0.5 K/uL    Baso # 0.01 0.00 - 0.20 K/uL    nRBC 0 0 /100 WBC    Gran % 39.9 38.0 - 73.0 %    Lymph % 45.6 18.0 - 48.0 %    Mono % 13.5 4.0 - 15.0 %    Eosinophil %  0.2 0.0 - 8.0 %    Basophil % 0.2 0.0 - 1.9 %    Differential Method Automated    Comprehensive metabolic panel   Result Value Ref Range    Sodium 143 136 - 145 mmol/L    Potassium 3.9 3.5 - 5.1 mmol/L    Chloride 108 95 - 110 mmol/L    CO2 24 23 - 29 mmol/L    Glucose 106 70 - 110 mg/dL    BUN 13 8 - 23 mg/dL    Creatinine 0.7 0.5 - 1.4 mg/dL    Calcium 9.4 8.7 - 10.5 mg/dL    Total Protein 6.3 6.0 - 8.4 g/dL    Albumin 3.8 3.5 - 5.2 g/dL    Total Bilirubin 0.4 0.1 - 1.0 mg/dL    Alkaline Phosphatase 71 55 - 135 U/L    AST 22 10 - 40 U/L    ALT 11 10 - 44 U/L    eGFR >60 >60 mL/min/1.73 m^2    Anion Gap 11 8 - 16 mmol/L   Troponin I #1   Result Value Ref Range    Troponin I <0.006 0.000 - 0.026 ng/mL   BNP   Result Value Ref Range     (H) 0 - 99 pg/mL   Troponin I   Result Value Ref Range    Troponin I <0.006 0.000 - 0.026 ng/mL   EKG 12-lead   Result Value Ref Range    QRS Duration 80 ms    [facility] QTC Calculation 452 ms                 Imaging Results:  Imaging Results              X-Ray Chest AP Portable (Final result)  Result time 07/10/24 07:15:54      Final result by Marcial Murphy MD (07/10/24 07:15:54)                   Impression:      1.  Negative for acute process involving the chest.    2.  Stable findings as noted above.      Electronically signed by: Marcial Murphy MD  Date:    07/10/2024  Time:    07:15               Narrative:    EXAMINATION:  XR CHEST AP PORTABLE    CLINICAL HISTORY:  Chest Pain;    COMPARISON:  Studies dating back to June 18, 2021    FINDINGS:  EKG leads overlie the chest, which is lordotic in position.  Mild basilar reticular interstitial changes again seen.  The lungs are clear. The cardiac silhouette size is normal. The trachea is midline and the mediastinal width is normal. Negative for focal infiltrate, effusion or pneumothorax. Pulmonary vasculature is normal. Negative for osseous abnormalities. Ectatic and tortuous aorta with aortic arch calcifications.  Degenerative  changes of the spine and both shoulder girdles.                                     Type of Interpretation: ED Physician (Independently Interpreted).  Radiology Procedure Done: Portable CXR.  Interpretation: No acute findings.            The EKG was ordered, reviewed, and independently interpreted by the ED provider.  Interpretation time: 2:05:09  Rate: 68 BPM  Rhythm: normal sinus rhythm  Interpretation: Nonspecific T wave abnormality. No STEMI.  When compared to EKG performed 20-MAR-2024 11:35, there are no significant changes.           The Emergency Provider reviewed the vital signs and test results, which are outlined above.     ED Discussion       5:39 AM: Reassessed pt at this time.  Pt states her condition has improved at this time. Discussed with pt all pertinent ED information and results. Discussed pt dx and plan of tx. Gave pt all f/u and return to the ED instructions. All questions and concerns were addressed at this time. Pt expresses understanding of information and instructions, and is comfortable with plan to discharge. Pt is stable for discharge.    I discussed with patient and/or family/caretaker that evaluation in the ED does not suggest any emergent or life threatening medical conditions requiring immediate intervention beyond what was provided in the ED, and I believe patient is safe for discharge.  Regardless, an unremarkable evaluation in the ED does not preclude the development or presence of a serious of life threatening condition. As such, patient was instructed to return immediately for any worsening or change in current symptoms.         Medical Decision Making  Amount and/or Complexity of Data Reviewed  Labs: ordered. Decision-making details documented in ED Course.  Radiology: ordered and independent interpretation performed. Decision-making details documented in ED Course.  ECG/medicine tests: ordered and independent interpretation performed. Decision-making details documented in ED  Course.    Risk  OTC drugs.                ED Medication(s):  Medications   aspirin tablet 325 mg (325 mg Oral Given 7/10/24 0305)       Discharge Medication List as of 7/10/2024  5:44 AM           Follow-up Information       Vlad Padilla MD In 2 days.    Specialties: Interventional Cardiology, Cardiology  Contact information:  [address]  [place] [address]  [phone]               [facility] - Emergency Dept..    Specialty: Emergency Medicine  Why: As needed, If symptoms worsen  Contact information:  [address] [facility] [address] [phone]                               Scribe Attestation:   Scribe #1: I performed the above scribed service and the documentation accurately describes the services I performed. I attest to the accuracy of the note.     Attending:   Physician Attestation Statement for Scribe #1: I, Valarie Lloyd MD, personally performed the services described in this documentation, as scribed by Xu Butt, in my presence, and it is both accurate and complete.           Clinical Impression       ICD-10-CM ICD-9-CM   1. Primary hypertension  I10 401.9   2. Chest pain  R07.9 786.50       Disposition:   Disposition: Discharged  Condition: Stable         Valarie Lloyd MD  07/18/24 [id number]

## 2024-07-11 LAB
OHS QRS DURATION: 80 MS
OHS QTC CALCULATION: 452 MS

## 2025-02-24 ENCOUNTER — TELEPHONE (OUTPATIENT)
Dept: UROLOGY | Facility: CLINIC | Age: 85
End: 2025-02-24
Payer: MEDICARE

## 2025-02-24 ENCOUNTER — LAB VISIT (OUTPATIENT)
Dept: LAB | Facility: HOSPITAL | Age: 85
End: 2025-02-24
Attending: NURSE PRACTITIONER
Payer: MEDICARE

## 2025-02-24 DIAGNOSIS — N39.0 RECURRENT UTI: Primary | ICD-10-CM

## 2025-02-24 DIAGNOSIS — N39.0 RECURRENT UTI: ICD-10-CM

## 2025-02-24 PROCEDURE — 81003 URINALYSIS AUTO W/O SCOPE: CPT | Performed by: NURSE PRACTITIONER

## 2025-02-24 PROCEDURE — 87086 URINE CULTURE/COLONY COUNT: CPT | Performed by: NURSE PRACTITIONER

## 2025-02-24 NOTE — TELEPHONE ENCOUNTER
Returned call to pt; spoke to pts daughter and she wanted an appt for mom due to microhematuria; also stated pt may have a UTI; orders put in for u/a and culture.

## 2025-02-25 LAB
BILIRUB UR QL STRIP: NEGATIVE
CLARITY UR REFRACT.AUTO: ABNORMAL
COLOR UR AUTO: YELLOW
GLUCOSE UR QL STRIP: NEGATIVE
HGB UR QL STRIP: ABNORMAL
KETONES UR QL STRIP: NEGATIVE
LEUKOCYTE ESTERASE UR QL STRIP: NEGATIVE
NITRITE UR QL STRIP: NEGATIVE
PH UR STRIP: 6 [PH] (ref 5–8)
PROT UR QL STRIP: NEGATIVE
SP GR UR STRIP: 1.01 (ref 1–1.03)
URN SPEC COLLECT METH UR: ABNORMAL

## 2025-02-26 ENCOUNTER — RESULTS FOLLOW-UP (OUTPATIENT)
Dept: UROLOGY | Facility: CLINIC | Age: 85
End: 2025-02-26

## 2025-02-26 LAB — BACTERIA UR CULT: NORMAL

## 2025-02-27 ENCOUNTER — OFFICE VISIT (OUTPATIENT)
Dept: UROLOGY | Facility: CLINIC | Age: 85
End: 2025-02-27
Payer: MEDICARE

## 2025-02-27 VITALS
HEIGHT: 63 IN | BODY MASS INDEX: 26.75 KG/M2 | HEART RATE: 79 BPM | WEIGHT: 151 LBS | SYSTOLIC BLOOD PRESSURE: 118 MMHG | DIASTOLIC BLOOD PRESSURE: 72 MMHG

## 2025-02-27 DIAGNOSIS — R10.9 FLANK PAIN: Primary | ICD-10-CM

## 2025-02-27 DIAGNOSIS — R31.29 MICROHEMATURIA: ICD-10-CM

## 2025-02-27 PROBLEM — R94.31 ABNORMAL ECG: Status: RESOLVED | Noted: 2024-02-01 | Resolved: 2025-02-27

## 2025-02-27 PROCEDURE — 99214 OFFICE O/P EST MOD 30 MIN: CPT | Mod: S$PBB,,, | Performed by: NURSE PRACTITIONER

## 2025-02-27 PROCEDURE — 81001 URINALYSIS AUTO W/SCOPE: CPT | Performed by: NURSE PRACTITIONER

## 2025-02-27 PROCEDURE — 99999 PR PBB SHADOW E&M-EST. PATIENT-LVL IV: CPT | Mod: PBBFAC,,, | Performed by: NURSE PRACTITIONER

## 2025-02-27 PROCEDURE — 99214 OFFICE O/P EST MOD 30 MIN: CPT | Mod: PBBFAC | Performed by: NURSE PRACTITIONER

## 2025-02-27 NOTE — PROGRESS NOTES
Chief Complaint:   Asymptomatic microhematuria    HPI:   Patient is a 84-year-old female that has been seen several times for asymptomatic microhematuria.  Denies gross hematuria.  Father and son do have a history of bladder cancer and stones.  Patient never smoked cigarettes.  Urine in clinic indicates blood and protein, all other parameters are negative.  11/14.2022  Patient is 81-year-old female that was seen by primary care physician and prescribed antibiotics secondary to UTI. Patient states that she is presenting today to follow-up to assess for resolution to UTI. Urine in clinic is negative. PVR is 6 mL. Denies gross hematuria, pelvic or flank pain. No fever. Patient states that she is asymptomatic, after completion of antibiotics.   12/01/2020  Simón LY  79-year-old female who comes in with hematuria. This has been reported in the past, she had seen a previous urologist, although no reproducible findings at that appointment. Therefore workup was not completed. She has had no gross hematuria, she has never been a smoker. She has no issues with UTIs or dysuria. She states that she wears a safety pad, but currently does not want any treatment for urgency as it is not too severe. No other urological or gynecological history. She has had a total abdominal hysterectomy with bilateral salpingo oophorectomy. She has had no pelvic slings. She has had 5 natural deliveries without issue. She has no evidence constipation. She has both a father and son with history of bladder cancer, and stones within her family history.   Allergies:  Erythromycin and Macrolide antibiotics    Medications:  has a current medication list which includes the following prescription(s): acetaminophen, alprazolam, amlodipine, amlodipine, aspirin, cholecalciferol (vitamin d3), dicyclomine, diphenoxylate-atropine 2.5-0.025 mg, esomeprazole, fluticasone propionate, hydrocodone-acetaminophen, levothyroxine, creon, lisinopril, loratadine,  melatonin, fish oil-omega-3 fatty acids, sertraline, and walker.    Review of Systems:  General: No fever, chills, fatigability, or weight loss.  Skin: No rashes, itching, or changes in color or texture of skin.  Chest: Denies CURTIS, cyanosis, wheezing, cough, and sputum production.  Abdomen: Appetite fine. No weight loss. Denies diarrhea, abdominal pain, hematemesis, or blood in stool.  Musculoskeletal: No joint stiffness or swelling. Denies back pain.  : As above.  All other review of systems negative.    PMH:   has a past medical history of Anxiety, Asymptomatic stenosis of both carotid arteries without infarction (1/28/2022), Atypical chest pain (11/9/2017), CAD (coronary artery disease) (11/18/2014), Chronic diastolic heart failure (8/17/2017), Coronary artery disease, Depression, Fractures, Hot flash, menopausal, HTN (hypertension), Hyperlipidemia, Hypothyroid, Nonrheumatic mitral valve regurgitation (10/19/2022), Nonrheumatic tricuspid valve regurgitation (10/19/2022), and Pulmonary hypertension (8/17/2017).    PSH:   has a past surgical history that includes Hysterectomy; open luis; Cholecystectomy; and Carpal tunnel release (Bilateral).    FamHx: family history includes Hypertension in her mother.    SocHx:  reports that she has never smoked. She has never used smokeless tobacco. She reports that she does not drink alcohol and does not use drugs.      Physical Exam:  General: A&Ox3, no apparent distress, no deformities  Neck: No masses, normal thyroid  Lungs: normal inspiration, no use of accessory muscles  Heart: normal pulse, no arrhythmias  Abdomen: Soft, NT, ND, no masses, no hernias, no hepatosplenomegaly  Lymphatic: Neck and groin nodes negative  Skin: The skin is warm and dry. No jaundice.  Ext: No c/c/e.    Labs/Studies:   See HPI  Impression/Plan:   Microscopic hematuria- at this point due to the fact that she is relatively low risk, but does have a family history will follow closely.  She has  trace blood today and will send it for microscopic analysis.  I will see her back in 1 month and repeat the urinalysis.  Obviously if this returns as true microscopic hematuria then further workup will be warranted.

## 2025-02-28 ENCOUNTER — HOSPITAL ENCOUNTER (OUTPATIENT)
Dept: RADIOLOGY | Facility: HOSPITAL | Age: 85
Discharge: HOME | End: 2025-02-28
Attending: NURSE PRACTITIONER
Payer: MEDICARE

## 2025-02-28 ENCOUNTER — RESULTS FOLLOW-UP (OUTPATIENT)
Dept: UROLOGY | Facility: CLINIC | Age: 85
End: 2025-02-28

## 2025-02-28 ENCOUNTER — PATIENT MESSAGE (OUTPATIENT)
Dept: UROLOGY | Facility: CLINIC | Age: 85
End: 2025-02-28
Payer: MEDICARE

## 2025-02-28 DIAGNOSIS — R10.9 FLANK PAIN: ICD-10-CM

## 2025-02-28 LAB
BACTERIA #/AREA URNS AUTO: ABNORMAL /HPF
BILIRUB UR QL STRIP: NEGATIVE
CLARITY UR REFRACT.AUTO: CLEAR
COLOR UR AUTO: YELLOW
GLUCOSE UR QL STRIP: NEGATIVE
HGB UR QL STRIP: ABNORMAL
KETONES UR QL STRIP: NEGATIVE
LEUKOCYTE ESTERASE UR QL STRIP: NEGATIVE
MICROSCOPIC COMMENT: ABNORMAL
NITRITE UR QL STRIP: NEGATIVE
PH UR STRIP: 6 [PH] (ref 5–8)
PROT UR QL STRIP: ABNORMAL
RBC #/AREA URNS AUTO: 8 /HPF (ref 0–4)
SP GR UR STRIP: 1.03 (ref 1–1.03)
URN SPEC COLLECT METH UR: ABNORMAL
WBC #/AREA URNS AUTO: 1 /HPF (ref 0–5)

## 2025-02-28 PROCEDURE — 76770 US EXAM ABDO BACK WALL COMP: CPT | Mod: TC

## 2025-02-28 PROCEDURE — 76770 US EXAM ABDO BACK WALL COMP: CPT | Mod: 26,,, | Performed by: RADIOLOGY

## 2025-03-14 ENCOUNTER — TELEPHONE (OUTPATIENT)
Dept: GASTROENTEROLOGY | Facility: CLINIC | Age: 85
End: 2025-03-14
Payer: MEDICARE

## 2025-03-14 NOTE — TELEPHONE ENCOUNTER
Pt dropped off medical records to the office from [facility]. Scanned to pts chart under media. Pt is scheduled for a virtual appointment with Ms Delta James on 03/25/25.

## 2025-03-25 ENCOUNTER — PATIENT MESSAGE (OUTPATIENT)
Dept: GASTROENTEROLOGY | Facility: CLINIC | Age: 85
End: 2025-03-25

## 2025-03-25 ENCOUNTER — OFFICE VISIT (OUTPATIENT)
Dept: GASTROENTEROLOGY | Facility: CLINIC | Age: 85
End: 2025-03-25
Payer: MEDICARE

## 2025-03-25 VITALS — HEIGHT: 63 IN | WEIGHT: 156 LBS | BODY MASS INDEX: 27.64 KG/M2

## 2025-03-25 DIAGNOSIS — K86.89 PANCREATIC INSUFFICIENCY: Primary | ICD-10-CM

## 2025-03-25 DIAGNOSIS — K44.9 HIATAL HERNIA: ICD-10-CM

## 2025-03-25 DIAGNOSIS — K52.9 CHRONIC DIARRHEA: ICD-10-CM

## 2025-03-25 DIAGNOSIS — K21.9 GASTROESOPHAGEAL REFLUX DISEASE WITHOUT ESOPHAGITIS: ICD-10-CM

## 2025-03-25 RX ORDER — ESOMEPRAZOLE MAGNESIUM 40 MG/1
1 CAPSULE, DELAYED RELEASE ORAL EVERY MORNING
COMMUNITY
Start: 2025-02-14

## 2025-03-25 RX ORDER — PANCRELIPASE 24000; 76000; 120000 [USP'U]/1; [USP'U]/1; [USP'U]/1
1 CAPSULE, DELAYED RELEASE PELLETS ORAL
Qty: 90 CAPSULE | Refills: 1 | Status: SHIPPED | OUTPATIENT
Start: 2025-03-25

## 2025-03-25 RX ORDER — DICYCLOMINE HYDROCHLORIDE 20 MG/1
20 TABLET ORAL EVERY 6 HOURS PRN
COMMUNITY
Start: 2025-02-14

## 2025-03-25 RX ORDER — EVOLOCUMAB 140 MG/ML
140 INJECTION, SOLUTION SUBCUTANEOUS
COMMUNITY

## 2025-03-25 RX ORDER — SERTRALINE HYDROCHLORIDE 100 MG/1
1 TABLET, FILM COATED ORAL EVERY MORNING
COMMUNITY
Start: 2025-02-14 | End: 2026-02-14

## 2025-03-25 RX ORDER — HYDROCODONE BITARTRATE AND ACETAMINOPHEN 10; 325 MG/1; MG/1
1 TABLET ORAL 2 TIMES DAILY PRN
COMMUNITY
Start: 2025-02-11 | End: 2025-05-12

## 2025-03-25 NOTE — PROGRESS NOTES
Subjective:      Patient ID: Rebecca Rios is a 84 y.o. female.    Chief Complaint: Diarrhea (Patient states she has IBS. She has been on Creon. Patient would like to know if she needs another RX. )    The patient location is: [place]  The chief complaint leading to consultation is: See above    Visit type: audiovisual    Face to Face time with patient: 23 minutes  26 minutes of total time spent on the encounter, which includes face to face time and non-face to face time preparing to see the patient (eg, review of tests), Obtaining and/or reviewing separately obtained history, Documenting clinical information in the electronic or other health record, Independently interpreting results (not separately reported) and communicating results to the patient/family/caregiver, or Care coordination (not separately reported).         Each patient to whom he or she provides medical services by telemedicine is:  (1) informed of the relationship between the physician and patient and the respective role of any other health care provider with respect to management of the patient; and (2) notified that he or she may decline to receive medical services by telemedicine and may withdraw from such care at any time.    Notes:     HPI  The patient has a history of large hiatal hernia, GERD, IBS-D, pancreatic insufficiency, colon polyps, diverticulosis and hemorrhoids. She was last seen in 2021 by Celeste Ruvalcaba. More recently she was followed by Dr. Mckinley at [facility].     Patient says today is a good day. She is accompanied by her daughter. The patient's GI recently moved so she wanted to establish with someone at [facility]. She is taking Creon three times a day for EPI but still having diarrhea. It has been somewhat worse lately. 1-2 times a week she has a bad episode of diarrhea without warning. She has to go back and forth to the bathroom several times. She wears protective garments in case of accidents. Outside of these episodes, she  "has a "regular" soft stool every day or every other day. She does report occasional formed stool followed by several loose stools. Diarrhea doesn't wake her. Bad episodes are associated with nausea and abdominal cramping. She denies vomiting, hematochezia or melena.   Lately, she cut back to 2 Creon a day to try and make them last until she could get in to see us.   She usually eats two meals a day. She says she doesn't get very hungry but that her weight has been stable lately.   Imodium doesn't help so she doesn't usually take it.   She has been prescribed Lomotil through the years but hasn't used it in awhile. She only gets a few prescribed at a time; she last used it when she had to go to a wedding.      Records from [facility] were placed in the chart under the Media tab. Labs, stools, scopes, imaging and progress notes were reviewed for this encounter.     Review of Systems  As per HPI.     Objective:     Physical Exam  Constitutional:       General: She is not in acute distress.     Appearance: She is well-developed.   HENT:      Head: Normocephalic and atraumatic.   Pulmonary:      Effort: Pulmonary effort is normal.   Neurological:      Mental Status: She is alert and oriented to person, place, and time.      Cranial Nerves: No cranial nerve deficit.   Psychiatric:         Behavior: Behavior normal.         Assessment:     1. Pancreatic insufficiency    2. Chronic diarrhea    3. Gastroesophageal reflux disease without esophagitis    4. Hiatal hernia        Plan:     Not sure if EPI is the only cause of her diarrhea. KUB to assess for overflow.   Will check pancreatic elastase to see if she is responding to Creon. If not, may need to increase the dose. Discussed how to take Creon and why it's used. She needs to be taking it prior to collecting the stool. A refill will be sent.   Depending on results, may have her start Metamucil bid.     Orders Placed This Encounter   Procedures    X-Ray Abdomen Flat And " Erect    Pancreatic elastase, fecal     Medications Ordered This Encounter   Medications    lipase-protease-amylase 24,000-76,000-120,000 units (CREON) 24,000-76,000 -120,000 unit capsule     Sig: Take 1 capsule by mouth 3 (three) times daily with meals.     Dispense:  90 capsule     Refill:  1       Follow up in about 4 weeks (around 4/22/2025).    Thank you for the opportunity to participate in the care of this patient.   Delta James PA-C.

## 2025-03-28 ENCOUNTER — HOSPITAL ENCOUNTER (OUTPATIENT)
Dept: RADIOLOGY | Facility: HOSPITAL | Age: 85
Discharge: HOME | End: 2025-03-28
Attending: PHYSICIAN ASSISTANT
Payer: MEDICARE

## 2025-03-28 DIAGNOSIS — K52.9 CHRONIC DIARRHEA: ICD-10-CM

## 2025-03-28 PROCEDURE — 74019 RADEX ABDOMEN 2 VIEWS: CPT | Mod: 26,,, | Performed by: RADIOLOGY

## 2025-03-28 PROCEDURE — 74019 RADEX ABDOMEN 2 VIEWS: CPT | Mod: TC,PO

## 2025-03-29 ENCOUNTER — PATIENT MESSAGE (OUTPATIENT)
Dept: GASTROENTEROLOGY | Facility: CLINIC | Age: 85
End: 2025-03-29
Payer: MEDICARE

## 2025-03-30 ENCOUNTER — PATIENT MESSAGE (OUTPATIENT)
Dept: GASTROENTEROLOGY | Facility: CLINIC | Age: 85
End: 2025-03-30
Payer: MEDICARE

## 2025-03-30 ENCOUNTER — RESULTS FOLLOW-UP (OUTPATIENT)
Dept: GASTROENTEROLOGY | Facility: CLINIC | Age: 85
End: 2025-03-30

## 2025-04-01 ENCOUNTER — TELEPHONE (OUTPATIENT)
Dept: GASTROENTEROLOGY | Facility: CLINIC | Age: 85
End: 2025-04-01
Payer: MEDICARE

## 2025-04-01 DIAGNOSIS — K86.89 PANCREATIC INSUFFICIENCY: Primary | ICD-10-CM

## 2025-04-01 NOTE — TELEPHONE ENCOUNTER
----- Message from Nurse Bowden sent at 4/1/2025 11:29 AM CDT -----  Regarding: RE: Orders  Yes, sorry that was the first day of Beaker roll out. We will get a new order placed.  ----- Message -----  From: Preeti Piedra  Sent: 4/1/2025  11:28 AM CDT  To: [facility] Staff  Subject: Orders                                           Pt came in yesterday afternoon and dropped off a stool sample. There are no active requests. If possible, can we get the orders put in so the stool can be processed today?

## 2025-04-04 ENCOUNTER — RESULTS FOLLOW-UP (OUTPATIENT)
Dept: GASTROENTEROLOGY | Facility: CLINIC | Age: 85
End: 2025-04-04

## 2025-04-04 DIAGNOSIS — K86.89 PANCREATIC INSUFFICIENCY: ICD-10-CM

## 2025-04-04 LAB — ELASTASE PANC STL-MCNT: 166 MCG/G

## 2025-04-04 RX ORDER — PANCRELIPASE 24000; 76000; 120000 [USP'U]/1; [USP'U]/1; [USP'U]/1
2 CAPSULE, DELAYED RELEASE PELLETS ORAL
Qty: 180 CAPSULE | Refills: 1 | Status: SHIPPED | OUTPATIENT
Start: 2025-04-04

## 2025-04-22 ENCOUNTER — PATIENT MESSAGE (OUTPATIENT)
Dept: GASTROENTEROLOGY | Facility: CLINIC | Age: 85
End: 2025-04-22

## 2025-04-22 ENCOUNTER — OFFICE VISIT (OUTPATIENT)
Dept: GASTROENTEROLOGY | Facility: CLINIC | Age: 85
End: 2025-04-22
Payer: MEDICARE

## 2025-04-22 VITALS — HEIGHT: 63 IN | WEIGHT: 157 LBS | BODY MASS INDEX: 27.82 KG/M2

## 2025-04-22 DIAGNOSIS — K44.9 HIATAL HERNIA: ICD-10-CM

## 2025-04-22 DIAGNOSIS — R19.7 OVERFLOW DIARRHEA: ICD-10-CM

## 2025-04-22 DIAGNOSIS — K86.89 PANCREATIC INSUFFICIENCY: ICD-10-CM

## 2025-04-22 DIAGNOSIS — K52.9 CHRONIC DIARRHEA: ICD-10-CM

## 2025-04-22 DIAGNOSIS — K21.9 GASTROESOPHAGEAL REFLUX DISEASE WITHOUT ESOPHAGITIS: ICD-10-CM

## 2025-04-22 PROCEDURE — 98005 SYNCH AUDIO-VIDEO EST LOW 20: CPT | Mod: 95,,, | Performed by: PHYSICIAN ASSISTANT

## 2025-04-22 NOTE — PROGRESS NOTES
Subjective:      Patient ID: Rebecca Rios is a 84 y.o. female.    Chief Complaint: Diarrhea (4 wk f/u//abd cramping//wants to know if any certain foods/diet to help)    The patient location is: [place]  The chief complaint leading to consultation is: See above    Visit type: audiovisual    Face to Face time with patient: 10 minutes  15 minutes of total time spent on the encounter, which includes face to face time and non-face to face time preparing to see the patient (eg, review of tests), Obtaining and/or reviewing separately obtained history, Documenting clinical information in the electronic or other health record, Independently interpreting results (not separately reported) and communicating results to the patient/family/caregiver, or Care coordination (not separately reported).         Each patient to whom he or she provides medical services by telemedicine is:  (1) informed of the relationship between the physician and patient and the respective role of any other health care provider with respect to management of the patient; and (2) notified that he or she may decline to receive medical services by telemedicine and may withdraw from such care at any time.    Notes:     HPI  The patient has a history of pancreatic insufficiency, IBS-D, colon polyps, diverticulosis, hemorrhoids, GERD and large hiatal hernia. She was last seen in March and reported diarrhea episodes 1-2 times a week without warning. She did report cutting back on Creon to try and make it last.     Recommendations last visit included AXR, pancreatic elastase and Creon refilled. Her AXR showed moderate stool through the colon. Laxative followed by Metamucil was recommended. Her pancreatic elastase was low so I recommended increasing to two pills with every meal.   Today she reports doing better since last visit but had a bout of diarrhea two days ago after eating an Easter meal. It occurred again yesterday afternoon. It wasn't quite as bad as prior  "episodes. She increased the Creon as recommended and taking two with each meal. She forgot to start the Metamucil.     She said her original pancreatic elastase number was 134. This last one was 166.    Review of Systems  As per HPI.     Objective:     Physical Exam  Constitutional:       General: She is not in acute distress.     Appearance: She is well-developed.   HENT:      Head: Normocephalic and atraumatic.   Pulmonary:      Effort: Pulmonary effort is normal.   Neurological:      Mental Status: She is alert and oriented to person, place, and time.      Cranial Nerves: No cranial nerve deficit.   Psychiatric:         Behavior: Behavior normal.         Assessment:     1. Chronic diarrhea    2. Pancreatic insufficiency    3. Overflow diarrhea    4. Hiatal hernia    5. Gastroesophageal reflux disease without esophagitis        Plan:     Patient doing better but still with a diarrhea episode recently.   Recommend starting the Metamucil bid and why.   Continue with two Creon before meals. Last elastase level better and in "borderline" range. Will repeat again in 8 weeks.   Call us sooner if issues.     Orders Placed This Encounter   Procedures    Pancreatic elastase, fecal       Follow up if symptoms worsen or fail to improve.    Thank you for the opportunity to participate in the care of this patient.   Delta James PA-C.      "

## 2025-05-06 ENCOUNTER — PATIENT MESSAGE (OUTPATIENT)
Dept: GASTROENTEROLOGY | Facility: CLINIC | Age: 85
End: 2025-05-06
Payer: MEDICARE

## 2025-05-06 ENCOUNTER — TELEPHONE (OUTPATIENT)
Dept: GASTROENTEROLOGY | Facility: CLINIC | Age: 85
End: 2025-05-06
Payer: MEDICARE

## 2025-05-06 NOTE — TELEPHONE ENCOUNTER
----- Message from Deepti sent at 5/5/2025  5:34 PM CDT -----  Type:  Appointment Request Name of Caller:Delma is the first available appointment?No accessWould the patient rather a call back or a response via [facility]? [facility] Call Back Number:[phone] Additional Information: Pt is requesting a appt next available isn't until June please contact pt with further information

## 2025-06-12 DIAGNOSIS — I10 ESSENTIAL HYPERTENSION: ICD-10-CM

## 2025-06-12 DIAGNOSIS — I25.119 CORONARY ARTERY DISEASE INVOLVING NATIVE CORONARY ARTERY OF NATIVE HEART WITH ANGINA PECTORIS: ICD-10-CM

## 2025-06-12 RX ORDER — LISINOPRIL 40 MG/1
40 TABLET ORAL DAILY
Qty: 90 TABLET | Refills: 3 | Status: SHIPPED | OUTPATIENT
Start: 2025-06-12

## 2025-07-24 ENCOUNTER — TELEPHONE (OUTPATIENT)
Dept: CARDIOLOGY | Facility: CLINIC | Age: 85
End: 2025-07-24
Payer: MEDICARE

## 2025-07-24 ENCOUNTER — TELEPHONE (OUTPATIENT)
Dept: DERMATOLOGY | Facility: CLINIC | Age: 85
End: 2025-07-24
Payer: MEDICARE

## 2025-07-24 NOTE — TELEPHONE ENCOUNTER
Tried contacting patient regarding scheduling appointment, no answer. LVM for patient to call back to schedule.     Copied from CRM #[id number]. Topic: Appointments - Appointment Access  >> Jul 24, 2025 12:47 PM Ancelmo wrote:  Type:  Same Day Appointment Request    Caller is requesting a same day appointment.  Caller declined first available appointment listed below.    Name of Caller:Rebecca Rios  When is the first available appointment?need to be seen today  Symptoms:rash/ wives, went to the urgent care and it has got worse  Best Call Back Number:[phone]  Additional Information: [id number]

## 2025-07-24 NOTE — TELEPHONE ENCOUNTER
Contacted PT and schedule PT apt PT stated verbal understanding      Copied from CRM #[id number]. Topic: Appointments - Appointment Access  >> Jul 24, 2025 12:51 PM Ancelmo wrote:  Type:  Sooner Apoointment Request    Caller is requesting a sooner appointment.  Caller declined first available appointment listed below.  Caller will not accept being placed on the waitlist and is requesting a message be sent to doctor.  Name of Caller:Rebecca Rios  When is the first available appointment?next availability  Symptoms:annual  Would the patient rather a call back or a response via [facility]? Call back  Best Call Back Number:[phone]   Additional Information: [id number]

## 2025-08-04 ENCOUNTER — OFFICE VISIT (OUTPATIENT)
Dept: DERMATOLOGY | Facility: CLINIC | Age: 85
End: 2025-08-04
Payer: MEDICARE

## 2025-08-04 DIAGNOSIS — L25.9 CONTACT DERMATITIS, UNSPECIFIED CONTACT DERMATITIS TYPE, UNSPECIFIED TRIGGER: Primary | ICD-10-CM

## 2025-08-04 PROCEDURE — 99214 OFFICE O/P EST MOD 30 MIN: CPT | Mod: PBBFAC | Performed by: DERMATOLOGY

## 2025-08-04 PROCEDURE — G2211 COMPLEX E/M VISIT ADD ON: HCPCS | Mod: ,,, | Performed by: DERMATOLOGY

## 2025-08-04 PROCEDURE — 99203 OFFICE O/P NEW LOW 30 MIN: CPT | Mod: S$PBB,,, | Performed by: DERMATOLOGY

## 2025-08-04 PROCEDURE — 99999 PR PBB SHADOW E&M-EST. PATIENT-LVL IV: CPT | Mod: PBBFAC,,, | Performed by: DERMATOLOGY

## 2025-08-04 RX ORDER — KETOCONAZOLE 20 MG/G
CREAM TOPICAL
Qty: 60 G | Refills: 3 | Status: SHIPPED | OUTPATIENT
Start: 2025-08-04

## 2025-08-04 RX ORDER — TRIAMCINOLONE ACETONIDE 1 MG/G
CREAM TOPICAL 2 TIMES DAILY
Qty: 30 G | Refills: 0 | Status: SHIPPED | OUTPATIENT
Start: 2025-08-04

## 2025-08-04 RX ORDER — TRIAMCINOLONE ACETONIDE 1 MG/G
CREAM TOPICAL 2 TIMES DAILY
Qty: 30 G | Refills: 0 | Status: SHIPPED | OUTPATIENT
Start: 2025-08-04 | End: 2025-08-04 | Stop reason: SDUPTHER

## 2025-08-04 RX ORDER — KETOCONAZOLE 20 MG/G
CREAM TOPICAL
Qty: 60 G | Refills: 3 | Status: SHIPPED | OUTPATIENT
Start: 2025-08-04 | End: 2025-08-04 | Stop reason: SDUPTHER

## 2025-08-04 NOTE — PATIENT INSTRUCTIONS
Recommend applying ketoconazole cream twice a day, every day  Recommend applying triamcinolone cream twice a day for the next 2 weeks   Recommend Dove for Sensitive Skin soap  Recommend All Free and Clear or Tide Free and Clear laundry detergent.  No softeners/fragrance additives/dryer sheets.  Frequently air out folds while at home (use a hand-held blow dryer set to cool)

## 2025-08-04 NOTE — PROGRESS NOTES
Subjective:      Patient ID:  Rebecca Rios is a 84 y.o. female who presents for   Chief Complaint   Patient presents with    Rash     C/o rash on lower abdomen and groin itching      History of Present Illness: The patient presents with chief complaint of rash.  Location: lower abdomen, groin  Duration: 1 month  Signs/Symptoms: red itchy rash, burns    Prior treatments:   Urgent care 7/2: po metronidazole, 2 courses of po fluconazole x 3 days (last taken 1 week ago), nystatin ointment  - has gotten worse    Her daughter thinks it may have been from some adult diapers she recently had to wear frequently given some frequent diarrhea.  She has now just stopped wearing them.    Changed to different laundry pods during rash  Used dial soap, then changed to Vagisil soap    No rash on hands/feet  Has one dog at home  No positive contacts with rash      Review of Systems   Skin:  Positive for itching and rash.       Objective:   Physical Exam   Constitutional: She appears well-developed and well-nourished. No distress.   Neurological: She is alert and oriented to person, place, and time. She is not disoriented.   Psychiatric: She has a normal mood and affect.   Skin:   Areas Examined (abnormalities noted in diagram):   Abdomen Inspection Performed  Genitals / Buttocks / Groin Inspection Performed            Diagram Legend     Erythematous scaling macule/papule c/w actinic keratosis       Vascular papule c/w angioma      Pigmented verrucoid papule/plaque c/w seborrheic keratosis      Yellow umbilicated papule c/w sebaceous hyperplasia      Irregularly shaped tan macule c/w lentigo     1-2 mm smooth white papules consistent with Milia      Movable subcutaneous cyst with punctum c/w epidermal inclusion cyst      Subcutaneous movable cyst c/w pilar cyst      Firm pink to brown papule c/w dermatofibroma      Pedunculated fleshy papule(s) c/w skin tag(s)      Evenly pigmented macule c/w junctional nevus     Mildly variegated  pigmented, slightly irregular-bordered macule c/w mildly atypical nevus      Flesh colored to evenly pigmented papule c/w intradermal nevus       Pink pearly papule/plaque c/w basal cell carcinoma      Erythematous hyperkeratotic cursted plaque c/w SCC      Surgical scar with no sign of skin cancer recurrence      Open and closed comedones      Inflammatory papules and pustules      Verrucoid papule consistent consistent with wart     Erythematous eczematous patches and plaques     Dystrophic onycholytic nail with subungual debris c/w onychomycosis     Umbilicated papule    Erythematous-base heme-crusted tan verrucoid plaque consistent with inflamed seborrheic keratosis     Erythematous Silvery Scaling Plaque c/w Psoriasis     See annotation      Assessment / Plan:        Contact dermatitis, unspecified contact dermatitis type, unspecified trigger  -     triamcinolone acetonide 0.1% (KENALOG) 0.1 % cream; Apply topically 2 (two) times daily. For up to 2 weeks only  Dispense: 30 g; Refill: 0  -     ketoconazole (NIZORAL) 2 % cream; AAA bid  Dispense: 60 g; Refill: 3    Vs intertrigo, but appears more inflammatory than intertrigo. Suspect ACD vs ICD to adult diapers possibly (recommended avoiding as much as possible)  - scraping negative for fungal elements  -discussed etiology and nature of condition, management options    Side effect profile reviewed for topical steroids including atrophy, telangiectasia and striae development with prolonged usage.  Patient acknowledged understanding.    Recommend applying ketoconazole cream twice a day, every day  Recommend applying triamcinolone cream twice a day for the next 2 weeks   Recommend Dove for Sensitive Skin soap  Recommend All Free and Clear or Tide Free and Clear laundry detergent.  No softeners/fragrance additives/dryer sheets.  Frequently air out folds while at home (use a hand-held blow dryer set to cool)           Follow up in about 2 weeks (around  8/18/2025).    [email or web address]      LOS NUMBER AND COMPLEXITY OF PROBLEMS    COMPLEXITY OF DATA RISK TOTAL TIME (m)   53936  55918 [] 1 self-limited or minor problem [x] Minimal to none [] No treatment recommended or patient to monitor. Reassurance.  15-29  10-19   78829  44894 Low  [] 2 or more self limited or minor problems  [] 1 stable chronic illness  [x] 1 acute, uncomplicated illness or injury Limited (2)  [] Prior external notes from each unique source  [] Review result of each unique test  [] Order each unique test  OR [] Independent historian Low  []  OTC medications   []  Discussed/Decision for minor skin surgery (no risk factors) 30-44  20-29   06816  78223 Moderate  []  1 or more chronic unstable illness (not at goal or progression or exacerbation) or SE of treatment  []  2 or more stable chronic illnesses  []  1 acute illness with systemic symptoms  []  1 acute complicated injury  []  1 undiagnosed new problem with uncertain prognosis Moderate (1/3 below)  []  3 or more data items        *Now includes independent historian  []  Independent interpretation of a test  []  Discuss management/test with another provider Moderate  [x]  Prescription drug mgmt  []  Discussed/Decision for Minor surgery with risk factors  []  Mgmt limited by social determinates 45-59  30-39   34194  67201 High  []  1 or more chronic illness with severe exacerbation, progression or SE of treatment  []  1 acute or chronic illness/injury that poses a threat to life or bodily function Extensive (2/3 below)  []  3 or more data items        *Now includes independent historian.  []  Independent interpretation of a test  []  Discuss management/test with another provider High  []  Major surgery with risk discussed  []  Drug therapy requiring intensive monitoring for toxicity  []  Hospitalization  []  Decision for DNR 60-74  40-54

## 2025-08-06 ENCOUNTER — TELEPHONE (OUTPATIENT)
Dept: DERMATOLOGY | Facility: CLINIC | Age: 85
End: 2025-08-06
Payer: MEDICARE

## 2025-08-06 DIAGNOSIS — B35.4 TINEA CORPORIS: Primary | ICD-10-CM

## 2025-08-06 NOTE — TELEPHONE ENCOUNTER
Copied from CRM #[id number]. Topic: General Inquiry - Patient Advice  >> Aug 6, 2025 10:11 AM Niraj wrote:  Type:  Needs Medical Advice    Who Called: Rebecca   Symptoms (please be specific): itching   How long has patient had these symptoms:    Pharmacy name and phone #:    Would the patient rather a call back or a response via [facility]? Call back   Best Call Back Number: [phone]   Additional Information: pt states she is needing medication for itching

## 2025-08-07 RX ORDER — TERBINAFINE HYDROCHLORIDE 250 MG/1
250 TABLET ORAL DAILY
Qty: 30 TABLET | Refills: 0 | Status: SHIPPED | OUTPATIENT
Start: 2025-08-07 | End: 2025-09-06

## 2025-08-07 NOTE — TELEPHONE ENCOUNTER
Informed pt of stopping topicals and start oral medication.  Edu her of side effects and possible liver injury.  Pt verbalized understanding.

## 2025-08-19 ENCOUNTER — TELEPHONE (OUTPATIENT)
Dept: DERMATOLOGY | Facility: CLINIC | Age: 85
End: 2025-08-19
Payer: MEDICARE

## 2025-08-21 ENCOUNTER — TELEPHONE (OUTPATIENT)
Dept: OBSTETRICS AND GYNECOLOGY | Facility: CLINIC | Age: 85
End: 2025-08-21
Payer: MEDICARE

## 2025-08-22 ENCOUNTER — OFFICE VISIT (OUTPATIENT)
Dept: URGENT CARE | Facility: CLINIC | Age: 85
End: 2025-08-22
Payer: MEDICARE

## 2025-08-22 ENCOUNTER — TELEPHONE (OUTPATIENT)
Dept: OBSTETRICS AND GYNECOLOGY | Facility: CLINIC | Age: 85
End: 2025-08-22
Payer: MEDICARE

## 2025-08-22 VITALS
OXYGEN SATURATION: 96 % | RESPIRATION RATE: 18 BRPM | SYSTOLIC BLOOD PRESSURE: 151 MMHG | HEART RATE: 77 BPM | DIASTOLIC BLOOD PRESSURE: 79 MMHG | HEIGHT: 63 IN | BODY MASS INDEX: 27.82 KG/M2 | WEIGHT: 157 LBS | TEMPERATURE: 99 F

## 2025-08-22 DIAGNOSIS — R21 RASH: ICD-10-CM

## 2025-08-22 DIAGNOSIS — N30.01 ACUTE CYSTITIS WITH HEMATURIA: ICD-10-CM

## 2025-08-22 DIAGNOSIS — R30.0 DYSURIA: Primary | ICD-10-CM

## 2025-08-22 LAB
BILIRUBIN, UA POC OHS: NEGATIVE
BLOOD, UA POC OHS: ABNORMAL
CLARITY, UA POC OHS: CLEAR
COLOR, UA POC OHS: YELLOW
GLUCOSE, UA POC OHS: NEGATIVE
KETONES, UA POC OHS: ABNORMAL
LEUKOCYTES, UA POC OHS: ABNORMAL
NITRITE, UA POC OHS: POSITIVE
PH, UA POC OHS: 6.5
PROTEIN, UA POC OHS: 100
SPECIFIC GRAVITY, UA POC OHS: 1.01
UROBILINOGEN, UA POC OHS: 1

## 2025-08-22 PROCEDURE — 87186 SC STD MICRODIL/AGAR DIL: CPT

## 2025-08-22 RX ORDER — NITROFURANTOIN 25; 75 MG/1; MG/1
100 CAPSULE ORAL 2 TIMES DAILY
Qty: 10 CAPSULE | Refills: 0 | Status: SHIPPED | OUTPATIENT
Start: 2025-08-22

## 2025-08-25 ENCOUNTER — OFFICE VISIT (OUTPATIENT)
Dept: OBSTETRICS AND GYNECOLOGY | Facility: CLINIC | Age: 85
End: 2025-08-25
Payer: MEDICARE

## 2025-08-25 VITALS
DIASTOLIC BLOOD PRESSURE: 74 MMHG | BODY MASS INDEX: 27.03 KG/M2 | SYSTOLIC BLOOD PRESSURE: 138 MMHG | HEIGHT: 63 IN | WEIGHT: 152.56 LBS

## 2025-08-25 DIAGNOSIS — L29.2 VULVAR ITCHING: Primary | ICD-10-CM

## 2025-08-25 DIAGNOSIS — B96.89 BV (BACTERIAL VAGINOSIS): ICD-10-CM

## 2025-08-25 DIAGNOSIS — N76.0 BV (BACTERIAL VAGINOSIS): ICD-10-CM

## 2025-08-25 DIAGNOSIS — Z12.31 ENCOUNTER FOR SCREENING MAMMOGRAM FOR MALIGNANT NEOPLASM OF BREAST: ICD-10-CM

## 2025-08-25 LAB
GARDNERELLA VAGINALIS: ABNORMAL
OTHER MICROSC. OBSERVATIONS: ABNORMAL
POC BACTERIAL VAGINOSIS: ABNORMAL
POC CLUE CELLS: POSITIVE
TRICHOMONAS, POC: NEGATIVE
YEAST WET PREP: NEGATIVE

## 2025-08-25 PROCEDURE — 99999 PR PBB SHADOW E&M-EST. PATIENT-LVL V: CPT | Mod: PBBFAC,,, | Performed by: NURSE PRACTITIONER

## 2025-08-25 PROCEDURE — 99999PBSHW POCT WET PREP: Mod: PBBFAC,,,

## 2025-08-25 PROCEDURE — 99214 OFFICE O/P EST MOD 30 MIN: CPT | Mod: S$PBB,,, | Performed by: NURSE PRACTITIONER

## 2025-08-25 PROCEDURE — 99215 OFFICE O/P EST HI 40 MIN: CPT | Mod: PBBFAC | Performed by: NURSE PRACTITIONER

## 2025-08-25 PROCEDURE — 99999PBSHW PR PBB SHADOW TECHNICAL ONLY FILED TO HB: Mod: PBBFAC,,,

## 2025-08-25 PROCEDURE — 87210 SMEAR WET MOUNT SALINE/INK: CPT | Mod: PBBFAC | Performed by: NURSE PRACTITIONER

## 2025-08-25 RX ORDER — METRONIDAZOLE 500 MG/1
500 TABLET ORAL 2 TIMES DAILY
Qty: 14 TABLET | Refills: 0 | Status: SHIPPED | OUTPATIENT
Start: 2025-08-25 | End: 2025-09-01

## 2025-08-25 RX ORDER — FLUCONAZOLE 150 MG/1
150 TABLET ORAL
COMMUNITY
Start: 2025-07-08

## 2025-08-25 RX ORDER — LIDOCAINE 50 MG/G
OINTMENT TOPICAL
COMMUNITY

## 2025-08-25 RX ORDER — HYDROXYZINE HYDROCHLORIDE 25 MG/1
12.5-25 TABLET, FILM COATED ORAL EVERY 8 HOURS PRN
COMMUNITY
Start: 2025-07-08

## 2025-08-26 ENCOUNTER — OFFICE VISIT (OUTPATIENT)
Dept: DERMATOLOGY | Facility: CLINIC | Age: 85
End: 2025-08-26
Payer: MEDICARE

## 2025-08-26 DIAGNOSIS — L29.9 PRURITUS: ICD-10-CM

## 2025-08-26 DIAGNOSIS — B35.4 TINEA CORPORIS: Primary | ICD-10-CM

## 2025-08-26 PROCEDURE — 99214 OFFICE O/P EST MOD 30 MIN: CPT | Mod: S$PBB,,, | Performed by: DERMATOLOGY

## 2025-08-26 PROCEDURE — G2211 COMPLEX E/M VISIT ADD ON: HCPCS | Mod: ,,, | Performed by: DERMATOLOGY

## 2025-08-26 PROCEDURE — 99212 OFFICE O/P EST SF 10 MIN: CPT | Mod: PBBFAC | Performed by: DERMATOLOGY

## 2025-08-26 PROCEDURE — 99999 PR PBB SHADOW E&M-EST. PATIENT-LVL II: CPT | Mod: PBBFAC,,, | Performed by: DERMATOLOGY

## 2025-08-26 RX ORDER — HYDROXYZINE HYDROCHLORIDE 25 MG/1
TABLET, FILM COATED ORAL
Qty: 30 TABLET | Refills: 0 | Status: SHIPPED | OUTPATIENT
Start: 2025-08-26

## 2025-09-04 ENCOUNTER — HOSPITAL ENCOUNTER (OUTPATIENT)
Dept: RADIOLOGY | Facility: HOSPITAL | Age: 85
Discharge: HOME | End: 2025-09-04
Attending: NURSE PRACTITIONER
Payer: MEDICARE

## 2025-09-04 VITALS — WEIGHT: 152.56 LBS | HEIGHT: 63 IN | BODY MASS INDEX: 27.03 KG/M2

## 2025-09-04 DIAGNOSIS — Z12.31 ENCOUNTER FOR SCREENING MAMMOGRAM FOR MALIGNANT NEOPLASM OF BREAST: ICD-10-CM

## 2025-09-04 PROCEDURE — 77063 BREAST TOMOSYNTHESIS BI: CPT | Mod: TC
